# Patient Record
Sex: MALE | Race: WHITE | Employment: OTHER | URBAN - NONMETROPOLITAN AREA
[De-identification: names, ages, dates, MRNs, and addresses within clinical notes are randomized per-mention and may not be internally consistent; named-entity substitution may affect disease eponyms.]

---

## 2017-01-05 DIAGNOSIS — F90.0 ADHD (ATTENTION DEFICIT HYPERACTIVITY DISORDER), INATTENTIVE TYPE: Primary | ICD-10-CM

## 2017-01-05 RX ORDER — DEXTROAMPHETAMINE SACCHARATE, AMPHETAMINE ASPARTATE MONOHYDRATE, DEXTROAMPHETAMINE SULFATE AND AMPHETAMINE SULFATE 5; 5; 5; 5 MG/1; MG/1; MG/1; MG/1
20 CAPSULE, EXTENDED RELEASE ORAL
Qty: 60 CAPSULE | Refills: 0 | Status: SHIPPED | OUTPATIENT
Start: 2017-01-05 | End: 2017-02-02

## 2017-01-05 NOTE — TELEPHONE ENCOUNTER
Unable to leave message that the written RX is ready at the [facility]  registration to be picked up.

## 2017-02-02 DIAGNOSIS — F90.0 ADHD (ATTENTION DEFICIT HYPERACTIVITY DISORDER), INATTENTIVE TYPE: Primary | ICD-10-CM

## 2017-02-03 RX ORDER — DEXTROAMPHETAMINE SACCHARATE, AMPHETAMINE ASPARTATE MONOHYDRATE, DEXTROAMPHETAMINE SULFATE AND AMPHETAMINE SULFATE 5; 5; 5; 5 MG/1; MG/1; MG/1; MG/1
CAPSULE, EXTENDED RELEASE ORAL
Qty: 60 CAPSULE | Refills: 0 | Status: SHIPPED | OUTPATIENT
Start: 2017-02-03 | End: 2017-02-22

## 2017-02-22 ENCOUNTER — OFFICE VISIT (OUTPATIENT)
Dept: PEDIATRICS | Facility: OTHER | Age: 47
End: 2017-02-22
Attending: INTERNAL MEDICINE
Payer: MEDICARE

## 2017-02-22 VITALS
SYSTOLIC BLOOD PRESSURE: 122 MMHG | WEIGHT: 215 LBS | OXYGEN SATURATION: 99 % | BODY MASS INDEX: 29.16 KG/M2 | DIASTOLIC BLOOD PRESSURE: 80 MMHG | RESPIRATION RATE: 20 BRPM | HEART RATE: 78 BPM

## 2017-02-22 DIAGNOSIS — F31.32 BIPOLAR AFFECTIVE DISORDER, CURRENTLY DEPRESSED, MODERATE (H): Primary | ICD-10-CM

## 2017-02-22 DIAGNOSIS — F90.0 ADHD (ATTENTION DEFICIT HYPERACTIVITY DISORDER), INATTENTIVE TYPE: ICD-10-CM

## 2017-02-22 DIAGNOSIS — Z79.899 HISTORY OF ONGOING TREATMENT WITH HIGH-RISK MEDICATION: ICD-10-CM

## 2017-02-22 DIAGNOSIS — F41.1 GAD (GENERALIZED ANXIETY DISORDER): ICD-10-CM

## 2017-02-22 PROCEDURE — 99214 OFFICE O/P EST MOD 30 MIN: CPT | Performed by: INTERNAL MEDICINE

## 2017-02-22 PROCEDURE — 99212 OFFICE O/P EST SF 10 MIN: CPT

## 2017-02-22 RX ORDER — BUPROPION HYDROCHLORIDE 300 MG/1
300 TABLET ORAL EVERY MORNING
Qty: 30 TABLET | Refills: 3 | Status: SHIPPED | OUTPATIENT
Start: 2017-02-22 | End: 2017-06-02

## 2017-02-22 RX ORDER — DEXTROAMPHETAMINE SACCHARATE, AMPHETAMINE ASPARTATE MONOHYDRATE, DEXTROAMPHETAMINE SULFATE AND AMPHETAMINE SULFATE 7.5; 7.5; 7.5; 7.5 MG/1; MG/1; MG/1; MG/1
30 CAPSULE, EXTENDED RELEASE ORAL 2 TIMES DAILY
Qty: 60 CAPSULE | Refills: 0 | Status: SHIPPED | OUTPATIENT
Start: 2017-02-22 | End: 2017-03-21

## 2017-02-22 ASSESSMENT — ANXIETY QUESTIONNAIRES
3. WORRYING TOO MUCH ABOUT DIFFERENT THINGS: MORE THAN HALF THE DAYS
IF YOU CHECKED OFF ANY PROBLEMS ON THIS QUESTIONNAIRE, HOW DIFFICULT HAVE THESE PROBLEMS MADE IT FOR YOU TO DO YOUR WORK, TAKE CARE OF THINGS AT HOME, OR GET ALONG WITH OTHER PEOPLE: VERY DIFFICULT
5. BEING SO RESTLESS THAT IT IS HARD TO SIT STILL: MORE THAN HALF THE DAYS
1. FEELING NERVOUS, ANXIOUS, OR ON EDGE: MORE THAN HALF THE DAYS
GAD7 TOTAL SCORE: 17
6. BECOMING EASILY ANNOYED OR IRRITABLE: NEARLY EVERY DAY
7. FEELING AFRAID AS IF SOMETHING AWFUL MIGHT HAPPEN: NEARLY EVERY DAY
2. NOT BEING ABLE TO STOP OR CONTROL WORRYING: MORE THAN HALF THE DAYS

## 2017-02-22 ASSESSMENT — PATIENT HEALTH QUESTIONNAIRE - PHQ9: 5. POOR APPETITE OR OVEREATING: NEARLY EVERY DAY

## 2017-02-22 NOTE — MR AVS SNAPSHOT
After Visit Summary   2/22/2017    Andrei Whitman    MRN: [id number]           Patient Information     Date Of Birth          1970        Visit Information        Provider Department      2/22/2017 8:20 AM Tommy Lamb DO [place]        Today's Diagnoses     Bipolar affective disorder, currently depressed, moderate (H)    -  1    ADHD (attention deficit hyperactivity disorder), inattentive type        SONDRA (generalized anxiety disorder)        Bipolar disease, chronic (H)        Attention deficit hyperactivity disorder (ADHD), predominantly inattentive type           Follow-ups after your visit        Follow-up notes from your care team     Return in about 6 weeks (around 4/5/2017), or SONDRA and ADHD.      Your next 10 appointments already scheduled     Apr 05, 2017  9:20 AM CDT   (Arrive by 9:00 AM)   SHORT with Tommy Lamb DO   [facility] [place] ([facility] [place] [facility]    [address]  [place] [address]   [phone]              Who to contact     If you have questions or need follow up information about today's clinic visit or your schedule please contact [facility] directly at [phone].  Normal or non-critical lab and imaging results will be communicated to you by MyChart, letter or phone within 4 business days after the clinic has received the results. If you do not hear from us within 7 days, please contact the clinic through MyChart or phone. If you have a critical or abnormal lab result, we will notify you by phone as soon as possible.  Submit refill requests through Primeloopt or call your pharmacy and they will forward the refill request to us. Please allow 3 business days for your refill to be completed.          Additional Information About Your Visit        Care EveryWhere ID     This is your Care EveryWhere ID. This could be used by other organizations to access your [place] medical records  [id number]        Your  Vitals Were     Pulse Respirations Pulse Oximetry BMI (Body Mass Index)          78 20 99% 29.16 kg/m2         Blood Pressure from Last 3 Encounters:   02/22/17 122/80   12/20/16 124/80   11/15/16 124/78    Weight from Last 3 Encounters:   02/22/17 215 lb (97.5 kg)   12/20/16 215 lb (97.5 kg)   11/15/16 220 lb 6.4 oz (100 kg)              Today, you had the following     No orders found for display         Today's Medication Changes          These changes are accurate as of: 2/22/17  8:52 AM.  If you have any questions, ask your nurse or doctor.               Start taking these medicines.        Dose/Directions    amphetamine-dextroamphetamine 30 MG per 24 hr capsule   Commonly known as:  ADDERALL XR   Used for:  ADHD (attention deficit hyperactivity disorder), inattentive type, SONDRA (generalized anxiety disorder)   Replaces:  amphetamine-dextroamphetamine 20 MG per 24 hr capsule   Started by:  Tommy Lamb DO        Dose:  30 mg   Take 1 capsule (30 mg) by mouth 2 times daily   Quantity:  60 capsule   Refills:  0       buPROPion 300 MG 24 hr tablet   Commonly known as:  WELLBUTRIN XL   Used for:  Bipolar affective disorder, currently depressed, moderate (H)   Started by:  Tommy Lamb DO        Dose:  300 mg   Take 1 tablet (300 mg) by mouth every morning   Quantity:  30 tablet   Refills:  3         Stop taking these medicines if you haven't already. Please contact your care team if you have questions.     amphetamine-dextroamphetamine 20 MG per 24 hr capsule   Commonly known as:  ADDERALL XR   Replaced by:  amphetamine-dextroamphetamine 30 MG per 24 hr capsule   Stopped by:  Tommy Lamb DO           venlafaxine 75 MG tablet   Commonly known as:  EFFEXOR   Stopped by:  Tommy Lamb DO                Where to get your medicines      These medications were sent to [id number] - [place] - [address]  [address] [address]     Phone:   [phone]     buPROPion 300 MG 24 hr tablet         Some of these will need a paper prescription and others can be bought over the counter.  Ask your nurse if you have questions.     Bring a paper prescription for each of these medications     amphetamine-dextroamphetamine 30 MG per 24 hr capsule                Primary Care Provider Office Phone # Fax #    Tommy Lamb [phone]       [facility] [place] [address] [facility] [address]        Thank you!     Thank you for choosing [facility] [place]  for your care. Our goal is always to provide you with excellent care. Hearing back from our patients is one way we can continue to improve our services. Please take a few minutes to complete the written survey that you may receive in the mail after your visit with us. Thank you!             Your Updated Medication List - Protect others around you: Learn how to safely use, store and throw away your medicines at www.disposemymeds.org.          This list is accurate as of: 2/22/17  8:52 AM.  Always use your most recent med list.                   Brand Name Dispense Instructions for use    amitriptyline 25 MG tablet    ELAVIL    30 tablet    TAKE 1 TABLET BY MOUTH AT B EDTIME       amphetamine-dextroamphetamine 30 MG per 24 hr capsule    ADDERALL XR    60 capsule    Take 1 capsule (30 mg) by mouth 2 times daily       buPROPion 300 MG 24 hr tablet    WELLBUTRIN XL    30 tablet    Take 1 tablet (300 mg) by mouth every morning       gabapentin 300 MG capsule    NEURONTIN    180 capsule    TAKE 2 CAPSULES BY MOUTH TH REE TIMES DAILY       lithium 450 MG CR tablet    ESKALITH    90 tablet    Take two tablets by mouth at 8 am and take one tablet at 4 pm.       Suvorexant 10 MG tablet    BELSOMRA    30 tablet    Take 1 tablet (10 mg) by mouth nightly as needed

## 2017-02-22 NOTE — PROGRESS NOTES
SUBJECTIVE:                                                    Andrei Whitman is a 46 year old male who presents to clinic today for the following health issues:        Depression and Anxiety Follow-Up    Status since last visit: He reports overwhelming depression with a higher dose of venlafaxine and increased irritability.    Other associated symptoms:None    Complicating factors:     Significant life event: No     Current substance abuse: None    PHQ-9 SCORE 12/20/2016 2/22/2017 2/22/2017   Total Score - - -   Total Score 14 18 18     SONDRA-7 SCORE 11/15/2016 12/20/2016 2/22/2017   Total Score 16 14 17        PHQ-9  English      PHQ-9   Any Language     GAD7       Amount of exercise or physical activity: None    Problems taking medications regularly: No    Medication side effects: spacey feeling, doesn't feel good on the Effexor   Diet: regular (no restrictions)    Inattention:  He feels that his his concentration is poor and that he is constantly thinking about the next thing he has to do.      Problem list and histories reviewed & adjusted, as indicated.  Additional history: as documented    Patient Active Problem List   Diagnosis     Cervicalgia     Lower back pain     Piriformis syndrome     Segmental and somatic dysfunction of lower extremity     GERD (gastroesophageal reflux disease)     Constipation     Sorethroat     Mood disorder (H)     Elevated blood pressure     Abnormal weight gain     Attention deficit hyperactivity disorder (ADHD), predominantly inattentive type     ACP (advance care planning)     Flatulence, eructation, and gas pain     Bipolar disease, chronic (H)     Routine general medical examination at a health care facility     Past Surgical History   Procedure Laterality Date     Appendectomy       age 14     Ent surgery  age 18     tonsils     Orthopedic surgery  age 30      back and neck fusion, bone from hip removed to use on plates     Orthopedic surgery  age 30     L5 fusion,  laminectomy of lumbar discs       Social History   Substance Use Topics     Smoking status: Current Every Day Smoker     Packs/day: 1.00     Years: 20.00     Smokeless tobacco: Never Used     Alcohol use No     Family History   Problem Relation Age of Onset     Asthma Mother      Arthritis Mother      Prostate Cancer Father      HEART DISEASE Paternal Grandfather      Hypertension Paternal Grandfather      Alcohol/Drug Paternal Grandfather      Other - See Comments Brother      Nerve and degenerative disease mild     Alcohol/Drug Brother      Other - See Comments Sister 21     Hip replacements, nerve, degerative disease     Alcohol/Drug Maternal Grandfather      Unknown/Adopted Paternal Grandmother      Other Cancer Paternal Aunt      Cervical cancer           ROS:  C: NEGATIVE for fever, chills, change in weight  CONSTITUTIONAL:poor appetite  E/M: NEGATIVE for ear, mouth and throat problems  R: NEGATIVE for significant cough or SOB  CV: NEGATIVE for chest pain, palpitations or peripheral edema  GI: NEGATIVE for nausea, abdominal pain, heartburn, or change in bowel habits  : NEGATIVE for frequency, dysuria, or hematuria  MUSCULOSKELETAL:back pain and neck pain  N: NEGATIVE for weakness, dizziness or paresthesias  PSYCHIATRIC: See HPI    OBJECTIVE:                                                    /80 (BP Location: Left arm, Patient Position: Chair, Cuff Size: Adult Large)  Pulse 78  Resp 20  Wt 215 lb (97.5 kg)  SpO2 99%  BMI 29.16 kg/m2  Body mass index is 29.16 kg/(m^2).  GENERAL: healthy, alert and no distress  NECK: no adenopathy, no asymmetry, masses, or scars and thyroid normal to palpation  RESP: lungs clear to auscultation - no rales, rhonchi or wheezes  CV: regular rate and rhythm, normal S1 S2, no S3 or S4, no murmur, click or rub, no peripheral edema and peripheral pulses strong  MS: no gross musculoskeletal defects noted, no edema  PSYCH: mentation appears normal, affect normal/bright  PSYCH:  anxious    Diagnostic Test Results:  none      ASSESSMENT/PLAN:                                                    (F31.32) Bipolar affective disorder, currently depressed, moderate (H)  (primary encounter diagnosis)  Comment: Increased agitation which is most likely due to medication side effect and under treated ADHD.  Plan:   Restart buPROPion (WELLBUTRIN XL) 300 MG 24 hr tablet and discontinue venlafaxine.  He will continue his current dosing of Lithium.           (F90.0) ADHD (attention deficit hyperactivity disorder), inattentive type  Comment: Poor control as he reports poor focus.  Plan:  Increase  amphetamine-dextroamphetamine (ADDERALL XR) from 20 MG to 30         MG per 24 hr capsule BID      (F41.1) SONDRA (generalized anxiety disorder)  Comment: Most likely increased anxiety is due poor control of his inattention.  Plan:   amphetamine-dextroamphetamine (ADDERALL XR) 30 MG per 24 hr capsule BID.                    FUTURE APPOINTMENTS:       - Follow-up visit in 6 weeks for SONDRA and ADHD    Tommy Lamb DO, DO  [facility]

## 2017-02-23 ASSESSMENT — PATIENT HEALTH QUESTIONNAIRE - PHQ9: SUM OF ALL RESPONSES TO PHQ QUESTIONS 1-9: 18

## 2017-02-23 ASSESSMENT — ANXIETY QUESTIONNAIRES: GAD7 TOTAL SCORE: 17

## 2017-03-21 DIAGNOSIS — F41.1 GAD (GENERALIZED ANXIETY DISORDER): ICD-10-CM

## 2017-03-21 DIAGNOSIS — F90.0 ADHD (ATTENTION DEFICIT HYPERACTIVITY DISORDER), INATTENTIVE TYPE: ICD-10-CM

## 2017-03-21 RX ORDER — DEXTROAMPHETAMINE SACCHARATE, AMPHETAMINE ASPARTATE MONOHYDRATE, DEXTROAMPHETAMINE SULFATE AND AMPHETAMINE SULFATE 7.5; 7.5; 7.5; 7.5 MG/1; MG/1; MG/1; MG/1
30 CAPSULE, EXTENDED RELEASE ORAL 2 TIMES DAILY
Qty: 60 CAPSULE | Refills: 0 | Status: SHIPPED | OUTPATIENT
Start: 2017-03-21 | End: 2017-04-20

## 2017-04-04 DIAGNOSIS — F39 MOOD DISORDER (H): ICD-10-CM

## 2017-04-04 RX ORDER — LITHIUM CARBONATE 450 MG
TABLET, EXTENDED RELEASE ORAL
Qty: 90 TABLET | Refills: 1 | Status: SHIPPED | OUTPATIENT
Start: 2017-04-04 | End: 2017-04-05

## 2017-04-04 NOTE — TELEPHONE ENCOUNTER
lithium     Last Written Prescription Date: 3/6/17  Last Fill Quantity: 90, # refills: 0  [place] Office Visit with [facility] or [facility] prescribing provider: 2/22/17  Next 5 appointments (look out 90 days)     Apr 05, 2017  9:20 AM CDT   (Arrive by 9:00 AM)   SHORT with Tommy Lamb [facility] [place] ([facility] [place] [facility])    [address]  [place] [address]   [phone]                   Lab Results   Component Value Date    ALT 21 11/15/2016     No results found for: WBC  No results found for: RBC  No results found for: HGB  No results found for: HCT  No components found for: MCT  No results found for: MCV  No results found for: MCH  No results found for: MCHC  No results found for: RDW  No results found for: PLT  TSH   Date Value Ref Range Status   06/20/2016 1.49 0.40 - 4.00 mU/L Final     Creatinine   Date Value Ref Range Status   11/15/2016 0.94 0.66 - 1.25 mg/dL Final       Drug Levels  Depakote: No results found for this or any previous visit.  Dilantin: No results found for this or any previous visit.  Gabitril: No results found for this or any previous visit.  Tegretol: No results found for this or any previous visit.  Zonegran: No results found for this or any previous visit.

## 2017-04-05 ENCOUNTER — OFFICE VISIT (OUTPATIENT)
Dept: PEDIATRICS | Facility: OTHER | Age: 47
End: 2017-04-05
Attending: FAMILY MEDICINE
Payer: MEDICARE

## 2017-04-05 VITALS
WEIGHT: 223 LBS | TEMPERATURE: 97 F | HEIGHT: 72 IN | SYSTOLIC BLOOD PRESSURE: 130 MMHG | BODY MASS INDEX: 30.2 KG/M2 | HEART RATE: 85 BPM | OXYGEN SATURATION: 98 % | RESPIRATION RATE: 16 BRPM | DIASTOLIC BLOOD PRESSURE: 90 MMHG

## 2017-04-05 DIAGNOSIS — F31.32 BIPOLAR AFFECTIVE DISORDER, CURRENTLY DEPRESSED, MODERATE (H): ICD-10-CM

## 2017-04-05 DIAGNOSIS — Z79.899 HISTORY OF ONGOING TREATMENT WITH HIGH-RISK MEDICATION: ICD-10-CM

## 2017-04-05 DIAGNOSIS — F39 MOOD DISORDER (H): Primary | ICD-10-CM

## 2017-04-05 LAB
ALBUMIN SERPL-MCNC: 3.8 G/DL (ref 3.4–5)
ALBUMIN UR-MCNC: NEGATIVE MG/DL
ALP SERPL-CCNC: 94 U/L (ref 40–150)
ALT SERPL W P-5'-P-CCNC: 18 U/L (ref 0–70)
ANION GAP SERPL CALCULATED.3IONS-SCNC: 7 MMOL/L (ref 3–14)
APPEARANCE UR: CLEAR
AST SERPL W P-5'-P-CCNC: 18 U/L (ref 0–45)
BILIRUB SERPL-MCNC: 0.2 MG/DL (ref 0.2–1.3)
BILIRUB UR QL STRIP: NEGATIVE
BUN SERPL-MCNC: 4 MG/DL (ref 7–30)
CALCIUM SERPL-MCNC: 8.8 MG/DL (ref 8.5–10.1)
CHLORIDE SERPL-SCNC: 108 MMOL/L (ref 94–109)
CO2 SERPL-SCNC: 26 MMOL/L (ref 20–32)
COLOR UR AUTO: ABNORMAL
CREAT SERPL-MCNC: 0.8 MG/DL (ref 0.66–1.25)
GFR SERPL CREATININE-BSD FRML MDRD: ABNORMAL ML/MIN/1.7M2
GLUCOSE SERPL-MCNC: 103 MG/DL (ref 70–99)
GLUCOSE UR STRIP-MCNC: NEGATIVE MG/DL
HGB UR QL STRIP: NEGATIVE
KETONES UR STRIP-MCNC: NEGATIVE MG/DL
LEUKOCYTE ESTERASE UR QL STRIP: NEGATIVE
NITRATE UR QL: NEGATIVE
PH UR STRIP: 7 PH (ref 4.7–8)
POTASSIUM SERPL-SCNC: 4.2 MMOL/L (ref 3.4–5.3)
PROT SERPL-MCNC: 7.9 G/DL (ref 6.8–8.8)
SODIUM SERPL-SCNC: 141 MMOL/L (ref 133–144)
SP GR UR STRIP: 1 (ref 1–1.03)
T4 FREE SERPL-MCNC: 0.85 NG/DL (ref 0.76–1.46)
TSH SERPL DL<=0.05 MIU/L-ACNC: 1.93 MU/L (ref 0.4–4)
URN SPEC COLLECT METH UR: ABNORMAL
UROBILINOGEN UR STRIP-MCNC: NORMAL MG/DL (ref 0–2)

## 2017-04-05 PROCEDURE — 80053 COMPREHEN METABOLIC PANEL: CPT | Performed by: INTERNAL MEDICINE

## 2017-04-05 PROCEDURE — 84439 ASSAY OF FREE THYROXINE: CPT | Performed by: INTERNAL MEDICINE

## 2017-04-05 PROCEDURE — 36415 COLL VENOUS BLD VENIPUNCTURE: CPT | Performed by: INTERNAL MEDICINE

## 2017-04-05 PROCEDURE — 84443 ASSAY THYROID STIM HORMONE: CPT | Performed by: INTERNAL MEDICINE

## 2017-04-05 PROCEDURE — 81003 URINALYSIS AUTO W/O SCOPE: CPT | Performed by: INTERNAL MEDICINE

## 2017-04-05 PROCEDURE — 99207 ZZC NO CHARGE NURSE ONLY: CPT | Performed by: INTERNAL MEDICINE

## 2017-04-05 ASSESSMENT — PAIN SCALES - GENERAL: PAINLEVEL: MODERATE PAIN (5)

## 2017-04-05 NOTE — MR AVS SNAPSHOT
"              After Visit Summary   4/5/2017    Andrei Whitman    MRN: [id number]           Patient Information     Date Of Birth          1970        Visit Information        Provider Department      4/5/2017 9:20 AM Tommy Lamb DO [facility] [place]         Follow-ups after your visit        Your next 10 appointments already scheduled     Apr 05, 2017  9:20 AM CDT   (Arrive by 9:00 AM)   SHORT with Tommy Lamb DO   [facility] [place] ([facility] [place] [facility]    [address]  [place] [address]   [phone]            May 16, 2017  8:40 AM CDT   (Arrive by 8:20 AM)   SHORT with Tommy Lamb DO   [facility] [place] ([facility] [place] [facility])    [address]  [place] [address]   [phone]              Who to contact     If you have questions or need follow up information about today's clinic visit or your schedule please contact [facility] [place] directly at [phone].  Normal or non-critical lab and imaging results will be communicated to you by MyChart, letter or phone within 4 business days after the clinic has received the results. If you do not hear from us within 7 days, please contact the clinic through 3scalehart or phone. If you have a critical or abnormal lab result, we will notify you by phone as soon as possible.  Submit refill requests through Plixi or call your pharmacy and they will forward the refill request to us. Please allow 3 business days for your refill to be completed.          Additional Information About Your Visit        3scaleharPeriphaGen Information     Plixi lets you send messages to your doctor, view your test results, renew your prescriptions, schedule appointments and more. To sign up, go to [email or web address] . Click on \"Log in\" on the left side of the screen, which will take you to the Welcome page. Then click on \"Sign up Now\" on the right side of the page.     You will be asked to enter the access code listed " below, as well as some personal information. Please follow the directions to create your username and password.     Your access code is: [email or web address]  Expires: 2017  8:56 AM     Your access code will  in 90 days. If you need help or a new code, please call your [facility] or [phone].        Care EveryWhere ID     This is your Care EveryWhere ID. This could be used by other organizations to access your [place] medical records  [id number]        Your Vitals Were     Pulse Temperature Respirations Height Pulse Oximetry BMI (Body Mass Index)    85 97  F (36.1  C) 16 6' (1.829 m) 98% 30.24 kg/m2       Blood Pressure from Last 3 Encounters:   17 130/90   17 122/80   16 124/80    Weight from Last 3 Encounters:   17 223 lb (101.2 kg)   17 215 lb (97.5 kg)   16 215 lb (97.5 kg)              Today, you had the following     No orders found for display         Today's Medication Changes          These changes are accurate as of: 17  8:56 AM.  If you have any questions, ask your nurse or doctor.               These medicines have changed or have updated prescriptions.        Dose/Directions    lithium 450 MG CR tablet   Commonly known as:  ESKALITH   This may have changed:  Another medication with the same name was removed. Continue taking this medication, and follow the directions you see here.   Used for:  Mood disorder (H)   Changed by:  Tommy Lamb DO        Take two tablets by mouth at 8 am and take one tablet at 4 pm.   Quantity:  90 tablet   Refills:  3                Primary Care Provider Office Phone # Fax #    Tommy Lamb [phone]       [facility] [place] [address]  [place] [address]        Thank you!     Thank you for choosing [facility]  for your care. Our goal is always to provide you with excellent care. Hearing back from our patients is one way we can continue to improve our  services. Please take a few minutes to complete the written survey that you may receive in the mail after your visit with us. Thank you!             Your Updated Medication List - Protect others around you: Learn how to safely use, store and throw away your medicines at www.disposemymeds.org.          This list is accurate as of: 4/5/17  8:56 AM.  Always use your most recent med list.                   Brand Name Dispense Instructions for use    amitriptyline 25 MG tablet    ELAVIL    30 tablet    TAKE 1 TABLET BY MOUTH AT B EDTIME       amphetamine-dextroamphetamine 30 MG per 24 hr capsule    ADDERALL XR    60 capsule    Take 1 capsule (30 mg) by mouth 2 times daily       buPROPion 300 MG 24 hr tablet    WELLBUTRIN XL    30 tablet    Take 1 tablet (300 mg) by mouth every morning       gabapentin 300 MG capsule    NEURONTIN    180 capsule    TAKE 2 CAPSULES BY MOUTH TH REE TIMES DAILY       lithium 450 MG CR tablet    ESKALITH    90 tablet    Take two tablets by mouth at 8 am and take one tablet at 4 pm.       Suvorexant 10 MG tablet    BELSOMRA    30 tablet    Take 1 tablet (10 mg) by mouth nightly as needed

## 2017-04-05 NOTE — PROGRESS NOTES
HPI      ROS      Physical Exam    NO CHARGE. NURSE ONLY. MD did not get to see patient as he was called up for an emergency.

## 2017-04-05 NOTE — NURSING NOTE
Chief Complaint   Patient presents with     Recheck Medication       Initial /90  Pulse 85  Temp 97  F (36.1  C)  Resp 16  Ht 6' (1.829 m)  Wt 223 lb (101.2 kg)  SpO2 98%  BMI 30.24 kg/m2 Estimated body mass index is 30.24 kg/(m^2) as calculated from the following:    Height as of this encounter: 6' (1.829 m).    Weight as of this encounter: 223 lb (101.2 kg).  Medication Reconciliation: complete     Summer Siddiqui

## 2017-04-19 ENCOUNTER — TELEPHONE (OUTPATIENT)
Dept: FAMILY MEDICINE | Facility: OTHER | Age: 47
End: 2017-04-19

## 2017-04-19 ENCOUNTER — TELEPHONE (OUTPATIENT)
Dept: PEDIATRICS | Facility: OTHER | Age: 47
End: 2017-04-19

## 2017-04-19 DIAGNOSIS — R82.90 ABNORMAL URINALYSIS: Primary | ICD-10-CM

## 2017-04-19 DIAGNOSIS — Z53.9 ERRONEOUS ENCOUNTER--DISREGARD: ICD-10-CM

## 2017-04-19 LAB
ALBUMIN UR-MCNC: 10 MG/DL
APPEARANCE UR: CLEAR
BACTERIA #/AREA URNS HPF: ABNORMAL /HPF
BILIRUB UR QL STRIP: NEGATIVE
COLOR UR AUTO: ABNORMAL
GLUCOSE UR STRIP-MCNC: NEGATIVE MG/DL
HGB UR QL STRIP: NEGATIVE
KETONES UR STRIP-MCNC: NEGATIVE MG/DL
LEUKOCYTE ESTERASE UR QL STRIP: NEGATIVE
NITRATE UR QL: NEGATIVE
PH UR STRIP: 6.5 PH (ref 4.7–8)
RBC #/AREA URNS AUTO: 0 /HPF (ref 0–2)
SP GR UR STRIP: 1 (ref 1–1.03)
SQUAMOUS #/AREA URNS AUTO: 1 /HPF (ref 0–1)
URN SPEC COLLECT METH UR: ABNORMAL
UROBILINOGEN UR STRIP-MCNC: NORMAL MG/DL (ref 0–2)
WBC #/AREA URNS AUTO: <1 /HPF (ref 0–2)

## 2017-04-19 PROCEDURE — 81001 URINALYSIS AUTO W/SCOPE: CPT | Performed by: INTERNAL MEDICINE

## 2017-04-19 NOTE — TELEPHONE ENCOUNTER
Patient here for lab only visit for follow up UA as ordered by PCP Dr. Lamb. Order placed per written order that was found on last UA result note.  Kelly Quick LPN

## 2017-04-20 DIAGNOSIS — F90.0 ADHD (ATTENTION DEFICIT HYPERACTIVITY DISORDER), INATTENTIVE TYPE: ICD-10-CM

## 2017-04-20 DIAGNOSIS — F41.1 GAD (GENERALIZED ANXIETY DISORDER): ICD-10-CM

## 2017-04-20 RX ORDER — DEXTROAMPHETAMINE SACCHARATE, AMPHETAMINE ASPARTATE MONOHYDRATE, DEXTROAMPHETAMINE SULFATE AND AMPHETAMINE SULFATE 7.5; 7.5; 7.5; 7.5 MG/1; MG/1; MG/1; MG/1
30 CAPSULE, EXTENDED RELEASE ORAL 2 TIMES DAILY
Qty: 60 CAPSULE | Refills: 0 | Status: SHIPPED | OUTPATIENT
Start: 2017-04-20 | End: 2017-05-16

## 2017-04-20 NOTE — TELEPHONE ENCOUNTER
Pt notified that the written RX is ready at the [facility]  registration to be picked up.

## 2017-04-20 NOTE — TELEPHONE ENCOUNTER
adderall      Last Written Prescription Date: 3/21/17  Last Fill Quantity: 30,  # refills: 0   Last Office Visit with [facility], [facility] or [facility] prescribing provider: 4/5/17                                         Next 5 appointments (look out 90 days)     May 16, 2017  8:40 AM CDT   (Arrive by 8:20 AM)   SHORT with Tommy Lamb DO   [facility]    [address] [place] [address]   [phone]

## 2017-05-16 ENCOUNTER — OFFICE VISIT (OUTPATIENT)
Dept: PEDIATRICS | Facility: OTHER | Age: 47
End: 2017-05-16
Attending: INTERNAL MEDICINE
Payer: MEDICARE

## 2017-05-16 VITALS
HEART RATE: 88 BPM | TEMPERATURE: 97.9 F | SYSTOLIC BLOOD PRESSURE: 130 MMHG | OXYGEN SATURATION: 98 % | DIASTOLIC BLOOD PRESSURE: 90 MMHG

## 2017-05-16 DIAGNOSIS — L03.119 CELLULITIS AND ABSCESS OF LEG, EXCEPT FOOT: ICD-10-CM

## 2017-05-16 DIAGNOSIS — R80.9 PROTEIN IN URINE: ICD-10-CM

## 2017-05-16 DIAGNOSIS — L02.419 CELLULITIS AND ABSCESS OF LEG, EXCEPT FOOT: ICD-10-CM

## 2017-05-16 DIAGNOSIS — K21.9 GASTROESOPHAGEAL REFLUX DISEASE, ESOPHAGITIS PRESENCE NOT SPECIFIED: ICD-10-CM

## 2017-05-16 DIAGNOSIS — R10.13 EPIGASTRIC PAIN: Primary | ICD-10-CM

## 2017-05-16 LAB
ALBUMIN UR-MCNC: NEGATIVE MG/DL
APPEARANCE UR: CLEAR
BILIRUB UR QL STRIP: NEGATIVE
COLOR UR AUTO: ABNORMAL
ERYTHROCYTE [DISTWIDTH] IN BLOOD BY AUTOMATED COUNT: 14 % (ref 10–15)
GLUCOSE UR STRIP-MCNC: NEGATIVE MG/DL
HCT VFR BLD AUTO: 41.6 % (ref 40–53)
HGB BLD-MCNC: 13.8 G/DL (ref 13.3–17.7)
HGB UR QL STRIP: NEGATIVE
KETONES UR STRIP-MCNC: NEGATIVE MG/DL
LEUKOCYTE ESTERASE UR QL STRIP: NEGATIVE
MCH RBC QN AUTO: 29.2 PG (ref 26.5–33)
MCHC RBC AUTO-ENTMCNC: 33.2 G/DL (ref 31.5–36.5)
MCV RBC AUTO: 88 FL (ref 78–100)
NITRATE UR QL: NEGATIVE
PH UR STRIP: 7 PH (ref 4.7–8)
PLATELET # BLD AUTO: 249 10E9/L (ref 150–450)
RBC # BLD AUTO: 4.72 10E12/L (ref 4.4–5.9)
SP GR UR STRIP: 1 (ref 1–1.03)
URN SPEC COLLECT METH UR: ABNORMAL
UROBILINOGEN UR STRIP-MCNC: NORMAL MG/DL (ref 0–2)
WBC # BLD AUTO: 7.4 10E9/L (ref 4–11)

## 2017-05-16 PROCEDURE — 99212 OFFICE O/P EST SF 10 MIN: CPT

## 2017-05-16 PROCEDURE — 36415 COLL VENOUS BLD VENIPUNCTURE: CPT | Mod: ZL | Performed by: INTERNAL MEDICINE

## 2017-05-16 PROCEDURE — 81003 URINALYSIS AUTO W/O SCOPE: CPT | Mod: ZL | Performed by: INTERNAL MEDICINE

## 2017-05-16 PROCEDURE — 99214 OFFICE O/P EST MOD 30 MIN: CPT | Performed by: INTERNAL MEDICINE

## 2017-05-16 PROCEDURE — 85027 COMPLETE CBC AUTOMATED: CPT | Mod: ZL | Performed by: INTERNAL MEDICINE

## 2017-05-16 RX ORDER — DEXTROAMPHETAMINE SACCHARATE, AMPHETAMINE ASPARTATE MONOHYDRATE, DEXTROAMPHETAMINE SULFATE AND AMPHETAMINE SULFATE 7.5; 7.5; 7.5; 7.5 MG/1; MG/1; MG/1; MG/1
30 CAPSULE, EXTENDED RELEASE ORAL 2 TIMES DAILY
Qty: 60 CAPSULE | Refills: 0 | Status: SHIPPED | OUTPATIENT
Start: 2017-05-16 | End: 2017-06-15

## 2017-05-16 RX ORDER — SULFAMETHOXAZOLE/TRIMETHOPRIM 800-160 MG
1 TABLET ORAL 2 TIMES DAILY
Qty: 14 TABLET | Refills: 0 | Status: SHIPPED | OUTPATIENT
Start: 2017-05-16 | End: 2017-05-23

## 2017-05-16 ASSESSMENT — ENCOUNTER SYMPTOMS
ABDOMINAL PAIN: 1
CONSTIPATION: 0
HEADACHES: 1
DIARRHEA: 0
VOMITING: 0
SHORTNESS OF BREATH: 0
HEARTBURN: 0
DYSURIA: 0
CHILLS: 0
WHEEZING: 0
HEMATURIA: 0
COUGH: 1
PALPITATIONS: 0
SPUTUM PRODUCTION: 1
FEVER: 0
NAUSEA: 1
BLOOD IN STOOL: 0
DIZZINESS: 0
BRUISES/BLEEDS EASILY: 0

## 2017-05-16 ASSESSMENT — PAIN SCALES - GENERAL: PAINLEVEL: MODERATE PAIN (5)

## 2017-05-16 NOTE — NURSING NOTE
Chief Complaint   Patient presents with     RECHECK     abdominal pain, protein in urine, 3rd Rt finger pain, boil Rt lower abdomen       Initial /90  Pulse 88  Temp 97.9  F (36.6  C)  SpO2 98% Estimated body mass index is 30.24 kg/(m^2) as calculated from the following:    Height as of 4/5/17: 6' (1.829 m).    Weight as of 4/5/17: 223 lb (101.2 kg).  Medication Reconciliation: complete     Summer Siddiqui

## 2017-05-16 NOTE — PROGRESS NOTES
HPI  Patient is a 47 yo male with long standing on and off abdominal pain who presents for a follow up on this problem.  He continues to use tobacco.  He does not use NSAIDs.  He has been drinking excessive caffeine loaded soda.  He reports that he has been having pain over the epigastric region immediatly after eating or drinking.      He also is concerned about his protein in his urine and would like this rechecked as he is on lithium.        Past Medical History:   Diagnosis Date     Bipolar disorder (H)      Chronic cervical pain      DDD (degenerative disc disease), cervical      Depression, major        Past Surgical History:   Procedure Laterality Date     APPENDECTOMY      age 14     ENT SURGERY  age 18    tonsils     ORTHOPEDIC SURGERY  age 30     back and neck fusion, bone from hip removed to use on plates     ORTHOPEDIC SURGERY  age 30    L5 fusion, laminectomy of lumbar discs       Review of Systems   Constitutional: Negative for chills and fever.   Respiratory: Positive for cough and sputum production. Negative for shortness of breath and wheezing.    Cardiovascular: Negative for chest pain, palpitations and leg swelling.   Gastrointestinal: Positive for abdominal pain and nausea. Negative for blood in stool, constipation, diarrhea, heartburn, melena and vomiting.   Genitourinary: Negative for dysuria and hematuria.   Neurological: Positive for headaches. Negative for dizziness.   Endo/Heme/Allergies: Does not bruise/bleed easily.         Physical Exam   Constitutional: He is oriented to person, place, and time and well-developed, well-nourished, and in no distress. No distress.   HENT:   Head: Normocephalic.   Mouth/Throat: No oropharyngeal exudate.   Eyes: Conjunctivae are normal. No scleral icterus.   Cardiovascular: Normal rate, regular rhythm, normal heart sounds and intact distal pulses.    No murmur heard.  Pulses:       Radial pulses are 2+ on the right side, and 2+ on the left side.    Pulmonary/Chest: Effort normal and breath sounds normal. He has no wheezes. He has no rales.   Abdominal: Soft. Bowel sounds are normal. He exhibits no shifting dullness, no distension, no pulsatile midline mass and no mass. There is no hepatosplenomegaly. There is no tenderness.   Musculoskeletal: He exhibits no edema.   Neurological: He is alert and oriented to person, place, and time.   Skin:        Psychiatric: Mood, memory, affect and judgment normal.       Labs:  Results for orders placed or performed in visit on 05/16/17   CBC with platelets   Result Value Ref Range    WBC 7.4 4.0 - 11.0 10e9/L    RBC Count 4.72 4.4 - 5.9 10e12/L    Hemoglobin 13.8 13.3 - 17.7 g/dL    Hematocrit 41.6 40.0 - 53.0 %    MCV 88 78 - 100 fl    MCH 29.2 26.5 - 33.0 pg    MCHC 33.2 31.5 - 36.5 g/dL    RDW 14.0 10.0 - 15.0 %    Platelet Count 249 150 - 450 10e9/L   UA reflex to Microscopic   Result Value Ref Range    Color Urine Straw     Appearance Urine Clear     Glucose Urine Negative NEG mg/dL    Bilirubin Urine Negative NEG    Ketones Urine Negative NEG mg/dL    Specific Gravity Urine 1.001 (L) 1.003 - 1.035    Blood Urine Negative NEG    pH Urine 7.0 4.7 - 8.0 pH    Protein Albumin Urine Negative NEG mg/dL    Urobilinogen mg/dL Normal 0.0 - 2.0 mg/dL    Nitrite Urine Negative NEG    Leukocyte Esterase Urine Negative NEG    Source Midstream Urine            Imaging:  NA      ASSESSMENT /PLAN:  (R10.13) Epigastric pain  (primary encounter diagnosis)  Comment: Patient has a long standing history of epigastric pain which comes and goes with continuance of smoking tobacco despite being advised to quits as this is a gatsric irritant.  His hemoglobin is normal.  He needs an EGD.  Plan:   GENERAL SURG ADULT REFERRAL,     (K21.9) Gastroesophageal reflux disease, esophagitis presence not specified  Comment: He has been on PPI and Histamin blockers in the past for several months.  He should have an EGD before being placed back on the se  medications.  Plan:  GENERAL SURG ADULT REFERRAL,  He agrees to cut down on his soda's with caffeine.    (R80.9) Protein in urine  Comment: Resolved.  Transient.  Plan:   Resolved    (L02.419,  L03.119) Cellulitis and abscess of leg, except foot  Comment: Abdomen abscess  Plan:   sulfamethoxazole-trimethoprim (BACTRIM  DS/SEPTRA DS) 800-160 MG per tablet BID for 7 days.            Follow up with Provider - 3 months for anxiety and depression.        Tommymarquita Lamb, DO

## 2017-06-02 DIAGNOSIS — F31.32 BIPOLAR AFFECTIVE DISORDER, CURRENTLY DEPRESSED, MODERATE (H): ICD-10-CM

## 2017-06-03 DIAGNOSIS — F39 MOOD DISORDER (H): ICD-10-CM

## 2017-06-05 RX ORDER — BUPROPION HYDROCHLORIDE 300 MG/1
TABLET ORAL
Qty: 30 TABLET | Refills: 5 | Status: SHIPPED | OUTPATIENT
Start: 2017-06-05 | End: 2017-09-06 | Stop reason: DRUGHIGH

## 2017-06-05 NOTE — TELEPHONE ENCOUNTER
Wellbutrin XL       Last Written Prescription Date: 2/22/2017  Last Fill Quantity: 30; # refills: 3  Last Office Visit with [facility], [facility] or [facility] prescribing provider:  5/16/2017   Next 5 appointments (look out 90 days)     Aug 18, 2017  9:20 AM CDT   (Arrive by 9:00 AM)   SHORT with Tommy Lamb DO   [facility] ([facility])    [address] [place] [address]   [phone]                   Last PHQ-9 score on record=   PHQ-9 SCORE 2/22/2017   Total Score 18       Lab Results   Component Value Date    AST 18 04/05/2017     Lab Results   Component Value Date    ALT 18 04/05/2017

## 2017-06-06 RX ORDER — LITHIUM CARBONATE 450 MG
TABLET, EXTENDED RELEASE ORAL
Qty: 90 TABLET | Refills: 0 | Status: SHIPPED | OUTPATIENT
Start: 2017-06-06 | End: 2017-07-10

## 2017-06-08 ENCOUNTER — HOSPITAL ENCOUNTER (EMERGENCY)
Facility: HOSPITAL | Age: 47
Discharge: HOME | End: 2017-06-08
Attending: PHYSICIAN ASSISTANT | Admitting: PHYSICIAN ASSISTANT
Payer: MEDICARE

## 2017-06-08 VITALS
OXYGEN SATURATION: 96 % | SYSTOLIC BLOOD PRESSURE: 150 MMHG | RESPIRATION RATE: 16 BRPM | DIASTOLIC BLOOD PRESSURE: 92 MMHG | HEIGHT: 72 IN | TEMPERATURE: 98.1 F

## 2017-06-08 DIAGNOSIS — R10.84 CHRONIC GENERALIZED ABDOMINAL PAIN: ICD-10-CM

## 2017-06-08 DIAGNOSIS — G89.29 CHRONIC GENERALIZED ABDOMINAL PAIN: ICD-10-CM

## 2017-06-08 LAB
ALBUMIN SERPL-MCNC: 4 G/DL (ref 3.4–5)
ALBUMIN UR-MCNC: 10 MG/DL
ALP SERPL-CCNC: 91 U/L (ref 40–150)
ALT SERPL W P-5'-P-CCNC: 24 U/L (ref 0–70)
ANION GAP SERPL CALCULATED.3IONS-SCNC: 7 MMOL/L (ref 3–14)
APPEARANCE UR: CLEAR
AST SERPL W P-5'-P-CCNC: 34 U/L (ref 0–45)
BACTERIA #/AREA URNS HPF: ABNORMAL /HPF
BASOPHILS # BLD AUTO: 0 10E9/L (ref 0–0.2)
BASOPHILS NFR BLD AUTO: 0.4 %
BILIRUB SERPL-MCNC: 1 MG/DL (ref 0.2–1.3)
BILIRUB UR QL STRIP: NEGATIVE
BUN SERPL-MCNC: 9 MG/DL (ref 7–30)
CALCIUM SERPL-MCNC: 9.3 MG/DL (ref 8.5–10.1)
CHLORIDE SERPL-SCNC: 103 MMOL/L (ref 94–109)
CO2 SERPL-SCNC: 27 MMOL/L (ref 20–32)
COLOR UR AUTO: YELLOW
CREAT SERPL-MCNC: 1.03 MG/DL (ref 0.66–1.25)
CRP SERPL-MCNC: 18.1 MG/L (ref 0–8)
DIFFERENTIAL METHOD BLD: ABNORMAL
EOSINOPHIL # BLD AUTO: 0.3 10E9/L (ref 0–0.7)
EOSINOPHIL NFR BLD AUTO: 3.1 %
ERYTHROCYTE [DISTWIDTH] IN BLOOD BY AUTOMATED COUNT: 14.4 % (ref 10–15)
GFR SERPL CREATININE-BSD FRML MDRD: 78 ML/MIN/1.7M2
GLUCOSE SERPL-MCNC: 91 MG/DL (ref 70–99)
GLUCOSE UR STRIP-MCNC: NEGATIVE MG/DL
HCT VFR BLD AUTO: 38.5 % (ref 40–53)
HGB BLD-MCNC: 12.8 G/DL (ref 13.3–17.7)
HGB UR QL STRIP: NEGATIVE
IMM GRANULOCYTES # BLD: 0.1 10E9/L (ref 0–0.4)
IMM GRANULOCYTES NFR BLD: 0.6 %
KETONES UR STRIP-MCNC: NEGATIVE MG/DL
LEUKOCYTE ESTERASE UR QL STRIP: ABNORMAL
LITHIUM SERPL-SCNC: 1 MMOL/L (ref 0.6–1.2)
LYMPHOCYTES # BLD AUTO: 1.7 10E9/L (ref 0.8–5.3)
LYMPHOCYTES NFR BLD AUTO: 15.5 %
MCH RBC QN AUTO: 29.2 PG (ref 26.5–33)
MCHC RBC AUTO-ENTMCNC: 33.2 G/DL (ref 31.5–36.5)
MCV RBC AUTO: 88 FL (ref 78–100)
MONOCYTES # BLD AUTO: 0.9 10E9/L (ref 0–1.3)
MONOCYTES NFR BLD AUTO: 8.7 %
MUCOUS THREADS #/AREA URNS LPF: PRESENT /LPF
NEUTROPHILS # BLD AUTO: 7.7 10E9/L (ref 1.6–8.3)
NEUTROPHILS NFR BLD AUTO: 71.7 %
NITRATE UR QL: NEGATIVE
NRBC # BLD AUTO: 0 10*3/UL
NRBC BLD AUTO-RTO: 0 /100
PH UR STRIP: 6.5 PH (ref 4.7–8)
PLATELET # BLD AUTO: 208 10E9/L (ref 150–450)
POTASSIUM SERPL-SCNC: 3.5 MMOL/L (ref 3.4–5.3)
PROT SERPL-MCNC: 8.1 G/DL (ref 6.8–8.8)
RBC # BLD AUTO: 4.39 10E12/L (ref 4.4–5.9)
RBC #/AREA URNS AUTO: 1 /HPF (ref 0–2)
SODIUM SERPL-SCNC: 137 MMOL/L (ref 133–144)
SP GR UR STRIP: 1.01 (ref 1–1.03)
SQUAMOUS #/AREA URNS AUTO: 1 /HPF (ref 0–1)
URN SPEC COLLECT METH UR: ABNORMAL
UROBILINOGEN UR STRIP-MCNC: NORMAL MG/DL (ref 0–2)
WBC # BLD AUTO: 10.7 10E9/L (ref 4–11)
WBC #/AREA URNS AUTO: 3 /HPF (ref 0–2)

## 2017-06-08 PROCEDURE — 36415 COLL VENOUS BLD VENIPUNCTURE: CPT | Performed by: PHYSICIAN ASSISTANT

## 2017-06-08 PROCEDURE — 80178 ASSAY OF LITHIUM: CPT | Performed by: PHYSICIAN ASSISTANT

## 2017-06-08 PROCEDURE — 86140 C-REACTIVE PROTEIN: CPT | Performed by: PHYSICIAN ASSISTANT

## 2017-06-08 PROCEDURE — 85025 COMPLETE CBC W/AUTO DIFF WBC: CPT | Performed by: PHYSICIAN ASSISTANT

## 2017-06-08 PROCEDURE — 74020 ZZHC X-RAY ABDOMEN COMPLETE: CPT | Mod: TC

## 2017-06-08 PROCEDURE — 99285 EMERGENCY DEPT VISIT HI MDM: CPT | Performed by: PHYSICIAN ASSISTANT

## 2017-06-08 PROCEDURE — 81001 URINALYSIS AUTO W/SCOPE: CPT | Performed by: PHYSICIAN ASSISTANT

## 2017-06-08 PROCEDURE — 80053 COMPREHEN METABOLIC PANEL: CPT | Performed by: PHYSICIAN ASSISTANT

## 2017-06-08 PROCEDURE — 76705 ECHO EXAM OF ABDOMEN: CPT | Mod: TC

## 2017-06-08 PROCEDURE — 99284 EMERGENCY DEPT VISIT MOD MDM: CPT | Mod: 25

## 2017-06-08 NOTE — ED NOTES
Pt with abd for a year and one-half.  Has sore on RLQ which has been treated with abx, scabbed area still present.  States he gets violently ill with abd pain when trying to eat. No vomiting.

## 2017-06-08 NOTE — ED AVS SNAPSHOT
[facility]    [address]    [place] [address]    Phone:  [phone]                                       Andrei Whitman   MRN: [id number]    Department:  [facility]   Date of Visit:  6/8/2017           Patient Information     Date Of Birth          1970        Your diagnoses for this visit were:     Chronic generalized abdominal pain        You were seen by Rachel Judd PA-C.      Follow-up Information     Follow up with Tommy Lamb DO In 1 week.    Specialties:  Internal Medicine, Pediatrics    Contact information:    [facility] [place]  [address]  [place] [address] [phone]          Follow up with [facility].    Specialty:  EMERGENCY MEDICINE    Why:  If symptoms worsen    Contact information:    [address]  [place] [address] [phone]    Additional information:    From [facility]: Take [address]. Turn left at [address]. Turn left at the first stoplight on [address]. At the first stop sign, take a right onto [address]. Take a left into the parking lot and continue through until you reach the North enterance of the building.       From [place]: Take [address]. Take the [address] ramp towards [place]. Turn left onto [address]. Take a slight right onto [address]. Turn left at the first stoplight on [address]. At the first stop sign, take a right onto [address]. Take a left into the parking lot and continue through until you reach the North enterance of the building.       From [place]: Take [address]. Take a right at [address]. At the first stop sign, take a right onto [address]. Take a left into the parking lot and continue through until you reach the North enterance of the building.         Discharge Instructions       Follow the attached instructions. Follow through with your endoscopy as  scheduled. Return here with any new or worsening symptoms.     Discharge References/Attachments     EPIGASTRIC PAIN (UNCERTAIN CAUSE) (ENGLISH)      Future Appointments        Provider Department Dept Phone Center    6/22/2017 10:30 AM Francis Valverde DO [facility] [phone] [facility] [place]    8/18/2017 9:20 AM Tommy Lamb DO,  [facility] [phone] [facility] [place]         Review of your medicines      Our records show that you are taking the medicines listed below. If these are incorrect, please call your family doctor or clinic.        Dose / Directions Last dose taken    amitriptyline 25 MG tablet   Commonly known as:  ELAVIL   Quantity:  30 tablet        TAKE 1 TABLET BY MOUTH AT B EDTIME   Refills:  11        amphetamine-dextroamphetamine 30 MG per 24 hr capsule   Commonly known as:  ADDERALL XR   Dose:  30 mg   Quantity:  60 capsule        Take 1 capsule (30 mg) by mouth 2 times daily   Refills:  0        buPROPion 300 MG 24 hr tablet   Commonly known as:  WELLBUTRIN XL   Quantity:  30 tablet        TAKE 1 TABLET (300 MG) BY MOUTH EVERY MORNING   Refills:  5        gabapentin 300 MG capsule   Commonly known as:  NEURONTIN   Quantity:  180 capsule        TAKE 2 CAPSULES BY MOUTH TH REE TIMES DAILY   Refills:  11        * lithium 450 MG CR tablet   Commonly known as:  ESKALITH   Quantity:  90 tablet        Take two tablets by mouth at 8 am and take one tablet at 4 pm.   Refills:  3        * lithium 450 MG CR tablet   Commonly known as:  ESKALITH   Quantity:  90 tablet        TAKE ONE TABLET THREE TIMES A DAY BY MOUTH   Refills:  0        Suvorexant 10 MG tablet   Commonly known as:  BELSOMRA   Dose:  10 mg   Quantity:  30 tablet        Take 1 tablet (10 mg) by mouth nightly as needed   Refills:  3        * Notice:  This list has 2 medication(s) that are the same as other medications prescribed for you. Read the directions carefully, and ask your doctor or other care  "provider to review them with you.            Procedures and tests performed during your visit     Abdomen US, limited (RUQ only)    CBC with platelets differential    CRP inflammation    Comprehensive metabolic panel    Lithium level    UA reflex to Microscopic and Culture    XR Abdomen 2 Views      Orders Needing Specimen Collection     None      Pending Results     Date and Time Order Name Status Description    2017 1603 Abdomen US, limited (RUQ only) In process     2017 1447 XR Abdomen 2 Views In process             Pending Culture Results     No orders found from 2017 to 2017.            Thank you for choosing [facility]       Thank you for choosing [facility] for your care. Our goal is always to provide you with excellent care. Hearing back from our patients is one way we can continue to improve our services. Please take a few minutes to complete the written survey that you may receive in the mail after you visit with us. Thank you!        [employer] Information     [employer] lets you send messages to your doctor, view your test results, renew your prescriptions, schedule appointments and more. To sign up, go to [email or web address] . Click on \"Log in\" on the left side of the screen, which will take you to the Welcome page. Then click on \"Sign up Now\" on the right side of the page.     You will be asked to enter the access code listed below, as well as some personal information. Please follow the directions to create your username and password.     Your access code is: [email or web address]  Expires: 2017  8:56 AM     Your access code will  in 90 days. If you need help or a new code, please call your [facility] clinic or [phone].        Care EveryWhere ID     This is your Care EveryWhere ID. This could be used by other organizations to access your [facility] medical records  [id number]        After Visit Summary       This is your record. Keep this with you and show to your community pharmacist(s) " and doctor(s) at your next visit.

## 2017-06-08 NOTE — ED AVS SNAPSHOT
[facility]    [address] [address]    Phone:  [phone]                                       Andrei Whitman   MRN: [id number]    Department:  [facility]   Date of Visit:  6/8/2017           After Visit Summary Signature Page     I have received my discharge instructions, and my questions have been answered. I have discussed any challenges I see with this plan with the nurse or doctor.    ..........................................................................................................................................  Patient/Patient Representative Signature      ..........................................................................................................................................  Patient Representative Print Name and Relationship to Patient    ..................................................               ................................................  Date                                            Time    ..........................................................................................................................................  Reviewed by Signature/Title    ...................................................              ..............................................  Date                                                            Time

## 2017-06-08 NOTE — ED PROVIDER NOTES
"  History     Chief Complaint   Patient presents with     Abdominal Pain     Pt feels he has an infection i his abdomen. Worst the last week.     Nausea     \"I have a hard time keeping food or water down\", \"I don't vomit but I become very nauseated after eating/drinking\".     HPI  Andrei Whitman is a 46 year old male who presents with abdominal pain for 1 year and swelling and rash to bilateral ankles since last night. He states the belly pain is usually in his upper abdomen and worse after eating. He is constantly standing up and walking around. He was on Bactrim 2 weeks ago for an abdominal abscess with cellulitis. The abscess has since drained and is healing up. Has been out in the sun recently. Denies fevers/chills or weight loss. Becomes nauseous after eating but no vomiting. Daily BM's.   H/o Bipolar depression.    I have reviewed the Medications, Allergies, Past Medical and Surgical History, and Social History in the Epic system.    Review of Systems   All other systems reviewed and are negative.     Past Medical History:   Past Medical History:   Diagnosis Date     Bipolar disorder (H)      Chronic cervical pain      DDD (degenerative disc disease), cervical      Depression, major        Past Surgical History:   Procedure Laterality Date     APPENDECTOMY      age 14     ENT SURGERY  age 18    tonsils     ORTHOPEDIC SURGERY  age 30     back and neck fusion, bone from hip removed to use on plates     ORTHOPEDIC SURGERY  age 30    L5 fusion, laminectomy of lumbar discs       Social History     Social History     Marital status:      Spouse name: N/A     Number of children: N/A     Years of education: N/A     Occupational History     Not on file.     Social History Main Topics     Smoking status: Current Every Day Smoker     Packs/day: 1.00     Years: 20.00     Smokeless tobacco: Never Used     Alcohol use No     Drug use: No     Sexual activity: Yes     Partners: Female     Other Topics Concern     "  Service No     Blood Transfusions Yes     Permits if needed     Caffeine Concern Yes     coffee and pop, a pot a day     Occupational Exposure No     Hobby Hazards No     Sleep Concern Yes     Stress Concern Yes     Weight Concern Yes     Special Diet No     Back Care Yes     Exercise Yes     Stretching and walking     Bike Helmet No     Seat Belt Yes     Parent/Sibling W/ Cabg, Mi Or Angioplasty Before 65f 55m? No     Social History Narrative       Discharge Medication List as of 6/8/2017  5:20 PM      CONTINUE these medications which have NOT CHANGED    Details   !! lithium (ESKALITH) 450 MG CR tablet TAKE ONE TABLET THREE TIMES A DAY BY MOUTH, Disp-90 tablet, R-0, E-Prescribe      buPROPion (WELLBUTRIN XL) 300 MG 24 hr tablet TAKE 1 TABLET (300 MG) BY MOUTH EVERY MORNING, Disp-30 tablet, R-5, E-Prescribe      amphetamine-dextroamphetamine (ADDERALL XR) 30 MG per 24 hr capsule Take 1 capsule (30 mg) by mouth 2 times daily, Disp-60 capsule, R-0, Local Print      gabapentin (NEURONTIN) 300 MG capsule TAKE 2 CAPSULES BY MOUTH TH REE TIMES DAILY, Disp-180 capsule, R-11, E-Prescribe      amitriptyline (ELAVIL) 25 MG tablet TAKE 1 TABLET BY MOUTH AT B EDTIME, Disp-30 tablet, R-11, E-Prescribe      !! lithium (ESKALITH) 450 MG CR tablet Take two tablets by mouth at 8 am and take one tablet at 4 pm., Disp-90 tablet, R-3, Historical      Suvorexant (BELSOMRA) 10 MG tablet Take 1 tablet (10 mg) by mouth nightly as needed, Disp-30 tablet, R-3, Local Print       !! - Potential duplicate medications found. Please discuss with provider.          Allergies: Review of patient's allergies indicates no known allergies.      Physical Exam   BP: (!) 148/109  Heart Rate: 108  Temp: 98.8  F (37.1  C)  Resp: 18  Height: 182.9 cm (6')  SpO2: 97 %  Physical Exam   Constitutional: He is oriented to person, place, and time. He appears well-developed and well-nourished.  Non-toxic appearance. He does not have a sickly appearance. He  does not appear ill. No distress.   HENT:   Head: Normocephalic and atraumatic.   Nose: Nose normal.   Mouth/Throat: Oropharynx is clear and moist. No oropharyngeal exudate.   Eyes: Conjunctivae are normal. Pupils are equal, round, and reactive to light. Right eye exhibits no discharge. Left eye exhibits no discharge. No scleral icterus.   Neck: Normal range of motion. Neck supple.   Cardiovascular: Normal rate, regular rhythm, normal heart sounds and intact distal pulses.  Exam reveals no gallop and no friction rub.    No murmur heard.  Pulmonary/Chest: Effort normal and breath sounds normal. No respiratory distress. He has no wheezes. He has no rales.   Abdominal: Soft. Bowel sounds are normal. He exhibits no distension and no mass. There is generalized tenderness. There is no rebound and no guarding.   Musculoskeletal: Normal range of motion. He exhibits edema (1+ pitting edema to bilateral ankles. ).   Lymphadenopathy:     He has no cervical adenopathy.   Neurological: He is alert and oriented to person, place, and time. No cranial nerve deficit. Coordination normal.   Skin: Skin is warm and dry. Rash (Petechial rash to bilateral ankles. ) noted. He is not diaphoretic. No erythema. No pallor.   Psychiatric: His behavior is normal. Judgment and thought content normal. His mood appears anxious. His speech is tangential.   Nursing note and vitals reviewed.      ED Course     ED Course     Procedures          Labs Ordered and Resulted from Time of ED Arrival Up to the Time of Departure from the ED   UA MACROSCOPIC WITH REFLEX TO MICRO AND CULTURE - Abnormal; Notable for the following:        Result Value    Protein Albumin Urine 10 (*)     Leukocyte Esterase Urine Small (*)     WBC Urine 3 (*)     Bacteria Urine None (*)     Mucous Urine Present (*)     All other components within normal limits   CBC WITH PLATELETS DIFFERENTIAL - Abnormal; Notable for the following:     RBC Count 4.39 (*)     Hemoglobin 12.8 (*)      Hematocrit 38.5 (*)     All other components within normal limits   CRP INFLAMMATION - Abnormal; Notable for the following:     CRP Inflammation 18.1 (*)     All other components within normal limits   COMPREHENSIVE METABOLIC PANEL   LITHIUM LEVEL     Results for orders placed or performed in visit on 11/15/16   XR ABDOMEN 2 VW (Clinic Performed)    Narrative    FLAT AND UPRIGHT VIEWS OF ABDOMEN    REPORT:  There is a normal intestinal gas pattern. No extraluminal gas  or pathologic intra-abdominal calcifications are noted.  Degenerative  changes are present in the lumbar spine.    IMPRESSION:   NORMAL ABDOMINAL GAS PATTERN.  Exam Date: Nov 15, 2016 09:30:13 AM  Author: TANA GARZON  This report is final and null           Assessments & Plan (with Medical Decision Making)   Labs are unremarkable other than elevated CRP at 18.1. RUQ US reveals normal gallbladder. Abdominal XR is unremarkable as well. Petechial rash to bilateral LE's is likely related to sun exposure while being on Bactrim. I reassured pt that there does not appear to be anything acute or life threatening going on today. He is scheduled for an endoscopy in 2 weeks for further evaluation of this pain that has been ongoing x 1 year. He was discharged home in good condition.     Plan: Follow the attached instructions. Follow through with your endoscopy as scheduled. Return here with any new or worsening symptoms.     I have reviewed the nursing notes.    I have reviewed the findings, diagnosis, plan and need for follow up with the patient.    Discharge Medication List as of 6/8/2017  5:20 PM          Final diagnoses:   Chronic generalized abdominal pain       6/8/2017   [facility]     Rachel Judd PA-C  06/08/17 1956

## 2017-06-08 NOTE — DISCHARGE INSTRUCTIONS
Follow the attached instructions. Follow through with your endoscopy as scheduled. Return here with any new or worsening symptoms.

## 2017-06-15 DIAGNOSIS — F90.0 ATTENTION DEFICIT HYPERACTIVITY DISORDER (ADHD), PREDOMINANTLY INATTENTIVE TYPE: Primary | ICD-10-CM

## 2017-06-15 RX ORDER — DEXTROAMPHETAMINE SACCHARATE, AMPHETAMINE ASPARTATE MONOHYDRATE, DEXTROAMPHETAMINE SULFATE AND AMPHETAMINE SULFATE 7.5; 7.5; 7.5; 7.5 MG/1; MG/1; MG/1; MG/1
30 CAPSULE, EXTENDED RELEASE ORAL 2 TIMES DAILY
Qty: 60 CAPSULE | Refills: 0 | Status: SHIPPED | OUTPATIENT
Start: 2017-06-15 | End: 2017-07-14

## 2017-06-22 ENCOUNTER — OFFICE VISIT (OUTPATIENT)
Dept: SURGERY | Facility: OTHER | Age: 47
End: 2017-06-22
Attending: INTERNAL MEDICINE
Payer: MEDICARE

## 2017-06-22 VITALS
HEIGHT: 72 IN | HEART RATE: 93 BPM | TEMPERATURE: 98.3 F | DIASTOLIC BLOOD PRESSURE: 74 MMHG | OXYGEN SATURATION: 98 % | BODY MASS INDEX: 30.61 KG/M2 | WEIGHT: 226 LBS | SYSTOLIC BLOOD PRESSURE: 144 MMHG

## 2017-06-22 DIAGNOSIS — Z71.6 ENCOUNTER FOR SMOKING CESSATION COUNSELING: ICD-10-CM

## 2017-06-22 DIAGNOSIS — K92.1 BLOOD IN STOOL: ICD-10-CM

## 2017-06-22 DIAGNOSIS — R10.13 EPIGASTRIC PAIN: ICD-10-CM

## 2017-06-22 DIAGNOSIS — I10 BENIGN ESSENTIAL HYPERTENSION: ICD-10-CM

## 2017-06-22 DIAGNOSIS — Z01.818 ENCOUNTER FOR PREOPERATIVE EXAMINATION FOR GENERAL SURGICAL PROCEDURE: Primary | ICD-10-CM

## 2017-06-22 DIAGNOSIS — K21.9 GASTROESOPHAGEAL REFLUX DISEASE, ESOPHAGITIS PRESENCE NOT SPECIFIED: ICD-10-CM

## 2017-06-22 DIAGNOSIS — L08.9 LOCAL INFECTION OF SKIN AND SUBCUTANEOUS TISSUE: ICD-10-CM

## 2017-06-22 PROCEDURE — 93010 ELECTROCARDIOGRAM REPORT: CPT | Performed by: INTERNAL MEDICINE

## 2017-06-22 PROCEDURE — 93005 ELECTROCARDIOGRAM TRACING: CPT | Performed by: INTERNAL MEDICINE

## 2017-06-22 PROCEDURE — 99203 OFFICE O/P NEW LOW 30 MIN: CPT | Performed by: SURGERY

## 2017-06-22 PROCEDURE — 99213 OFFICE O/P EST LOW 20 MIN: CPT

## 2017-06-22 RX ORDER — SODIUM, POTASSIUM,MAG SULFATES 17.5-3.13G
2 SOLUTION, RECONSTITUTED, ORAL ORAL SEE ADMIN INSTRUCTIONS
Qty: 2 BOTTLE | Refills: 0 | Status: SHIPPED | OUTPATIENT
Start: 2017-06-22 | End: 2017-09-06

## 2017-06-22 RX ORDER — SODIUM, POTASSIUM,MAG SULFATES 17.5-3.13G
2 SOLUTION, RECONSTITUTED, ORAL ORAL SEE ADMIN INSTRUCTIONS
Qty: 2 BOTTLE | Refills: 0 | Status: CANCELLED | OUTPATIENT
Start: 2017-06-22

## 2017-06-22 ASSESSMENT — PAIN SCALES - GENERAL: PAINLEVEL: MODERATE PAIN (5)

## 2017-06-22 NOTE — PROGRESS NOTES
Surgery Consult Clinic Note      RE: Andrei Whitman  : 1970  PJ: 2017      Chief Complaint:  Epigastric pain    History of Present Illness:  I have reviewed the EMR.  Personally examined the patient.  Read Gail Holden's note and agree with the findings, assessment and plan. 47 yo male with long standing GERD and acid reflux with dysphagia.  Also complains of diarrhea and blood in stools that's been attributed to hemorrhoids.  He also points out an abscess on the RLQ abdominal pain that is spontaneously draining.      Medical history:  Past Medical History:   Diagnosis Date     Bipolar disorder (H)      Chronic cervical pain      DDD (degenerative disc disease), cervical      Depression, major        Surgical history:  Past Surgical History:   Procedure Laterality Date     APPENDECTOMY      age 14     ENT SURGERY  age 18    tonsils     ORTHOPEDIC SURGERY  age 30     back and neck fusion, bone from hip removed to use on plates     ORTHOPEDIC SURGERY  age 30    L5 fusion, laminectomy of lumbar discs       Family history:  Family History   Problem Relation Age of Onset     Asthma Mother      Arthritis Mother      Prostate Cancer Father      HEART DISEASE Paternal Grandfather      Hypertension Paternal Grandfather      Alcohol/Drug Paternal Grandfather      Other - See Comments Brother      Nerve and degenerative disease mild     Alcohol/Drug Brother      Other - See Comments Sister 21     Hip replacements, nerve, degerative disease     Alcohol/Drug Maternal Grandfather      Unknown/Adopted Paternal Grandmother      Other Cancer Paternal Aunt      Cervical cancer       Medications:  Current Outpatient Prescriptions   Medication Sig Dispense Refill     amphetamine-dextroamphetamine (ADDERALL XR) 30 MG per 24 hr capsule Take 1 capsule (30 mg) by mouth 2 times daily 60 capsule 0     lithium (ESKALITH) 450 MG CR tablet TAKE ONE TABLET THREE TIMES A DAY BY MOUTH 90 tablet 0     buPROPion (WELLBUTRIN XL) 300  MG 24 hr tablet TAKE 1 TABLET (300 MG) BY MOUTH EVERY MORNING 30 tablet 5     gabapentin (NEURONTIN) 300 MG capsule TAKE 2 CAPSULES BY MOUTH TH REE TIMES DAILY 180 capsule 11     Suvorexant (BELSOMRA) 10 MG tablet Take 1 tablet (10 mg) by mouth nightly as needed 30 tablet 3     amitriptyline (ELAVIL) 25 MG tablet TAKE 1 TABLET BY MOUTH AT B EDTIME 30 tablet 11     lithium (ESKALITH) 450 MG CR tablet Take two tablets by mouth at 8 am and take one tablet at 4 pm. 90 tablet 3     Allergies:  The patienthas No Known Allergies.  .  Social history:  Social History   Substance Use Topics     Smoking status: Current Every Day Smoker     Packs/day: 1.00     Years: 20.00     Smokeless tobacco: Never Used     Alcohol use No     Marital status: .    Review of Systems:    Constitutional: Negative for fever, chills and weight loss.   HENT: Negative for ear pain, nosebleeds, congestion, sore throat, tinnitus and ear discharge.    Eyes: Negative for blurred vision, double vision, photophobia and pain.   Respiratory: Negative for cough, hemoptysis, shortness of breath, wheezing and stridor.    Cardiovascular: Negative for chest pain, palpitations and orthopnea.   Gastrointestinal: Negative for heartburn, nausea, vomiting, abdominal pain and blood in stool.   Genitourinary: Negative for urgency, frequency and hematuria.   Musculoskeletal: Negative for myalgias, back pain and joint pain.   Neurological: Negative for tingling, speech change and headaches.   Endo/Heme/Allergies: Does not bruise/bleed easily.   Psychiatric/Behavioral: Negative for depression, suicidal ideas and hallucinations. The patient is not nervous/anxious.    Physical Examination:  /74 (BP Location: Right arm, Patient Position: Chair, Cuff Size: Adult Regular)  Pulse 93  Temp 98.3  F (36.8  C) (Tympanic)  Ht 6' (1.829 m)  Wt 226 lb (102.5 kg)  SpO2 98%  BMI 30.65 kg/m2  General: AAOx4, NAD, WN/WD, ambulating without assistance  HEENT:NCAT, EOMI,  PERRL Sclerae anicteric; Trachea mideline, no JVD  Chest:   Clear to auscultation bilaterally.  Cardiac: S1S2 , regular rate and rhythm without additional sounds  Abdomen: Obese, Soft, ND/NT no rebound, no guarding  Extremities: Cursory exam unremarkable.  Skin: Warm, dry, < 2 sec cap refill, 3cm erythematous draining abscess without purulence, induration.  Neuro: CN 2-12 grossly intact, no focal deficit, GCS 15  Psych: happy, calm, asks appropriate questions            Dr Valverde  [facility]  [address] [place] [address], [address]  [place] [address]    Referring Provider:  [place] [address]     Primary Care Provider:  Tommy Lamb

## 2017-06-22 NOTE — PROGRESS NOTES
Surgery Consult Clinic Note      RE: Andrei Whitman  : 1970  PJ: 2017      Chief Complaint:  Acid taste in mouth  Abdominal pain  Blood in stools  RLQ wound    History of Present Illness:  I am seeing Andrei Whitman at the request of Dr. Lamb for evaluation of fullness after meals, abdominal bloating, heartburn, bilious reflux, nocturnal burning, waterbrash, upper abdominal discomfort, symptoms primarily relate to meals and lying down after meals, symptoms occur at night and consideration for EGD.  He has been treated for reflux with omeprazole in the past, but has been off of it for the last several months and Dr. Lamb prefers to have him refrain from taking a PPI or H2 Blocker until after evaluation. Denies changes in voice.   He doesn't drink coffee or ETOH.  However, he drinks 3-10 liters of caffeinated soda per day and smokes one ppd of cigarettes.     Mr. Whitman also reports bowel changes with blood in his stools, therefore we will evaluate for screening malignant colon neoplasm and consideration for colonoscopy.  He denies family history of colon or rectal cancer, weight loss.  He specifically denies fevers, chills, nausea, vomiting, chest pain, shortness of breath, palpitations, sore throat, cough, or generalized feeling ill.      Medical history:  Past Medical History:   Diagnosis Date     Bipolar disorder (H)      Chronic cervical pain      DDD (degenerative disc disease), cervical      Depression, major        Surgical history:  Past Surgical History:   Procedure Laterality Date     APPENDECTOMY      age 14     ENT SURGERY  age 18    tonsils     ORTHOPEDIC SURGERY  age 30     back and neck fusion, bone from hip removed to use on plates     ORTHOPEDIC SURGERY  age 30    L5 fusion, laminectomy of lumbar discs       Family history:  Family History   Problem Relation Age of Onset     Asthma Mother      Arthritis Mother      Prostate Cancer Father      HEART DISEASE Paternal Grandfather       Hypertension Paternal Grandfather      Alcohol/Drug Paternal Grandfather      Other - See Comments Brother      Nerve and degenerative disease mild     Alcohol/Drug Brother      Other - See Comments Sister 21     Hip replacements, nerve, degerative disease     Alcohol/Drug Maternal Grandfather      Unknown/Adopted Paternal Grandmother      Other Cancer Paternal Aunt      Cervical cancer       Medications:  Current Outpatient Prescriptions   Medication Sig Dispense Refill     amphetamine-dextroamphetamine (ADDERALL XR) 30 MG per 24 hr capsule Take 1 capsule (30 mg) by mouth 2 times daily 60 capsule 0     lithium (ESKALITH) 450 MG CR tablet TAKE ONE TABLET THREE TIMES A DAY BY MOUTH 90 tablet 0     buPROPion (WELLBUTRIN XL) 300 MG 24 hr tablet TAKE 1 TABLET (300 MG) BY MOUTH EVERY MORNING 30 tablet 5     gabapentin (NEURONTIN) 300 MG capsule TAKE 2 CAPSULES BY MOUTH TH REE TIMES DAILY 180 capsule 11     Suvorexant (BELSOMRA) 10 MG tablet Take 1 tablet (10 mg) by mouth nightly as needed 30 tablet 3     amitriptyline (ELAVIL) 25 MG tablet TAKE 1 TABLET BY MOUTH AT B EDTIME 30 tablet 11     lithium (ESKALITH) 450 MG CR tablet Take two tablets by mouth at 8 am and take one tablet at 4 pm. 90 tablet 3     Allergies:  The patient has No Known Allergies.  .  Social history:  Social History   Substance Use Topics     Smoking status: Current Every Day Smoker     Packs/day: 1.00     Years: 20.00     Smokeless tobacco: Never Used     Alcohol use No     Marital status: .      Review of Systems:    Constitutional: Negative for fever, chills and weight loss.   HENT: Negative for ear pain, nosebleeds, congestion, sore throat, tinnitus and ear discharge.    Eyes: Negative for blurred vision, double vision, photophobia and pain.   Respiratory: Negative for cough, hemoptysis, shortness of breath, wheezing and stridor.    Cardiovascular: Negative for chest pain, palpitations and orthopnea.   Gastrointestinal: Negative for  heartburn, nausea, vomiting, abdominal pain and blood in stool.   Genitourinary: Negative for urgency, frequency and hematuria.   Musculoskeletal: Negative for myalgias, back pain and joint pain.   Neurological: Negative for tingling, speech change and headaches.   Endo/Heme/Allergies: Does not bruise/bleed easily.   Psychiatric/Behavioral: Negative for depression, suicidal ideas and hallucinations. The patient is not nervous/anxious.    Physical Examination:  /74 (BP Location: Right arm, Patient Position: Chair, Cuff Size: Adult Regular)  Pulse 93  Temp 98.3  F (36.8  C) (Tympanic)  Ht 6' (1.829 m)  Wt 226 lb (102.5 kg)  SpO2 98%  BMI 30.65 kg/m2  General: AAOx4, NAD, WN/WD, ambulating without assistance  HEENT:NCAT, EOMI, PERRL Sclerae anicteric; Trachea mideline, no JVD  Chest:   Clear to auscultation bilaterally.  Cardiac: S1S2 , regular rate and rhythm without additional sounds  Abdomen: Soft, non-tender, non-distended  Extremities: Cursory exam unremarkable.  No peripheral edema noted.  Skin: Warm, dry, < 2 sec cap refill  Neuro: CN 2-12 grossly intact, no focal deficit, GCS 15  Psych: Pleasant, calm, asks appropriate questions      Assessment/Plan:  #1 Esophagram  #2 Esophagogastroduodenoscopy  #3 Colonoscopy  #4 Draining abscess  #5 Obesity  #6 HTN    We discussed lifestyle changes including tobacco cessation, refraining from coffee, tomato-based foods, fatty foods (especially fast food), citrus, peppermint, alcohol, NSAIDS, and carbonated beverages.  We discussed elevating the head of his bed.  He verbalizes understanding and indicates that he IS NOT interested in smoking cessation or cutting back on soda pop specifically.    He has a skin abscess on his RLQ which is open and draining.  He has been treated with Bactrim in the past and he reports that it is improving.  I have instructed him to apply warm compresses several times a day and follow up with us back in clinic next week.      The  indications, risks, benefits and technical aspects of esophagogastroduodenoscopy were reviewed with Mr. Whitman, his questions asked and answered. Antral biopsy for histologic examination will be performed and the place of H. pylori in gastritis was discussed. Preoperative preparation, npo after midnight preceding the date was discussed and a request made to hold aspirin containing agents one week prior to ameliorate antiplatelet effect.        Andrei Whitman and I had a discussion about colonoscopies.  The indications, risks, benefits, althernatives and technical aspects of whole colon colonoscopy were outlined with risks including, but not limited to, perforation, bleeding and inability to visualize entire colon.  Management of each was reviewed including the risk for life saving surgery and possible admittance to the hospital.  His questions were asked and answered.    We will schedule an esophagogastroduodenoscopy and a colonoscopy with Dr. Valverde at the next available date.  Constanza Coles [facility]  [address] [place] [address]    Referring Provider:  [place] [address]     Primary Care Provider:  Tommy Lamb

## 2017-06-22 NOTE — MR AVS SNAPSHOT
After Visit Summary   6/22/2017    Andrei Whitman    MRN: [id number]           Patient Information     Date Of Birth          1970        Visit Information        Provider Department      6/22/2017 10:30 AM Francis Valverde, DO [facility]        Today's Diagnoses     Encounter for preoperative examination for general surgical procedure    -  1    Epigastric pain        Gastroesophageal reflux disease, esophagitis presence not specified          Care Instructions          Thank you for allowing Dr. Valverde and our surgical team to participate in your care.  If you have a scheduling or an appointment question please contact [facility] Unit Coordinator at her direct line [phone].   ALL nursing questions or concerns can be directed to Nataly at: [phone]     You are scheduled for a: colonoscopy and egd  Your procedure date is: 7/12/17    You need a friend or family member available to drive you home AND stay with you for 24 hours after you leave the hospital. You will not be allowed to drive yourself. IF you need to take a taxi or the bus you MUST have a responsible person to ride with you. YOUR PROCEDURE WILL BE CANCELLED IF YOU DO NOT HAVE A RESPONSIBLE ADULT TO DRIVE YOU HOME.       You CANNOT have anything to eat or drink after midnight the night before your surgery, ncluding water and coffee. Your stomach needs to be completely empty. Do NOT chew gum, suck on hard candy, or smoke. You can brush your teeth the morning of surgery.       You need to call our Surgery Education Nurses 1-2 weeks prior to your surgery date at  [phone] or toll free [phone]. Please have you medication and allergy lists ready.      Stop your aspirin or other NSAIDs(Ibuprofen, Motrin, Aleve, Celebrex, Naproxen, etc...) 7 days before your surgery.      Hospital admitting will call you the day before your surgery with your arrival time. If you are scheduled on a Monday admitting  "will call you the Friday before.      Please call your primary care physician if you should become ill within 24 hours of scheduled surgery. (ex.vomiting, diarrhea, fever)    Surgery Education will contact you the day before your procedure between the hours of noon and 5 pm with the time you need to register in admitting at the hospital. Call Nataly with any questions [phone]                Follow-ups after your visit        Your next 10 appointments already scheduled     Aug 18, 2017  9:20 AM CDT   (Arrive by 9:00 AM)   SHORT with Tommy Lamb, DO   [facility]    [address]  [facility] [address]   [phone]              Who to contact     If you have questions or need follow up information about today's clinic visit or your schedule please contact [facility]] directly at [phone].  Normal or non-critical lab and imaging results will be communicated to you by MyChart, letter or phone within 4 business days after the clinic has received the results. If you do not hear from us within 7 days, please contact the clinic through MyChart or phone. If you have a critical or abnormal lab result, we will notify you by phone as soon as possible.  Submit refill requests through [employer] or call your pharmacy and they will forward the refill request to us. Please allow 3 business days for your refill to be completed.          Additional Information About Your Visit        MyChart Information     [employer] lets you send messages to your doctor, view your test results, renew your prescriptions, schedule appointments and more. To sign up, go to [email or web address] . Click on \"Log in\" on the left side of the screen, which will take you to the Welcome page. Then click on \"Sign up Now\" on the right side of the page.     You will be asked to enter the access code listed below, as well as some personal information. Please follow the directions to " create your username and password.     Your access code is: [email or web address]  Expires: 2017  8:56 AM     Your access code will  in 90 days. If you need help or a new code, please call your [place] clinic or [phone].        Care EveryWhere ID     This is your Care EveryWhere ID. This could be used by other organizations to access your [place] medical records  [id number]        Your Vitals Were     Pulse Temperature Height Pulse Oximetry BMI (Body Mass Index)       93 98.3  F (36.8  C) (Tympanic) 6' (1.829 m) 98% 30.65 kg/m2        Blood Pressure from Last 3 Encounters:   17 144/74   17 150/92   17 130/90    Weight from Last 3 Encounters:   17 226 lb (102.5 kg)   17 223 lb (101.2 kg)   17 215 lb (97.5 kg)              We Performed the Following     EKG 12-lead complete w/read - Clinics        Primary Care Provider Office Phone # Fax #    Tommy Farhan Lamb [phone]       [facility] [place] [address]  [place] [address]        Equal Access to Services     SENG CEVALLOS AH: Hadii aad ku hadasho Soomaali, waaxda luqadaha, qaybta kaalmada adeegyada, waxay idiin haydarieln steven parker . So [facility] [phone].    ATENCIÓN: Si habla español, tiene a rodriguez disposición servicios gratuitos de asistencia lingüística. Llrakel al [phone].    We comply with applicable federal civil rights laws and [place] laws. We do not discriminate on the basis of race, color, national origin, age, disability sex, sexual orientation or gender identity.            Thank you!     Thank you for choosing [facility]  for your care. Our goal is always to provide you with excellent care. Hearing back from our patients is one way we can continue to improve our services. Please take a few minutes to complete the written survey that you may receive in the mail after your visit with us. Thank you!             Your Updated Medication List - Protect  others around you: Learn how to safely use, store and throw away your medicines at www.disposemymeds.org.          This list is accurate as of: 6/22/17 11:24 AM.  Always use your most recent med list.                   Brand Name Dispense Instructions for use Diagnosis    amitriptyline 25 MG tablet    ELAVIL    30 tablet    TAKE 1 TABLET BY MOUTH AT B EDTIME    Persistent insomnia       amphetamine-dextroamphetamine 30 MG per 24 hr capsule    ADDERALL XR    60 capsule    Take 1 capsule (30 mg) by mouth 2 times daily    Attention deficit hyperactivity disorder (ADHD), predominantly inattentive type       buPROPion 300 MG 24 hr tablet    WELLBUTRIN XL    30 tablet    TAKE 1 TABLET (300 MG) BY MOUTH EVERY MORNING    Bipolar affective disorder, currently depressed, moderate (H)       gabapentin 300 MG capsule    NEURONTIN    180 capsule    TAKE 2 CAPSULES BY MOUTH TH REE TIMES DAILY    Cervical radiculopathy       * lithium 450 MG CR tablet    ESKALITH    90 tablet    Take two tablets by mouth at 8 am and take one tablet at 4 pm.    Mood disorder (H)       * lithium 450 MG CR tablet    ESKALITH    90 tablet    TAKE ONE TABLET THREE TIMES A DAY BY MOUTH    Mood disorder (H)       Suvorexant 10 MG tablet    BELSOMRA    30 tablet    Take 1 tablet (10 mg) by mouth nightly as needed    Mixed insomnia       * Notice:  This list has 2 medication(s) that are the same as other medications prescribed for you. Read the directions carefully, and ask your doctor or other care provider to review them with you.

## 2017-06-22 NOTE — PATIENT INSTRUCTIONS
Thank you for allowing Dr. Valverde and our surgical team to participate in your care.  If you have a scheduling or an appointment question please contact [facility] Unit Coordinator at her direct line [phone].   ALL nursing questions or concerns can be directed to Nataly at: [phone]     You are scheduled for a: colonoscopy and egd  Your procedure date is: 7/12/17    You need a friend or family member available to drive you home AND stay with you for 24 hours after you leave the hospital. You will not be allowed to drive yourself. IF you need to take a taxi or the bus you MUST have a responsible person to ride with you. YOUR PROCEDURE WILL BE CANCELLED IF YOU DO NOT HAVE A RESPONSIBLE ADULT TO DRIVE YOU HOME.       You CANNOT have anything to eat or drink after midnight the night before your surgery, ncluding water and coffee. Your stomach needs to be completely empty. Do NOT chew gum, suck on hard candy, or smoke. You can brush your teeth the morning of surgery.       You need to call our Surgery Education Nurses 1-2 weeks prior to your surgery date at  [phone] or toll free [phone]. Please have you medication and allergy lists ready.      Stop your aspirin or other NSAIDs(Ibuprofen, Motrin, Aleve, Celebrex, Naproxen, etc...) 7 days before your surgery.      Hospital admitting will call you the day before your surgery with your arrival time. If you are scheduled on a Monday admitting will call you the Friday before.      Please call your primary care physician if you should become ill within 24 hours of scheduled surgery. (ex.vomiting, diarrhea, fever)    Surgery Education will contact you the day before your procedure between the hours of noon and 5 pm with the time you need to register in admitting at the hospital. Call Nataly with any questions [phone]

## 2017-06-22 NOTE — NURSING NOTE
Chief Complaint   Patient presents with     Consult     Consult for epigastric pain- Dr Lamb is referring.  Abdominal ultrasound and abdominal xray done 6/8/17.  Pain is surrounding umbillicus, also has a lesion on his right abdomen.         Initial /74 (BP Location: Right arm, Patient Position: Chair, Cuff Size: Adult Regular)  Pulse 93  Temp 98.3  F (36.8  C) (Tympanic)  Ht 6' (1.829 m)  Wt 226 lb (102.5 kg)  SpO2 98%  BMI 30.65 kg/m2 Estimated body mass index is 30.65 kg/(m^2) as calculated from the following:    Height as of this encounter: 6' (1.829 m).    Weight as of this encounter: 226 lb (102.5 kg).  Medication Reconciliation: complete   NILTON GROVER

## 2017-06-26 ENCOUNTER — HOSPITAL ENCOUNTER (OUTPATIENT)
Dept: GENERAL RADIOLOGY | Facility: HOSPITAL | Age: 47
Discharge: HOME | End: 2017-06-26
Attending: SURGERY | Admitting: SURGERY
Payer: MEDICARE

## 2017-06-26 PROCEDURE — 74220 X-RAY XM ESOPHAGUS 1CNTRST: CPT | Mod: TC | Performed by: RADIOLOGY

## 2017-06-26 PROCEDURE — 74220 X-RAY XM ESOPHAGUS 1CNTRST: CPT | Mod: TC

## 2017-06-26 NOTE — PROGRESS NOTES
Fluoro time for this case was 1:10 minutes:seconds, less than 5 min  Was patient held? YES, Hector  If yes, by whom? Technologist NAME Hector

## 2017-06-28 NOTE — H&P (VIEW-ONLY)
Surgery Consult Clinic Note      RE: Andrei Whitman  : 1970  PJ: 2017      Chief Complaint:  Acid taste in mouth  Abdominal pain  Blood in stools  RLQ wound    History of Present Illness:  I am seeing Andrei Whitman at the request of Dr. Lamb for evaluation of fullness after meals, abdominal bloating, heartburn, bilious reflux, nocturnal burning, waterbrash, upper abdominal discomfort, symptoms primarily relate to meals and lying down after meals, symptoms occur at night and consideration for EGD.  He has been treated for reflux with omeprazole in the past, but has been off of it for the last several months and Dr. Lamb prefers to have him refrain from taking a PPI or H2 Blocker until after evaluation. Denies changes in voice.   He doesn't drink coffee or ETOH.  However, he drinks 3-10 liters of caffeinated soda per day and smokes one ppd of cigarettes.     Mr. Whitman also reports bowel changes with blood in his stools, therefore we will evaluate for screening malignant colon neoplasm and consideration for colonoscopy.  He denies family history of colon or rectal cancer, weight loss.  He specifically denies fevers, chills, nausea, vomiting, chest pain, shortness of breath, palpitations, sore throat, cough, or generalized feeling ill.      Medical history:  Past Medical History:   Diagnosis Date     Bipolar disorder (H)      Chronic cervical pain      DDD (degenerative disc disease), cervical      Depression, major        Surgical history:  Past Surgical History:   Procedure Laterality Date     APPENDECTOMY      age 14     ENT SURGERY  age 18    tonsils     ORTHOPEDIC SURGERY  age 30     back and neck fusion, bone from hip removed to use on plates     ORTHOPEDIC SURGERY  age 30    L5 fusion, laminectomy of lumbar discs       Family history:  Family History   Problem Relation Age of Onset     Asthma Mother      Arthritis Mother      Prostate Cancer Father      HEART DISEASE Paternal Grandfather       Hypertension Paternal Grandfather      Alcohol/Drug Paternal Grandfather      Other - See Comments Brother      Nerve and degenerative disease mild     Alcohol/Drug Brother      Other - See Comments Sister 21     Hip replacements, nerve, degerative disease     Alcohol/Drug Maternal Grandfather      Unknown/Adopted Paternal Grandmother      Other Cancer Paternal Aunt      Cervical cancer       Medications:  Current Outpatient Prescriptions   Medication Sig Dispense Refill     amphetamine-dextroamphetamine (ADDERALL XR) 30 MG per 24 hr capsule Take 1 capsule (30 mg) by mouth 2 times daily 60 capsule 0     lithium (ESKALITH) 450 MG CR tablet TAKE ONE TABLET THREE TIMES A DAY BY MOUTH 90 tablet 0     buPROPion (WELLBUTRIN XL) 300 MG 24 hr tablet TAKE 1 TABLET (300 MG) BY MOUTH EVERY MORNING 30 tablet 5     gabapentin (NEURONTIN) 300 MG capsule TAKE 2 CAPSULES BY MOUTH TH REE TIMES DAILY 180 capsule 11     Suvorexant (BELSOMRA) 10 MG tablet Take 1 tablet (10 mg) by mouth nightly as needed 30 tablet 3     amitriptyline (ELAVIL) 25 MG tablet TAKE 1 TABLET BY MOUTH AT B EDTIME 30 tablet 11     lithium (ESKALITH) 450 MG CR tablet Take two tablets by mouth at 8 am and take one tablet at 4 pm. 90 tablet 3     Allergies:  The patient has No Known Allergies.  .  Social history:  Social History   Substance Use Topics     Smoking status: Current Every Day Smoker     Packs/day: 1.00     Years: 20.00     Smokeless tobacco: Never Used     Alcohol use No     Marital status: .      Review of Systems:    Constitutional: Negative for fever, chills and weight loss.   HENT: Negative for ear pain, nosebleeds, congestion, sore throat, tinnitus and ear discharge.    Eyes: Negative for blurred vision, double vision, photophobia and pain.   Respiratory: Negative for cough, hemoptysis, shortness of breath, wheezing and stridor.    Cardiovascular: Negative for chest pain, palpitations and orthopnea.   Gastrointestinal: Negative for  heartburn, nausea, vomiting, abdominal pain and blood in stool.   Genitourinary: Negative for urgency, frequency and hematuria.   Musculoskeletal: Negative for myalgias, back pain and joint pain.   Neurological: Negative for tingling, speech change and headaches.   Endo/Heme/Allergies: Does not bruise/bleed easily.   Psychiatric/Behavioral: Negative for depression, suicidal ideas and hallucinations. The patient is not nervous/anxious.    Physical Examination:  /74 (BP Location: Right arm, Patient Position: Chair, Cuff Size: Adult Regular)  Pulse 93  Temp 98.3  F (36.8  C) (Tympanic)  Ht 6' (1.829 m)  Wt 226 lb (102.5 kg)  SpO2 98%  BMI 30.65 kg/m2  General: AAOx4, NAD, WN/WD, ambulating without assistance  HEENT:NCAT, EOMI, PERRL Sclerae anicteric; Trachea mideline, no JVD  Chest:   Clear to auscultation bilaterally.  Cardiac: S1S2 , regular rate and rhythm without additional sounds  Abdomen: Soft, non-tender, non-distended  Extremities: Cursory exam unremarkable.  No peripheral edema noted.  Skin: Warm, dry, < 2 sec cap refill  Neuro: CN 2-12 grossly intact, no focal deficit, GCS 15  Psych: Pleasant, calm, asks appropriate questions      Assessment/Plan:  #1 Esophagram  #2 Esophagogastroduodenoscopy  #3 Colonoscopy  #4 Draining abscess  #5 Obesity  #6 HTN    We discussed lifestyle changes including tobacco cessation, refraining from coffee, tomato-based foods, fatty foods (especially fast food), citrus, peppermint, alcohol, NSAIDS, and carbonated beverages.  We discussed elevating the head of his bed.  He verbalizes understanding and indicates that he IS NOT interested in smoking cessation or cutting back on soda pop specifically.    He has a skin abscess on his RLQ which is open and draining.  He has been treated with Bactrim in the past and he reports that it is improving.  I have instructed him to apply warm compresses several times a day and follow up with us back in clinic next week.      The  indications, risks, benefits and technical aspects of esophagogastroduodenoscopy were reviewed with Mr. Whitman, his questions asked and answered. Antral biopsy for histologic examination will be performed and the place of H. pylori in gastritis was discussed. Preoperative preparation, npo after midnight preceding the date was discussed and a request made to hold aspirin containing agents one week prior to ameliorate antiplatelet effect.        Andrei Whitman and I had a discussion about colonoscopies.  The indications, risks, benefits, althernatives and technical aspects of whole colon colonoscopy were outlined with risks including, but not limited to, perforation, bleeding and inability to visualize entire colon.  Management of each was reviewed including the risk for life saving surgery and possible admittance to the hospital.  His questions were asked and answered.    We will schedule an esophagogastroduodenoscopy and a colonoscopy with Dr. Valverde at the next available date.  Constanza Coles [facility]  [address] [place] [address]    Referring Provider:  [place] [address]     Primary Care Provider:  Tommy Lmab

## 2017-07-11 ENCOUNTER — ANESTHESIA EVENT (OUTPATIENT)
Dept: SURGERY | Facility: HOSPITAL | Age: 47
End: 2017-07-11
Payer: MEDICARE

## 2017-07-11 NOTE — ANESTHESIA PREPROCEDURE EVALUATION
Anesthesia Evaluation     . Pt has had prior anesthetic. Type: General    No history of anesthetic complications          ROS/MED HX    ENT/Pulmonary:  - neg pulmonary ROS     Neurologic:  - neg neurologic ROS     Cardiovascular:     (+) Dyslipidemia, hypertension-range: 120-145 systolic, ---. : . . . :. .       METS/Exercise Tolerance:     Hematologic:  - neg hematologic  ROS       Musculoskeletal:   (+) arthritis, , , -       GI/Hepatic:     (+) GERD Asymptomatic on medication,       Renal/Genitourinary:  - ROS Renal section negative       Endo:  - neg endo ROS   (+) Obesity, .      Psychiatric:     (+) psychiatric history anxiety, depression and bipolar      Infectious Disease:  - neg infectious disease ROS       Malignancy:      - no malignancy   Other:    - neg other ROS                 Physical Exam  Normal systems: dental    Airway   Mallampati: II  TM distance: >3 FB  Neck ROM: full    Dental     Cardiovascular   Rhythm and rate: regular and normal      Pulmonary    breath sounds clear to auscultation                    Anesthesia Plan      History & Physical Review  History and physical reviewed and following examination; no interval change.    ASA Status:  3 .    NPO Status:  > 8 hours    Plan for MAC with Propofol induction. Reason for MAC:  Difficulty with conscious sedation (QS)  PONV prophylaxis:  Ondansetron (or other 5HT-3)       Postoperative Care      Consents  Anesthetic plan, risks, benefits and alternatives discussed with:  Patient..                          .

## 2017-07-12 ENCOUNTER — HOSPITAL ENCOUNTER (OUTPATIENT)
Facility: HOSPITAL | Age: 47
Discharge: HOME | End: 2017-07-12
Attending: SURGERY | Admitting: SURGERY
Payer: MEDICARE

## 2017-07-12 ENCOUNTER — ANESTHESIA (OUTPATIENT)
Dept: SURGERY | Facility: HOSPITAL | Age: 47
End: 2017-07-12
Payer: MEDICARE

## 2017-07-12 ENCOUNTER — SURGERY (OUTPATIENT)
Age: 47
End: 2017-07-12

## 2017-07-12 VITALS
HEIGHT: 72 IN | WEIGHT: 231 LBS | DIASTOLIC BLOOD PRESSURE: 90 MMHG | TEMPERATURE: 98.7 F | SYSTOLIC BLOOD PRESSURE: 122 MMHG | BODY MASS INDEX: 31.29 KG/M2 | OXYGEN SATURATION: 98 % | HEART RATE: 75 BPM | RESPIRATION RATE: 20 BRPM

## 2017-07-12 DIAGNOSIS — K29.00 ACUTE SUPERFICIAL GASTRITIS WITHOUT HEMORRHAGE: Primary | ICD-10-CM

## 2017-07-12 PROCEDURE — 40000305 ZZH STATISTIC PRE PROC ASSESS I: Performed by: SURGERY

## 2017-07-12 PROCEDURE — 37000008 ZZH ANESTHESIA TECHNICAL FEE, 1ST 30 MIN: Performed by: SURGERY

## 2017-07-12 PROCEDURE — 36000050 ZZH SURGERY LEVEL 2 1ST 30 MIN: Performed by: SURGERY

## 2017-07-12 PROCEDURE — 43239 EGD BIOPSY SINGLE/MULTIPLE: CPT | Performed by: SURGERY

## 2017-07-12 PROCEDURE — 27210794 ZZH OR GENERAL SUPPLY STERILE: Performed by: SURGERY

## 2017-07-12 PROCEDURE — 25000125 ZZHC RX 250: Performed by: NURSE ANESTHETIST, CERTIFIED REGISTERED

## 2017-07-12 PROCEDURE — 45385 COLONOSCOPY W/LESION REMOVAL: CPT | Performed by: ANESTHESIOLOGY

## 2017-07-12 PROCEDURE — 45385 COLONOSCOPY W/LESION REMOVAL: CPT | Performed by: NURSE ANESTHETIST, CERTIFIED REGISTERED

## 2017-07-12 PROCEDURE — 36000052 ZZH SURGERY LEVEL 2 EA 15 ADDTL MIN: Performed by: SURGERY

## 2017-07-12 PROCEDURE — 25000128 H RX IP 250 OP 636: Performed by: ANESTHESIOLOGY

## 2017-07-12 PROCEDURE — 88305 TISSUE EXAM BY PATHOLOGIST: CPT | Mod: TC | Performed by: SURGERY

## 2017-07-12 PROCEDURE — 37000009 ZZH ANESTHESIA TECHNICAL FEE, EACH ADDTL 15 MIN: Performed by: SURGERY

## 2017-07-12 PROCEDURE — 71000027 ZZH RECOVERY PHASE 2 EACH 15 MINS: Performed by: SURGERY

## 2017-07-12 PROCEDURE — 45380 COLONOSCOPY AND BIOPSY: CPT | Performed by: SURGERY

## 2017-07-12 RX ORDER — LIDOCAINE 40 MG/G
CREAM TOPICAL
Status: DISCONTINUED | OUTPATIENT
Start: 2017-07-12 | End: 2017-07-12 | Stop reason: HOSPADM

## 2017-07-12 RX ORDER — OMEPRAZOLE 40 MG/1
40 CAPSULE, DELAYED RELEASE ORAL DAILY
Qty: 90 CAPSULE | Refills: 3 | Status: SHIPPED | OUTPATIENT
Start: 2017-07-12 | End: 2018-07-19

## 2017-07-12 RX ORDER — ONDANSETRON 2 MG/ML
4 INJECTION INTRAMUSCULAR; INTRAVENOUS EVERY 30 MIN PRN
Status: DISCONTINUED | OUTPATIENT
Start: 2017-07-12 | End: 2017-07-12 | Stop reason: HOSPADM

## 2017-07-12 RX ORDER — ONDANSETRON 4 MG/1
4 TABLET, ORALLY DISINTEGRATING ORAL EVERY 30 MIN PRN
Status: DISCONTINUED | OUTPATIENT
Start: 2017-07-12 | End: 2017-07-12 | Stop reason: HOSPADM

## 2017-07-12 RX ORDER — SODIUM CHLORIDE, SODIUM LACTATE, POTASSIUM CHLORIDE, CALCIUM CHLORIDE 600; 310; 30; 20 MG/100ML; MG/100ML; MG/100ML; MG/100ML
INJECTION, SOLUTION INTRAVENOUS CONTINUOUS
Status: DISCONTINUED | OUTPATIENT
Start: 2017-07-12 | End: 2017-07-12 | Stop reason: HOSPADM

## 2017-07-12 RX ORDER — NALOXONE HYDROCHLORIDE 0.4 MG/ML
.1-.4 INJECTION, SOLUTION INTRAMUSCULAR; INTRAVENOUS; SUBCUTANEOUS
Status: DISCONTINUED | OUTPATIENT
Start: 2017-07-12 | End: 2017-07-12 | Stop reason: HOSPADM

## 2017-07-12 RX ORDER — LIDOCAINE HYDROCHLORIDE 20 MG/ML
INJECTION, SOLUTION INFILTRATION; PERINEURAL PRN
Status: DISCONTINUED | OUTPATIENT
Start: 2017-07-12 | End: 2017-07-12

## 2017-07-12 RX ORDER — MEPERIDINE HYDROCHLORIDE 25 MG/ML
12.5 INJECTION INTRAMUSCULAR; INTRAVENOUS; SUBCUTANEOUS
Status: DISCONTINUED | OUTPATIENT
Start: 2017-07-12 | End: 2017-07-12 | Stop reason: HOSPADM

## 2017-07-12 RX ORDER — PROPOFOL 10 MG/ML
INJECTION, EMULSION INTRAVENOUS PRN
Status: DISCONTINUED | OUTPATIENT
Start: 2017-07-12 | End: 2017-07-12

## 2017-07-12 RX ADMIN — PROPOFOL 50 MG: 10 INJECTION, EMULSION INTRAVENOUS at 11:30

## 2017-07-12 RX ADMIN — PROPOFOL 30 MG: 10 INJECTION, EMULSION INTRAVENOUS at 11:33

## 2017-07-12 RX ADMIN — PROPOFOL 30 MG: 10 INJECTION, EMULSION INTRAVENOUS at 11:48

## 2017-07-12 RX ADMIN — PROPOFOL 50 MG: 10 INJECTION, EMULSION INTRAVENOUS at 11:40

## 2017-07-12 RX ADMIN — PROPOFOL 70 MG: 10 INJECTION, EMULSION INTRAVENOUS at 11:59

## 2017-07-12 RX ADMIN — SODIUM CHLORIDE, POTASSIUM CHLORIDE, SODIUM LACTATE AND CALCIUM CHLORIDE 1000 ML: 600; 310; 30; 20 INJECTION, SOLUTION INTRAVENOUS at 10:38

## 2017-07-12 RX ADMIN — PROPOFOL 50 MG: 10 INJECTION, EMULSION INTRAVENOUS at 11:29

## 2017-07-12 RX ADMIN — PROPOFOL 70 MG: 10 INJECTION, EMULSION INTRAVENOUS at 11:35

## 2017-07-12 RX ADMIN — LIDOCAINE HYDROCHLORIDE 40 MG: 20 INJECTION, SOLUTION INFILTRATION; PERINEURAL at 11:28

## 2017-07-12 RX ADMIN — PROPOFOL 50 MG: 10 INJECTION, EMULSION INTRAVENOUS at 11:32

## 2017-07-12 RX ADMIN — PROPOFOL 30 MG: 10 INJECTION, EMULSION INTRAVENOUS at 11:58

## 2017-07-12 RX ADMIN — PROPOFOL 40 MG: 10 INJECTION, EMULSION INTRAVENOUS at 11:54

## 2017-07-12 RX ADMIN — PROPOFOL 50 MG: 10 INJECTION, EMULSION INTRAVENOUS at 11:28

## 2017-07-12 RX ADMIN — PROPOFOL 50 MG: 10 INJECTION, EMULSION INTRAVENOUS at 11:43

## 2017-07-12 RX ADMIN — PROPOFOL 30 MG: 10 INJECTION, EMULSION INTRAVENOUS at 11:50

## 2017-07-12 NOTE — IP AVS SNAPSHOT
[facility] Operating Room    [address] [place] [id number]    Phone:  [phone]                                       After Visit Summary   7/12/2017    Andrei Whitman    MRN: [id number]           After Visit Summary Signature Page     I have received my discharge instructions, and my questions have been answered. I have discussed any challenges I see with this plan with the nurse or doctor.    ..........................................................................................................................................  Patient/Patient Representative Signature      ..........................................................................................................................................  Patient Representative Print Name and Relationship to Patient    ..................................................               ................................................  Date                                            Time    ..........................................................................................................................................  Reviewed by Signature/Title    ...................................................              ..............................................  Date                                                            Time

## 2017-07-12 NOTE — OP NOTE
Procedure report    Preoperative diagnosis: GERD, blood in stools    Post operative diagnosis: gastritis, galvan's esophagus, external hemorrhoids, internal hemorrhoids, pan diverticulosis, descending colon polyp, sigmoid colon polyp, low rectal polyp    Procedure: Esophagogastroduodenoscopy, colonoscopy, polypectomy x 3    Indication: 46 year old male with long standing reflux symptoms and blood in stools for the past month    Surgeon: Francis Valverde    Note:    The patient is brought into the endoscopy suite and placed in the left lateral decubitus position.  After a pre procedural pause and attended monitored anesthesia was administered, the endoscopy was advanced through the oral bite block, through the oropharynx, past the cricopharyngeus and easily intubated the esophagus.  Under direct visualization, the endoscope was advanced down the esophagus, through the stomach, past the pylorus and on to the distal portions of the duodenum.  The mucosa of the duodenum was unremarkable.  The entire duodenum was thoroughly evaluated upon slow withdrawal of the endoscopy.  The duodenal sweep and bulb were unremarkable.  The ampulla was identified and bile flowing freely from it.  Random biopsies were obtained using cold biting forceps.  The endoscope was withdrawn to the antrum of the stomach.  There was a focal area of mild gastritis that was biopsied as well as random biopsies were obtained using cold biting forceps.  Retroflexion into the cardia and fundus were unremarkable.  The endoscope then was withdrawn to the GE junction at 40cm. This was very irregular with evidence of reflux changes and possibly galvan's esophagus. The extra air was removed from the stomach and the endoscope was slowly withdrawn evaluating the esophagus which was unremarkable.  The endoscope then was completely withdrawn.  The patient tolerated the procedure well and was taken to the post anesthesia care unit.     The patient tolerated  that portion of the procedure well.  The equipment was exchanged and the patient repositioned.  Next, my attention was turned towards the colonoscopy.  The external anus was inspected and was positive for non thrombosed external hemorrhoids.  Digital rectal exam was positive for hemorrhoids.  The colonoscopy was inserted and advanced to the level of the cecum which was identified by the appendiceal orifice and the ileocecal valve. The prep was good.  Upon slow withdrawal of the colonoscopy, approximately 90% of the colon mucosa was directly visualized.  The cecum, ascending , transverse were unremarkable although there were the very rare small mouth diverticula. In the distal descending colon at 50 cm, a small sessile polyp was removed using cold biting forceps and sent to pathology.  In the sigmoid colon, there were more prevalent diverticula as well as a small sessile polyp at 30cm that was removed by cold biting forceps and sent to pathology.  The rectum was unremarkable.  Retroflexion was positive for grade 1 internal hemorrhoids as well as a very small sessile polyp 1 cm distal to the dentate line that was removed using cold biting forceps.  The extra air was removed from the colon and the colonoscopy was withdrawn.  The patient tolerated the procedure well and was taken to the post anesthesia care unit. Timing for interval colonoscopy will depend on the histological evaluation of the polyps.

## 2017-07-12 NOTE — ANESTHESIA CARE TRANSFER NOTE
Patient: Andrei Whitman    Procedure(s):  UPPER ENDOSCOPY WITH BIOPSIES  AND COLONOSCOPY WITH POLYPECTOMY - Wound Class: II-Clean Contaminated    Diagnosis: REFLUX AND BLOOD IN STOOLS  Diagnosis Additional Information: No value filed.    Anesthesia Type:   MAC     Note:  Airway :Room Air  Patient transferred to:Phase II  Comments: VSS.  Report to PACU RN.      Vitals: (Last set prior to Anesthesia Care Transfer)    CRNA VITALS  7/12/2017 1137 - 7/12/2017 1207      7/12/2017             Resp Rate (observed): (!)  1    Resp Rate (set): 8                Electronically Signed By: JOEY Mathews CRNA  July 12, 2017  12:07 PM

## 2017-07-12 NOTE — ANESTHESIA POSTPROCEDURE EVALUATION
Patient: Andrei Whitman    Procedure(s):  UPPER ENDOSCOPY WITH BIOPSIES  AND COLONOSCOPY WITH POLYPECTOMY - Wound Class: II-Clean Contaminated    Diagnosis:REFLUX AND BLOOD IN STOOLS  Diagnosis Additional Information: No value filed.    Anesthesia Type:  MAC    Note:  Anesthesia Post Evaluation    Patient location during evaluation: PACU  Patient participation: Able to fully participate in evaluation  Level of consciousness: awake and alert  Pain management: adequate  Airway patency: patent  Cardiovascular status: acceptable  Respiratory status: acceptable  Hydration status: acceptable  PONV: none     Anesthetic complications: None          Last vitals:  Vitals:    07/12/17 1019 07/12/17 1210 07/12/17 1215   BP: 144/88 112/68 117/73   Pulse: 75     Resp: 20 16 16   Temp: 99.2  F (37.3  C)     SpO2: 98%  94%         Electronically Signed By: Constantine Dutton MD  July 12, 2017  12:51 PM

## 2017-07-12 NOTE — OR NURSING
Patient and responsible adult given discharge instructions with no questions regarding instructions. Bethanie score 20. Pain level 0/10.  Discharged from unit via ambulation with wife. Patient discharged to home.

## 2017-07-12 NOTE — DISCHARGE INSTRUCTIONS
UPPER ENDOSCOPY    PURPOSE:  An Upper Endoscopy is a procedure in which the doctor passes a flexible, lighted tube called a gastroscope through your mouth into your stomach in order to:    See the lining of the esophagus, stomach, and duodenum (first part of the small intestine).    Look for bleeding, inflammation (swelling), abnormal tumors or tissue, or ulcers.    Take biopsy specimens.  (Biopsies do not hurt.)    TELL YOUR DOCTOR IF:     You have a heart condition, heart murmur, or have had heart valve surgery.    You have had angioplasty with stents put in within the last year.    You are taking blood thinners - Aspirin, Anti-inflammatory pain pills (like Motrin, ibuprofen, Naproxen, Feldene, Advil, etc.), Coumadin, Plavix.    You have diabetes - contact your regular doctor for management of your insulin.    YOU NEED TO:    Have someone drive you home.  You are not allowed to drive until the next day.  (If you take a taxi, the person staying with you after surgery must ride with you in the taxi.  The  is not responsible for you).    Have someone stay with you for four (4) hours after you leave the hospital.    Know the time to be at admitting:  A nurse will call you with the time the afternoon before your procedure.  If your procedure is on Monday, you will be called on Friday.  If you have not been contacted with the time, call the Admitting Department at [phone] or [phone], ext. [phone] after 5 pm (Admitting is open 24 hours daily).    Talk with the Surgery Patient Education Nurses.  Please call them @ [phone] or toll free [phone] after 8:00 a.m. Monday through Friday.    TO PREPARE FOR THE PROCEDURE:      ONE WEEK BEFORE:    Stop Aspirin, anti-inflammatory pain pills (Motrin, ibuprofen, Naproxen, Feldene, Advil, etc.).  It is OK to take Tylenol (acetaminophen).    Your doctor will tell you what to do if you are on Coumadin or Plavix.     NIGHT BEFORE:    Do not eat or drink  anything after midnight.  Your stomach needs to be empty.     DAY OF YOUR PROCEDURE:    Take your pills you were told to take.  Do not take diabetic pills.  If you are on Insulin, take the dose your doctor told you to take.    Wear loose, comfortable clothing and shoes.    Remove ALL jewelry including wedding rings.  Leave valuables at home.    Come to the hospital Admitting Department located at the [facility] on the lower level.    In Same Day surgery, you will be asked to change into an exam gown.    You will be asked your name, birth date, and what you are having done by every person who is involved with your care.    Your health history, medications, and allergies will be reviewed and verified.    An IV (intravenous line) will be started in your hand.  DURING THE UPPER ENDOSCOPY:    Your doctor and a registered nurse will be with you throughout the procedure which takes about 30 minutes.    You will either lie on your left side or on your back.    Medications will be given to you to help you relax and reduce the gag reflex when swallowing the scope.    Your doctor may need to insert air into your stomach to see better.  This may cause fullness or a cramping sensation.  The air will be removed at the end of the exam.    AFTER THE PROCEDURE    You will return to Same Day Surgery to rest for about an hour before you go home.    The doctor will talk with you and your family.    A family member/friend may visit with you.    You may burp up any air remaining in your stomach.    You may feel dizzy or light-headed from the medicine.    Your nurse will go over the discharge instructions with you and your caregiver and answer any of your questions.      You will be contacted the next day to check on how you are doing.    If biopsies were taken, you will be contacted with the results usually within 3 days.  BACK AT HOME    Rest for an hour or two after you get home.    When your throat is no longer numb and you have a  gag reflex, take a few sips of cool water.  If you can swallow comfortably, you may start eating again.    You may have a mild sore throat for the rest of the day.  WHAT TO WATCH FOR:  Problems rarely occur after the exam, but it is important to be aware of the early signs of a complication.  Call your doctor immediately if you have:    Difficulty swallowing or breathing    Unusual pain in your stomach or chest    Vomiting blood or dark material that looks like coffee grounds    Black or tarry stools    Temperature over 100.6 degrees    MORE QUESTIONS?  Please ask your doctor or nurse before the exam begins  or call your doctor at the clinic.    IF YOU MUST CANCEL YOUR PROCEDURE THE EVENING/NIGHT BEFORE, PLEASE CALL HOSPITAL ADMITTING -[phone] OR TOLL FREE [phone], [phone]. [phone].    Phone Numbers:  Hospital - [phone] [phone]  [facility] - [phone]  Surgery Patient Education - [phone] or toll free [phone]    INSTRUCTIONS AFTER COLONOSCOPY    WHEN YOU ARE BACK HOME:    Plan to rest for an hour or two after you get home.    You may have some cramping or pressure until you pass gas.    You may resume your regular medications.    Eat a small, light meal at first, and then gradually return to normal meal sizes.  If you had a polyp removed:    Slight bleeding may occur.  You may have a slight blood stain on the toilet paper after a bowel movement.    To lessen the chance of bleeding, avoid heavy exercise for ONE WEEK.  This includes heavy lifting, vigorous sport activities, and heavy physical labor.  You may resume your normal sexual activity.      Avoid aspirin or aspirin products if instructed by your doctor.    WHAT TO WATCH FOR:  Problems rarely occur after the exam; however, it is important for you to watch for early signs of possible problems.  If you have     Unusual pain in your abdomen    Nausea and vomiting that persists    Excessive bleeding    Black or bloody bowel  movements    Fever or temperature above 100.6 F  Please call your doctor ([facility] [phone]) or go to the nearest hospital emergency room.    Post-Anesthesia Patient Instructions    IMMEDIATELY FOLLOWING SURGERY:  Do not drive or operate machinery for the first twenty four hours after surgery.  Do not make any important decisions for twenty four hours after surgery or while taking narcotic pain medications or sedatives.  If you develop intractable nausea and vomiting or a severe headache please notify your doctor immediately.    FOLLOW-UP:  Please make an appointment with your surgeon as instructed. You do not need to follow up with anesthesia unless specifically instructed to do so.    WOUND CARE INSTRUCTIONS (if applicable):  Keep a dry clean dressing on the anesthesia/puncture wound site if there is drainage.  Once the wound has quit draining you may leave it open to air.  Generally you should leave the bandage intact for twenty four hours unless there is drainage.  If the epidural site drains for more than 36-48 hours please call the anesthesia department.    QUESTIONS?:  Please feel free to call your physician or the hospital  if you have any questions, and they will be happy to assist you.

## 2017-07-12 NOTE — IP AVS SNAPSHOT
MRN:[id number]                      After Visit Summary   7/12/2017    Andrei Whitman    MRN: [id number]           Thank you!     Thank you for choosing [facility] for your care. Our goal is always to provide you with excellent care. Hearing back from our patients is one way we can continue to improve our services. Please take a few minutes to complete the written survey that you may receive in the mail after you visit with us. Thank you!        Patient Information     Date Of Birth          1970        About your hospital stay     You were admitted on:  July 12, 2017 You last received care in the:  [facility] Main Operating Room    You were discharged on:  July 12, 2017       Who to Call     For medical emergencies, please call 911.  For non-urgent questions about your medical care, please call your primary care provider or clinic, [phone]  For questions related to your surgery, please call your surgery clinic        Attending Provider     Provider Francis Burrell,  Surgery       Primary Care Provider Office Phone # Fax #    Tommy Lamb [phone]      Your next 10 appointments already scheduled     Aug 18, 2017  9:20 AM CDT   (Arrive by 9:00 AM)   SHORT with Tommy Lamb DO   [facility] [place] ([facility] [place] )    [address]  [place] [address]   [phone]              Further instructions from your care team           UPPER ENDOSCOPY    PURPOSE:  An Upper Endoscopy is a procedure in which the doctor passes a flexible, lighted tube called a gastroscope through your mouth into your stomach in order to:    See the lining of the esophagus, stomach, and duodenum (first part of the small intestine).    Look for bleeding, inflammation (swelling), abnormal tumors or tissue, or ulcers.    Take biopsy specimens.  (Biopsies do not hurt.)    TELL YOUR DOCTOR IF:     You have a heart condition, heart murmur, or  have had heart valve surgery.    You have had angioplasty with stents put in within the last year.    You are taking blood thinners - Aspirin, Anti-inflammatory pain pills (like Motrin, ibuprofen, Naproxen, Feldene, Advil, etc.), Coumadin, Plavix.    You have diabetes - contact your regular doctor for management of your insulin.    YOU NEED TO:    Have someone drive you home.  You are not allowed to drive until the next day.  (If you take a taxi, the person staying with you after surgery must ride with you in the taxi.  The  is not responsible for you).    Have someone stay with you for four (4) hours after you leave the hospital.    Know the time to be at admitting:  A nurse will call you with the time the afternoon before your procedure.  If your procedure is on Monday, you will be called on Friday.  If you have not been contacted with the time, call the Admitting Department at [phone] or [phone], ext. [phone] after 5 pm (Admitting is open 24 hours daily).    Talk with the Surgery Patient Education Nurses.  Please call them @ [phone] or toll free [phone] after 8:00 a.m. Monday through Friday.    TO PREPARE FOR THE PROCEDURE:      ONE WEEK BEFORE:    Stop Aspirin, anti-inflammatory pain pills (Motrin, ibuprofen, Naproxen, Feldene, Advil, etc.).  It is OK to take Tylenol (acetaminophen).    Your doctor will tell you what to do if you are on Coumadin or Plavix.     NIGHT BEFORE:    Do not eat or drink anything after midnight.  Your stomach needs to be empty.     DAY OF YOUR PROCEDURE:    Take your pills you were told to take.  Do not take diabetic pills.  If you are on Insulin, take the dose your doctor told you to take.    Wear loose, comfortable clothing and shoes.    Remove ALL jewelry including wedding rings.  Leave valuables at home.    Come to the hospital Admitting Department located at the [facility] on the lower level.    In Same Day surgery, you will be asked to change into  an exam gown.    You will be asked your name, birth date, and what you are having done by every person who is involved with your care.    Your health history, medications, and allergies will be reviewed and verified.    An IV (intravenous line) will be started in your hand.  DURING THE UPPER ENDOSCOPY:    Your doctor and a registered nurse will be with you throughout the procedure which takes about 30 minutes.    You will either lie on your left side or on your back.    Medications will be given to you to help you relax and reduce the gag reflex when swallowing the scope.    Your doctor may need to insert air into your stomach to see better.  This may cause fullness or a cramping sensation.  The air will be removed at the end of the exam.    AFTER THE PROCEDURE    You will return to Same Day Surgery to rest for about an hour before you go home.    The doctor will talk with you and your family.    A family member/friend may visit with you.    You may burp up any air remaining in your stomach.    You may feel dizzy or light-headed from the medicine.    Your nurse will go over the discharge instructions with you and your caregiver and answer any of your questions.      You will be contacted the next day to check on how you are doing.    If biopsies were taken, you will be contacted with the results usually within 3 days.  BACK AT HOME    Rest for an hour or two after you get home.    When your throat is no longer numb and you have a gag reflex, take a few sips of cool water.  If you can swallow comfortably, you may start eating again.    You may have a mild sore throat for the rest of the day.  WHAT TO WATCH FOR:  Problems rarely occur after the exam, but it is important to be aware of the early signs of a complication.  Call your doctor immediately if you have:    Difficulty swallowing or breathing    Unusual pain in your stomach or chest    Vomiting blood or dark material that looks like coffee grounds    Black or  tarry stools    Temperature over 100.6 degrees    MORE QUESTIONS?  Please ask your doctor or nurse before the exam begins  or call your doctor at the clinic.    IF YOU MUST CANCEL YOUR PROCEDURE THE EVENING/NIGHT BEFORE, PLEASE CALL HOSPITAL ADMITTING -[phone] OR TOLL FREE [phone], [phone]. [phone].    Phone Numbers:  [facility] - [phone] [phone]  [facility] - [phone]  Surgery Patient Education - [phone] or toll free [phone]    INSTRUCTIONS AFTER COLONOSCOPY    WHEN YOU ARE BACK HOME:    Plan to rest for an hour or two after you get home.    You may have some cramping or pressure until you pass gas.    You may resume your regular medications.    Eat a small, light meal at first, and then gradually return to normal meal sizes.  If you had a polyp removed:    Slight bleeding may occur.  You may have a slight blood stain on the toilet paper after a bowel movement.    To lessen the chance of bleeding, avoid heavy exercise for ONE WEEK.  This includes heavy lifting, vigorous sport activities, and heavy physical labor.  You may resume your normal sexual activity.      Avoid aspirin or aspirin products if instructed by your doctor.    WHAT TO WATCH FOR:  Problems rarely occur after the exam; however, it is important for you to watch for early signs of possible problems.  If you have     Unusual pain in your abdomen    Nausea and vomiting that persists    Excessive bleeding    Black or bloody bowel movements    Fever or temperature above 100.6 F  Please call your doctor ([facility] [phone]) or go to the nearest hospital emergency room.    Post-Anesthesia Patient Instructions    IMMEDIATELY FOLLOWING SURGERY:  Do not drive or operate machinery for the first twenty four hours after surgery.  Do not make any important decisions for twenty four hours after surgery or while taking narcotic pain medications or sedatives.  If you develop intractable nausea and vomiting or a severe  "headache please notify your doctor immediately.    FOLLOW-UP:  Please make an appointment with your surgeon as instructed. You do not need to follow up with anesthesia unless specifically instructed to do so.    WOUND CARE INSTRUCTIONS (if applicable):  Keep a dry clean dressing on the anesthesia/puncture wound site if there is drainage.  Once the wound has quit draining you may leave it open to air.  Generally you should leave the bandage intact for twenty four hours unless there is drainage.  If the epidural site drains for more than 36-48 hours please call the anesthesia department.    QUESTIONS?:  Please feel free to call your physician or the hospital  if you have any questions, and they will be happy to assist you.       Pending Results     No orders found from 7/10/2017 to 2017.            Admission Information     Date & Time Provider Department Dept. Phone    2017 Francis Valverde DO [facility] Main Operating Room [phone]      Your Vitals Were     Blood Pressure Pulse Temperature Respirations Height Weight    117/73 75 99.2  F (37.3  C) (Oral) 16 1.829 m (6') 104.8 kg (231 lb)    Pulse Oximetry BMI (Body Mass Index)                94% 31.33 kg/m2          MyChart Information     [employer] lets you send messages to your doctor, view your test results, renew your prescriptions, schedule appointments and more. To sign up, go to [email or web address] . Click on \"Log in\" on the left side of the screen, which will take you to the Welcome page. Then click on \"Sign up Now\" on the right side of the page.     You will be asked to enter the access code listed below, as well as some personal information. Please follow the directions to create your username and password.     Your access code is: [email or web address]  Expires: 10/10/2017 12:44 PM     Your access code will  in 90 days. If you need help or a new code, please call your [facility] clinic or [phone].        Care EveryWhere ID     This is " your Care EveryWhere ID. This could be used by other organizations to access your [facility] medical records  [id number]        Equal Access to Services     SENG CEVALLOS : Hadii misa Hill, delfino suh, marjorie harrisonmacali monetrroso, alka mendoza teresamukund dentonmari yakelinjulianamari smithBrianna Wylie [facility] [phone].    ATENCIÓN: Si habla español, tiene a rodriguez disposición servicios gratuitos de asistencia lingüística. Llame al [phone].    We comply with applicable federal civil rights laws and [place] laws. We do not discriminate on the basis of race, color, national origin, age, disability sex, sexual orientation or gender identity.               Review of your medicines      START taking        Dose / Directions    omeprazole 40 MG capsule   Commonly known as:  priLOSEC   Used for:  Acute superficial gastritis without hemorrhage        Dose:  40 mg   Take 1 capsule (40 mg) by mouth daily Take 30-60 minutes before a meal.   Quantity:  90 capsule   Refills:  3         CONTINUE these medicines which have NOT CHANGED        Dose / Directions    amitriptyline 25 MG tablet   Commonly known as:  ELAVIL   Used for:  Persistent insomnia        TAKE 1 TABLET BY MOUTH AT B EDTIME   Quantity:  30 tablet   Refills:  11       amphetamine-dextroamphetamine 30 MG per 24 hr capsule   Commonly known as:  ADDERALL XR   Used for:  Attention deficit hyperactivity disorder (ADHD), predominantly inattentive type        Dose:  30 mg   Take 1 capsule (30 mg) by mouth 2 times daily   Quantity:  60 capsule   Refills:  0       buPROPion 300 MG 24 hr tablet   Commonly known as:  WELLBUTRIN XL   Used for:  Bipolar affective disorder, currently depressed, moderate (H)        TAKE 1 TABLET (300 MG) BY MOUTH EVERY MORNING   Quantity:  30 tablet   Refills:  5       gabapentin 300 MG capsule   Commonly known as:  NEURONTIN   Used for:  Cervical radiculopathy        TAKE 2 CAPSULES BY MOUTH TH REE TIMES DAILY   Quantity:  180 capsule   Refills:  11        * lithium 450 MG CR tablet   Commonly known as:  ESKALITH   Used for:  Mood disorder (H)        Take two tablets by mouth at 8 am and take one tablet at 4 pm.   Quantity:  90 tablet   Refills:  3       * lithium 450 MG CR tablet   Commonly known as:  ESKALITH   Used for:  Mood disorder (H)        TAKE ONE TABLET THREE TIMES A DAY BY MOUTH   Quantity:  90 tablet   Refills:  0       Na Sulfate-K Sulfate-Mg Sulf solution   Commonly known as:  SUPREP BOWEL PREP   Used for:  Epigastric pain, Gastroesophageal reflux disease, esophagitis presence not specified        Dose:  2 Bottle   Take 354 mLs (2 Bottles) by mouth See Admin Instructions   Quantity:  2 Bottle   Refills:  0       * Notice:  This list has 2 medication(s) that are the same as other medications prescribed for you. Read the directions carefully, and ask your doctor or other care provider to review them with you.         Where to get your medicines      These medications were sent to [id number] - [place] - [address]  [address] [place] [address]     Phone:  [phone]     omeprazole 40 MG capsule                Protect others around you: Learn how to safely use, store and throw away your medicines at www.disposemymeds.org.             Medication List: This is a list of all your medications and when to take them. Check marks below indicate your daily home schedule. Keep this list as a reference.      Medications           Morning Afternoon Evening Bedtime As Needed    amitriptyline 25 MG tablet   Commonly known as:  ELAVIL   TAKE 1 TABLET BY MOUTH AT B EDTIME                                amphetamine-dextroamphetamine 30 MG per 24 hr capsule   Commonly known as:  ADDERALL XR   Take 1 capsule (30 mg) by mouth 2 times daily                                buPROPion 300 MG 24 hr tablet   Commonly known as:  WELLBUTRIN XL   TAKE 1 TABLET (300 MG) BY MOUTH EVERY MORNING                                gabapentin 300 MG capsule    Commonly known as:  NEURONTIN   TAKE 2 CAPSULES BY MOUTH TH REE TIMES DAILY                                * lithium 450 MG CR tablet   Commonly known as:  ESKALITH   Take two tablets by mouth at 8 am and take one tablet at 4 pm.                                * lithium 450 MG CR tablet   Commonly known as:  ESKALITH   TAKE ONE TABLET THREE TIMES A DAY BY MOUTH                                Na Sulfate-K Sulfate-Mg Sulf solution   Commonly known as:  SUPREP BOWEL PREP   Take 354 mLs (2 Bottles) by mouth See Admin Instructions                                omeprazole 40 MG capsule   Commonly known as:  priLOSEC   Take 1 capsule (40 mg) by mouth daily Take 30-60 minutes before a meal.                                * Notice:  This list has 2 medication(s) that are the same as other medications prescribed for you. Read the directions carefully, and ask your doctor or other care provider to review them with you.

## 2017-07-13 DIAGNOSIS — K22.719 BARRETT'S ESOPHAGUS WITH DYSPLASIA: Primary | ICD-10-CM

## 2017-07-13 LAB — COPATH REPORT: NORMAL

## 2017-07-14 DIAGNOSIS — F90.0 ATTENTION DEFICIT HYPERACTIVITY DISORDER (ADHD), PREDOMINANTLY INATTENTIVE TYPE: ICD-10-CM

## 2017-07-14 NOTE — TELEPHONE ENCOUNTER
adderall      Last Written Prescription Date: 6/15/17  Last Fill Quantity: 60,  # refills: 0   Last Office Visit with [facility], [facility] or [facility] prescribing provider: 5/16/17                                         Next 5 appointments (look out 90 days)     Aug 18, 2017  9:20 AM CDT   (Arrive by 9:00 AM)   SHORT with Tommy Lamb DO   [facility] [place] ([facility] [place] )    [address] [place] [address]   [phone]

## 2017-07-15 RX ORDER — DEXTROAMPHETAMINE SACCHARATE, AMPHETAMINE ASPARTATE MONOHYDRATE, DEXTROAMPHETAMINE SULFATE AND AMPHETAMINE SULFATE 7.5; 7.5; 7.5; 7.5 MG/1; MG/1; MG/1; MG/1
30 CAPSULE, EXTENDED RELEASE ORAL 2 TIMES DAILY
Qty: 60 CAPSULE | Refills: 0 | Status: SHIPPED | OUTPATIENT
Start: 2017-07-15 | End: 2017-08-17

## 2017-07-17 NOTE — TELEPHONE ENCOUNTER
Pt notified that the written RX is ready at the [facility]  registration to be picked up.

## 2017-07-18 ENCOUNTER — HOSPITAL PATHOLOGY (OUTPATIENT)
Dept: OTHER | Facility: CLINIC | Age: 47
End: 2017-07-18

## 2017-07-18 LAB — COPATH REPORT: NORMAL

## 2017-08-09 DIAGNOSIS — F39 MOOD DISORDER (H): ICD-10-CM

## 2017-08-11 RX ORDER — LITHIUM CARBONATE 450 MG
TABLET, EXTENDED RELEASE ORAL
Qty: 90 TABLET | Refills: 0 | Status: SHIPPED | OUTPATIENT
Start: 2017-08-11 | End: 2017-09-06

## 2017-08-11 NOTE — TELEPHONE ENCOUNTER
Lithium      Last Written Prescription Date:  7.14.17  Last Fill Quantity: #90,   # refills: 0  Last Office Visit with [facility], [facility] or [facility] prescribing provider: 5.16.17  Future Office visit:    Next 5 appointments (look out 90 days)     Aug 18, 2017  9:20 AM CDT   (Arrive by 9:00 AM)   SHORT with Tommy Lamb DO   [facility] [place] ([facility] [place] )    [address] [place] [address]   [phone]                   Routing refill request to provider for review/approval because:  Drug not on the [facility], [facility] or [facility] refill protocol or controlled substance

## 2017-08-17 DIAGNOSIS — F90.0 ATTENTION DEFICIT HYPERACTIVITY DISORDER (ADHD), PREDOMINANTLY INATTENTIVE TYPE: ICD-10-CM

## 2017-08-17 NOTE — TELEPHONE ENCOUNTER
Adderall XR 30 mg   Last office visit: 5/16/17  Last refill: 7/15/17 #60, 0 R.  Medication pended.  Thank you.

## 2017-08-18 RX ORDER — DEXTROAMPHETAMINE SACCHARATE, AMPHETAMINE ASPARTATE MONOHYDRATE, DEXTROAMPHETAMINE SULFATE AND AMPHETAMINE SULFATE 7.5; 7.5; 7.5; 7.5 MG/1; MG/1; MG/1; MG/1
30 CAPSULE, EXTENDED RELEASE ORAL 2 TIMES DAILY
Qty: 60 CAPSULE | Refills: 0 | Status: SHIPPED | OUTPATIENT
Start: 2017-08-18 | End: 2017-09-14

## 2017-08-18 NOTE — TELEPHONE ENCOUNTER
I have attempted to contact this patient by phone with the following results: talked to patient and let him know Rx ready to be picked up at [facility] .

## 2017-09-06 ENCOUNTER — OFFICE VISIT (OUTPATIENT)
Dept: PEDIATRICS | Facility: OTHER | Age: 47
End: 2017-09-06
Attending: INTERNAL MEDICINE
Payer: MEDICARE

## 2017-09-06 VITALS
WEIGHT: 238 LBS | TEMPERATURE: 98.7 F | HEIGHT: 72 IN | HEART RATE: 48 BPM | SYSTOLIC BLOOD PRESSURE: 136 MMHG | BODY MASS INDEX: 32.23 KG/M2 | RESPIRATION RATE: 14 BRPM | OXYGEN SATURATION: 98 % | DIASTOLIC BLOOD PRESSURE: 82 MMHG

## 2017-09-06 DIAGNOSIS — F33.41 RECURRENT MAJOR DEPRESSIVE DISORDER, IN PARTIAL REMISSION (H): Primary | ICD-10-CM

## 2017-09-06 DIAGNOSIS — F17.200 TOBACCO DEPENDENCE: ICD-10-CM

## 2017-09-06 PROCEDURE — 99212 OFFICE O/P EST SF 10 MIN: CPT

## 2017-09-06 PROCEDURE — 99214 OFFICE O/P EST MOD 30 MIN: CPT | Performed by: INTERNAL MEDICINE

## 2017-09-06 RX ORDER — BUPROPION HYDROCHLORIDE 100 MG/1
200 TABLET ORAL 2 TIMES DAILY
Qty: 120 TABLET | Refills: 5 | Status: SHIPPED | OUTPATIENT
Start: 2017-09-06 | End: 2018-02-04

## 2017-09-06 ASSESSMENT — ANXIETY QUESTIONNAIRES
4. TROUBLE RELAXING: MORE THAN HALF THE DAYS
IF YOU CHECKED OFF ANY PROBLEMS ON THIS QUESTIONNAIRE, HOW DIFFICULT HAVE THESE PROBLEMS MADE IT FOR YOU TO DO YOUR WORK, TAKE CARE OF THINGS AT HOME, OR GET ALONG WITH OTHER PEOPLE: VERY DIFFICULT
7. FEELING AFRAID AS IF SOMETHING AWFUL MIGHT HAPPEN: SEVERAL DAYS
6. BECOMING EASILY ANNOYED OR IRRITABLE: MORE THAN HALF THE DAYS
2. NOT BEING ABLE TO STOP OR CONTROL WORRYING: NEARLY EVERY DAY
3. WORRYING TOO MUCH ABOUT DIFFERENT THINGS: MORE THAN HALF THE DAYS
5. BEING SO RESTLESS THAT IT IS HARD TO SIT STILL: SEVERAL DAYS
GAD7 TOTAL SCORE: 13
1. FEELING NERVOUS, ANXIOUS, OR ON EDGE: MORE THAN HALF THE DAYS

## 2017-09-06 ASSESSMENT — PATIENT HEALTH QUESTIONNAIRE - PHQ9: SUM OF ALL RESPONSES TO PHQ QUESTIONS 1-9: 19

## 2017-09-06 NOTE — MR AVS SNAPSHOT
After Visit Summary   9/6/2017    Andrei Whitman    MRN: [id number]           Patient Information     Date Of Birth          1970        Visit Information        Provider Department      9/6/2017 9:40 AM Tommy Lamb, DO [facility]        Today's Diagnoses     Recurrent major depressive disorder, in partial remission (H)    -  1    Tobacco dependence          Care Instructions      HOW TO QUIT SMOKING  Smoking is one of the hardest habits to break. About half of all those who have ever smoked have been able to quit, and most of those (about 70%) who still smoke want to quit. Here are some of the best ways to stop smoking.     KEEP TRYING:  It takes most smokers about 8 tries before they are finally able to fully quit. So, the more often you try and fail, the better your chance of quitting the next time! So, don't give up!    GO COLD TURKEY:  Most ex-smokers quit cold turkey. Trying to cut back gradually doesn't seem to work as well, perhaps because it continues the smoking habit. Also, it is possible to fool yourself by inhaling more while smoking fewer cigarettes. This results in the same amount of nicotine in your body!    GET SUPPORT:  Support programs can make an important difference, especially for the heavy smoker. These groups offer lectures, methods to change your behavior and peer support. Call the free national Quitline for more information. 800-QUIT-NOW (187-238-8354). Low-cost or free programs are offered by many hospitals, local chapters of the American Lung Association (821-219-3996) and the American Cancer Society (109-542-4855). Support at home is important too. Non-smokers can help by offering praise and encouragement. If the smoker fails to quit, encourage them to try again!    OVER-THE-COUNTER MEDICINES:  For those who can't quit on their own, Nicotine Replacement Therapy (NRT) may make quitting much easier. Certain aids such as the nicotine patch,  gum and lozenge are available without a prescription. However, it is best to use these under the guidance of your doctor. The skin patch provides a steady supply of nicotine to the body. Nicotine gum and lozenge gives temporary bursts of low levels of nicotine. Both methods take the edge off the craving for cigarettes. WARNING: If you feel symptoms of nicotine overdose, such as nausea, vomiting, dizziness, weakness, or fast heartbeat, stop using these and see your doctor.    PRESCRIPTION MEDICINES:  After evaluating your smoking patterns and prior attempts at quitting, your doctor may offer a prescription medicine such as bupropion (Zyban, Wellbutrin), varenicline (Chantix, Champix), a niocotine inhaler or nasal spray. Each has its unique advantage and side effects which your doctor can review with you.    HEALTH BENEFITS OF QUITTING:  The benefits of quitting start right away and keep improving the longer you go without smokin minutes: blood pressure and pulse return to normal  8 hours: oxygen levels return to normal  2 days: ability to smell and taste begins to improve as damaged nerves start to regrow  2-3 weeks: circulation and lung function improves  1-9 months: decreased cough, congestion and shortness of breath; less tired  1 year: risk of heart attack decreases by half  5 years: risk of lung cancer decreases by half; risk of stroke becomes the same as a non-smoker  For information about how to quit smoking, visit the following links:  National [employer] [place] ,   Clearing the Air, Quit Smoking Today   - an online booklet. http://www.smokefree.gov/pubs/clearing_the_air.pdf  Smokefree.gov http://smokefree.gov/  QuitNet http://www.quitnet.com/    9167-8842 Ryan Nolasco, [address] [place] [address], [place] [address]. All rights reserved. This information is not intended as a substitute for professional medical care. Always follow your healthcare professional's instructions.    The Benefits of Living Smoke  Free  What do you want to gain from quitting? Check off some reasons to quit.  Health Benefits  ___ Reduce my risk of lung cancer, heart disease, chronic lung disease  ___ Have fewer wrinkles and softer skin  ___ Improve my sense of taste and smell  ___ For pregnant women--reduce the risk of having a miscarriage, stillbirth, premature birth, or low-birth-weight baby  Personal Benefits  ___ Feel more in control of my life  ___ Have better-smelling hair, breath, clothes, home, and car  ___ Save time by not having to take smoke breaks, buy cigarettes, or hunt for a light  ___ Have whiter teeth  Family Benefits  ___ Reduce my children s respiratory tract infections  ___ Set a good example for my children  ___ Reduce my family s cancer risk  Financial Benefits  ___ Save hundreds of dollars each year that would be spent on cigarettes  ___ Save money on medical bills  ___ Save on life, health, and car insurance premiums    Those Dollars Add Up!  Cigarettes are expensive, and getting more expensive all the time. Do you realize how much money you are spending on cigarettes per year? What is the average amount you spend on a pack of cigarettes? What is the average number of packs that you smoke per day? Using your answers to these questions, fill in this formula to help you find out:  ($ _____ per pack) ×  ( _____ number of packs per day) × (365 days) =  $ _____ yearly cost of smoking  Besides tobacco, there are other costs, including extra cleaning bills and replacement costs for clothing and furniture; medical expenses for smoking-related illnesses; and higher health, life, and car insurance premiums.    Cigars and Pipes Count Too!  Cigars and pipes are also dangerous. So are smokeless (chewing) tobacco and snuff. All of these products contain nicotine, a highly addictive substance that has harmful effects on your body. Quitting smoking means giving up all tobacco products.      5459-9369 Ryan Nolasco, [address] [place] [address]  Road, [place] [address]. All rights reserved. This information is not intended as a substitute for professional medical care. Always follow your healthcare professional's instructions.  HOW TO QUIT SMOKING  Smoking is one of the hardest habits to break. About half of all those who have ever smoked have been able to quit, and most of those (about 70%) who still smoke want to quit. Here are some of the best ways to stop smoking.     KEEP TRYING:  It takes most smokers about 8 tries before they are finally able to fully quit. So, the more often you try and fail, the better your chance of quitting the next time! So, don't give up!    GO COLD TURKEY:  Most ex-smokers quit cold turkey. Trying to cut back gradually doesn't seem to work as well, perhaps because it continues the smoking habit. Also, it is possible to fool yourself by inhaling more while smoking fewer cigarettes. This results in the same amount of nicotine in your body!    GET SUPPORT:  Support programs can make an important difference, especially for the heavy smoker. These groups offer lectures, methods to change your behavior and peer support. Call the free national Quitline for more information. 800-QUIT-NOW (818-105-2246). Low-cost or free programs are offered by many hospitals, local chapters of the American Lung Association (597-369-5079) and the American Cancer Society (905-999-4456). Support at home is important too. Non-smokers can help by offering praise and encouragement. If the smoker fails to quit, encourage them to try again!    OVER-THE-COUNTER MEDICINES:  For those who can't quit on their own, Nicotine Replacement Therapy (NRT) may make quitting much easier. Certain aids such as the nicotine patch, gum and lozenge are available without a prescription. However, it is best to use these under the guidance of your doctor. The skin patch provides a steady supply of nicotine to the body. Nicotine gum and lozenge gives temporary bursts of low levels  of nicotine. Both methods take the edge off the craving for cigarettes. WARNING: If you feel symptoms of nicotine overdose, such as nausea, vomiting, dizziness, weakness, or fast heartbeat, stop using these and see your doctor.    PRESCRIPTION MEDICINES:  After evaluating your smoking patterns and prior attempts at quitting, your doctor may offer a prescription medicine such as bupropion (Zyban, Wellbutrin), varenicline (Chantix, Champix), a niocotine inhaler or nasal spray. Each has its unique advantage and side effects which your doctor can review with you.    HEALTH BENEFITS OF QUITTING:  The benefits of quitting start right away and keep improving the longer you go without smokin minutes: blood pressure and pulse return to normal  8 hours: oxygen levels return to normal  2 days: ability to smell and taste begins to improve as damaged nerves start to regrow  2-3 weeks: circulation and lung function improves  1-9 months: decreased cough, congestion and shortness of breath; less tired  1 year: risk of heart attack decreases by half  5 years: risk of lung cancer decreases by half; risk of stroke becomes the same as a non-smoker  For information about how to quit smoking, visit the following links:  National Cancer [place] ,   Clearing the Air, Quit Smoking Today   - an online booklet. http://www.smokefree.gov/pubs/clearing_the_air.pdf  Smokefree.gov http://smokefree.gov/  QuitNet http://www.quitnet.com/    0834-1348 [facility], [address] [place] [address]. All rights reserved. This information is not intended as a substitute for professional medical care. Always follow your healthcare professional's instructions.    The Benefits of Living Smoke Free  What do you want to gain from quitting? Check off some reasons to quit.  Health Benefits  ___ Reduce my risk of lung cancer, heart disease, chronic lung disease  ___ Have fewer wrinkles and softer skin  ___ Improve my sense of taste and  smell  ___ For pregnant women--reduce the risk of having a miscarriage, stillbirth, premature birth, or low-birth-weight baby  Personal Benefits  ___ Feel more in control of my life  ___ Have better-smelling hair, breath, clothes, home, and car  ___ Save time by not having to take smoke breaks, buy cigarettes, or hunt for a light  ___ Have whiter teeth  Family Benefits  ___ Reduce my children s respiratory tract infections  ___ Set a good example for my children  ___ Reduce my family s cancer risk  Financial Benefits  ___ Save hundreds of dollars each year that would be spent on cigarettes  ___ Save money on medical bills  ___ Save on life, health, and car insurance premiums    Those Dollars Add Up!  Cigarettes are expensive, and getting more expensive all the time. Do you realize how much money you are spending on cigarettes per year? What is the average amount you spend on a pack of cigarettes? What is the average number of packs that you smoke per day? Using your answers to these questions, fill in this formula to help you find out:  ($ _____ per pack) ×  ( _____ number of packs per day) × (365 days) =  $ _____ yearly cost of smoking  Besides tobacco, there are other costs, including extra cleaning bills and replacement costs for clothing and furniture; medical expenses for smoking-related illnesses; and higher health, life, and car insurance premiums.    Cigars and Pipes Count Too!  Cigars and pipes are also dangerous. So are smokeless (chewing) tobacco and snuff. All of these products contain nicotine, a highly addictive substance that has harmful effects on your body. Quitting smoking means giving up all tobacco products.      [phone] Ryan \[facility]\"", [address] [place] [address], [place] [address]. All rights reserved. This information is not intended as a substitute for professional medical care. Always follow your healthcare professional's instructions.          Follow-ups after your visit        Additional  Services     QUITPLAN  Referral       [place] [employer] QUITLINES FAX FORM  Fax form to: 1 [phone]    The clinic will facilitate the referral to the quitline.    Provider Information:  ===============================================================  Tommy Lamb DO, DO  ID#: [id number] - [facility] [phone] Fax: [phone]   [email or web address]  Payor: MEDICARE / Plan: MEDICARE / Product Type: Medicare /   ===============================================================    The Public Health Service Guideline does not recommend providing over-the-counter nicotine replacement therapy products without physician authorization to patients with the following conditions: pregnancy, uncontrolled high blood pressure, or cardiovascular diseases.     I authorize the [place] Tobacco Quitlines to provide over-the-counter nicotine replacement products for the patient listed below if the patient's health plan benefits cover NRT or if the patient is eligible for QUITPLAN services.    Patient Consented to:  ===============================================================  - YES - I am ready to quit tobacco and request the above information be given to the quitline so they may contact me.  I understand that one of [place]'s Tobacco Quitlines will inform my provider about my participation.  ===============================================================  Please check the BEST 3-hour call window for them to reach you: 11am - 2pm, 2pm - 5pm or 5pm - 8pm  May we leave a message?  YES  Language Preference:  English  Phone Number: Home Phone      [phone]  Mobile          [phone]     E-mail Address: No e-mail address on record    ========================================================================  FOR QUITLINE USE ONLY:  THIS INFORMATION WILL BE PROVIDED BACK TO THE PROVIDER  Contact date: __/ __/__ or ____ Did not reach after three  attempts.    Outcome:  __ Enrolled in telephone counseling program  __ Declined  __ Not Reached    Stage of readiness: _______________________  Planned Quit Date: ___/ ___/ ___  Comments:      2011 [facility]   This message funded by [employer] and [employer] [facility], an independent licensee of the Blue Cross and Blue [employer] Association. Rev. 11/1/12                  Follow-up notes from your care team     Return in about 6 weeks (around 10/18/2017), or depression .      Your next 10 appointments already scheduled     Sep 06, 2017  9:40 AM CDT   (Arrive by 9:20 AM)   SHORT with Tommy Lamb DO   [facility] [place] )    [address]  [place] [address]   [phone]            Oct 19, 2017  9:20 AM CDT   (Arrive by 9:00 AM)   SHORT with Tommy Lamb DO   [facility] ([facility] [place] )    [address]  [place] [address]   [phone]              Future tests that were ordered for you today     Open Future Orders        Priority Expected Expires Ordered    QUITPLAN  Referral Routine  11/5/2017 9/6/2017            Who to contact     If you have questions or need follow up information about today's clinic visit or your schedule please contact [facility] directly at [phone].  Normal or non-critical lab and imaging results will be communicated to you by MyChart, letter or phone within 4 business days after the clinic has received the results. If you do not hear from us within 7 days, please contact the clinic through MyChart or phone. If you have a critical or abnormal lab result, we will notify you by phone as soon as possible.  Submit refill requests through [employer] or call your pharmacy and they will forward the refill request to us. Please allow 3 business days for your refill to be completed.          Additional Information About Your Visit        MyChart Information     [facility]  "lets you send messages to your doctor, view your test results, renew your prescriptions, schedule appointments and more. To sign up, go to [email or web address] . Click on \"Log in\" on the left side of the screen, which will take you to the Welcome page. Then click on \"Sign up Now\" on the right side of the page.     You will be asked to enter the access code listed below, as well as some personal information. Please follow the directions to create your username and password.     Your access code is: [email or web address]  Expires: 10/10/2017 12:44 PM     Your access code will  in 90 days. If you need help or a new code, please call your [place] clinic or [phone].        Care EveryWhere ID     This is your Care EveryWhere ID. This could be used by other organizations to access your [place] medical records  [id number]        Your Vitals Were     Pulse Temperature Respirations Height Pulse Oximetry BMI (Body Mass Index)    48 98.7  F (37.1  C) (Tympanic) 14 6' (1.829 m) 98% 32.28 kg/m2       Blood Pressure from Last 3 Encounters:   17 136/82   17 122/90   17 144/74    Weight from Last 3 Encounters:   17 238 lb (108 kg)   17 231 lb (104.8 kg)   17 226 lb (102.5 kg)              We Performed the Following     Tobacco Cessation - for Health Maintenance          Today's Medication Changes          These changes are accurate as of: 17  9:34 AM.  If you have any questions, ask your nurse or doctor.               Start taking these medicines.        Dose/Directions    buPROPion 100 MG tablet   Commonly known as:  WELLBUTRIN   Used for:  Recurrent major depressive disorder, in partial remission (H)   Replaces:  buPROPion 300 MG 24 hr tablet   Started by:  Tommy Lamb DO        Dose:  200 mg   Take 2 tablets (200 mg) by mouth 2 times daily   Quantity:  120 tablet   Refills:  5         These medicines have changed or have updated prescriptions.        Dose/Directions    " lithium 450 MG CR tablet   Commonly known as:  ESKALITH   This may have changed:  Another medication with the same name was removed. Continue taking this medication, and follow the directions you see here.   Used for:  Mood disorder (H)   Changed by:  Tommy Lamb DO        Take two tablets by mouth at 8 am and take one tablet at 4 pm.   Quantity:  90 tablet   Refills:  3         Stop taking these medicines if you haven't already. Please contact your care team if you have questions.     buPROPion 300 MG 24 hr tablet   Commonly known as:  WELLBUTRIN XL   Replaced by:  buPROPion 100 MG tablet   Stopped by:  Tommy Lamb DO           Na Sulfate-K Sulfate-Mg Sulf solution   Commonly known as:  SUPREP BOWEL PREP   Stopped by:  Tommy Lamb DO                Where to get your medicines      These medications were sent to [id number] - [place], [place] - [address]  [address] [facility], [place] [address]     Phone:  [phone]     buPROPion 100 MG tablet                Primary Care Provider Office Phone # Fax #    Tommy Lamb [phone]       [facility] [place] [address]  [place] [address]        Equal Access to Services     SENG CEVALLOS : Hadii misa castillo hadasho Soomaali, waaxda luqadaha, qaybta kaalmada adeegyada, alka smith. So [facility] [phone].    ATENCIÓN: Si habla español, tiene a rodriguez disposición servicios gratuitos de asistencia lingüística. Llame al [phone].    We comply with applicable federal civil rights laws and [place] laws. We do not discriminate on the basis of race, color, national origin, age, disability sex, sexual orientation or gender identity.            Thank you!     Thank you for choosing [facility]  for your care. Our goal is always to provide you with excellent care. Hearing back from our patients is one way we can continue to improve our services. Please  take a few minutes to complete the written survey that you may receive in the mail after your visit with us. Thank you!             Your Updated Medication List - Protect others around you: Learn how to safely use, store and throw away your medicines at www.disposemymeds.org.          This list is accurate as of: 9/6/17  9:34 AM.  Always use your most recent med list.                   Brand Name Dispense Instructions for use Diagnosis    amitriptyline 25 MG tablet    ELAVIL    30 tablet    TAKE 1 TABLET BY MOUTH AT B EDTIME    Persistent insomnia       amphetamine-dextroamphetamine 30 MG per 24 hr capsule    ADDERALL XR    60 capsule    Take 1 capsule (30 mg) by mouth 2 times daily    Attention deficit hyperactivity disorder (ADHD), predominantly inattentive type       aspirin 81 MG tablet     30 tablet    Take 1 tablet (81 mg) by mouth daily    Russell's esophagus with dysplasia       buPROPion 100 MG tablet    WELLBUTRIN    120 tablet    Take 2 tablets (200 mg) by mouth 2 times daily    Recurrent major depressive disorder, in partial remission (H)       gabapentin 300 MG capsule    NEURONTIN    180 capsule    TAKE 2 CAPSULES BY MOUTH TH REE TIMES DAILY    Cervical radiculopathy       lithium 450 MG CR tablet    ESKALITH    90 tablet    Take two tablets by mouth at 8 am and take one tablet at 4 pm.    Mood disorder (H)       omeprazole 40 MG capsule    priLOSEC    90 capsule    Take 1 capsule (40 mg) by mouth daily Take 30-60 minutes before a meal.    Acute superficial gastritis without hemorrhage

## 2017-09-06 NOTE — PATIENT INSTRUCTIONS
HOW TO QUIT SMOKING  Smoking is one of the hardest habits to break. About half of all those who have ever smoked have been able to quit, and most of those (about 70%) who still smoke want to quit. Here are some of the best ways to stop smoking.     KEEP TRYING:  It takes most smokers about 8 tries before they are finally able to fully quit. So, the more often you try and fail, the better your chance of quitting the next time! So, don't give up!    GO COLD TURKEY:  Most ex-smokers quit cold turkey. Trying to cut back gradually doesn't seem to work as well, perhaps because it continues the smoking habit. Also, it is possible to fool yourself by inhaling more while smoking fewer cigarettes. This results in the same amount of nicotine in your body!    GET SUPPORT:  Support programs can make an important difference, especially for the heavy smoker. These groups offer lectures, methods to change your behavior and peer support. Call the free national Quitline for more information. 800-QUIT-NOW (596-439-9970). Low-cost or free programs are offered by many hospitals, local chapters of the American Lung Association (669-981-0940) and the American Cancer Society (427-985-4486). Support at home is important too. Non-smokers can help by offering praise and encouragement. If the smoker fails to quit, encourage them to try again!    OVER-THE-COUNTER MEDICINES:  For those who can't quit on their own, Nicotine Replacement Therapy (NRT) may make quitting much easier. Certain aids such as the nicotine patch, gum and lozenge are available without a prescription. However, it is best to use these under the guidance of your doctor. The skin patch provides a steady supply of nicotine to the body. Nicotine gum and lozenge gives temporary bursts of low levels of nicotine. Both methods take the edge off the craving for cigarettes. WARNING: If you feel symptoms of nicotine overdose, such as nausea, vomiting, dizziness, weakness, or fast  heartbeat, stop using these and see your doctor.    PRESCRIPTION MEDICINES:  After evaluating your smoking patterns and prior attempts at quitting, your doctor may offer a prescription medicine such as bupropion (Zyban, Wellbutrin), varenicline (Chantix, Champix), a niocotine inhaler or nasal spray. Each has its unique advantage and side effects which your doctor can review with you.    HEALTH BENEFITS OF QUITTING:  The benefits of quitting start right away and keep improving the longer you go without smokin minutes: blood pressure and pulse return to normal  8 hours: oxygen levels return to normal  2 days: ability to smell and taste begins to improve as damaged nerves start to regrow  2-3 weeks: circulation and lung function improves  1-9 months: decreased cough, congestion and shortness of breath; less tired  1 year: risk of heart attack decreases by half  5 years: risk of lung cancer decreases by half; risk of stroke becomes the same as a non-smoker  For information about how to quit smoking, visit the following links:  National Cancer [place] ,   Clearing the Air, Quit Smoking Today   - an online booklet. http://www.smokefree.gov/pubs/clearing_the_air.pdf  Smokefree.gov http://smokefree.gov/  QuitNet http://www.quitnet.com/    2267-9854 Ryan Nolasco, [address] [place] [address], [place] [address]. All rights reserved. This information is not intended as a substitute for professional medical care. Always follow your healthcare professional's instructions.    The Benefits of Living Smoke Free  What do you want to gain from quitting? Check off some reasons to quit.  Health Benefits  ___ Reduce my risk of lung cancer, heart disease, chronic lung disease  ___ Have fewer wrinkles and softer skin  ___ Improve my sense of taste and smell  ___ For pregnant women reduce the risk of having a miscarriage, stillbirth, premature birth, or low-birth-weight baby  Personal Benefits  ___ Feel more in control of my life  ___  Have better-smelling hair, breath, clothes, home, and car  ___ Save time by not having to take smoke breaks, buy cigarettes, or hunt for a light  ___ Have whiter teeth  Family Benefits  ___ Reduce my children s respiratory tract infections  ___ Set a good example for my children  ___ Reduce my family s cancer risk  Financial Benefits  ___ Save hundreds of dollars each year that would be spent on cigarettes  ___ Save money on medical bills  ___ Save on life, health, and car insurance premiums    Those Dollars Add Up!  Cigarettes are expensive, and getting more expensive all the time. Do you realize how much money you are spending on cigarettes per year? What is the average amount you spend on a pack of cigarettes? What is the average number of packs that you smoke per day? Using your answers to these questions, fill in this formula to help you find out:  ($ _____ per pack) ×  ( _____ number of packs per day) × (365 days) =  $ _____ yearly cost of smoking  Besides tobacco, there are other costs, including extra cleaning bills and replacement costs for clothing and furniture; medical expenses for smoking-related illnesses; and higher health, life, and car insurance premiums.    Cigars and Pipes Count Too!  Cigars and pipes are also dangerous. So are smokeless (chewing) tobacco and snuff. All of these products contain nicotine, a highly addictive substance that has harmful effects on your body. Quitting smoking means giving up all tobacco products.      0885-0015 [address], [place] [address]. All rights reserved. This information is not intended as a substitute for professional medical care. Always follow your healthcare professional's instructions.  HOW TO QUIT SMOKING  Smoking is one of the hardest habits to break. About half of all those who have ever smoked have been able to quit, and most of those (about 70%) who still smoke want to quit. Here are some of the best ways to stop smoking.      KEEP TRYING:  It takes most smokers about 8 tries before they are finally able to fully quit. So, the more often you try and fail, the better your chance of quitting the next time! So, don't give up!    GO COLD TURKEY:  Most ex-smokers quit cold turkey. Trying to cut back gradually doesn't seem to work as well, perhaps because it continues the smoking habit. Also, it is possible to fool yourself by inhaling more while smoking fewer cigarettes. This results in the same amount of nicotine in your body!    GET SUPPORT:  Support programs can make an important difference, especially for the heavy smoker. These groups offer lectures, methods to change your behavior and peer support. Call the free national Quitline for more information. 800-QUIT-NOW (182-711-4808). Low-cost or free programs are offered by many hospitals, local chapters of the American Lung Association (767-655-4222) and the American Cancer Society (228-155-9594). Support at home is important too. Non-smokers can help by offering praise and encouragement. If the smoker fails to quit, encourage them to try again!    OVER-THE-COUNTER MEDICINES:  For those who can't quit on their own, Nicotine Replacement Therapy (NRT) may make quitting much easier. Certain aids such as the nicotine patch, gum and lozenge are available without a prescription. However, it is best to use these under the guidance of your doctor. The skin patch provides a steady supply of nicotine to the body. Nicotine gum and lozenge gives temporary bursts of low levels of nicotine. Both methods take the edge off the craving for cigarettes. WARNING: If you feel symptoms of nicotine overdose, such as nausea, vomiting, dizziness, weakness, or fast heartbeat, stop using these and see your doctor.    PRESCRIPTION MEDICINES:  After evaluating your smoking patterns and prior attempts at quitting, your doctor may offer a prescription medicine such as bupropion (Zyban, Wellbutrin), varenicline  (Chantix, Champix), a niocotine inhaler or nasal spray. Each has its unique advantage and side effects which your doctor can review with you.    HEALTH BENEFITS OF QUITTING:  The benefits of quitting start right away and keep improving the longer you go without smokin minutes: blood pressure and pulse return to normal  8 hours: oxygen levels return to normal  2 days: ability to smell and taste begins to improve as damaged nerves start to regrow  2-3 weeks: circulation and lung function improves  1-9 months: decreased cough, congestion and shortness of breath; less tired  1 year: risk of heart attack decreases by half  5 years: risk of lung cancer decreases by half; risk of stroke becomes the same as a non-smoker  For information about how to quit smoking, visit the following links:  National Cancer [place] ,   Clearing the Air, Quit Smoking Today   - an online booklet. http://www.smokefree.gov/pubs/clearing_the_air.pdf  Smokefree.gov http://smokefree.gov/  QuitNet http://www.quitnet.com/    0601-8436 [place] [address]. All rights reserved. This information is not intended as a substitute for professional medical care. Always follow your healthcare professional's instructions.    The Benefits of Living Smoke Free  What do you want to gain from quitting? Check off some reasons to quit.  Health Benefits  ___ Reduce my risk of lung cancer, heart disease, chronic lung disease  ___ Have fewer wrinkles and softer skin  ___ Improve my sense of taste and smell  ___ For pregnant women reduce the risk of having a miscarriage, stillbirth, premature birth, or low-birth-weight baby  Personal Benefits  ___ Feel more in control of my life  ___ Have better-smelling hair, breath, clothes, home, and car  ___ Save time by not having to take smoke breaks, buy cigarettes, or hunt for a light  ___ Have whiter teeth  Family Benefits  ___ Reduce my children s respiratory tract infections  ___  Set a good example for my children  ___ Reduce my family s cancer risk  Financial Benefits  ___ Save hundreds of dollars each year that would be spent on cigarettes  ___ Save money on medical bills  ___ Save on life, health, and car insurance premiums    Those Dollars Add Up!  Cigarettes are expensive, and getting more expensive all the time. Do you realize how much money you are spending on cigarettes per year? What is the average amount you spend on a pack of cigarettes? What is the average number of packs that you smoke per day? Using your answers to these questions, fill in this formula to help you find out:  ($ _____ per pack) ×  ( _____ number of packs per day) × (365 days) =  $ _____ yearly cost of smoking  Besides tobacco, there are other costs, including extra cleaning bills and replacement costs for clothing and furniture; medical expenses for smoking-related illnesses; and higher health, life, and car insurance premiums.    Cigars and Pipes Count Too!  Cigars and pipes are also dangerous. So are smokeless (chewing) tobacco and snuff. All of these products contain nicotine, a highly addictive substance that has harmful effects on your body. Quitting smoking means giving up all tobacco products.      2827-9460 [facility], [address] [place] [address], [place] [id number]. All rights reserved. This information is not intended as a substitute for professional medical care. Always follow your healthcare professional's instructions.

## 2017-09-06 NOTE — NURSING NOTE
Chief Complaint   Patient presents with     Depression     update phq     Anxiety     update fred       Initial /82 (BP Location: Right arm, Patient Position: Sitting, Cuff Size: Adult Regular)  Pulse (!) 48  Temp 98.7  F (37.1  C) (Tympanic)  Resp 14  Ht 6' (1.829 m)  Wt 238 lb (108 kg)  SpO2 98%  BMI 32.28 kg/m2 Estimated body mass index is 32.28 kg/(m^2) as calculated from the following:    Height as of this encounter: 6' (1.829 m).    Weight as of this encounter: 238 lb (108 kg).  Medication Reconciliation: complete     Ibeth Cabral

## 2017-09-06 NOTE — PROGRESS NOTES
SUBJECTIVE:                                                    Andrei Whitman is a 46 year old male who presents to clinic today for the following health issues:      Depression and Anxiety Follow-Up    Status since last visit: No change    Other associated symptoms:None    Complicating factors:     Significant life event: No     Current substance abuse: None    PHQ-9 SCORE 2/22/2017 2/22/2017 9/6/2017   Total Score - - -   Total Score 18 18 19     SONDRA-7 SCORE 12/20/2016 2/22/2017 9/6/2017   Total Score 14 17 13       PHQ-9  English  PHQ-9   Any Language  GAD7      Amount of exercise or physical activity: None    Problems taking medications regularly: No    Medication side effects:  Time release Wellbutrin causes stomach problems  Diet: regular (no restrictions)      Abdominal pain:  He reports pressure like pain over the upper abdomen.  His pain comes on mainly in the afternoon.  He has a nauseated feeling.  He has had an upper GI scope showing no significant ulcers or gastritis.  He has made mental note that his abdominal pains worsened with ER Wellbutrin.  He had minimal nausea soon after taking the IR formulation which resolved within a few hours.  He feels like this is contributing to his depression.            Problem list and histories reviewed & adjusted, as indicated.  Additional history: as documented    Patient Active Problem List   Diagnosis     Cervicalgia     Lower back pain     Piriformis syndrome     Segmental and somatic dysfunction of lower extremity     GERD (gastroesophageal reflux disease)     Constipation     Sorethroat     Mood disorder (H)     Elevated blood pressure     Abnormal weight gain     Attention deficit hyperactivity disorder (ADHD), predominantly inattentive type     ACP (advance care planning)     Flatulence, eructation, and gas pain     Bipolar disease, chronic (H)     Routine general medical examination at a health care facility     Past Surgical History:   Procedure  Laterality Date     APPENDECTOMY      age 14     COLONOSCOPY  07/12/2017    Left descending x1 tubular adenoma, sigmoid x1 tubular adenoma and rectal x1 hyperplastic polyp.  Pan diverticulosis     ENDOSCOPY UPPER, COLONOSCOPY, COMBINED N/A 7/12/2017    Procedure: COMBINED ENDOSCOPY UPPER, COLONOSCOPY;  UPPER ENDOSCOPY WITH BIOPSIES  AND COLONOSCOPY WITH POLYPECTOMY;  Surgeon: Francis Valverde DO;  Location: [facility] OR     ENT SURGERY  age 18    tonsils     ESOPHAGOGASTRODUODENOSCOPY  07/12/2017    chemical gatropathy, GE junction with pancreatiuc metaplasia      ORTHOPEDIC SURGERY  age 30     back and neck fusion, bone from hip removed to use on plates     ORTHOPEDIC SURGERY  age 30    L5 fusion, laminectomy of lumbar discs       Social History   Substance Use Topics     Smoking status: Current Every Day Smoker     Packs/day: 1.00     Years: 20.00     Smokeless tobacco: Never Used     Alcohol use No     Family History   Problem Relation Age of Onset     Asthma Mother      Arthritis Mother      Prostate Cancer Father      HEART DISEASE Paternal Grandfather      Hypertension Paternal Grandfather      Alcohol/Drug Paternal Grandfather      Other - See Comments Brother      Nerve and degenerative disease mild     Alcohol/Drug Brother      Other - See Comments Sister 21     Hip replacements, nerve, degerative disease     Alcohol/Drug Maternal Grandfather      Unknown/Adopted Paternal Grandmother      Other Cancer Paternal Aunt      Cervical cancer             ROS:  C: NEGATIVE for fever, chills  CONSTITUTIONAL:POSITIVE  for weight gain  E: NEGATIVE for vision changes or irritation  E/M: NEGATIVE for ear, mouth and throat problems  ENT/MOUTH: POSITIVE for tooth pain  R: NEGATIVE for significant cough or SOB  CV: NEGATIVE for chest pain, palpitations or peripheral edema  GI: POSITIVE for abdominal pain epigastric and RUQ and nausea and NEGATIVE for constipation, heartburn or reflux, melena and vomiting  : NEGATIVE for  frequency, dysuria, or hematuria  MUSCULOSKELETAL:POSITIVE  for arthralgias of the back and neck which are chroic  N: NEGATIVE for weakness, dizziness or paresthesias  E: NEGATIVE for temperature intolerance, skin/hair changes  P: NEGATIVE for changes in mood or affect    OBJECTIVE:     /82 (BP Location: Right arm, Patient Position: Sitting, Cuff Size: Adult Regular)  Pulse (!) 48  Temp 98.7  F (37.1  C) (Tympanic)  Resp 14  Ht 6' (1.829 m)  Wt 238 lb (108 kg)  SpO2 98%  BMI 32.28 kg/m2  Body mass index is 32.28 kg/(m^2).  GENERAL: healthy, alert and no distress  HENT: oropharynx clear and oral mucous membranes are dry  RESP: lungs clear to auscultation - no rales, rhonchi or wheezes  CV: regular rate and rhythm, normal S1 S2, no S3 or S4, no murmur, click or rub, no peripheral edema and peripheral pulses strong  ABDOMEN: soft, nontender, no hepatosplenomegaly, no masses and bowel sounds normal  ABDOMEN: no palpable or pulsatile masses and no bruits heard  MS: no gross musculoskeletal defects noted, no edema  PSYCH: mentation appears normal, affect normal/bright    Diagnostic Test Results:  none     ASSESSMENT/PLAN:   (F33.41) Recurrent major depressive disorder, in partial remission (H)  (primary encounter diagnosis)  Comment: Patient has been having abdominal pain which seems to correlate well with his extended release Wellbutrin   Plan:  buPROPion (WELLBUTRIN) 100 MG tablet, 200 mg BID.  Discontinue XR formulation.      (F17.200) Tobacco dependence  Comment:  Emerson is ready to accept help to quit his tobacco use.  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  Plan:   QUITPLAN  Referral                  FUTURE APPOINTMENTS:       - Follow-up visit in 6 weeks    Tommy Lamb DO, DO  [facility]

## 2017-09-07 ASSESSMENT — ANXIETY QUESTIONNAIRES: GAD7 TOTAL SCORE: 13

## 2017-09-08 DIAGNOSIS — F39 MOOD DISORDER (H): ICD-10-CM

## 2017-09-08 RX ORDER — LITHIUM CARBONATE 450 MG
TABLET, EXTENDED RELEASE ORAL
Qty: 90 TABLET | Refills: 0 | Status: SHIPPED | OUTPATIENT
Start: 2017-09-08 | End: 2017-10-08

## 2017-09-08 NOTE — TELEPHONE ENCOUNTER
Lithium last filled on 8.15.16 #90 with 3 refills. Last office visit on 9.6.17.Medication pended.Thank you

## 2017-09-14 DIAGNOSIS — F90.0 ATTENTION DEFICIT HYPERACTIVITY DISORDER (ADHD), PREDOMINANTLY INATTENTIVE TYPE: ICD-10-CM

## 2017-09-14 NOTE — TELEPHONE ENCOUNTER
adderall      Last Written Prescription Date: 8/18/17  Last Fill Quantity: 60,  # refills: 0   Last Office Visit with [facility], [facility] or [facility] prescribing provider: 9/6/17                                         Next 5 appointments (look out 90 days)     Oct 19, 2017  9:20 AM CDT   (Arrive by 9:00 AM)   SHORT with Tommy Lamb DO   [facility] ([facility] )    [address] [place] [address]   [phone]

## 2017-09-15 RX ORDER — DEXTROAMPHETAMINE SACCHARATE, AMPHETAMINE ASPARTATE MONOHYDRATE, DEXTROAMPHETAMINE SULFATE AND AMPHETAMINE SULFATE 7.5; 7.5; 7.5; 7.5 MG/1; MG/1; MG/1; MG/1
30 CAPSULE, EXTENDED RELEASE ORAL 2 TIMES DAILY
Qty: 60 CAPSULE | Refills: 0 | Status: SHIPPED | OUTPATIENT
Start: 2017-09-15 | End: 2017-10-17

## 2017-10-08 DIAGNOSIS — F39 MOOD DISORDER (H): ICD-10-CM

## 2017-10-10 RX ORDER — LITHIUM CARBONATE 450 MG
TABLET, EXTENDED RELEASE ORAL
Qty: 90 TABLET | Refills: 0 | Status: SHIPPED | OUTPATIENT
Start: 2017-10-10 | End: 2017-10-19

## 2017-10-10 NOTE — TELEPHONE ENCOUNTER
Lithium carbonate 450 mg ER      Last Written Prescription Date: 9-8-17  Last Fill Quantity: 90,  # refills: 0   Last Office Visit with [facility], [facility] or [facility] prescribing provider: 9-6-17                                         Next 5 appointments (look out 90 days)     Oct 19, 2017  9:20 AM CDT   (Arrive by 9:00 AM)   SHORT with Tommy Lamb DO   [facility] ([facility] )    [address] [place] [address]   [phone]

## 2017-10-17 DIAGNOSIS — F90.0 ATTENTION DEFICIT HYPERACTIVITY DISORDER (ADHD), PREDOMINANTLY INATTENTIVE TYPE: ICD-10-CM

## 2017-10-17 RX ORDER — DEXTROAMPHETAMINE SACCHARATE, AMPHETAMINE ASPARTATE MONOHYDRATE, DEXTROAMPHETAMINE SULFATE AND AMPHETAMINE SULFATE 7.5; 7.5; 7.5; 7.5 MG/1; MG/1; MG/1; MG/1
30 CAPSULE, EXTENDED RELEASE ORAL 2 TIMES DAILY
Qty: 60 CAPSULE | Refills: 0 | Status: SHIPPED | OUTPATIENT
Start: 2017-10-17 | End: 2017-11-14

## 2017-10-19 ENCOUNTER — OFFICE VISIT (OUTPATIENT)
Dept: PEDIATRICS | Facility: OTHER | Age: 47
End: 2017-10-19
Attending: INTERNAL MEDICINE
Payer: MEDICARE

## 2017-10-19 VITALS
SYSTOLIC BLOOD PRESSURE: 148 MMHG | BODY MASS INDEX: 32.23 KG/M2 | HEART RATE: 64 BPM | WEIGHT: 238 LBS | DIASTOLIC BLOOD PRESSURE: 84 MMHG | HEIGHT: 72 IN | TEMPERATURE: 99.4 F

## 2017-10-19 DIAGNOSIS — F31.9 BIPOLAR DISEASE, CHRONIC (H): ICD-10-CM

## 2017-10-19 DIAGNOSIS — F39 MOOD DISORDER (H): ICD-10-CM

## 2017-10-19 DIAGNOSIS — I49.9 IRREGULAR HEART RHYTHM: Primary | ICD-10-CM

## 2017-10-19 DIAGNOSIS — R68.2 ORAL DRYNESS: ICD-10-CM

## 2017-10-19 PROCEDURE — 93010 ELECTROCARDIOGRAM REPORT: CPT | Performed by: INTERNAL MEDICINE

## 2017-10-19 PROCEDURE — 99212 OFFICE O/P EST SF 10 MIN: CPT

## 2017-10-19 PROCEDURE — 99214 OFFICE O/P EST MOD 30 MIN: CPT | Mod: 25 | Performed by: INTERNAL MEDICINE

## 2017-10-19 PROCEDURE — 93005 ELECTROCARDIOGRAM TRACING: CPT

## 2017-10-19 RX ORDER — FLUORIDE TOOTHPASTE
10 TOOTHPASTE DENTAL 4 TIMES DAILY
Qty: 473 ML | Refills: 5 | Status: SHIPPED | OUTPATIENT
Start: 2017-10-19 | End: 2018-03-12

## 2017-10-19 ASSESSMENT — ANXIETY QUESTIONNAIRES
3. WORRYING TOO MUCH ABOUT DIFFERENT THINGS: MORE THAN HALF THE DAYS
2. NOT BEING ABLE TO STOP OR CONTROL WORRYING: MORE THAN HALF THE DAYS
7. FEELING AFRAID AS IF SOMETHING AWFUL MIGHT HAPPEN: MORE THAN HALF THE DAYS
6. BECOMING EASILY ANNOYED OR IRRITABLE: SEVERAL DAYS
5. BEING SO RESTLESS THAT IT IS HARD TO SIT STILL: MORE THAN HALF THE DAYS
1. FEELING NERVOUS, ANXIOUS, OR ON EDGE: SEVERAL DAYS
IF YOU CHECKED OFF ANY PROBLEMS ON THIS QUESTIONNAIRE, HOW DIFFICULT HAVE THESE PROBLEMS MADE IT FOR YOU TO DO YOUR WORK, TAKE CARE OF THINGS AT HOME, OR GET ALONG WITH OTHER PEOPLE: VERY DIFFICULT
4. TROUBLE RELAXING: SEVERAL DAYS
GAD7 TOTAL SCORE: 11

## 2017-10-19 ASSESSMENT — PATIENT HEALTH QUESTIONNAIRE - PHQ9: SUM OF ALL RESPONSES TO PHQ QUESTIONS 1-9: 14

## 2017-10-19 ASSESSMENT — PAIN SCALES - GENERAL: PAINLEVEL: SEVERE PAIN (6)

## 2017-10-19 NOTE — MR AVS SNAPSHOT
"              After Visit Summary   10/19/2017    Andrei Whitman    MRN: [id number]           Patient Information     Date Of Birth          1970        Visit Information        Provider Department      10/19/2017 9:20 AM Tommy Lamb DO [facility] [place]        Today's Diagnoses     Irregular heart rhythm    -  1    Mood disorder (H)        Bipolar disease, chronic (H)        Oral dryness          Care Instructions    Take 200 mg twice per day of Wellbutrin on Tuesday and Friday of each week.  Remain on 200 mg in the am and 100 mg in the PM  All other day.          Follow-ups after your visit        Your next 10 appointments already scheduled     Dec 04, 2017 10:20 AM CST   (Arrive by 10:00 AM)   SHORT with Tommy Lamb DO   [facility] ([facility] )    [address] [place] [address]   [phone]              Who to contact     If you have questions or need follow up information about today's clinic visit or your schedule please contact [facility] directly at [phone].  Normal or non-critical lab and imaging results will be communicated to you by Musiwavehart, letter or phone within 4 business days after the clinic has received the results. If you do not hear from us within 7 days, please contact the clinic through Musiwavehart or phone. If you have a critical or abnormal lab result, we will notify you by phone as soon as possible.  Submit refill requests through Featherlight or call your pharmacy and they will forward the refill request to us. Please allow 3 business days for your refill to be completed.          Additional Information About Your Visit        MyChart Information     Featherlight lets you send messages to your doctor, view your test results, renew your prescriptions, schedule appointments and more. To sign up, go to [email or web address] . Click on \"Log in\" on the left side of the screen, which will take you to the " "Welcome page. Then click on \"Sign up Now\" on the right side of the page.     You will be asked to enter the access code listed below, as well as some personal information. Please follow the directions to create your username and password.     Your access code is: [email or web address]  Expires: 2018 10:05 AM     Your access code will  in 90 days. If you need help or a new code, please call your [facility] clinic or [phone].        Care EveryWhere ID     This is your Care EveryWhere ID. This could be used by other organizations to access your [facility] medical records  [id number]        Your Vitals Were     Pulse Temperature Height BMI (Body Mass Index)          64 99.4  F (37.4  C) 6' (1.829 m) 32.28 kg/m2         Blood Pressure from Last 3 Encounters:   10/19/17 148/84   17 136/82   17 122/90    Weight from Last 3 Encounters:   10/19/17 238 lb (108 kg)   17 238 lb (108 kg)   17 231 lb (104.8 kg)              We Performed the Following     EKG 12-lead complete w/read - (Clinic Performed)          Today's Medication Changes          These changes are accurate as of: 10/19/17 10:31 AM.  If you have any questions, ask your nurse or doctor.               Start taking these medicines.        Dose/Directions    artificial saliva Liqd liquid   Used for:  Oral dryness   Started by:  Tommy Lamb DO        Dose:  10 mL   Swish and spit 10 mLs in mouth 4 times daily   Quantity:  473 mL   Refills:  5         These medicines have changed or have updated prescriptions.        Dose/Directions    buPROPion 100 MG tablet   Commonly known as:  WELLBUTRIN   This may have changed:  additional instructions   Used for:  Recurrent major depressive disorder, in partial remission (H)        Dose:  200 mg   Take 2 tablets (200 mg) by mouth 2 times daily   Quantity:  120 tablet   Refills:  5       lithium 450 MG CR tablet   Commonly known as:  ESKALITH   This may have changed:  Another medication with " the same name was removed. Continue taking this medication, and follow the directions you see here.   Used for:  Mood disorder (H)   Changed by:  Tommy Lamb, DO        Take two tablets by mouth at 8 am and take one tablet at 4 pm.   Quantity:  90 tablet   Refills:  3            Where to get your medicines      These medications were sent to [id number] - [place], [place] - [address]  [address], [place] [address]     Phone:  [phone]     artificial saliva Liqd liquid                Primary Care Provider Office Phone # Fax #    Tommy Lamb [phone]       [facility] [place] [address]  [place] [address]        Equal Access to Services     SENG CEVALLOS : Hadii misa ku hadasho Soomaali, waaxda luqadaha, qaybta kaalmada adeegyada, waxay idiin haybrooke parker . So [facility] [phone].    ATENCIÓN: Si habla español, tiene a rodriguez disposición servicios gratuitos de asistencia lingüística. [facility] [phone].    We comply with applicable federal civil rights laws and [place] laws. We do not discriminate on the basis of race, color, national origin, age, disability, sex, sexual orientation, or gender identity.            Thank you!     Thank you for choosing [facility]  for your care. Our goal is always to provide you with excellent care. Hearing back from our patients is one way we can continue to improve our services. Please take a few minutes to complete the written survey that you may receive in the mail after your visit with us. Thank you!             Your Updated Medication List - Protect others around you: Learn how to safely use, store and throw away your medicines at www.disposemymeds.org.          This list is accurate as of: 10/19/17 10:31 AM.  Always use your most recent med list.                   Brand Name Dispense Instructions for use Diagnosis    amitriptyline 25 MG tablet    ELAVIL    30 tablet     TAKE 1 TABLET BY MOUTH AT B EDTIME    Persistent insomnia       amphetamine-dextroamphetamine 30 MG per 24 hr capsule    ADDERALL XR    60 capsule    Take 1 capsule (30 mg) by mouth 2 times daily    Attention deficit hyperactivity disorder (ADHD), predominantly inattentive type       artificial saliva Liqd liquid     473 mL    Swish and spit 10 mLs in mouth 4 times daily    Oral dryness       aspirin 81 MG tablet     30 tablet    Take 1 tablet (81 mg) by mouth daily    Russell's esophagus with dysplasia       buPROPion 100 MG tablet    WELLBUTRIN    120 tablet    Take 2 tablets (200 mg) by mouth 2 times daily    Recurrent major depressive disorder, in partial remission (H)       gabapentin 300 MG capsule    NEURONTIN    180 capsule    TAKE 2 CAPSULES BY MOUTH TH REE TIMES DAILY    Cervical radiculopathy       lithium 450 MG CR tablet    ESKALITH    90 tablet    Take two tablets by mouth at 8 am and take one tablet at 4 pm.    Mood disorder (H)       omeprazole 40 MG capsule    priLOSEC    90 capsule    Take 1 capsule (40 mg) by mouth daily Take 30-60 minutes before a meal.    Acute superficial gastritis without hemorrhage

## 2017-10-19 NOTE — NURSING NOTE
Chief Complaint   Patient presents with     Depression       Initial /72  Pulse 64  Temp 99.4  F (37.4  C)  Ht 6' (1.829 m)  Wt 238 lb (108 kg)  BMI 32.28 kg/m2 Estimated body mass index is 32.28 kg/(m^2) as calculated from the following:    Height as of this encounter: 6' (1.829 m).    Weight as of this encounter: 238 lb (108 kg).  Medication Reconciliation: complete     Clemente Agudelo

## 2017-10-19 NOTE — PROGRESS NOTES
SUBJECTIVE:                                                    Andrei Whitman is a 46 year old male who presents to clinic today for the following health issues:      HPI    Depression Followup    Status since last visit: Improved with higher dosage of Wellbutrin.  He reports that he did have some overwhelming fear late in the day.  He reports that he took the higher dose of Wellbutrin for 5 weeks and he has reduced his dose to 300mg with improvements in his anxiety.  He denies any other symptoms besides the overwhelming fear.    See PHQ-9 for current symptoms.  Other associated symptoms: None    Complicating factors:   Significant life event:  No   Current substance abuse:  None  Anxiety or Panic symptoms:  Yes-  As above.    PHQ-9 Score and MyChart F/U Questions 2/22/2017 9/6/2017 10/19/2017   Total Score 18 19 14   Q9: Suicide Ideation More than half the days More than half the days Several days       PHQ-9  English  PHQ-9   Any Language  Suicide Assessment Five-step Evaluation and Treatment (SAFE-T)        Problem list and histories reviewed & adjusted, as indicated.  Additional history: as documented    Teeth pain:  He reports pain over the posterior upper and lower teeth.  The pain is present with eating.  He has npt seen a dentist in greater than 12 years.      Patient Active Problem List   Diagnosis     Cervicalgia     Lower back pain     Piriformis syndrome     Segmental and somatic dysfunction of lower extremity     GERD (gastroesophageal reflux disease)     Constipation     Sorethroat     Mood disorder (H)     Elevated blood pressure     Abnormal weight gain     Attention deficit hyperactivity disorder (ADHD), predominantly inattentive type     ACP (advance care planning)     Flatulence, eructation, and gas pain     Bipolar disease, chronic (H)     Routine general medical examination at a health care facility     Past Surgical History:   Procedure Laterality Date     APPENDECTOMY      age 14      COLONOSCOPY  07/12/2017    Left descending x1 tubular adenoma, sigmoid x1 tubular adenoma and rectal x1 hyperplastic polyp.  Pan diverticulosis     ENDOSCOPY UPPER, COLONOSCOPY, COMBINED N/A 7/12/2017    Procedure: COMBINED ENDOSCOPY UPPER, COLONOSCOPY;  UPPER ENDOSCOPY WITH BIOPSIES  AND COLONOSCOPY WITH POLYPECTOMY;  Surgeon: Francis Valverde DO;  Location: [facility]     ENT SURGERY  age 18    tonsils     ESOPHAGOGASTRODUODENOSCOPY  07/12/2017    chemical gatropathy, GE junction with pancreatiuc metaplasia      ORTHOPEDIC SURGERY  age 30     back and neck fusion, bone from hip removed to use on plates     ORTHOPEDIC SURGERY  age 30    L5 fusion, laminectomy of lumbar discs       Social History   Substance Use Topics     Smoking status: Current Every Day Smoker     Packs/day: 1.00     Years: 20.00     Smokeless tobacco: Never Used     Alcohol use No     Family History   Problem Relation Age of Onset     Asthma Mother      Arthritis Mother      Prostate Cancer Father      HEART DISEASE Paternal Grandfather      Hypertension Paternal Grandfather      Alcohol/Drug Paternal Grandfather      Other - See Comments Brother      Nerve and degenerative disease mild     Alcohol/Drug Brother      Other - See Comments Sister 21     Hip replacements, nerve, degerative disease     Alcohol/Drug Maternal Grandfather      Unknown/Adopted Paternal Grandmother      Other Cancer Paternal Aunt      Cervical cancer             ROS:  C: NEGATIVE for fever, chills, change in weight  E/M: NEGATIVE for ear, mouth and throat problems  ENT/MOUTH: See HPI.  He reports that he can taste infection in his mouth   R: NEGATIVE for significant cough or SOB  RESP:POSITIVE for cough-non productive  CV: NEGATIVE for chest pain, palpitations or peripheral edema  GI: NEGATIVE for nausea, abdominal pain, heartburn, or change in bowel habits  : NEGATIVE for frequency, dysuria, or hematuria  MUSCULOSKELETAL:POSITIVE  for back pain  and neck pain  N:  NEGATIVE for weakness, dizziness or paresthesias  P: NEGATIVE for changes in mood or affect    OBJECTIVE:     /72  Pulse 64  Temp 99.4  F (37.4  C)  Ht 6' (1.829 m)  Wt 238 lb (108 kg)  BMI 32.28 kg/m2  Body mass index is 32.28 kg/(m^2).  GENERAL: healthy, alert and no distress  EYES: Eyes grossly normal to inspection  HENT: oropharynx clear and lower left posterior molar broken. Some large cavitations noted.  The mouth is excessively dry.  NECK: no adenopathy, no asymmetry, masses, or scars and thyroid normal to palpation  RESP: lungs clear to auscultation - no rales, rhonchi or wheezes  CV: frequent extasystoles with compensatory pause, no murmur, click or rub, peripheral pulses strong and no peripheral edema  ABDOMEN: soft, nontender, no hepatosplenomegaly, no masses and bowel sounds normal  ABDOMEN: no palpable or pulsatile masses and no bruits heard  MS: no gross musculoskeletal defects noted, no edema  PSYCH: mentation appears normal, affect normal/bright           EKG Interpretation:      Interpreted by Tommy Lamb DO  Time reviewed:1100  Symptoms at time of EKG: None   Rhythm:  Sinus tachycardia with sinus arrhythmia   Rate: 100-110  Axis: Left Axis Deviation  Ectopy: None  Conduction: Normal  ST Segments/ T Waves: No ST-T wave changes and No acute ischemic changes  Q Waves: None  Comparison to prior: Changed from 06/22/2017    Clinical Impression: sinus tachycardia              ASSESSMENT/PLAN:   (F39) Mood disorder (H)  Comment: Stable with the exception that he feels as though he has some excess tremors noted with the recent increased in his Wellbutrin.  Plan:   He will continue Wellbutrin 200 in the am and 100 mg in the pm, but he will take an additional 100 mg in the evening on Tuesdays and Fridays.    (F31.9) Bipolar disease, chronic (H)  Comment: Stable.  He has improved depression.  Plan:   He will continue Lithium 450 mg TID    (R68.2) Oral dryness  Comment: He has some oral  dryness.  Plan:   artificial saliva (BIOTENE DRY MOUTHWASH) LIQD liquid    (I49.9) Irregular heart rhythm  (primary encounter diagnosis)  Comment:His ECG shows sinus tachycardia nd sinus arrhythmia  Plan:   Continue monitoring.      Patient Instructions   Take 200 mg twice per day of Wellbutrin on Tuesday and Friday of each week.  Remain on 200 mg in the am and 100 mg in the PM  All other day.        FUTURE APPOINTMENTS:       - Follow-up visit in 6 weeks     Tommy Lamb DO, DO  [facility] [place]

## 2017-10-19 NOTE — PATIENT INSTRUCTIONS
Take 200 mg twice per day of Wellbutrin on Tuesday and Friday of each week.  Remain on 200 mg in the am and 100 mg in the PM  All other day.

## 2017-10-20 ASSESSMENT — ANXIETY QUESTIONNAIRES: GAD7 TOTAL SCORE: 11

## 2017-11-07 DIAGNOSIS — F39 MOOD DISORDER (H): ICD-10-CM

## 2017-11-07 RX ORDER — LITHIUM CARBONATE 450 MG
TABLET, EXTENDED RELEASE ORAL
Qty: 90 TABLET | Refills: 1 | Status: SHIPPED | OUTPATIENT
Start: 2017-11-07 | End: 2018-01-04

## 2017-11-07 NOTE — TELEPHONE ENCOUNTER
Lithium      Last Written Prescription Date: 8/15/16  Last Fill Quantity: 90tab,  # refills: 3   Last Office Visit with [facility], [facility] or [facility] prescribing provider: 10/19/17                                         Next 5 appointments (look out 90 days)     Dec 04, 2017 10:20 AM CST   (Arrive by 10:00 AM)   SHORT with Tommy Lamb DO   [facility] ([facility] )    [address] [place] [address]   [phone]

## 2017-11-14 DIAGNOSIS — F90.0 ATTENTION DEFICIT HYPERACTIVITY DISORDER (ADHD), PREDOMINANTLY INATTENTIVE TYPE: ICD-10-CM

## 2017-11-14 NOTE — TELEPHONE ENCOUNTER
adderall      Last Written Prescription Date: 10/17/17  Last Fill Quantity: 60,  # refills: 0   Last Office Visit with [facility], [facility] or [facility] prescribing provider: 10/19/17                                         Next 5 appointments (look out 90 days)     Dec 04, 2017 10:20 AM CST   (Arrive by 10:00 AM)   SHORT with Tommy Lamb DO   [facility] ([facility] )    [address] [place] [address]   [phone]

## 2017-11-15 RX ORDER — DEXTROAMPHETAMINE SACCHARATE, AMPHETAMINE ASPARTATE MONOHYDRATE, DEXTROAMPHETAMINE SULFATE AND AMPHETAMINE SULFATE 7.5; 7.5; 7.5; 7.5 MG/1; MG/1; MG/1; MG/1
30 CAPSULE, EXTENDED RELEASE ORAL 2 TIMES DAILY
Qty: 60 CAPSULE | Refills: 0 | Status: SHIPPED | OUTPATIENT
Start: 2017-11-15 | End: 2017-12-12

## 2017-11-15 NOTE — TELEPHONE ENCOUNTER
Unable to leave message that the written RX is ready at the [facility]  registration to be picked up.

## 2017-12-03 DIAGNOSIS — G47.00 PERSISTENT INSOMNIA: ICD-10-CM

## 2017-12-04 NOTE — TELEPHONE ENCOUNTER
amitriptyline (ELAVIL) 25 MG tablet  Last Written Prescription Date: 12-5-2016  Last Fill Quantity: 30,  # refills: 11   Last Office Visit with [facility], [facility] or [facility] prescribing provider: 5-                                         Next 5 appointments (look out 90 days)     Cristian 10, 2018 11:40 AM CST   (Arrive by 11:20 AM)   SHORT with Tommy Lamb DO   [facility] [place] ([facility] )    [address] [place] [address]   [phone]

## 2017-12-12 DIAGNOSIS — F90.0 ATTENTION DEFICIT HYPERACTIVITY DISORDER (ADHD), PREDOMINANTLY INATTENTIVE TYPE: ICD-10-CM

## 2017-12-12 RX ORDER — DEXTROAMPHETAMINE SACCHARATE, AMPHETAMINE ASPARTATE MONOHYDRATE, DEXTROAMPHETAMINE SULFATE AND AMPHETAMINE SULFATE 7.5; 7.5; 7.5; 7.5 MG/1; MG/1; MG/1; MG/1
30 CAPSULE, EXTENDED RELEASE ORAL 2 TIMES DAILY
Qty: 60 CAPSULE | Refills: 0 | Status: SHIPPED | OUTPATIENT
Start: 2017-12-12 | End: 2018-01-10

## 2017-12-12 NOTE — TELEPHONE ENCOUNTER
Pt notified that the written RX is ready at the [facility]  registration to be picked up.

## 2017-12-12 NOTE — TELEPHONE ENCOUNTER
adderall      Last Written Prescription Date: 11/15/17  Last Fill Quantity: 60,  # refills: 0   Last Office Visit with [facility], [facility] or [facility] prescribing provider: 10/19/17                                         Next 5 appointments (look out 90 days)     Cristian 10, 2018 11:40 AM CST   (Arrive by 11:20 AM)   SHORT with Tommy Lamb DO   [facility] ([facility]    [address] [place] [address]   [phone]

## 2018-01-04 DIAGNOSIS — F39 MOOD DISORDER (H): ICD-10-CM

## 2018-01-05 NOTE — TELEPHONE ENCOUNTER
Lithium      Last Written Prescription Date:  11/7/17  Last Fill Quantity: 90,   # refills: 1  Last Office Visit: 10/19/17  Future Office visit:    Next 5 appointments (look out 90 days)     Cristian 10, 2018 11:40 AM CST   (Arrive by 11:20 AM)   SHORT with Tommy Lamb DO   [facility] ([facility]    [address] [place] [address]   [phone]

## 2018-01-08 RX ORDER — LITHIUM CARBONATE 450 MG
TABLET, EXTENDED RELEASE ORAL
Qty: 90 TABLET | Refills: 0 | Status: SHIPPED | OUTPATIENT
Start: 2018-01-08 | End: 2018-01-10

## 2018-01-10 ENCOUNTER — OFFICE VISIT (OUTPATIENT)
Dept: PEDIATRICS | Facility: OTHER | Age: 48
End: 2018-01-10
Attending: INTERNAL MEDICINE
Payer: MEDICARE

## 2018-01-10 VITALS
TEMPERATURE: 98 F | HEIGHT: 70 IN | RESPIRATION RATE: 20 BRPM | SYSTOLIC BLOOD PRESSURE: 140 MMHG | HEART RATE: 80 BPM | WEIGHT: 239 LBS | BODY MASS INDEX: 34.22 KG/M2 | DIASTOLIC BLOOD PRESSURE: 98 MMHG

## 2018-01-10 DIAGNOSIS — F39 MOOD DISORDER (H): ICD-10-CM

## 2018-01-10 DIAGNOSIS — I10 ESSENTIAL HYPERTENSION: Primary | ICD-10-CM

## 2018-01-10 DIAGNOSIS — F90.0 ATTENTION DEFICIT HYPERACTIVITY DISORDER (ADHD), PREDOMINANTLY INATTENTIVE TYPE: ICD-10-CM

## 2018-01-10 PROCEDURE — 99214 OFFICE O/P EST MOD 30 MIN: CPT | Performed by: INTERNAL MEDICINE

## 2018-01-10 PROCEDURE — G0463 HOSPITAL OUTPT CLINIC VISIT: HCPCS

## 2018-01-10 RX ORDER — DEXTROAMPHETAMINE SACCHARATE, AMPHETAMINE ASPARTATE MONOHYDRATE, DEXTROAMPHETAMINE SULFATE AND AMPHETAMINE SULFATE 7.5; 7.5; 7.5; 7.5 MG/1; MG/1; MG/1; MG/1
30 CAPSULE, EXTENDED RELEASE ORAL 2 TIMES DAILY
Qty: 60 CAPSULE | Refills: 0 | Status: CANCELLED | OUTPATIENT
Start: 2018-01-10

## 2018-01-10 RX ORDER — DEXTROAMPHETAMINE SACCHARATE, AMPHETAMINE ASPARTATE MONOHYDRATE, DEXTROAMPHETAMINE SULFATE AND AMPHETAMINE SULFATE 7.5; 7.5; 7.5; 7.5 MG/1; MG/1; MG/1; MG/1
30 CAPSULE, EXTENDED RELEASE ORAL 2 TIMES DAILY
Qty: 60 CAPSULE | Refills: 0 | Status: SHIPPED | OUTPATIENT
Start: 2018-01-10 | End: 2018-02-13

## 2018-01-10 RX ORDER — VERAPAMIL HYDROCHLORIDE 120 MG/1
120 CAPSULE, EXTENDED RELEASE ORAL AT BEDTIME
Qty: 30 CAPSULE | Refills: 3 | Status: SHIPPED | OUTPATIENT
Start: 2018-01-10 | End: 2018-01-26

## 2018-01-10 ASSESSMENT — PATIENT HEALTH QUESTIONNAIRE - PHQ9
SUM OF ALL RESPONSES TO PHQ QUESTIONS 1-9: 13
5. POOR APPETITE OR OVEREATING: SEVERAL DAYS

## 2018-01-10 ASSESSMENT — ANXIETY QUESTIONNAIRES
3. WORRYING TOO MUCH ABOUT DIFFERENT THINGS: SEVERAL DAYS
6. BECOMING EASILY ANNOYED OR IRRITABLE: MORE THAN HALF THE DAYS
GAD7 TOTAL SCORE: 10
7. FEELING AFRAID AS IF SOMETHING AWFUL MIGHT HAPPEN: SEVERAL DAYS
1. FEELING NERVOUS, ANXIOUS, OR ON EDGE: MORE THAN HALF THE DAYS
5. BEING SO RESTLESS THAT IT IS HARD TO SIT STILL: SEVERAL DAYS
2. NOT BEING ABLE TO STOP OR CONTROL WORRYING: MORE THAN HALF THE DAYS
IF YOU CHECKED OFF ANY PROBLEMS ON THIS QUESTIONNAIRE, HOW DIFFICULT HAVE THESE PROBLEMS MADE IT FOR YOU TO DO YOUR WORK, TAKE CARE OF THINGS AT HOME, OR GET ALONG WITH OTHER PEOPLE: VERY DIFFICULT

## 2018-01-10 ASSESSMENT — PAIN SCALES - GENERAL: PAINLEVEL: MILD PAIN (3)

## 2018-01-10 NOTE — PROGRESS NOTES
SUBJECTIVE:                                                    Andrei Whitmna is a 47 year old male who presents to clinic today for the following health issues:      HPI    Depression and Anxiety Follow-Up    Status since last visit: No change  He has stable bipolar moods    Other associated symptoms:None    Complicating factors:     Significant life event: No     Current substance abuse: None    PHQ-9 Score and MyChart F/U Questions 9/6/2017 10/19/2017 1/10/2018   Total Score 19 14 13   Q9: Suicide Ideation More than half the days Several days Several days     SONDRA-7 SCORE 9/6/2017 10/19/2017 1/10/2018   Total Score 13 11 10       PHQ-9  English  PHQ-9   Any Language  GAD7  Suicide Assessment Five-step Evaluation and Treatment (SAFE-T)        Problem list and histories reviewed & adjusted, as indicated.  Additional history: as documented    Cough:  He reports that he has a productive cough which is more like a thick gel vs mucus.  He has coughed up blood a few times.  The cough has been presents for 5-6 weeks.  He was having burning esophageal pain which has resolved.  He continues to smoke less than one pack per day.  He denies any fevers or chills.  He denies sinus, ear or throat pain.    Patient Active Problem List   Diagnosis     Cervicalgia     Lower back pain     Piriformis syndrome     Segmental and somatic dysfunction of lower extremity     GERD (gastroesophageal reflux disease)     Constipation     Sorethroat     Mood disorder (H)     Elevated blood pressure     Abnormal weight gain     Attention deficit hyperactivity disorder (ADHD), predominantly inattentive type     ACP (advance care planning)     Flatulence, eructation, and gas pain     Bipolar disease, chronic (H)     Routine general medical examination at a health care facility     Past Surgical History:   Procedure Laterality Date     APPENDECTOMY      age 14     COLONOSCOPY  07/12/2017    Left descending x1 tubular adenoma, sigmoid x1 tubular  adenoma and rectal x1 hyperplastic polyp.  Pan diverticulosis     ENDOSCOPY UPPER, COLONOSCOPY, COMBINED N/A 7/12/2017    Procedure: COMBINED ENDOSCOPY UPPER, COLONOSCOPY;  UPPER ENDOSCOPY WITH BIOPSIES  AND COLONOSCOPY WITH POLYPECTOMY;  Surgeon: Francis Valverde DO;  Location: [facility]     ENT SURGERY  age 18    tonsils     ESOPHAGOGASTRODUODENOSCOPY  07/12/2017    chemical gatropathy, GE junction with pancreatiuc metaplasia      ORTHOPEDIC SURGERY  age 30     back and neck fusion, bone from hip removed to use on plates     ORTHOPEDIC SURGERY  age 30    L5 fusion, laminectomy of lumbar discs       Social History   Substance Use Topics     Smoking status: Current Every Day Smoker     Packs/day: 1.00     Years: 20.00     Smokeless tobacco: Never Used     Alcohol use No     Family History   Problem Relation Age of Onset     Asthma Mother      Arthritis Mother      Prostate Cancer Father      HEART DISEASE Paternal Grandfather      Hypertension Paternal Grandfather      Alcohol/Drug Paternal Grandfather      Other - See Comments Brother      Nerve and degenerative disease mild     Alcohol/Drug Brother      Other - See Comments Sister 21     Hip replacements, nerve, degerative disease     Alcohol/Drug Maternal Grandfather      Unknown/Adopted Paternal Grandmother      Other Cancer Paternal Aunt      Cervical cancer         BP Readings from Last 6 Encounters:   01/10/18 (!) 140/98   10/19/17 148/84   09/06/17 136/82   07/12/17 122/90   06/22/17 144/74   06/08/17 150/92       ROS:  C: NEGATIVE for fever, chills, change in weight  E: NEGATIVE for vision changes or irritation  E/M: NEGATIVE for ear, mouth and throat problems  R: NEGATIVE for significant cough or SOB  RESP:see HPI  B: NEGATIVE for masses, tenderness or discharge  CV: NEGATIVE for chest pain, palpitations or peripheral edema  GI: NEGATIVE for nausea, abdominal pain, heartburn, or change in bowel habits  : NEGATIVE for frequency, dysuria, or  "hematuria  MUSCULOSKELETAL:POSITIVE  for back pain  and neck pain  N: NEGATIVE for weakness, dizziness or paresthesias  H: NEGATIVE for bleeding problems  PSYCHIATRIC: See HPI    OBJECTIVE:     BP (!) 140/98  Pulse 80  Temp 98  F (36.7  C) (Tympanic)  Resp 20  Ht 5' 10\" (1.778 m)  Wt 239 lb (108.4 kg)  BMI 34.29 kg/m2  Body mass index is 34.29 kg/(m^2).  GENERAL: healthy, alert and no distress  HENT: ear canals and TM's normal, nose and mouth without ulcers or lesions  HENT: sinuses: not tender  NECK: no adenopathy, no asymmetry, masses, or scars and thyroid normal to palpation  RESP: lungs clear to auscultation - no rales, rhonchi or wheezes  CV: regular rate and rhythm, normal S1 S2, no S3 or S4, no murmur, click or rub, no peripheral edema and peripheral pulses strong  ABDOMEN: soft, nontender, no hepatosplenomegaly, no masses and bowel sounds normal  ABDOMEN: no bruits heard  MS: no gross musculoskeletal defects noted, no edema  PSYCH: mentation appears normal, affect normal/bright    Diagnostic Test Results:  none     ASSESSMENT/PLAN:   (I10) Essential hypertension  (primary encounter diagnosis)  Comment: Patient's blood pressure has been on the rise for a few months without an increase in his pain level.  He is likely to have fatigue with a beat blocker and he has a cough so at this time I don't want to mix the picture by putting him on an ACE inhibitor.  Since he has diastolic BP elevation a calcium channel.blocket would be best  Plan:   Start verapamil (VERELAN) 120 MG 24 hr capsule daily.    (F39) Mood disorder (H)  Comment: Stable.  Plan:   He will continue Alavil 25 mg daily, Wellbutrin 200 mg in the AM and 100 mg in the PM.  He will continue Lithium 900 mg AM and 450 mg in PM.                    FUTURE APPOINTMENTS:       - Follow-up visit in  2 weeks for HTN    Tommy Lamb DO, DO  [facility] [place]  "

## 2018-01-10 NOTE — NURSING NOTE
"Chief Complaint   Patient presents with     Depression     states going okay, symptoms better then last visit. no new life events.      Anxiety     hx of panic attacks, under control     Cough     onging issue for 5 weeks, started with URI symptoms, now seems to be settled in lower right lung. Very sticky foul tasting sputum being coughing. has coughed up blood for a couple days, right lung pain       Initial BP (!) 156/98 (BP Location: Right arm, Patient Position: Sitting, Cuff Size: Adult Regular)  Pulse 80  Temp 98  F (36.7  C) (Tympanic)  Resp 20  Ht 5' 10\" (1.778 m)  Wt 239 lb (108.4 kg)  BMI 34.29 kg/m2 Estimated body mass index is 34.29 kg/(m^2) as calculated from the following:    Height as of this encounter: 5' 10\" (1.778 m).    Weight as of this encounter: 239 lb (108.4 kg).  Medication Reconciliation: complete   Kelly Quick LPN    "

## 2018-01-10 NOTE — MR AVS SNAPSHOT
"              After Visit Summary   1/10/2018    Andrei Whitman    MRN: [id number]           Patient Information     Date Of Birth          1970        Visit Information        Provider Department      1/10/2018 11:40 AM Tommy Lamb DO [facility]        Today's Diagnoses     Essential hypertension    -  1    Mood disorder (H)        Attention deficit hyperactivity disorder (ADHD), predominantly inattentive type           Follow-ups after your visit        Follow-up notes from your care team     Return in about 2 weeks (around 1/24/2018) for HTN.      Your next 10 appointments already scheduled     Jan 26, 2018 10:00 AM CST   (Arrive by 9:40 AM)   SHORT with Tommy Lamb DO   [facility] ([facility] )    [address]  [place] [address]   [phone]              Who to contact     If you have questions or need follow up information about today's clinic visit or your schedule please contact [facility] directly at [phone].  Normal or non-critical lab and imaging results will be communicated to you by MyChart, letter or phone within 4 business days after the clinic has received the results. If you do not hear from us within 7 days, please contact the clinic through MyChart or phone. If you have a critical or abnormal lab result, we will notify you by phone as soon as possible.  Submit refill requests through [employer] or call your pharmacy and they will forward the refill request to us. Please allow 3 business days for your refill to be completed.          Additional Information About Your Visit        Care EveryWhere ID     This is your Care EveryWhere ID. This could be used by other organizations to access your [place] medical records  [id number]        Your Vitals Were     Pulse Temperature Respirations Height BMI (Body Mass Index)       80 98  F (36.7  C) (Tympanic) 20 5' 10\" (1.778 m) 34.29 kg/m2        Blood " Pressure from Last 3 Encounters:   01/10/18 (!) 140/98   10/19/17 148/84   09/06/17 136/82    Weight from Last 3 Encounters:   01/10/18 239 lb (108.4 kg)   10/19/17 238 lb (108 kg)   09/06/17 238 lb (108 kg)              Today, you had the following     No orders found for display         Today's Medication Changes          These changes are accurate as of: 1/10/18 12:15 PM.  If you have any questions, ask your nurse or doctor.               Start taking these medicines.        Dose/Directions    verapamil 120 MG 24 hr capsule   Commonly known as:  VERELAN   Used for:  Essential hypertension   Started by:  Tommy Lamb DO        Dose:  120 mg   Take 1 capsule (120 mg) by mouth At Bedtime   Quantity:  30 capsule   Refills:  3         These medicines have changed or have updated prescriptions.        Dose/Directions    buPROPion 100 MG tablet   Commonly known as:  WELLBUTRIN   This may have changed:  additional instructions   Used for:  Recurrent major depressive disorder, in partial remission (H)        Dose:  200 mg   Take 2 tablets (200 mg) by mouth 2 times daily   Quantity:  120 tablet   Refills:  5            Where to get your medicines      These medications were sent to [id number] - [place], [place] - [address]  [address] [place] [place] [address]     Phone:  [phone]     verapamil 120 MG 24 hr capsule         Some of these will need a paper prescription and others can be bought over the counter.  Ask your nurse if you have questions.     Bring a paper prescription for each of these medications     amphetamine-dextroamphetamine 30 MG per 24 hr capsule                Primary Care Provider Office Phone # Fax #    Tommy Lamb [phone]       [facility] [place] [address]  [place] [address]        Equal Access to Services     BRADEN CEVALLOS AH: Gildardo Hill, delfino suh, marjorie monterroso, alka harris  ana cristina parker ah. So [facility] [phone].    ATENCIÓN: Si roberla eduardo, tiene a rodriguez disposición servicios gratuitos de asistencia lingüística. Lluvia mackey [phone].    We comply with applicable federal civil rights laws and [place] laws. We do not discriminate on the basis of race, color, national origin, age, disability, sex, sexual orientation, or gender identity.            Thank you!     [facility] you for choosing [facility]  for your care. Our goal is always to provide you with excellent care. Hearing back from our patients is one way we can continue to improve our services. Please take a few minutes to complete the written survey that you may receive in the mail after your visit with us. Thank you!             Your Updated Medication List - Protect others around you: Learn how to safely use, store and throw away your medicines at www.disposemymeds.org.          This list is accurate as of: 1/10/18 12:15 PM.  Always use your most recent med list.                   Brand Name Dispense Instructions for use Diagnosis    amitriptyline 25 MG tablet    ELAVIL    30 tablet    TAKE 1 TABLET BY MOUTH AT BEDTIME    Persistent insomnia       amphetamine-dextroamphetamine 30 MG per 24 hr capsule    ADDERALL XR    60 capsule    Take 1 capsule (30 mg) by mouth 2 times daily    Attention deficit hyperactivity disorder (ADHD), predominantly inattentive type       artificial saliva Liqd liquid     473 mL    Swish and spit 10 mLs in mouth 4 times daily    Oral dryness       aspirin 81 MG tablet     30 tablet    Take 1 tablet (81 mg) by mouth daily    Russell's esophagus with dysplasia       buPROPion 100 MG tablet    WELLBUTRIN    120 tablet    Take 2 tablets (200 mg) by mouth 2 times daily    Recurrent major depressive disorder, in partial remission (H)       gabapentin 300 MG capsule    NEURONTIN    180 capsule    TAKE 2 CAPSULES BY MOUTH TH REE TIMES DAILY    Cervical radiculopathy       lithium 450 MG CR tablet     ESKALITH    90 tablet    Take two tablets by mouth at 8 am and take one tablet at 4 pm.    Mood disorder (H)       omeprazole 40 MG capsule    priLOSEC    90 capsule    Take 1 capsule (40 mg) by mouth daily Take 30-60 minutes before a meal.    Acute superficial gastritis without hemorrhage       verapamil 120 MG 24 hr capsule    VERELAN    30 capsule    Take 1 capsule (120 mg) by mouth At Bedtime    Essential hypertension

## 2018-01-11 ASSESSMENT — ANXIETY QUESTIONNAIRES: GAD7 TOTAL SCORE: 10

## 2018-01-26 ENCOUNTER — MEDICAL CORRESPONDENCE (OUTPATIENT)
Dept: HEALTH INFORMATION MANAGEMENT | Facility: CLINIC | Age: 48
End: 2018-01-26

## 2018-01-26 ENCOUNTER — OFFICE VISIT (OUTPATIENT)
Dept: PEDIATRICS | Facility: OTHER | Age: 48
End: 2018-01-26
Attending: INTERNAL MEDICINE
Payer: MEDICARE

## 2018-01-26 VITALS
HEART RATE: 86 BPM | BODY MASS INDEX: 34.22 KG/M2 | SYSTOLIC BLOOD PRESSURE: 138 MMHG | WEIGHT: 239 LBS | HEIGHT: 70 IN | RESPIRATION RATE: 18 BRPM | DIASTOLIC BLOOD PRESSURE: 68 MMHG | OXYGEN SATURATION: 98 % | TEMPERATURE: 97.5 F

## 2018-01-26 DIAGNOSIS — K04.7 TOOTH ABSCESS: Primary | ICD-10-CM

## 2018-01-26 DIAGNOSIS — I10 ESSENTIAL HYPERTENSION: ICD-10-CM

## 2018-01-26 PROCEDURE — 99213 OFFICE O/P EST LOW 20 MIN: CPT | Performed by: INTERNAL MEDICINE

## 2018-01-26 PROCEDURE — G0463 HOSPITAL OUTPT CLINIC VISIT: HCPCS

## 2018-01-26 RX ORDER — VERAPAMIL HYDROCHLORIDE 180 MG/1
180 CAPSULE, EXTENDED RELEASE ORAL AT BEDTIME
Qty: 30 CAPSULE | Refills: 1 | Status: SHIPPED | OUTPATIENT
Start: 2018-01-26 | End: 2018-03-12

## 2018-01-26 RX ORDER — AMOXICILLIN AND CLAVULANATE POTASSIUM 500; 125 MG/1; MG/1
1 TABLET, FILM COATED ORAL 2 TIMES DAILY
Qty: 20 TABLET | Refills: 0 | Status: SHIPPED | OUTPATIENT
Start: 2018-01-26 | End: 2018-03-12

## 2018-01-26 ASSESSMENT — PAIN SCALES - GENERAL: PAINLEVEL: MODERATE PAIN (5)

## 2018-01-26 ASSESSMENT — ANXIETY QUESTIONNAIRES
7. FEELING AFRAID AS IF SOMETHING AWFUL MIGHT HAPPEN: SEVERAL DAYS
5. BEING SO RESTLESS THAT IT IS HARD TO SIT STILL: SEVERAL DAYS
1. FEELING NERVOUS, ANXIOUS, OR ON EDGE: SEVERAL DAYS
3. WORRYING TOO MUCH ABOUT DIFFERENT THINGS: MORE THAN HALF THE DAYS
GAD7 TOTAL SCORE: 8
4. TROUBLE RELAXING: SEVERAL DAYS
2. NOT BEING ABLE TO STOP OR CONTROL WORRYING: SEVERAL DAYS
IF YOU CHECKED OFF ANY PROBLEMS ON THIS QUESTIONNAIRE, HOW DIFFICULT HAVE THESE PROBLEMS MADE IT FOR YOU TO DO YOUR WORK, TAKE CARE OF THINGS AT HOME, OR GET ALONG WITH OTHER PEOPLE: VERY DIFFICULT
6. BECOMING EASILY ANNOYED OR IRRITABLE: SEVERAL DAYS

## 2018-01-26 NOTE — NURSING NOTE
"Chief Complaint   Patient presents with     Anxiety     Since the BP meds, feels anxiety is less.     Depression       Initial /68 (BP Location: Left arm, Patient Position: Sitting, Cuff Size: Adult Large)  Pulse 86  Temp 97.5  F (36.4  C) (Tympanic)  Resp 18  Ht 5' 10\" (1.778 m)  Wt 239 lb (108.4 kg)  SpO2 98%  BMI 34.29 kg/m2 Estimated body mass index is 34.29 kg/(m^2) as calculated from the following:    Height as of this encounter: 5' 10\" (1.778 m).    Weight as of this encounter: 239 lb (108.4 kg).  Medication Reconciliation: complete     Ofelia Rios      "

## 2018-01-26 NOTE — PROGRESS NOTES
SUBJECTIVE:                                                    Andrei Whitman is a 47 year old male who presents to clinic today for the following health issues:          Hypertension Follow-up      Outpatient blood pressures are not being checked.    Low Salt Diet: low salt          BP Readings from Last 6 Encounters:   01/26/18 138/68   01/10/18 (!) 140/98   10/19/17 148/84   09/06/17 136/82   07/12/17 122/90   06/22/17 144/74       Problem list and histories reviewed & adjusted, as indicated.  Additional history: as documented    Patient Active Problem List   Diagnosis     Cervicalgia     Lower back pain     Segmental and somatic dysfunction of lower extremity     GERD (gastroesophageal reflux disease)     Constipation     Mood disorder (H)     Abnormal weight gain     Attention deficit hyperactivity disorder (ADHD), predominantly inattentive type     ACP (advance care planning)     Flatulence, eructation, and gas pain     Bipolar disease, chronic (H)     Routine general medical examination at a health care facility     Past Surgical History:   Procedure Laterality Date     APPENDECTOMY      age 14     COLONOSCOPY  07/12/2017    Left descending x1 tubular adenoma, sigmoid x1 tubular adenoma and rectal x1 hyperplastic polyp.  Pan diverticulosis     ENDOSCOPY UPPER, COLONOSCOPY, COMBINED N/A 7/12/2017    Procedure: COMBINED ENDOSCOPY UPPER, COLONOSCOPY;  UPPER ENDOSCOPY WITH BIOPSIES  AND COLONOSCOPY WITH POLYPECTOMY;  Surgeon: Francis Valverde DO;  Location: [facility] OR     ENT SURGERY  age 18    tonsils     ESOPHAGOGASTRODUODENOSCOPY  07/12/2017    chemical gatropathy, GE junction with pancreatiuc metaplasia      ORTHOPEDIC SURGERY  age 30     back and neck fusion, bone from hip removed to use on plates     ORTHOPEDIC SURGERY  age 30    L5 fusion, laminectomy of lumbar discs       Social History   Substance Use Topics     Smoking status: Current Every Day Smoker     Packs/day: 1.00     Years: 20.00      "Smokeless tobacco: Never Used     Alcohol use No     Family History   Problem Relation Age of Onset     Asthma Mother      Arthritis Mother      Prostate Cancer Father      HEART DISEASE Paternal Grandfather      Hypertension Paternal Grandfather      Alcohol/Drug Paternal Grandfather      Other - See Comments Brother      Nerve and degenerative disease mild     Alcohol/Drug Brother      Other - See Comments Sister 21     Hip replacements, nerve, degerative disease     Alcohol/Drug Maternal Grandfather      Unknown/Adopted Paternal Grandmother      Other Cancer Paternal Aunt      Cervical cancer           ROS:  C: NEGATIVE for fever, chills, change in weight  E: NEGATIVE for vision changes or irritation  E/M: NEGATIVE for ear, mouth and throat problems  ENT/MOUTH: dry mouth   R: NEGATIVE for significant cough or SOB  CV: NEGATIVE for chest pain, palpitations or peripheral edema  GI: NEGATIVE for nausea, abdominal pain, heartburn, or change in bowel habits  : NEGATIVE for frequency, dysuria, or hematuria  MUSCULOSKELETAL:Back pain  N: NEGATIVE for weakness, dizziness or paresthesias  P: NEGATIVE for changes in mood or affect    OBJECTIVE:     /68 (BP Location: Left arm, Patient Position: Sitting, Cuff Size: Adult Large)  Pulse 86  Temp 97.5  F (36.4  C) (Tympanic)  Resp 18  Ht 5' 10\" (1.778 m)  Wt 239 lb (108.4 kg)  SpO2 98%  BMI 34.29 kg/m2  Body mass index is 34.29 kg/(m^2).  GENERAL: healthy, alert and no distress  HENT: oropharynx clear, oral mucous membranes moist and right lower molar abscess with broken tooth.  NECK: no adenopathy, no asymmetry, masses, or scars and thyroid normal to palpation  RESP: lungs clear to auscultation - no rales, rhonchi or wheezes  CV: regular rate and rhythm, normal S1 S2, no S3 or S4, no murmur, click or rub, no peripheral edema and peripheral pulses strong  ABDOMEN: soft, nontender, no hepatosplenomegaly, no masses and bowel sounds normal  ABDOMEN: no bruits " heard  MS: no gross musculoskeletal defects noted, no edema  LYMPH: normal ant/post cervical, supraclavicular nodes      Diagnostic Test Results:  none     ASSESSMENT/PLAN:   (I10) Essential hypertension  Comment: Improved, but not at goal.  Plan:   Increase verapamil (VERELAN) from 120 to 180 MG CP24 24 hr capsule    (K04.7) Tooth abscess  (primary encounter diagnosis)  Comment:   Plan:   amoxicillin-clavulanate (AUGMENTIN) 500-125 MG per tablet, BID for 14 days.                      FUTURE APPOINTMENTS:       - Follow-up visit in 3 weeks for HTN    Tommy Lamb DO, DO  [facility]

## 2018-01-26 NOTE — MR AVS SNAPSHOT
After Visit Summary   1/26/2018    Andrei Whitman    MRN: [id number]           Patient Information     Date Of Birth          1970        Visit Information        Provider Department      1/26/2018 10:00 AM Tommy Lamb DO [facility] [place]        Today's Diagnoses     Tooth abscess    -  1    Essential hypertension           Follow-ups after your visit        Follow-up notes from your care team     Return in about 3 weeks (around 2/16/2018) for HTN.      Your next 10 appointments already scheduled     Jan 26, 2018 10:00 AM CST   (Arrive by 9:40 AM)   SHORT with Tommy Lamb DO   [facility] [place] ([facility] [place] )    [address]  [place] [address]   [phone]            Feb 16, 2018 10:00 AM CST   (Arrive by 9:40 AM)   SHORT with Tommy Lamb DO   [facility] [place] ([facility] [place] )    [address]  [place] [address]   [phone]              Who to contact     If you have questions or need follow up information about today's clinic visit or your schedule please contact [facility] directly at [phone].  Normal or non-critical lab and imaging results will be communicated to you by MyChart, letter or phone within 4 business days after the clinic has received the results. If you do not hear from us within 7 days, please contact the clinic through MyChart or phone. If you have a critical or abnormal lab result, we will notify you by phone as soon as possible.  Submit refill requests through [email or web address] or call your pharmacy and they will forward the refill request to us. Please allow 3 business days for your refill to be completed.          Additional Information About Your Visit        Care EveryWhere ID     This is your Care EveryWhere ID. This could be used by other organizations to access your [place] medical records  [id number]        Your Vitals Were     Pulse Temperature  "Respirations Height Pulse Oximetry BMI (Body Mass Index)    86 97.5  F (36.4  C) (Tympanic) 18 5' 10\" (1.778 m) 98% 34.29 kg/m2       Blood Pressure from Last 3 Encounters:   01/26/18 138/68   01/10/18 (!) 140/98   10/19/17 148/84    Weight from Last 3 Encounters:   01/26/18 239 lb (108.4 kg)   01/10/18 239 lb (108.4 kg)   10/19/17 238 lb (108 kg)              Today, you had the following     No orders found for display         Today's Medication Changes          These changes are accurate as of 1/26/18  9:20 AM.  If you have any questions, ask your nurse or doctor.               Start taking these medicines.        Dose/Directions    amoxicillin-clavulanate 500-125 MG per tablet   Commonly known as:  AUGMENTIN   Used for:  Tooth abscess   Started by:  Tommy Lamb DO        Dose:  1 tablet   Take 1 tablet by mouth 2 times daily   Quantity:  20 tablet   Refills:  0         These medicines have changed or have updated prescriptions.        Dose/Directions    buPROPion 100 MG tablet   Commonly known as:  WELLBUTRIN   This may have changed:  additional instructions   Used for:  Recurrent major depressive disorder, in partial remission (H)        Dose:  200 mg   Take 2 tablets (200 mg) by mouth 2 times daily   Quantity:  120 tablet   Refills:  5       verapamil 180 MG Cp24 24 hr capsule   Commonly known as:  VERELAN   This may have changed:    - medication strength  - how much to take   Used for:  Essential hypertension   Changed by:  Tommy Lamb DO        Dose:  180 mg   Take 1 capsule (180 mg) by mouth At Bedtime   Quantity:  30 capsule   Refills:  1            Where to get your medicines      These medications were sent to [id number] - [place] - [address]  [address] [place] [address]     Phone:  [phone]     amoxicillin-clavulanate 500-125 MG per tablet    verapamil 180 MG Cp24 24 hr capsule                Primary Care Provider Office Phone # Fax #    " Tommy Lamb, [phone]       [facility] [place] [address]  [place] [address]        Equal Access to Services     SENG CEVALLOS : Hadii misa ku hadrojaso Soomaali, waaxda luqadaha, qaybta kaalmada adeegyada, alka mooren bertinmari de la paz keith smith. So [facility] [phone].    ATENCIÓN: Si habla español, tiene a rodriguez disposición servicios gratuitos de asistencia lingüística. Llame al [phone].    We comply with applicable federal civil rights laws and [place] laws. We do not discriminate on the basis of race, color, national origin, age, disability, sex, sexual orientation, or gender identity.            Thank you!     [facility] you [facility]  for your [facility]. Our goal is always to provide you with excellent care. Hearing back from our patients is one way we can continue to improve our services. Please take a few minutes to complete the written survey that you may receive in the mail after your visit with us. Thank you!             Your Updated Medication List - Protect others around you: Learn how to safely use, store and throw away your medicines at www.disposemymeds.org.          This list is accurate as of 1/26/18  9:20 AM.  Always use your most recent med list.                   Brand Name Dispense Instructions for use Diagnosis    amitriptyline 25 MG tablet    ELAVIL    30 tablet    TAKE 1 TABLET BY MOUTH AT BEDTIME    Persistent insomnia       amoxicillin-clavulanate 500-125 MG per tablet    AUGMENTIN    20 tablet    Take 1 tablet by mouth 2 times daily    Tooth abscess       amphetamine-dextroamphetamine 30 MG per 24 hr capsule    ADDERALL XR    60 capsule    Take 1 capsule (30 mg) by mouth 2 times daily        artificial saliva Liqd liquid     473 mL    Swish and spit 10 mLs in mouth 4 times daily    Oral dryness       aspirin 81 MG tablet     30 tablet    Take 1 tablet (81 mg) by mouth daily    Russell's esophagus with dysplasia        buPROPion 100 MG tablet    WELLBUTRIN    120 tablet    Take 2 tablets (200 mg) by mouth 2 times daily    Recurrent major depressive disorder, in partial remission (H)       gabapentin 300 MG capsule    NEURONTIN    180 capsule    TAKE 2 CAPSULES BY MOUTH TH REE TIMES DAILY    Cervical radiculopathy       lithium 450 MG CR tablet    ESKALITH    90 tablet    Take two tablets by mouth at 8 am and take one tablet at 4 pm.    Mood disorder (H)       omeprazole 40 MG capsule    priLOSEC    90 capsule    Take 1 capsule (40 mg) by mouth daily Take 30-60 minutes before a meal.    Acute superficial gastritis without hemorrhage       verapamil 180 MG Cp24 24 hr capsule    VERELAN    30 capsule    Take 1 capsule (180 mg) by mouth At Bedtime    Essential hypertension

## 2018-01-27 ASSESSMENT — ANXIETY QUESTIONNAIRES: GAD7 TOTAL SCORE: 8

## 2018-01-27 ASSESSMENT — PATIENT HEALTH QUESTIONNAIRE - PHQ9: SUM OF ALL RESPONSES TO PHQ QUESTIONS 1-9: 12

## 2018-02-04 DIAGNOSIS — F33.41 RECURRENT MAJOR DEPRESSIVE DISORDER, IN PARTIAL REMISSION (H): ICD-10-CM

## 2018-02-05 RX ORDER — BUPROPION HYDROCHLORIDE 100 MG/1
TABLET ORAL
Qty: 120 TABLET | Refills: 1 | Status: SHIPPED | OUTPATIENT
Start: 2018-02-05 | End: 2018-04-07

## 2018-02-13 DIAGNOSIS — F90.0 ADHD (ATTENTION DEFICIT HYPERACTIVITY DISORDER), INATTENTIVE TYPE: Primary | ICD-10-CM

## 2018-02-13 RX ORDER — DEXTROAMPHETAMINE SACCHARATE, AMPHETAMINE ASPARTATE MONOHYDRATE, DEXTROAMPHETAMINE SULFATE AND AMPHETAMINE SULFATE 7.5; 7.5; 7.5; 7.5 MG/1; MG/1; MG/1; MG/1
30 CAPSULE, EXTENDED RELEASE ORAL 2 TIMES DAILY
Qty: 60 CAPSULE | Refills: 0 | Status: SHIPPED | OUTPATIENT
Start: 2018-02-13 | End: 2018-03-12 | Stop reason: DRUGHIGH

## 2018-02-13 NOTE — TELEPHONE ENCOUNTER
Pt notified that the written RX is ready at the [facility]  registration to be picked up.

## 2018-03-12 ENCOUNTER — TELEPHONE (OUTPATIENT)
Dept: PEDIATRICS | Facility: OTHER | Age: 48
End: 2018-03-12

## 2018-03-12 ENCOUNTER — OFFICE VISIT (OUTPATIENT)
Dept: PEDIATRICS | Facility: OTHER | Age: 48
End: 2018-03-12
Attending: INTERNAL MEDICINE
Payer: MEDICARE

## 2018-03-12 VITALS
WEIGHT: 238 LBS | HEART RATE: 91 BPM | OXYGEN SATURATION: 98 % | BODY MASS INDEX: 34.07 KG/M2 | TEMPERATURE: 99.6 F | SYSTOLIC BLOOD PRESSURE: 140 MMHG | DIASTOLIC BLOOD PRESSURE: 78 MMHG | HEIGHT: 70 IN

## 2018-03-12 DIAGNOSIS — F90.0 ADHD (ATTENTION DEFICIT HYPERACTIVITY DISORDER), INATTENTIVE TYPE: Primary | ICD-10-CM

## 2018-03-12 DIAGNOSIS — Z79.899 LITHIUM USE: ICD-10-CM

## 2018-03-12 DIAGNOSIS — K04.7 TOOTH ABSCESS: Primary | ICD-10-CM

## 2018-03-12 DIAGNOSIS — F90.1 ATTENTION DEFICIT HYPERACTIVITY DISORDER (ADHD), PREDOMINANTLY HYPERACTIVE TYPE: ICD-10-CM

## 2018-03-12 DIAGNOSIS — I10 ESSENTIAL HYPERTENSION: ICD-10-CM

## 2018-03-12 PROCEDURE — G0463 HOSPITAL OUTPT CLINIC VISIT: HCPCS

## 2018-03-12 PROCEDURE — 99213 OFFICE O/P EST LOW 20 MIN: CPT | Performed by: INTERNAL MEDICINE

## 2018-03-12 RX ORDER — DEXTROAMPHETAMINE SACCHARATE, AMPHETAMINE ASPARTATE MONOHYDRATE, DEXTROAMPHETAMINE SULFATE AND AMPHETAMINE SULFATE 7.5; 7.5; 7.5; 7.5 MG/1; MG/1; MG/1; MG/1
30 CAPSULE, EXTENDED RELEASE ORAL DAILY
Qty: 60 CAPSULE | Refills: 0 | Status: SHIPPED | OUTPATIENT
Start: 2018-04-12 | End: 2018-03-12

## 2018-03-12 RX ORDER — VERAPAMIL HYDROCHLORIDE 240 MG/1
240 CAPSULE, EXTENDED RELEASE ORAL AT BEDTIME
Qty: 90 CAPSULE | Refills: 1 | Status: SHIPPED | OUTPATIENT
Start: 2018-03-12 | End: 2018-09-17

## 2018-03-12 RX ORDER — DEXTROAMPHETAMINE SACCHARATE, AMPHETAMINE ASPARTATE MONOHYDRATE, DEXTROAMPHETAMINE SULFATE AND AMPHETAMINE SULFATE 7.5; 7.5; 7.5; 7.5 MG/1; MG/1; MG/1; MG/1
30 CAPSULE, EXTENDED RELEASE ORAL DAILY
Qty: 60 CAPSULE | Refills: 0 | Status: SHIPPED | OUTPATIENT
Start: 2018-03-12 | End: 2018-03-12

## 2018-03-12 RX ORDER — DEXTROAMPHETAMINE SACCHARATE, AMPHETAMINE ASPARTATE MONOHYDRATE, DEXTROAMPHETAMINE SULFATE AND AMPHETAMINE SULFATE 7.5; 7.5; 7.5; 7.5 MG/1; MG/1; MG/1; MG/1
CAPSULE, EXTENDED RELEASE ORAL
Qty: 60 CAPSULE | Refills: 0 | Status: SHIPPED | OUTPATIENT
Start: 2018-04-12 | End: 2018-05-23

## 2018-03-12 RX ORDER — AMOXICILLIN AND CLAVULANATE POTASSIUM 500; 125 MG/1; MG/1
1 TABLET, FILM COATED ORAL 2 TIMES DAILY
Qty: 20 TABLET | Refills: 0 | Status: SHIPPED | OUTPATIENT
Start: 2018-03-12 | End: 2018-04-24

## 2018-03-12 RX ORDER — DEXTROAMPHETAMINE SACCHARATE, AMPHETAMINE ASPARTATE MONOHYDRATE, DEXTROAMPHETAMINE SULFATE AND AMPHETAMINE SULFATE 7.5; 7.5; 7.5; 7.5 MG/1; MG/1; MG/1; MG/1
CAPSULE, EXTENDED RELEASE ORAL
Qty: 60 CAPSULE | Refills: 0 | Status: SHIPPED | OUTPATIENT
Start: 2018-03-12 | End: 2018-05-08

## 2018-03-12 ASSESSMENT — ANXIETY QUESTIONNAIRES
IF YOU CHECKED OFF ANY PROBLEMS ON THIS QUESTIONNAIRE, HOW DIFFICULT HAVE THESE PROBLEMS MADE IT FOR YOU TO DO YOUR WORK, TAKE CARE OF THINGS AT HOME, OR GET ALONG WITH OTHER PEOPLE: VERY DIFFICULT
1. FEELING NERVOUS, ANXIOUS, OR ON EDGE: MORE THAN HALF THE DAYS
6. BECOMING EASILY ANNOYED OR IRRITABLE: MORE THAN HALF THE DAYS
3. WORRYING TOO MUCH ABOUT DIFFERENT THINGS: MORE THAN HALF THE DAYS
2. NOT BEING ABLE TO STOP OR CONTROL WORRYING: MORE THAN HALF THE DAYS
7. FEELING AFRAID AS IF SOMETHING AWFUL MIGHT HAPPEN: SEVERAL DAYS
5. BEING SO RESTLESS THAT IT IS HARD TO SIT STILL: MORE THAN HALF THE DAYS
GAD7 TOTAL SCORE: 12

## 2018-03-12 ASSESSMENT — PAIN SCALES - GENERAL: PAINLEVEL: SEVERE PAIN (7)

## 2018-03-12 ASSESSMENT — PATIENT HEALTH QUESTIONNAIRE - PHQ9: 5. POOR APPETITE OR OVEREATING: SEVERAL DAYS

## 2018-03-12 NOTE — TELEPHONE ENCOUNTER
Patient stopped in with scripts for start date of March and April for Adderall XR 30 mg stating they take 1 capsule twice daily.    I have pended the new scripts.  Thank you.

## 2018-03-12 NOTE — TELEPHONE ENCOUNTER
Scripts for Adderall XR 30 mg take 1 capsule daily March and April were shredded.  Scripts for Adderall XR 30 mg take 1 capsule twice daily for March and April were given to patient at .

## 2018-03-12 NOTE — MR AVS SNAPSHOT
"              After Visit Summary   3/12/2018    Andrei Whitman    MRN: [id number]           Patient Information     Date Of Birth          1970        Visit Information        Provider Department      3/12/2018 2:40 PM [facility], DO [facility]        Today's Diagnoses     Tooth abscess    -  1    Essential hypertension           Follow-ups after your visit        Follow-up notes from your care team     Return in about 6 weeks (around 4/23/2018) for HTN and labs for lithium.      Who to contact     If you have questions or need follow up information about today's clinic visit or your schedule please contact [facility] directly at [phone].  Normal or non-critical lab and imaging results will be communicated to you by MyChart, letter or phone within 4 business days after the clinic has received the results. If you do not hear from us within 7 days, please contact the clinic through MyChart or phone. If you have a critical or abnormal lab result, we will notify you by phone as soon as possible.  Submit refill requests through [employer] or call your pharmacy and they will forward the refill request to us. Please allow 3 business days for your refill to be completed.          Additional Information About Your Visit        Care EveryWhere ID     This is your Care EveryWhere ID. This could be used by other organizations to access your [place] medical records  [id number]        Your Vitals Were     Pulse Temperature Height Pulse Oximetry BMI (Body Mass Index)       91 99.6  F (37.6  C) (Tympanic) 5' 10\" (1.778 m) 98% 34.15 kg/m2        Blood Pressure from Last 3 Encounters:   03/12/18 140/78   01/26/18 138/68   01/10/18 (!) 140/98    Weight from Last 3 Encounters:   03/12/18 238 lb (108 kg)   01/26/18 239 lb (108.4 kg)   01/10/18 239 lb (108.4 kg)              Today, you had the following     No orders found for display         Today's Medication Changes          These " changes are accurate as of 3/12/18  3:19 PM.  If you have any questions, ask your nurse or doctor.               These medicines have changed or have updated prescriptions.        Dose/Directions    lithium 450 MG CR tablet   Commonly known as:  ESKALITH   This may have changed:  Another medication with the same name was removed. Continue taking this medication, and follow the directions you see here.   Used for:  Mood disorder (H)   Changed by:  Tommy Lamb DO        Take two tablets by mouth at 8 am and take one tablet at 4 pm.   Quantity:  90 tablet   Refills:  3       verapamil 240 MG Cp24 24 hr capsule   Commonly known as:  VERELAN   This may have changed:    - medication strength  - how much to take   Used for:  Essential hypertension   Changed by:  Tommy Lamb DO        Dose:  240 mg   Take 1 capsule (240 mg) by mouth At Bedtime   Quantity:  90 capsule   Refills:  1         Stop taking these medicines if you haven't already. Please contact your care team if you have questions.     artificial saliva Liqd liquid   Stopped by:  Tommy Lamb DO                Where to get your medicines      These medications were sent to [id number] - [facility] [address] [address]     Phone:  [phone]     amoxicillin-clavulanate 500-125 MG per tablet    verapamil 240 MG Cp24 24 hr capsule                Primary Care Provider Office Phone # Fax #    Tommy Farhan Lamb [phone]       [facility] [address]        Equal Access to Services     [facility] : Hadii aad ku hadasho Soomaali, waaxda luqadaha, qaybta kaalmada adeegyada, alka parker . So [facility] [phone].    ATENCIÓN: Si habla español, tiene a rodriguez disposición servicios gratuitos de asistencia lingüística. Llame al [phone].    We comply with applicable federal civil rights laws and [place] laws. We do not  discriminate on the basis of race, color, national origin, age, disability, sex, sexual orientation, or gender identity.            Thank you!     [facility] you for [facility]  [facility] your [facility]. Our goal is always to provide you with excellent care. Hearing back from our patients is one way we can continue to improve our services. Please take a few minutes to complete the written survey that you may receive in the mail after your visit with us. Thank you!             Your Updated Medication List - Protect others around you: Learn how to safely use, store and throw away your medicines at www.disposemymeds.org.          This list is accurate as of 3/12/18  3:19 PM.  Always use your most recent med list.                   Brand Name Dispense Instructions for use Diagnosis    amitriptyline 25 MG tablet    ELAVIL    30 tablet    TAKE 1 TABLET BY MOUTH AT BEDTIME    Persistent insomnia       amoxicillin-clavulanate 500-125 MG per tablet    AUGMENTIN    20 tablet    Take 1 tablet by mouth 2 times daily    Tooth abscess       amphetamine-dextroamphetamine 30 MG per 24 hr capsule    ADDERALL XR    60 capsule    Take 1 capsule (30 mg) by mouth 2 times daily    ADHD (attention deficit hyperactivity disorder), inattentive type       aspirin 81 MG tablet     30 tablet    Take 1 tablet (81 mg) by mouth daily    Russell's esophagus with dysplasia       buPROPion 100 MG tablet    WELLBUTRIN    120 tablet    TAKE 2 TABLETS (200 MG) BY MOUTH 2 TIMES DAILY    Recurrent major depressive disorder, in partial remission (H)       gabapentin 300 MG capsule    NEURONTIN    180 capsule    TAKE 2 CAPSULES BY MOUTH TH REE TIMES DAILY    Cervical radiculopathy       lithium 450 MG CR tablet    ESKALITH    90 tablet    Take two tablets by mouth at 8 am and take one tablet at 4 pm.    Mood disorder (H)       omeprazole 40 MG capsule    priLOSEC    90 capsule    Take 1 capsule (40 mg) by mouth daily Take 30-60 minutes before a  meal.    Acute superficial gastritis without hemorrhage       verapamil 240 MG Cp24 24 hr capsule    VERELAN    90 capsule    Take 1 capsule (240 mg) by mouth At Bedtime    Essential hypertension

## 2018-03-12 NOTE — PROGRESS NOTES
SUBJECTIVE:   Andrei Whitman is a 47 year old male who presents to clinic today for the following health issues:      Hypertension Follow-up      Outpatient blood pressures are not being checked.    Low Salt Diet: no added salt      BP Readings from Last 6 Encounters:   03/12/18 144/84   01/26/18 138/68   01/10/18 (!) 140/98   10/19/17 148/84   09/06/17 136/82   07/12/17 122/90       Depression and Anxiety Follow-Up    Status since last visit: Worsened worried a little more- a little more anxiety    Other associated symptoms:None    Complicating factors:     Significant life event: No     Current substance abuse: None    PHQ-9 10/19/2017 1/10/2018 1/26/2018   Total Score 14 13 12   Q9: Suicide Ideation Several days Several days Several days   F/U: Thoughts of suicide or self-harm - - No   F/U: Safety concerns - - No     SONDRA-7 SCORE 10/19/2017 1/10/2018 1/26/2018   Total Score 11 10 8       PHQ-9  English  PHQ-9   Any Language  SONDRA-7  Suicide Assessment Five-step Evaluation and Treatment (SAFE-T)    Amount of exercise or physical activity: None    Problems taking medications regularly: No    Medication side effects: none    Diet: regular (no restrictions)        -------------------------------------    Problem list and histories reviewed & adjusted, as indicated.  Additional history: as documented    Patient Active Problem List   Diagnosis     Cervicalgia     Lower back pain     Segmental and somatic dysfunction of lower extremity     GERD (gastroesophageal reflux disease)     Constipation     Mood disorder (H)     Abnormal weight gain     Attention deficit hyperactivity disorder (ADHD), predominantly inattentive type     ACP (advance care planning)     Flatulence, eructation, and gas pain     Bipolar disease, chronic (H)     Routine general medical examination at a health care facility     Past Surgical History:   Procedure Laterality Date     APPENDECTOMY      age 14     COLONOSCOPY  07/12/2017    Left  descending x1 tubular adenoma, sigmoid x1 tubular adenoma and rectal x1 hyperplastic polyp.  Pan diverticulosis     ENDOSCOPY UPPER, COLONOSCOPY, COMBINED N/A 7/12/2017    Procedure: COMBINED ENDOSCOPY UPPER, COLONOSCOPY;  UPPER ENDOSCOPY WITH BIOPSIES  AND COLONOSCOPY WITH POLYPECTOMY;  Surgeon: Frnacis Valverde DO;  Location: [facility] OR     ENT SURGERY  age 18    tonsils     ESOPHAGOGASTRODUODENOSCOPY  07/12/2017    chemical gatropathy, GE junction with pancreatiuc metaplasia      ORTHOPEDIC SURGERY  age 30     back and neck fusion, bone from hip removed to use on plates     ORTHOPEDIC SURGERY  age 30    L5 fusion, laminectomy of lumbar discs       Social History   Substance Use Topics     Smoking status: Current Every Day Smoker     Packs/day: 1.00     Years: 20.00     Smokeless tobacco: Never Used     Alcohol use No     Family History   Problem Relation Age of Onset     Asthma Mother      Arthritis Mother      Prostate Cancer Father      HEART DISEASE Paternal Grandfather      Hypertension Paternal Grandfather      Alcohol/Drug Paternal Grandfather      Other - See Comments Brother      Nerve and degenerative disease mild     Alcohol/Drug Brother      Other - See Comments Sister 21     Hip replacements, nerve, degerative disease     Alcohol/Drug Maternal Grandfather      Unknown/Adopted Paternal Grandmother      Other Cancer Paternal Aunt      Cervical cancer           Reviewed and updated as needed this visit by clinical staff       Reviewed and updated as needed this visit by Provider         ROS:  CONSTITUTIONAL: NEGATIVE for fever, chills, change in weight  EYES: NEGATIVE for vision changes or irritation  ENT/MOUTH: NEGATIVE for ear, mouth and throat problems  RESP: NEGATIVE for significant cough or SOB  CV: NEGATIVE for chest pain, palpitations or peripheral edema  GI: NEGATIVE for nausea, abdominal pain, heartburn, or change in bowel habits  MUSCULOSKELETAL:POSITIVE  for arthralgias of the lower back and  "neck pain  NEURO: NEGATIVE for weakness, dizziness or paresthesias  PSYCHIATRIC: NEGATIVE for changes in mood or affect    OBJECTIVE:     /84 (BP Location: Left arm, Patient Position: Supine, Cuff Size: Adult Large)  Pulse 91  Temp 99.6  F (37.6  C) (Tympanic)  Ht 5' 10\" (1.778 m)  Wt 238 lb (108 kg)  SpO2 98%  BMI 34.15 kg/m2  Body mass index is 34.15 kg/(m^2).  GENERAL: healthy, alert and no distress  HENT: lower right molar broken, left lower and left upper molars in ill repair with likely abscess of the upper left molars.  NECK: no adenopathy, no asymmetry, masses, or scars and thyroid normal to palpation  RESP: lungs clear to auscultation - no rales, rhonchi or wheezes  CV: regular rate and rhythm, normal S1 S2, no S3 or S4, no murmur, click or rub, no peripheral edema and peripheral pulses strong  MS: no gross musculoskeletal defects noted, no edema  PSYCH: mentation appears normal, affect normal/bright  LYMPH: no cervical adenopathy    Diagnostic Test Results:  none     ASSESSMENT/PLAN:   (I10) Essential hypertension  Comment: Not at goal.  Plan:   Increase verapamil (VERELAN) from 180 MG to 240 MG CP24 24 hr capsule.     (K04.7) Tooth abscess  (primary encounter diagnosis)  Comment:   Plan:   amoxicillin-clavulanate (AUGMENTIN) 500-125 MG per tablet BID                    FUTURE APPOINTMENTS:       - Follow-up visit in 6 weeks for HTN    Tommy Lamb DO, DO  [facility] [place]    "

## 2018-03-13 ASSESSMENT — PATIENT HEALTH QUESTIONNAIRE - PHQ9: SUM OF ALL RESPONSES TO PHQ QUESTIONS 1-9: 14

## 2018-03-13 ASSESSMENT — ANXIETY QUESTIONNAIRES: GAD7 TOTAL SCORE: 12

## 2018-04-12 DIAGNOSIS — M54.12 CERVICAL RADICULOPATHY: ICD-10-CM

## 2018-04-12 RX ORDER — GABAPENTIN 300 MG/1
CAPSULE ORAL
Qty: 180 CAPSULE | Refills: 0 | Status: SHIPPED | OUTPATIENT
Start: 2018-04-12 | End: 2018-06-13

## 2018-04-12 NOTE — TELEPHONE ENCOUNTER
gabapentin (NEURONTIN) 300 MG capsule    Last Written Prescription Date:  4/9/18  Last Fill Quantity: 180,   # refills: 0  Last Office Visit: 3/12/18  Future Office visit:    Next 5 appointments (look out 90 days)     Apr 24, 2018  9:40 AM CDT   (Arrive by 9:20 AM)   SHORT with Tommy Lamb DO   [facility] [place] ([facility] )    [address] [place] [address]   [phone]                   }

## 2018-04-24 ENCOUNTER — OFFICE VISIT (OUTPATIENT)
Dept: PEDIATRICS | Facility: OTHER | Age: 48
End: 2018-04-24
Attending: INTERNAL MEDICINE
Payer: MEDICARE

## 2018-04-24 VITALS
TEMPERATURE: 98.2 F | SYSTOLIC BLOOD PRESSURE: 130 MMHG | WEIGHT: 234 LBS | DIASTOLIC BLOOD PRESSURE: 80 MMHG | HEART RATE: 73 BPM | OXYGEN SATURATION: 98 % | BODY MASS INDEX: 33.58 KG/M2 | RESPIRATION RATE: 20 BRPM

## 2018-04-24 DIAGNOSIS — J41.0 SIMPLE CHRONIC BRONCHITIS (H): ICD-10-CM

## 2018-04-24 DIAGNOSIS — F39 MOOD DISORDER (H): Primary | ICD-10-CM

## 2018-04-24 DIAGNOSIS — Z79.899 LITHIUM USE: ICD-10-CM

## 2018-04-24 DIAGNOSIS — I10 BENIGN ESSENTIAL HYPERTENSION: ICD-10-CM

## 2018-04-24 LAB
ALBUMIN SERPL-MCNC: 4 G/DL (ref 3.4–5)
ALP SERPL-CCNC: 104 U/L (ref 40–150)
ALT SERPL W P-5'-P-CCNC: 23 U/L (ref 0–70)
ANION GAP SERPL CALCULATED.3IONS-SCNC: 8 MMOL/L (ref 3–14)
AST SERPL W P-5'-P-CCNC: 17 U/L (ref 0–45)
BILIRUB SERPL-MCNC: 0.3 MG/DL (ref 0.2–1.3)
BUN SERPL-MCNC: 4 MG/DL (ref 7–30)
CALCIUM SERPL-MCNC: 8.9 MG/DL (ref 8.5–10.1)
CHLORIDE SERPL-SCNC: 106 MMOL/L (ref 94–109)
CO2 SERPL-SCNC: 25 MMOL/L (ref 20–32)
CREAT SERPL-MCNC: 0.92 MG/DL (ref 0.66–1.25)
GFR SERPL CREATININE-BSD FRML MDRD: 89 ML/MIN/1.7M2
GLUCOSE SERPL-MCNC: 104 MG/DL (ref 70–99)
LITHIUM SERPL-SCNC: 0.56 MMOL/L (ref 0.6–1.2)
POTASSIUM SERPL-SCNC: 3.6 MMOL/L (ref 3.4–5.3)
PROT SERPL-MCNC: 8.3 G/DL (ref 6.8–8.8)
SODIUM SERPL-SCNC: 139 MMOL/L (ref 133–144)
TSH SERPL DL<=0.005 MIU/L-ACNC: 1.09 MU/L (ref 0.4–4)

## 2018-04-24 PROCEDURE — 80053 COMPREHEN METABOLIC PANEL: CPT | Mod: ZL | Performed by: INTERNAL MEDICINE

## 2018-04-24 PROCEDURE — 84443 ASSAY THYROID STIM HORMONE: CPT | Mod: ZL | Performed by: INTERNAL MEDICINE

## 2018-04-24 PROCEDURE — 36415 COLL VENOUS BLD VENIPUNCTURE: CPT | Mod: ZL | Performed by: INTERNAL MEDICINE

## 2018-04-24 PROCEDURE — 80178 ASSAY OF LITHIUM: CPT | Mod: ZL | Performed by: INTERNAL MEDICINE

## 2018-04-24 PROCEDURE — 99214 OFFICE O/P EST MOD 30 MIN: CPT | Performed by: INTERNAL MEDICINE

## 2018-04-24 PROCEDURE — G0463 HOSPITAL OUTPT CLINIC VISIT: HCPCS

## 2018-04-24 RX ORDER — BUDESONIDE AND FORMOTEROL FUMARATE DIHYDRATE 80; 4.5 UG/1; UG/1
2 AEROSOL RESPIRATORY (INHALATION) 2 TIMES DAILY
Qty: 3 INHALER | Refills: 3 | Status: SHIPPED | OUTPATIENT
Start: 2018-04-24 | End: 2018-08-06 | Stop reason: DRUGHIGH

## 2018-04-24 RX ORDER — LITHIUM CARBONATE 450 MG
TABLET, EXTENDED RELEASE ORAL
Qty: 90 TABLET | Refills: 3 | Status: SHIPPED | OUTPATIENT
Start: 2018-04-24 | End: 2018-10-13

## 2018-04-24 ASSESSMENT — ANXIETY QUESTIONNAIRES
IF YOU CHECKED OFF ANY PROBLEMS ON THIS QUESTIONNAIRE, HOW DIFFICULT HAVE THESE PROBLEMS MADE IT FOR YOU TO DO YOUR WORK, TAKE CARE OF THINGS AT HOME, OR GET ALONG WITH OTHER PEOPLE: SOMEWHAT DIFFICULT
3. WORRYING TOO MUCH ABOUT DIFFERENT THINGS: MORE THAN HALF THE DAYS
5. BEING SO RESTLESS THAT IT IS HARD TO SIT STILL: SEVERAL DAYS
6. BECOMING EASILY ANNOYED OR IRRITABLE: SEVERAL DAYS
2. NOT BEING ABLE TO STOP OR CONTROL WORRYING: MORE THAN HALF THE DAYS
7. FEELING AFRAID AS IF SOMETHING AWFUL MIGHT HAPPEN: SEVERAL DAYS
GAD7 TOTAL SCORE: 9
1. FEELING NERVOUS, ANXIOUS, OR ON EDGE: SEVERAL DAYS

## 2018-04-24 ASSESSMENT — PAIN SCALES - GENERAL: PAINLEVEL: SEVERE PAIN (6)

## 2018-04-24 ASSESSMENT — PATIENT HEALTH QUESTIONNAIRE - PHQ9: 5. POOR APPETITE OR OVEREATING: SEVERAL DAYS

## 2018-04-24 NOTE — NURSING NOTE
"Chief Complaint   Patient presents with     Anxiety     Depression       Initial /78 (BP Location: Right arm, Patient Position: Chair, Cuff Size: Adult Large)  Pulse 73  Temp 98.2  F (36.8  C) (Tympanic)  Resp 20  Wt 234 lb (106.1 kg)  SpO2 98%  BMI 33.58 kg/m2 Estimated body mass index is 33.58 kg/(m^2) as calculated from the following:    Height as of 3/12/18: 5' 10\" (1.778 m).    Weight as of this encounter: 234 lb (106.1 kg).  Medication Reconciliation: complete   Monica Valerio LPN      "

## 2018-04-24 NOTE — PROGRESS NOTES
SUBJECTIVE:   Andrei Whitman is a 47 year old male who presents to clinic today for the following health issues:      Depression and Anxiety Follow-Up    Status since last visit: Improved     Other associated symptoms:None    Complicating factors:     Significant life event: No     Current substance abuse: None    PHQ-9 1/26/2018 3/12/2018 4/24/2018   Total Score 12 14 11   Q9: Suicide Ideation Several days Several days Several days   F/U: Thoughts of suicide or self-harm No - No   F/U: Safety concerns No - No     SONDRA-7 SCORE 1/26/2018 3/12/2018 4/24/2018   Total Score 8 12 9       PHQ-9  English  PHQ-9   Any Language  SONDRA-7  Suicide Assessment Five-step Evaluation and Treatment (SAFE-T)    Amount of exercise or physical activity: None    Problems taking medications regularly: No    Medication side effects: none    Diet: regular (no restrictions)          HTN:  He is not measuring his blood pressures at home.      BP Readings from Last 6 Encounters:   04/24/18 130/80   03/12/18 140/78   01/26/18 138/68   01/10/18 (!) 140/98   10/19/17 148/84   09/06/17 136/82       Wt Readings from Last 5 Encounters:   04/24/18 234 lb (106.1 kg)   03/12/18 238 lb (108 kg)   01/26/18 239 lb (108.4 kg)   01/10/18 239 lb (108.4 kg)   10/19/17 238 lb (108 kg)               -------------------------------------    Problem list and histories reviewed & adjusted, as indicated.  Additional history: as documented    Patient Active Problem List   Diagnosis     Cervicalgia     Lower back pain     Segmental and somatic dysfunction of lower extremity     GERD (gastroesophageal reflux disease)     Constipation     Mood disorder (H)     Abnormal weight gain     Attention deficit hyperactivity disorder (ADHD), predominantly inattentive type     ACP (advance care planning)     Flatulence, eructation, and gas pain     Bipolar disease, chronic (H)     Routine general medical examination at a health care facility     Past Surgical History:    Procedure Laterality Date     APPENDECTOMY      age 14     COLONOSCOPY  07/12/2017    Left descending x1 tubular adenoma, sigmoid x1 tubular adenoma and rectal x1 hyperplastic polyp.  Pan diverticulosis     ENDOSCOPY UPPER, COLONOSCOPY, COMBINED N/A 7/12/2017    Procedure: COMBINED ENDOSCOPY UPPER, COLONOSCOPY;  UPPER ENDOSCOPY WITH BIOPSIES  AND COLONOSCOPY WITH POLYPECTOMY;  Surgeon: Francis Valverde DO;  Location: [facility]     ENT SURGERY  age 18    tonsils     ESOPHAGOGASTRODUODENOSCOPY  07/12/2017    chemical gatropathy, GE junction with pancreatiuc metaplasia      ORTHOPEDIC SURGERY  age 30     back and neck fusion, bone from hip removed to use on plates     ORTHOPEDIC SURGERY  age 30    L5 fusion, laminectomy of lumbar discs       Social History   Substance Use Topics     Smoking status: Current Every Day Smoker     Packs/day: 1.00     Years: 20.00     Smokeless tobacco: Never Used     Alcohol use No     Family History   Problem Relation Age of Onset     Asthma Mother      Arthritis Mother      Prostate Cancer Father      HEART DISEASE Paternal Grandfather      Hypertension Paternal Grandfather      Alcohol/Drug Paternal Grandfather      Other - See Comments Brother      Nerve and degenerative disease mild     Alcohol/Drug Brother      Other - See Comments Sister 21     Hip replacements, nerve, degerative disease     Alcohol/Drug Maternal Grandfather      Unknown/Adopted Paternal Grandmother      Other Cancer Paternal Aunt      Cervical cancer           Reviewed and updated as needed this visit by clinical staff  Tobacco  Allergies  Meds  Med Hx  Surg Hx  Fam Hx  Soc Hx      Reviewed and updated as needed this visit by Provider         ROS:  CONSTITUTIONAL: NEGATIVE for fever, chills, change in weight  EYES: NEGATIVE for vision changes or irritation  ENT/MOUTH: NEGATIVE for ear, mouth and throat problems  RESP: NEGATIVE for significant cough or SOB  RESP:POSITIVE for cough-productive and wheezing in  the morning and with exertion  CV: NEGATIVE for chest pain, palpitations or peripheral edema  GI: NEGATIVE for nausea, abdominal pain, heartburn, or change in bowel habits  : NEGATIVE for frequency, dysuria, or hematuria  MUSCULOSKELETAL:POSITIVE  for back pain   NEURO: Headaches with lifting items greater 8 lbs and NEGATIVE for dizziness and paresthesias  ENDOCRINE: NEGATIVE for temperature intolerance, skin/hair changes  HEME: NEGATIVE for bleeding problems  PSYCHIATRIC: NEGATIVE for changes in mood or affect    OBJECTIVE:     /80  Pulse 73  Temp 98.2  F (36.8  C) (Tympanic)  Resp 20  Wt 234 lb (106.1 kg)  SpO2 98%  BMI 33.58 kg/m2  Body mass index is 33.58 kg/(m^2).  GENERAL: healthy, alert and no distress  NECK: no adenopathy, no asymmetry, masses, or scars and thyroid normal to palpation  RESP: lungs clear to auscultation - no rales, rhonchi or wheezes  CV: irregularly irregular rhythm, normal S1 S2, no S3 or S4, no murmur, click or rub, peripheral pulses strong and no peripheral edema  ABDOMEN: soft, nontender, no hepatosplenomegaly, no masses and bowel sounds normal  ABDOMEN: no palpable or pulsatile masses  MS: no gross musculoskeletal defects noted, no edema  PSYCH: mentation appears normal, affect normal/bright    Diagnostic Test Results:  TSH   Date Value Ref Range Status   04/24/2018 1.09 0.40 - 4.00 mU/L Final       Lithium level:  0.56 mmol/L    Recent Labs   Lab Test  04/24/18   0952  06/08/17   1458   NA  139  137   POTASSIUM  3.6  3.5   CHLORIDE  106  103   CO2  25  27   ANIONGAP  8  7   GLC  104*  91   BUN  4*  9   CR  0.92  1.03   CLARIBEL  8.9  9.3     Lab Results   Component Value Date    AST 17 04/24/2018     Lab Results   Component Value Date    ALT 23 04/24/2018        Lab Results   Component Value Date    ALBUMIN 4.0 04/24/2018     Lab Results   Component Value Date    PROTTOTAL 8.3 04/24/2018     Lab Results   Component Value Date    BILITOTAL 0.3 04/24/2018    BILITOTAL 1.0  06/08/2017    BILITOTAL 0.2 04/05/2017    BILITOTAL 0.3 11/15/2016    BILITOTAL 0.3 06/20/2016           ASSESSMENT/PLAN:   (F39) Mood disorder (H)  (primary encounter diagnosis)  Comment: Stable and well controlled.  His lithium level is low.  Plan:   Increase lithium in the evening from 450 mg to 600 mg and continue 900 mg in the am.  He will continue Wellbutrin 400 mg BID    (I10) Benign essential hypertension  Comment: Stable and improved.  Plan:   He will continue Verapamil 240 mg daily.    (J41.0) Simple chronic bronchitis (H)  Comment: New diagnosis  Plan:   Start budesonide-formoterol (SYMBICORT) 80-4.5 MCG/ACT Inhaler, 2 puffs BID    ]              FUTURE APPOINTMENTS:       - Follow-up visit in 1 month for COPD    Tommy Lamb DO, DO  [facility] [place]

## 2018-04-24 NOTE — MR AVS SNAPSHOT
"              After Visit Summary   4/24/2018    Andrei Whitman    MRN: [id number]           Patient Information     Date Of Birth          1970        Visit Information        Provider Department      4/24/2018 9:40 AM Tommy Lamb DO [facility] [place]        Today's Diagnoses     Mood disorder (H)    -  1    Benign essential hypertension        Simple chronic bronchitis (H)           Follow-ups after your visit        Follow-up notes from your care team     Return in about 1 month (around 5/24/2018) for COPD and HTN.      Your next 10 appointments already scheduled     May 24, 2018  9:20 AM CDT   (Arrive by 9:00 AM)   SHORT with Tommy Lamb DO   [facility] [place] ([facility] [place] )    [address]  [place] [address]   [phone]              Who to contact     If you have questions or need follow up information about today's clinic visit or your schedule please contact [facility] directly at [phone].  Normal or non-critical lab and imaging results will be communicated to you by [employer], letter or phone within 4 business days after the clinic has received the results. If you do not hear from us within 7 days, please contact the clinic through [employer] or phone. If you have a critical or abnormal lab result, we will notify you by phone as soon as possible.  Submit refill requests through [employer] or call your pharmacy and they will forward the refill request to us. Please allow 3 business days for your refill to be completed.          Additional Information About Your Visit        [employer] Information     [employer] lets you send messages to your doctor, view your test results, renew your prescriptions, schedule appointments and more. To sign up, go to [email or web address] . Click on \"Log in\" on the left side of the screen, which will take you to the Welcome page. Then click on \"Sign up Now\" on the right side of the page.     You " will be asked to enter the access code listed below, as well as some personal information. Please follow the directions to create your username and password.     Your access code is: [email or web address]  Expires: 2018 10:33 AM     Your access code will  in 90 days. If you need help or a new code, please call your [place] clinic or [phone].        Care EveryWhere ID     This is your Care EveryWhere ID. This could be used by other organizations to access your [place] medical records  [id number]        Your Vitals Were     Pulse Temperature Respirations Pulse Oximetry BMI (Body Mass Index)       73 98.2  F (36.8  C) (Tympanic) 20 98% 33.58 kg/m2        Blood Pressure from Last 3 Encounters:   18 130/80   18 140/78   18 138/68    Weight from Last 3 Encounters:   18 234 lb (106.1 kg)   18 238 lb (108 kg)   18 239 lb (108.4 kg)              Today, you had the following     No orders found for display         Today's Medication Changes          These changes are accurate as of 18 10:35 AM.  If you have any questions, ask your nurse or doctor.               Start taking these medicines.        Dose/Directions    budesonide-formoterol 80-4.5 MCG/ACT Inhaler   Commonly known as:  SYMBICORT   Used for:  Simple chronic bronchitis (H)   Started by:  Tommy Lamb DO        Dose:  2 puff   Inhale 2 puffs into the lungs 2 times daily   Quantity:  3 Inhaler   Refills:  3         Stop taking these medicines if you haven't already. Please contact your care team if you have questions.     amoxicillin-clavulanate 500-125 MG per tablet   Commonly known as:  AUGMENTIN   Stopped by:  Tommy Lamb DO                Where to get your medicines      These medications were sent to [id number] - [place] - [address]  [address] [place] [address] [address]     Phone:  [phone]     budesonide-formoterol 80-4.5 MCG/ACT Inhaler                 Primary Care Provider Office Phone # Fax #    Tommy Lamb [phone] [address] [id number]        Equal Access to Services     SENG CEVALLOS : Hadii aad ku hadrojaso Soamy, waaxda luqadaha, qaybta kaalmada adecarlosda, alka gross bertinmari de la paz keith smith. So [facility] [phone].    ATENCIÓN: Si habla español, tiene a rodriguez disposición servicios gratuitos de asistencia lingüística. Llame al [phone].    We comply with applicable federal civil rights laws and [place] laws. We do not discriminate on the basis of race, color, national origin, age, disability, sex, sexual orientation, or gender identity.            Thank you!     Thank you for choosing [facility]  for your care. Our goal is always to provide you with excellent care. Hearing back from our patients is one way we can continue to improve our services. Please take a few minutes to complete the written survey that you may receive in the mail after your visit with us. Thank you!             Your Updated Medication List - Protect others around you: Learn how to safely use, store and throw away your medicines at www.disposemymeds.org.          This list is accurate as of 4/24/18 10:35 AM.  Always use your most recent med list.                   Brand Name Dispense Instructions for use Diagnosis    amitriptyline 25 MG tablet    ELAVIL    30 tablet    TAKE 1 TABLET BY MOUTH AT BEDTIME    Persistent insomnia       * amphetamine-dextroamphetamine 30 MG per 24 hr capsule    ADDERALL XR    60 capsule    Take 1 capsule (30 mg) by mouth 2 times daily    ADHD (attention deficit hyperactivity disorder), inattentive type       * amphetamine-dextroamphetamine 30 MG per 24 hr capsule    ADDERALL XR    60 capsule    Take 1 capsule (30 mg) by mouth 2 times daily    Attention deficit hyperactivity disorder (ADHD), predominantly hyperactive type       aspirin 81 MG tablet     30 tablet    Take 1 tablet (81  mg) by mouth daily    Russell's esophagus with dysplasia       budesonide-formoterol 80-4.5 MCG/ACT Inhaler    SYMBICORT    3 Inhaler    Inhale 2 puffs into the lungs 2 times daily    Simple chronic bronchitis (H)       buPROPion 100 MG tablet    WELLBUTRIN    120 tablet    TAKE 2 TABLETS (200 MG) BY MOUTH 2 TIMES DAILY    Recurrent major depressive disorder, in partial remission (H)       gabapentin 300 MG capsule    NEURONTIN    180 capsule    TAKE 2 CAPSULES BY MOUTH THREE TIMES DAILY    Cervical radiculopathy       lithium 450 MG CR tablet    ESKALITH    90 tablet    Take two tablets by mouth at 8 am and take one tablet at 4 pm.    Mood disorder (H)       omeprazole 40 MG capsule    priLOSEC    90 capsule    Take 1 capsule (40 mg) by mouth daily Take 30-60 minutes before a meal.    Acute superficial gastritis without hemorrhage       verapamil 240 MG Cp24 24 hr capsule    VERELAN    90 capsule    Take 1 capsule (240 mg) by mouth At Bedtime    Essential hypertension       * Notice:  This list has 2 medication(s) that are the same as other medications prescribed for you. Read the directions carefully, and ask your doctor or other care provider to review them with you.

## 2018-04-25 ASSESSMENT — ANXIETY QUESTIONNAIRES: GAD7 TOTAL SCORE: 9

## 2018-04-25 ASSESSMENT — PATIENT HEALTH QUESTIONNAIRE - PHQ9: SUM OF ALL RESPONSES TO PHQ QUESTIONS 1-9: 11

## 2018-04-30 ENCOUNTER — TELEPHONE (OUTPATIENT)
Dept: INTERNAL MEDICINE | Facility: OTHER | Age: 48
End: 2018-04-30

## 2018-04-30 DIAGNOSIS — F39 MOOD DISORDER (H): Primary | ICD-10-CM

## 2018-04-30 RX ORDER — LITHIUM CARBONATE 600 MG/1
600 CAPSULE ORAL EVERY EVENING
Qty: 30 CAPSULE | Refills: 1 | Status: SHIPPED | OUTPATIENT
Start: 2018-04-30 | End: 2018-06-10

## 2018-04-30 NOTE — TELEPHONE ENCOUNTER
11:13 AM    Reason for Call: Phone Call    Description: Pt calling stating that his Lithium medication was changed after he was seen by DR and the pharmacy doesn't have the changes as of yet. Please call him to advise.    Was an appointment offered for this call? No  If yes : Appointment type              Date    Preferred method for responding to this message: Telephone Call  What is your phone number ?    If we cannot reach you directly, may we leave a detailed response at the number you provided? Yes    Can this message wait until your PCP/provider returns, if available today?     Jenny Dowd

## 2018-04-30 NOTE — TELEPHONE ENCOUNTER
Was told to go up on lithium dose after lab draw, Dr. Owens did fill for 600mg tablet 1 daily but was on previous 450 mg 2 tablets daily- please advise

## 2018-05-01 NOTE — TELEPHONE ENCOUNTER
This is done. Patient is to be on 450mg 2 capsules in the Am and 600mg cap in evening. Dr. Alonzo filled this yesterday and I have updated the patient.

## 2018-05-08 DIAGNOSIS — F90.0 ADHD (ATTENTION DEFICIT HYPERACTIVITY DISORDER), INATTENTIVE TYPE: ICD-10-CM

## 2018-05-08 RX ORDER — DEXTROAMPHETAMINE SACCHARATE, AMPHETAMINE ASPARTATE MONOHYDRATE, DEXTROAMPHETAMINE SULFATE AND AMPHETAMINE SULFATE 7.5; 7.5; 7.5; 7.5 MG/1; MG/1; MG/1; MG/1
CAPSULE, EXTENDED RELEASE ORAL
Qty: 60 CAPSULE | Refills: 0 | Status: SHIPPED | OUTPATIENT
Start: 2018-05-10 | End: 2018-05-17

## 2018-05-08 NOTE — TELEPHONE ENCOUNTER
adderall      Last Written Prescription Date:  4/12/18  Last Fill Quantity: 60,   # refills: 0  Last Office Visit: 4/24/18  Future Office visit:    Next 5 appointments (look out 90 days)     May 24, 2018  9:20 AM CDT   (Arrive by 9:00 AM)   SHORT with Tommy Lamb DO   [facility] [place] ([facility] [place] )    [address]  [place] [address]   [phone]                   Routing refill request to provider for review/approval because:  Drug not on the [facility], [facility] or  Health refill protocol or controlled substance

## 2018-05-14 NOTE — TELEPHONE ENCOUNTER
Pt notified that the written RX is ready at the [facility]  registration to be picked up.

## 2018-05-17 DIAGNOSIS — F90.0 ADHD (ATTENTION DEFICIT HYPERACTIVITY DISORDER), INATTENTIVE TYPE: ICD-10-CM

## 2018-05-17 NOTE — TELEPHONE ENCOUNTER
adderall      Last Written Prescription Date:  4/12/18  Last Fill Quantity: 60,   # refills: 0  Last Office Visit: 4/24/18  Future Office visit:    Next 5 appointments (look out 90 days)     May 24, 2018  9:20 AM CDT   (Arrive by 9:00 AM)   SHORT with Tommy Lamb DO   [facility] [place] ([facility] [place] )    [address]  [place] [address]   [phone]                   Routing refill request to provider for review/approval because:  Drug not on the [facility], [facility]MP or  Health refill protocol or controlled substance

## 2018-05-18 RX ORDER — DEXTROAMPHETAMINE SACCHARATE, AMPHETAMINE ASPARTATE MONOHYDRATE, DEXTROAMPHETAMINE SULFATE AND AMPHETAMINE SULFATE 7.5; 7.5; 7.5; 7.5 MG/1; MG/1; MG/1; MG/1
CAPSULE, EXTENDED RELEASE ORAL
Qty: 60 CAPSULE | Refills: 0 | Status: SHIPPED | OUTPATIENT
Start: 2018-05-18 | End: 2018-07-11

## 2018-05-23 DIAGNOSIS — F90.1 ATTENTION DEFICIT HYPERACTIVITY DISORDER (ADHD), PREDOMINANTLY HYPERACTIVE TYPE: ICD-10-CM

## 2018-05-23 RX ORDER — DEXTROAMPHETAMINE SACCHARATE, AMPHETAMINE ASPARTATE MONOHYDRATE, DEXTROAMPHETAMINE SULFATE AND AMPHETAMINE SULFATE 7.5; 7.5; 7.5; 7.5 MG/1; MG/1; MG/1; MG/1
CAPSULE, EXTENDED RELEASE ORAL
Qty: 60 CAPSULE | Refills: 0 | Status: SHIPPED | OUTPATIENT
Start: 2018-05-23 | End: 2018-06-18

## 2018-05-23 NOTE — TELEPHONE ENCOUNTER
adderall      Last Written Prescription Date:  4/12/18  Last Fill Quantity: 60,   # refills: 0  Last Office Visit: 4/24/18  Future Office visit:    Next 5 appointments (look out 90 days)     May 24, 2018  9:20 AM CDT   (Arrive by 9:00 AM)   SHORT with Tommy Lamb DO   [facility] [place] ([facility] [place] [facility]    [address]  [place] [address]   [phone]                   Routing refill request to provider for review/approval because:  Drug not on the [facility], [facility]MP or  Health refill protocol or controlled substance

## 2018-05-24 ENCOUNTER — OFFICE VISIT (OUTPATIENT)
Dept: PEDIATRICS | Facility: OTHER | Age: 48
End: 2018-05-24
Attending: INTERNAL MEDICINE
Payer: MEDICARE

## 2018-05-24 VITALS
WEIGHT: 233 LBS | SYSTOLIC BLOOD PRESSURE: 128 MMHG | OXYGEN SATURATION: 98 % | HEART RATE: 60 BPM | BODY MASS INDEX: 33.36 KG/M2 | RESPIRATION RATE: 20 BRPM | DIASTOLIC BLOOD PRESSURE: 92 MMHG | TEMPERATURE: 97 F | HEIGHT: 70 IN

## 2018-05-24 DIAGNOSIS — J41.0 SIMPLE CHRONIC BRONCHITIS (H): ICD-10-CM

## 2018-05-24 DIAGNOSIS — G89.29 CHRONIC MIDLINE LOW BACK PAIN WITHOUT SCIATICA: Primary | ICD-10-CM

## 2018-05-24 DIAGNOSIS — M54.50 CHRONIC MIDLINE LOW BACK PAIN WITHOUT SCIATICA: Primary | ICD-10-CM

## 2018-05-24 DIAGNOSIS — I10 BENIGN ESSENTIAL HYPERTENSION: ICD-10-CM

## 2018-05-24 DIAGNOSIS — M50.30 DDD (DEGENERATIVE DISC DISEASE), CERVICAL: ICD-10-CM

## 2018-05-24 PROCEDURE — 99214 OFFICE O/P EST MOD 30 MIN: CPT | Performed by: INTERNAL MEDICINE

## 2018-05-24 PROCEDURE — G0463 HOSPITAL OUTPT CLINIC VISIT: HCPCS

## 2018-05-24 ASSESSMENT — ANXIETY QUESTIONNAIRES
GAD7 TOTAL SCORE: 11
2. NOT BEING ABLE TO STOP OR CONTROL WORRYING: MORE THAN HALF THE DAYS
3. WORRYING TOO MUCH ABOUT DIFFERENT THINGS: MORE THAN HALF THE DAYS
IF YOU CHECKED OFF ANY PROBLEMS ON THIS QUESTIONNAIRE, HOW DIFFICULT HAVE THESE PROBLEMS MADE IT FOR YOU TO DO YOUR WORK, TAKE CARE OF THINGS AT HOME, OR GET ALONG WITH OTHER PEOPLE: VERY DIFFICULT
7. FEELING AFRAID AS IF SOMETHING AWFUL MIGHT HAPPEN: SEVERAL DAYS
4. TROUBLE RELAXING: SEVERAL DAYS
5. BEING SO RESTLESS THAT IT IS HARD TO SIT STILL: SEVERAL DAYS
1. FEELING NERVOUS, ANXIOUS, OR ON EDGE: MORE THAN HALF THE DAYS
6. BECOMING EASILY ANNOYED OR IRRITABLE: MORE THAN HALF THE DAYS

## 2018-05-24 ASSESSMENT — PAIN SCALES - GENERAL: PAINLEVEL: SEVERE PAIN (6)

## 2018-05-24 NOTE — TELEPHONE ENCOUNTER
Patient already has a script that he filled on 5/14/18.  Per Dr. Lamb, I just changed the start date on the Adderall signed 5/23/18 to start on 6/14/18.  Patient called and notified that they can just pick it up at the  in June and can't fill until 6/14/18.  Thank you.

## 2018-05-24 NOTE — PROGRESS NOTES
SUBJECTIVE:   Andrei Whitman is a 47 year old male who presents to clinic today for the following health issues:      Hypertension Follow-up      Outpatient blood pressures are not being checked.    Low Salt Diet: low salt          BP Readings from Last 6 Encounters:   05/24/18 140/70   04/24/18 130/80   03/12/18 140/78   01/26/18 138/68   01/10/18 (!) 140/98   10/19/17 148/84     COPD Follow-Up    Symptoms are currently: stable    Current fatigue or dyspnea with ambulation: none    Shortness of breath: stable    Increased or change in Cough/Sputum: No    Fever(s): No    Baseline ambulation without stopping to rest:  1 blocks. Able to walk up 1 flights of stairs without stopping to rest.    Any ER/UC or hospital admissions since your last visit? No     History   Smoking Status     Current Every Day Smoker     Packs/day: 1.00     Years: 20.00   Smokeless Tobacco     Never Used     No results found for: FEV1, IFV0LPE    Amount of exercise or physical activity: None    Problems taking medications regularly: No    Medication side effects: none    Diet: low salt    He reports two days after he started Symbicort he started having several GI symptoms at which time he stopped his symbicort as he was not feeling well.      Vomiting and Nausea:  He started having vomiting for two days duration starting  on 05/02/18.  He continued to have dry heaves for several days after this which started with exertion such as carrying groceries which induced a cough followed by dry heaves.  He reports that his chronic back pain was exacerbated by theses events.  He was unable to eat regularly for 10 days.  Since that time he has been having bowel movement which he feels that he has lost sensation as he sat down to move his bowels and does not feel the movement.  He denies fecal incontinence.  The reports soft stool without blood.  He reports a strong urine stream and feels like he completely empties his  bladder.        -------------------------------------    Problem list and histories reviewed & adjusted, as indicated.  Additional history: as documented    Patient Active Problem List   Diagnosis     Cervicalgia     Lower back pain     Segmental and somatic dysfunction of lower extremity     GERD (gastroesophageal reflux disease)     Constipation     Mood disorder (H)     Abnormal weight gain     Attention deficit hyperactivity disorder (ADHD), predominantly inattentive type     ACP (advance care planning)     Flatulence, eructation, and gas pain     Bipolar disease, chronic (H)     Routine general medical examination at a health care facility     Benign essential hypertension     Simple chronic bronchitis (H)     Past Surgical History:   Procedure Laterality Date     APPENDECTOMY      age 14     COLONOSCOPY  07/12/2017    Left descending x1 tubular adenoma, sigmoid x1 tubular adenoma and rectal x1 hyperplastic polyp.  Pan diverticulosis     ENDOSCOPY UPPER, COLONOSCOPY, COMBINED N/A 7/12/2017    Procedure: COMBINED ENDOSCOPY UPPER, COLONOSCOPY;  UPPER ENDOSCOPY WITH BIOPSIES  AND COLONOSCOPY WITH POLYPECTOMY;  Surgeon: Francis Valverde DO;  Location: [facility] OR     ENT SURGERY  age 18    tonsils     ESOPHAGOGASTRODUODENOSCOPY  07/12/2017    chemical gatropathy, GE junction with pancreatiuc metaplasia      ORTHOPEDIC SURGERY  age 30     back and neck fusion, bone from hip removed to use on plates     ORTHOPEDIC SURGERY  age 30    L5 fusion, laminectomy of lumbar discs       Social History   Substance Use Topics     Smoking status: Current Every Day Smoker     Packs/day: 1.00     Years: 20.00     Smokeless tobacco: Never Used     Alcohol use No     Family History   Problem Relation Age of Onset     Asthma Mother      Arthritis Mother      Prostate Cancer Father      HEART DISEASE Paternal Grandfather      Hypertension Paternal Grandfather      Alcohol/Drug Paternal Grandfather      Other - See Comments Brother       "Nerve and degenerative disease mild     Alcohol/Drug Brother      Other - See Comments Sister 21     Hip replacements, nerve, degerative disease     Alcohol/Drug Maternal Grandfather      Unknown/Adopted Paternal Grandmother      Other Cancer Paternal Aunt      Cervical cancer           Reviewed and updated as needed this visit by clinical staff  Tobacco  Allergies  Meds  Problems       Reviewed and updated as needed this visit by Provider         ROS:  CONSTITUTIONAL: NEGATIVE for fever, chills, change in weight  INTEGUMENTARY/SKIN: NEGATIVE for worrisome rashes, moles or lesions  EYES: NEGATIVE for vision changes or irritation  ENT/MOUTH: NEGATIVE for ear, mouth and throat problems  RESP:POSITIVE for cough-productive, dyspnea on exertion and  and NEGATIVE for wheezing  BREAST: NEGATIVE for masses, tenderness or discharge  CV: NEGATIVE for chest pain, palpitations or peripheral edema  GI: See HPI  : NEGATIVE for frequency, dysuria, or hematuria  MUSCULOSKELETAL:back pain has increased and neck pain has not changed  NEURO: POSITIVE for dizziness/lightheadedness and NEGATIVE for numbness or tingling   PSYCHIATRIC: NEGATIVE for changes in mood or affect    OBJECTIVE:     BP (!) 128/92  Pulse 60  Temp 97  F (36.1  C) (Tympanic)  Resp 20  Ht 5' 10\" (1.778 m)  Wt 233 lb (105.7 kg)  SpO2 98%  BMI 33.43 kg/m2  Body mass index is 33.43 kg/(m^2).  GENERAL: healthy, alert and no distress, voice is raspy  EYES: Eyes grossly normal to inspection and conjunctivae and sclerae normal  NECK: no adenopathy, no asymmetry, masses, or scars and thyroid normal to palpation  RESP: lungs clear to auscultation - no rales, rhonchi or wheezes  CV: regular rate and rhythm, normal S1 S2, no S3 or S4, no murmur, click or rub, no peripheral edema and peripheral pulses strong  ABDOMEN: soft, nontender, no hepatosplenomegaly, no masses and bowel sounds normal  ABDOMEN: no palpable or pulsatile masses and no bruits heard  MS: no gross " musculoskeletal defects noted, no edema  NEURO: Normal strength and tone, mentation intact and speech normal  NEURO: Normal strength and tone, sensory exam grossly normal, mentation intact and DTR's normal over the brachioradialis and asymmetric over the patella with 2/4 on the left and 3/4 brisk DTR on the right   PSYCH: mentation appears normal, anxious and speech pressured    Diagnostic Test Results:  none     ASSESSMENT/PLAN:   (M54.5,  G89.29) Chronic midline low back pain without sciatica  (primary encounter diagnosis)  Comment: Patient is having increased pain and feels that his recent episode of vomiting and dry heaves was prolonged due to worsening spinal disease.  He is considering going through surgery again.  Plan:   MR Lumbar Spine w/o Contrast    (M50.30) DDD (degenerative disc disease), cervical  Comment: Stable pain with chronic headaches  Plan:   MR Cervical Spine w/o Contrast    (I10) Benign essential hypertension  Comment: Improved.  Plan:   He will continue Verapamil 240 mg daily     (J41.0) Simple chronic bronchitis (H)  Comment: No changes, howeever he has not been taken the medication as above.  Plan:   He will restart his Symbicort BID                  FUTURE APPOINTMENTS:       - Follow-up visit in 2 week for COPD and HTN    Tommy Lamb DO,   [facility]

## 2018-05-24 NOTE — TELEPHONE ENCOUNTER
Patient gave this script to Dr. Lamb.  Dr. Lamb showed me the voided script that patient gave him during office visit.  New script was written in next encounter.

## 2018-05-24 NOTE — NURSING NOTE
"Chief Complaint   Patient presents with     COPD     Hypertension       Initial /70 (BP Location: Right arm, Patient Position: Chair, Cuff Size: Adult Regular)  Pulse 60  Temp 97  F (36.1  C) (Tympanic)  Resp 20  Ht 5' 10\" (1.778 m)  Wt 233 lb (105.7 kg)  SpO2 98%  BMI 33.43 kg/m2 Estimated body mass index is 33.43 kg/(m^2) as calculated from the following:    Height as of this encounter: 5' 10\" (1.778 m).    Weight as of this encounter: 233 lb (105.7 kg).  Medication Reconciliation: complete    Moraima Lechuga LPN    "

## 2018-05-24 NOTE — MR AVS SNAPSHOT
After Visit Summary   5/24/2018    Andrei Whitman    MRN: [id number]           Patient Information     Date Of Birth          1970        Visit Information        Provider Department      5/24/2018 9:20 AM Tommy Lamb DO [facility] [place]        Today's Diagnoses     Chronic midline low back pain without sciatica    -  1    DDD (degenerative disc disease), cervical        Benign essential hypertension        Simple chronic bronchitis (H)           Follow-ups after your visit        Follow-up notes from your care team     Return in about 2 weeks (around 6/7/2018) for COPD and HTN.      Your next 10 appointments already scheduled     May 24, 2018  9:20 AM CDT   (Arrive by 9:00 AM)   SHORT with Tommy Lamb DO   [facility] [place] ([facility] [place] )    [address]  [place] [address]   [phone]            Jun 07, 2018  9:40 AM CDT   (Arrive by 9:20 AM)   SHORT with Tommy Lamb DO   [facility] [place] ([facility] [place] )    [address]  [place] [address]   [phone]              Future tests that were ordered for you today     Open Future Orders        Priority Expected Expires Ordered    MR Lumbar Spine w/o Contrast Routine  5/24/2019 5/24/2018    MR Cervical Spine w/o Contrast Routine  5/24/2019 5/24/2018            Who to contact     If you have questions or need follow up information about today's clinic visit or your schedule please contact [facility] directly at [phone].  Normal or non-critical lab and imaging results will be communicated to you by MyChart, letter or phone within 4 business days after the clinic has received the results. If you do not hear from us within 7 days, please contact the clinic through [email or web address] or phone. If you have a critical or abnormal lab result, we will notify you by phone as soon as possible.  Submit refill requests through Accellos or call  "your pharmacy and they will forward the refill request to us. Please allow 3 business days for your refill to be completed.          Additional Information About Your Visit        Care EveryWhere ID     This is your Care EveryWhere ID. This could be used by other organizations to access your [place] medical records  [id number]        Your Vitals Were     Pulse Temperature Respirations Height Pulse Oximetry BMI (Body Mass Index)    60 97  F (36.1  C) (Tympanic) 20 5' 10\" (1.778 m) 98% 33.43 kg/m2       Blood Pressure from Last 3 Encounters:   05/24/18 (!) 128/92   04/24/18 130/80   03/12/18 140/78    Weight from Last 3 Encounters:   05/24/18 233 lb (105.7 kg)   04/24/18 234 lb (106.1 kg)   03/12/18 238 lb (108 kg)               Primary Care Provider Office Phone # Fax #    Tommy Farhan Zainab,  [phone]       [facility] [address] [address]        Equal Access to Services     BRADEN CEVALLOS : Hadii misa ku hadasho Soomaali, waaxda luqadaha, qaybta kaalmada adeegyada, alka parker . So [facility] [phone].    ATENCIÓN: Si habla español, tiene a rodriguez disposición servicios gratuitos de asistencia lingüística. [facility] [phone].    We comply with applicable federal civil rights laws and [place] laws. We do not discriminate on the basis of race, color, national origin, age, disability, sex, sexual orientation, or gender identity.            Thank you!     Thank you for choosing [facility]  for your care. Our goal is always to provide you with excellent care. Hearing back from our patients is one way we can continue to improve our services. Please take a few minutes to complete the written survey that you may receive in the mail after your visit with us. Thank you!             Your Updated Medication List - Protect others around you: Learn how to safely use, store and throw away your medicines at www.disposemymeds.org.          This list is accurate " as of 5/24/18  9:18 AM.  Always use your most recent med list.                   Brand Name Dispense Instructions for use Diagnosis    amitriptyline 25 MG tablet    ELAVIL    30 tablet    TAKE 1 TABLET BY MOUTH AT BEDTIME    Persistent insomnia       * amphetamine-dextroamphetamine 30 MG per 24 hr capsule    ADDERALL XR    60 capsule    Take 1 capsule (30 mg) by mouth 2 times daily    ADHD (attention deficit hyperactivity disorder), inattentive type       * amphetamine-dextroamphetamine 30 MG per 24 hr capsule    ADDERALL XR    60 capsule    Take 1 capsule (30 mg) by mouth 2 times daily    Attention deficit hyperactivity disorder (ADHD), predominantly hyperactive type       aspirin 81 MG tablet     30 tablet    Take 1 tablet (81 mg) by mouth daily    Russell's esophagus with dysplasia       budesonide-formoterol 80-4.5 MCG/ACT Inhaler    SYMBICORT    3 Inhaler    Inhale 2 puffs into the lungs 2 times daily    Simple chronic bronchitis (H)       buPROPion 100 MG tablet    WELLBUTRIN    120 tablet    Take 2 tablets by mouth twice a day.    Recurrent major depressive disorder, in partial remission (H)       gabapentin 300 MG capsule    NEURONTIN    180 capsule    TAKE 2 CAPSULES BY MOUTH THREE TIMES DAILY    Cervical radiculopathy       * lithium 450 MG CR tablet    ESKALITH    90 tablet    Take two tablets by mouth at 4 pm    Mood disorder (H)       * lithium 600 MG capsule     30 capsule    Take 1 capsule (600 mg) by mouth every evening    Mood disorder (H)       omeprazole 40 MG capsule    priLOSEC    90 capsule    Take 1 capsule (40 mg) by mouth daily Take 30-60 minutes before a meal.    Acute superficial gastritis without hemorrhage       verapamil 240 MG Cp24 24 hr capsule    VERELAN    90 capsule    Take 1 capsule (240 mg) by mouth At Bedtime    Essential hypertension       * Notice:  This list has 4 medication(s) that are the same as other medications prescribed for you. Read the directions carefully, and ask  your doctor or other care provider to review them with you.

## 2018-05-25 ASSESSMENT — ANXIETY QUESTIONNAIRES: GAD7 TOTAL SCORE: 11

## 2018-05-25 ASSESSMENT — PATIENT HEALTH QUESTIONNAIRE - PHQ9: SUM OF ALL RESPONSES TO PHQ QUESTIONS 1-9: 11

## 2018-05-29 ENCOUNTER — HOSPITAL ENCOUNTER (OUTPATIENT)
Dept: MRI IMAGING | Facility: HOSPITAL | Age: 48
End: 2018-05-29
Attending: INTERNAL MEDICINE
Payer: MEDICARE

## 2018-05-29 ENCOUNTER — HOSPITAL ENCOUNTER (OUTPATIENT)
Dept: MRI IMAGING | Facility: HOSPITAL | Age: 48
Discharge: HOME | End: 2018-05-29
Attending: INTERNAL MEDICINE | Admitting: INTERNAL MEDICINE
Payer: MEDICARE

## 2018-05-29 DIAGNOSIS — M50.30 DDD (DEGENERATIVE DISC DISEASE), CERVICAL: ICD-10-CM

## 2018-05-29 DIAGNOSIS — G89.29 CHRONIC MIDLINE LOW BACK PAIN WITHOUT SCIATICA: ICD-10-CM

## 2018-05-29 DIAGNOSIS — M54.50 CHRONIC MIDLINE LOW BACK PAIN WITHOUT SCIATICA: ICD-10-CM

## 2018-05-29 PROCEDURE — 72148 MRI LUMBAR SPINE W/O DYE: CPT | Mod: TC

## 2018-05-29 PROCEDURE — 72141 MRI NECK SPINE W/O DYE: CPT | Mod: TC

## 2018-05-30 DIAGNOSIS — M50.30 DDD (DEGENERATIVE DISC DISEASE), CERVICAL: Primary | ICD-10-CM

## 2018-05-30 DIAGNOSIS — M48.03 SPINAL STENOSIS, CERVICOTHORACIC REGION: ICD-10-CM

## 2018-06-06 DIAGNOSIS — G47.00 PERSISTENT INSOMNIA: ICD-10-CM

## 2018-06-07 ENCOUNTER — OFFICE VISIT (OUTPATIENT)
Dept: PEDIATRICS | Facility: OTHER | Age: 48
End: 2018-06-07
Attending: INTERNAL MEDICINE
Payer: MEDICARE

## 2018-06-07 VITALS
DIASTOLIC BLOOD PRESSURE: 78 MMHG | TEMPERATURE: 99.2 F | OXYGEN SATURATION: 96 % | RESPIRATION RATE: 22 BRPM | SYSTOLIC BLOOD PRESSURE: 144 MMHG | HEART RATE: 104 BPM | WEIGHT: 234 LBS | BODY MASS INDEX: 33.58 KG/M2

## 2018-06-07 DIAGNOSIS — K22.70 BARRETT'S ESOPHAGUS WITHOUT DYSPLASIA: ICD-10-CM

## 2018-06-07 DIAGNOSIS — I10 BENIGN ESSENTIAL HYPERTENSION: Primary | ICD-10-CM

## 2018-06-07 DIAGNOSIS — J41.0 SIMPLE CHRONIC BRONCHITIS (H): ICD-10-CM

## 2018-06-07 DIAGNOSIS — F17.200 TOBACCO DEPENDENCY: ICD-10-CM

## 2018-06-07 LAB
ALBUMIN SERPL-MCNC: 3.5 G/DL (ref 3.4–5)
ALP SERPL-CCNC: 119 U/L (ref 40–150)
ALT SERPL W P-5'-P-CCNC: 22 U/L (ref 0–70)
ANION GAP SERPL CALCULATED.3IONS-SCNC: 6 MMOL/L (ref 3–14)
AST SERPL W P-5'-P-CCNC: 20 U/L (ref 0–45)
BILIRUB SERPL-MCNC: 0.4 MG/DL (ref 0.2–1.3)
BUN SERPL-MCNC: 9 MG/DL (ref 7–30)
CALCIUM SERPL-MCNC: 9.2 MG/DL (ref 8.5–10.1)
CHLORIDE SERPL-SCNC: 106 MMOL/L (ref 94–109)
CO2 SERPL-SCNC: 26 MMOL/L (ref 20–32)
CREAT SERPL-MCNC: 0.84 MG/DL (ref 0.66–1.25)
GFR SERPL CREATININE-BSD FRML MDRD: >90 ML/MIN/1.7M2
GLUCOSE SERPL-MCNC: 98 MG/DL (ref 70–99)
POTASSIUM SERPL-SCNC: 4 MMOL/L (ref 3.4–5.3)
PROT SERPL-MCNC: 8.4 G/DL (ref 6.8–8.8)
SODIUM SERPL-SCNC: 138 MMOL/L (ref 133–144)

## 2018-06-07 PROCEDURE — 36415 COLL VENOUS BLD VENIPUNCTURE: CPT | Mod: ZL | Performed by: INTERNAL MEDICINE

## 2018-06-07 PROCEDURE — 80053 COMPREHEN METABOLIC PANEL: CPT | Mod: ZL | Performed by: INTERNAL MEDICINE

## 2018-06-07 PROCEDURE — G0463 HOSPITAL OUTPT CLINIC VISIT: HCPCS

## 2018-06-07 PROCEDURE — 99214 OFFICE O/P EST MOD 30 MIN: CPT | Performed by: INTERNAL MEDICINE

## 2018-06-07 RX ORDER — HYDROCHLOROTHIAZIDE 12.5 MG/1
12.5 CAPSULE ORAL EVERY MORNING
Qty: 90 CAPSULE | Refills: 1 | Status: SHIPPED | OUTPATIENT
Start: 2018-06-07 | End: 2018-12-03

## 2018-06-07 ASSESSMENT — ANXIETY QUESTIONNAIRES
IF YOU CHECKED OFF ANY PROBLEMS ON THIS QUESTIONNAIRE, HOW DIFFICULT HAVE THESE PROBLEMS MADE IT FOR YOU TO DO YOUR WORK, TAKE CARE OF THINGS AT HOME, OR GET ALONG WITH OTHER PEOPLE: VERY DIFFICULT
2. NOT BEING ABLE TO STOP OR CONTROL WORRYING: MORE THAN HALF THE DAYS
5. BEING SO RESTLESS THAT IT IS HARD TO SIT STILL: SEVERAL DAYS
4. TROUBLE RELAXING: MORE THAN HALF THE DAYS
1. FEELING NERVOUS, ANXIOUS, OR ON EDGE: SEVERAL DAYS
6. BECOMING EASILY ANNOYED OR IRRITABLE: MORE THAN HALF THE DAYS
GAD7 TOTAL SCORE: 11
3. WORRYING TOO MUCH ABOUT DIFFERENT THINGS: MORE THAN HALF THE DAYS
7. FEELING AFRAID AS IF SOMETHING AWFUL MIGHT HAPPEN: SEVERAL DAYS

## 2018-06-07 ASSESSMENT — PAIN SCALES - GENERAL: PAINLEVEL: SEVERE PAIN (7)

## 2018-06-07 NOTE — MR AVS SNAPSHOT
After Visit Summary   6/7/2018    Andrei Whitman    MRN: [id number]           Patient Information     Date Of Birth          1970        Visit Information        Provider Department      6/7/2018 9:40 AM Tommy Lamb DO [facility] [place]        Today's Diagnoses     Benign essential hypertension    -  1    Simple chronic bronchitis (H)        Russell's esophagus without dysplasia           Follow-ups after your visit        Additional Services     GENERAL SURG ADULT REFERRAL       Your provider has referred you to: PREFERRED PROVIDERS:  Dr. Valverde    Please be aware that coverage of these services is subject to the terms and limitations of your health insurance plan.  Call member services at your health plan with any benefit or coverage questions.      Please bring the following with you to your appointment:    (1) Any X-Rays, CTs or MRIs which have been performed.  Contact the facility where they were done to arrange for  prior to your scheduled appointment.   (2) List of current medications   (3) This referral request   (4) Any documents/labs given to you for this referral                  Follow-up notes from your care team     Return in about 2 months (around 8/7/2018) for BP Recheck.      Your next 10 appointments already scheduled     Aug 06, 2018 10:40 AM CDT   (Arrive by 10:20 AM)   SHORT with DO [facility] [place] ([facility] )    [facility] [place] [address]   [phone]              Who to contact     If you have questions or need follow up information about today's clinic visit or your schedule please contact [facility] directly at [phone].  Normal or non-critical lab and imaging results will be communicated to you by MyChart, letter or phone within 4 business days after the clinic has received the results. If you do not hear from us within 7 days, please contact the clinic  through Talyst or phone. If you have a critical or abnormal lab result, we will notify you by phone as soon as possible.  Submit refill requests through Talyst or call your pharmacy and they will forward the refill request to us. Please allow 3 business days for your refill to be completed.          Additional Information About Your Visit        Care EveryWhere ID     This is your Care EveryWhere ID. This could be used by other organizations to access your [place] medical records  [id number]        Your Vitals Were     Pulse Temperature Respirations Pulse Oximetry BMI (Body Mass Index)       104 99.2  F (37.3  C) (Tympanic) 22 96% 33.58 kg/m2        Blood Pressure from Last 3 Encounters:   06/07/18 144/78   05/24/18 (!) 128/92   04/24/18 130/80    Weight from Last 3 Encounters:   06/07/18 234 lb (106.1 kg)   05/24/18 233 lb (105.7 kg)   04/24/18 234 lb (106.1 kg)              We Performed the Following     Comprehensive metabolic panel     GENERAL SURG ADULT REFERRAL          Today's Medication Changes          These changes are accurate as of 6/7/18 10:01 AM.  If you have any questions, ask your nurse or doctor.               Start taking these medicines.        Dose/Directions    hydrochlorothiazide 12.5 MG capsule   Commonly known as:  MICROZIDE   Used for:  Benign essential hypertension   Started by:  Tommy Lamb DO        Dose:  12.5 mg   Take 1 capsule (12.5 mg) by mouth every morning   Quantity:  90 capsule   Refills:  1            Where to get your medicines      These medications were sent to [id number] - [place] - [address] [address] [facility] [place] [address]     Phone:  [phone]     hydrochlorothiazide 12.5 MG capsule                Primary Care Provider Office Phone # Fax #    Tommy Lamb [phone]       [address] [place] [address]  [place] [address]        Equal Access to Services     SENG CEVALLOS AH: Gildardo Hill  wabravocali suh, qaybta kachase monterroso, alka harris yakelinpamela laNicoleaamukund ah. So [facility] [phone].    ATENCIÓN: Si courtney clark, tiene a rodriguez disposición servicios gratuitos de asistencia lingüística. Lluvia al [phone].    We comply with applicable federal civil rights laws and [place] laws. We do not discriminate on the basis of race, color, national origin, age, disability, sex, sexual orientation, or gender identity.            Thank you!     [facility]  [facility] your [facility]. Our goal is always to provide you with excellent care. Hearing back from our patients is one way we can continue to improve our services. Please take a few minutes to complete the written survey that you may receive in the mail after your visit with us. Thank you!             Your Updated Medication List - Protect others around you: Learn how to safely use, store and throw away your medicines at www.disposemymeds.org.          This list is accurate as of 6/7/18 10:01 AM.  Always use your most recent med list.                   Brand Name Dispense Instructions for use Diagnosis    amitriptyline 25 MG tablet    ELAVIL    30 tablet    TAKE 1 TABLET BY MOUTH AT BEDTIME    Persistent insomnia       * amphetamine-dextroamphetamine 30 MG per 24 hr capsule    ADDERALL XR    60 capsule    Take 1 capsule (30 mg) by mouth 2 times daily    ADHD (attention deficit hyperactivity disorder), inattentive type       * amphetamine-dextroamphetamine 30 MG per 24 hr capsule    ADDERALL XR    60 capsule    Take 1 capsule (30 mg) by mouth 2 times daily    Attention deficit hyperactivity disorder (ADHD), predominantly hyperactive type       aspirin 81 MG tablet     30 tablet    Take 1 tablet (81 mg) by mouth daily    Russell's esophagus with dysplasia       budesonide-formoterol 80-4.5 MCG/ACT Inhaler    SYMBICORT    3 Inhaler    Inhale 2 puffs into the lungs 2 times daily    Simple chronic bronchitis (H)       buPROPion  100 MG tablet    WELLBUTRIN    120 tablet    Take 2 tablets by mouth twice a day.    Recurrent major depressive disorder, in partial remission (H)       gabapentin 300 MG capsule    NEURONTIN    180 capsule    TAKE 2 CAPSULES BY MOUTH THREE TIMES DAILY    Cervical radiculopathy       hydrochlorothiazide 12.5 MG capsule    MICROZIDE    90 capsule    Take 1 capsule (12.5 mg) by mouth every morning    Benign essential hypertension       * lithium 450 MG CR tablet    ESKALITH    90 tablet    Take two tablets by mouth at 4 pm    Mood disorder (H)       * lithium 600 MG capsule     30 capsule    Take 1 capsule (600 mg) by mouth every evening    Mood disorder (H)       omeprazole 40 MG capsule    priLOSEC    90 capsule    Take 1 capsule (40 mg) by mouth daily Take 30-60 minutes before a meal.    Acute superficial gastritis without hemorrhage       verapamil 240 MG Cp24 24 hr capsule    VERELAN    90 capsule    Take 1 capsule (240 mg) by mouth At Bedtime    Essential hypertension       * Notice:  This list has 4 medication(s) that are the same as other medications prescribed for you. Read the directions carefully, and ask your doctor or other care provider to review them with you.

## 2018-06-07 NOTE — PROGRESS NOTES
SUBJECTIVE:   Andrei Whitman is a 47 year old male who presents to clinic today for the following health issues:      Hypertension Follow-up      Outpatient blood pressures are not being checked.    Low Salt Diet: low salt      BP Readings from Last 6 Encounters:   06/07/18 142/90   05/24/18 (!) 128/92   04/24/18 130/80   03/12/18 140/78   01/26/18 138/68   01/10/18 (!) 140/98           COPD Follow-Up    Symptoms are currently: stable    Current fatigue or dyspnea with ambulation: none    Shortness of breath: stable    Increased or change in Cough/Sputum: Yes-  both  White Sputum throughout the day.      Fever(s): No    Baseline ambulation without stopping to rest:  1 blocks. Able to walk up 1 flights of stairs without stopping to rest.    Any ER/UC or hospital admissions since your last visit? No     History   Smoking Status     Current Every Day Smoker     Packs/day: 1.00     Years: 20.00   Smokeless Tobacco     Never Used     No results found for: FEV1, [id number]    Amount of exercise or physical activity: 6-7 days/week for an average of less than 15 minutes    Problems taking medications regularly: No    Medication side effects: none    Diet: low salt        -------------------------------------    Problem list and histories reviewed & adjusted, as indicated.  Additional history: as documented    Patient Active Problem List   Diagnosis     Cervicalgia     Lower back pain     Segmental and somatic dysfunction of lower extremity     GERD (gastroesophageal reflux disease)     Mood disorder (H)     Abnormal weight gain     Attention deficit hyperactivity disorder (ADHD), predominantly inattentive type     ACP (advance care planning)     Flatulence, eructation, and gas pain     Bipolar disease, chronic (H)     Routine general medical examination at a health care facility     Benign essential hypertension     Simple chronic bronchitis (H)     Past Surgical History:   Procedure Laterality Date     APPENDECTOMY       age 14     COLONOSCOPY  07/12/2017    Left descending x1 tubular adenoma, sigmoid x1 tubular adenoma and rectal x1 hyperplastic polyp.  Pan diverticulosis     ENDOSCOPY UPPER, COLONOSCOPY, COMBINED N/A 7/12/2017    Procedure: COMBINED ENDOSCOPY UPPER, COLONOSCOPY;  UPPER ENDOSCOPY WITH BIOPSIES  AND COLONOSCOPY WITH POLYPECTOMY;  Surgeon: Francis Valverde DO;  Location: [facility] OR     ENT SURGERY  age 18    tonsils     ESOPHAGOGASTRODUODENOSCOPY  07/12/2017    chemical gatropathy, GE junction with pancreatiuc metaplasia      ORTHOPEDIC SURGERY  age 30     back and neck fusion, bone from hip removed to use on plates     ORTHOPEDIC SURGERY  age 30    L5 fusion, laminectomy of lumbar discs       Social History   Substance Use Topics     Smoking status: Current Every Day Smoker     Packs/day: 1.00     Years: 30.00     Smokeless tobacco: Never Used     Alcohol use No     Family History   Problem Relation Age of Onset     Asthma Mother      Arthritis Mother      Prostate Cancer Father      HEART DISEASE Paternal Grandfather      Hypertension Paternal Grandfather      Alcohol/Drug Paternal Grandfather      Other - See Comments Brother      Nerve and degenerative disease mild     Alcohol/Drug Brother      Other - See Comments Sister 21     Hip replacements, nerve, degerative disease     Alcohol/Drug Maternal Grandfather      Unknown/Adopted Paternal Grandmother      Other Cancer Paternal Aunt      Cervical cancer           Reviewed and updated as needed this visit by clinical staff       Reviewed and updated as needed this visit by Provider         ROS:  CONSTITUTIONAL: NEGATIVE for fever, chills, change in weight  EYES: NEGATIVE for vision changes or irritation  ENT/MOUTH: POSITIVE for nasal congestion and sore throat and NEGATIVE for ear pain   RESP: NEGATIVE for significant cough or SOB  RESP  See HPI  CV: POSITIVE for lower extremity edema and NEGATIVE for chest pain/chest pressure and palpitations  GI: NEGATIVE for  nausea, abdominal pain, heartburn, or change in bowel habits  : NEGATIVE for frequency, dysuria, or hematuria  MUSCULOSKELETAL:POSITIVE  for back pain  and neck pain and NEGATIVE for muscle spasm and muscle weakness   NEURO: NEGATIVE for weakness, dizziness or paresthesias  ENDOCRINE: NEGATIVE for temperature intolerance, skin/hair changes  PSYCHIATRIC: NEGATIVE for changes in mood or affect    OBJECTIVE:     /90 (BP Location: Left arm, Patient Position: Sitting, Cuff Size: Adult Regular)  Pulse 104  Temp 99.2  F (37.3  C) (Tympanic)  Resp 22  Wt 234 lb (106.1 kg)  SpO2 96%  BMI 33.58 kg/m2  Body mass index is 33.58 kg/(m^2).  GENERAL: healthy, alert and no distress  NECK: no adenopathy, no asymmetry, masses, or scars and thyroid normal to palpation  RESP: lungs clear to auscultation - no rales, rhonchi or wheezes  CV: regular rate and rhythm, normal S1 S2, no S3 or S4, no murmur, click or rub, no peripheral edema and peripheral pulses strong  ABDOMEN: soft, nontender, no hepatosplenomegaly, no masses and bowel sounds normal  ABDOMEN: no bruits heard  MS: no gross musculoskeletal defects noted, no edema  PSYCH: mentation appears normal, affect normal/bright    Diagnostic Test Results:  Results for orders placed or performed in visit on 06/07/18 (from the past 24 hour(s))   Comprehensive metabolic panel   Result Value Ref Range    Sodium 138 133 - 144 mmol/L    Potassium 4.0 3.4 - 5.3 mmol/L    Chloride 106 94 - 109 mmol/L    Carbon Dioxide 26 20 - 32 mmol/L    Anion Gap 6 3 - 14 mmol/L    Glucose 98 70 - 99 mg/dL    Urea Nitrogen 9 7 - 30 mg/dL    Creatinine 0.84 0.66 - 1.25 mg/dL    GFR Estimate >90 >60 mL/min/1.7m2    GFR Estimate If Black >90 >60 mL/min/1.7m2    Calcium 9.2 8.5 - 10.1 mg/dL    Bilirubin Total 0.4 0.2 - 1.3 mg/dL    Albumin 3.5 3.4 - 5.0 g/dL    Protein Total 8.4 6.8 - 8.8 g/dL    Alkaline Phosphatase 119 40 - 150 U/L    ALT 22 0 - 70 U/L    AST 20 0 - 45 U/L       ASSESSMENT/PLAN:  "  (I10) Benign essential hypertension  (primary encounter diagnosis)  Comment: Improved, but not at goal.  His renal function is normal.  Plan:  Add hydrochlorothiazide (MICROZIDE) 12.5 MG capsule daily and continue Verapamil 240 mg daily.    (J41.0) Simple chronic bronchitis (H)  Comment: Stable.  Plan:   He will continue Symbicort 2 puffs BID.  He may need an increase in dosing.    (K22.70) Russell's esophagus without dysplasia  Comment: Patient requires a annual follow up EGD.  Plan:  GENERAL SURG ADULT REFERRAL.  He will continue omeprazole 40 mg daily.    (F17.200) Tobacco dependency  Comment: He continues to use tobacco.  He does understand that if he has spine surgery he will need to quit smoking to allow healing to take place.  Plan:   Andrei will try to quit \" cold turkey\".                  FUTURE APPOINTMENTS:       - Follow-up visit in 2 months for HTN    Tommy Lamb DO, DO  [facility] [place]    "

## 2018-06-07 NOTE — NURSING NOTE
"Chief Complaint   Patient presents with     COPD     Hypertension       Initial /90 (BP Location: Left arm, Patient Position: Sitting, Cuff Size: Adult Regular)  Pulse 104  Temp 99.2  F (37.3  C) (Tympanic)  Resp 22  Wt 234 lb (106.1 kg)  SpO2 96%  BMI 33.58 kg/m2 Estimated body mass index is 33.58 kg/(m^2) as calculated from the following:    Height as of 5/24/18: 5' 10\" (1.778 m).    Weight as of this encounter: 234 lb (106.1 kg).  Medication Reconciliation: complete    Moraima Lechuga LPN    "

## 2018-06-08 ASSESSMENT — PATIENT HEALTH QUESTIONNAIRE - PHQ9: SUM OF ALL RESPONSES TO PHQ QUESTIONS 1-9: 13

## 2018-06-08 ASSESSMENT — ANXIETY QUESTIONNAIRES: GAD7 TOTAL SCORE: 11

## 2018-06-09 PROBLEM — F17.200 TOBACCO DEPENDENCY: Status: ACTIVE | Noted: 2018-06-09

## 2018-06-10 DIAGNOSIS — F39 MOOD DISORDER (H): ICD-10-CM

## 2018-06-11 RX ORDER — LITHIUM CARBONATE 600 MG/1
CAPSULE ORAL
Qty: 30 CAPSULE | Refills: 0 | Status: SHIPPED | OUTPATIENT
Start: 2018-06-11 | End: 2018-06-30

## 2018-06-14 DIAGNOSIS — F90.0 ADHD (ATTENTION DEFICIT HYPERACTIVITY DISORDER), INATTENTIVE TYPE: ICD-10-CM

## 2018-06-14 RX ORDER — DEXTROAMPHETAMINE SACCHARATE, AMPHETAMINE ASPARTATE MONOHYDRATE, DEXTROAMPHETAMINE SULFATE AND AMPHETAMINE SULFATE 7.5; 7.5; 7.5; 7.5 MG/1; MG/1; MG/1; MG/1
CAPSULE, EXTENDED RELEASE ORAL
Qty: 60 CAPSULE | Refills: 0 | OUTPATIENT
Start: 2018-06-14

## 2018-06-14 NOTE — TELEPHONE ENCOUNTER
adderall      Last Written Prescription Date:  5/23/18  Last Fill Quantity: 60,   # refills: 0  Last Office Visit: 6/7/18  Future Office visit:    Next 5 appointments (look out 90 days)     Aug 06, 2018 10:40 AM CDT   (Arrive by 10:20 AM)   SHORT with Tommy Lamb DO   [facility] [place] ([facility] [place] )    [address] [place] [address]   [phone]                   Routing refill request to provider for review/approval because:  Drug not on the [facility], [facility] or  Health refill protocol or controlled substance

## 2018-06-18 ENCOUNTER — OFFICE VISIT (OUTPATIENT)
Dept: SURGERY | Facility: OTHER | Age: 48
End: 2018-06-18
Attending: INTERNAL MEDICINE
Payer: MEDICARE

## 2018-06-18 VITALS
DIASTOLIC BLOOD PRESSURE: 80 MMHG | TEMPERATURE: 98.6 F | HEIGHT: 70 IN | BODY MASS INDEX: 32.5 KG/M2 | HEART RATE: 90 BPM | OXYGEN SATURATION: 99 % | WEIGHT: 227 LBS | SYSTOLIC BLOOD PRESSURE: 124 MMHG

## 2018-06-18 DIAGNOSIS — F17.200 TOBACCO DEPENDENCE: ICD-10-CM

## 2018-06-18 DIAGNOSIS — K22.70 BARRETT'S ESOPHAGUS WITHOUT DYSPLASIA: ICD-10-CM

## 2018-06-18 DIAGNOSIS — E66.09 CLASS 1 OBESITY DUE TO EXCESS CALORIES WITHOUT SERIOUS COMORBIDITY WITH BODY MASS INDEX (BMI) OF 32.0 TO 32.9 IN ADULT: ICD-10-CM

## 2018-06-18 DIAGNOSIS — Z01.818 ENCOUNTER FOR PREOPERATIVE EXAMINATION FOR GENERAL SURGICAL PROCEDURE: ICD-10-CM

## 2018-06-18 DIAGNOSIS — Z71.6 ENCOUNTER FOR TOBACCO USE CESSATION COUNSELING: ICD-10-CM

## 2018-06-18 DIAGNOSIS — K21.9 GASTROESOPHAGEAL REFLUX DISEASE WITHOUT ESOPHAGITIS: Primary | ICD-10-CM

## 2018-06-18 DIAGNOSIS — E66.811 CLASS 1 OBESITY DUE TO EXCESS CALORIES WITHOUT SERIOUS COMORBIDITY WITH BODY MASS INDEX (BMI) OF 32.0 TO 32.9 IN ADULT: ICD-10-CM

## 2018-06-18 PROCEDURE — G0463 HOSPITAL OUTPT CLINIC VISIT: HCPCS

## 2018-06-18 PROCEDURE — 99214 OFFICE O/P EST MOD 30 MIN: CPT | Performed by: SURGERY

## 2018-06-18 PROCEDURE — 93010 ELECTROCARDIOGRAM REPORT: CPT | Performed by: INTERNAL MEDICINE

## 2018-06-18 PROCEDURE — 93005 ELECTROCARDIOGRAM TRACING: CPT | Performed by: INTERNAL MEDICINE

## 2018-06-18 ASSESSMENT — PAIN SCALES - GENERAL
PAINLEVEL: EXTREME PAIN (8)
PAINLEVEL: EXTREME PAIN (8)

## 2018-06-18 NOTE — MR AVS SNAPSHOT
After Visit Summary   6/18/2018    Andrei Whitman    MRN: [id number]           Patient Information     Date Of Birth          1970        Visit Information        Provider Department      6/18/2018 11:30 AM Farncis Valverde, DO [facility]        Today's Diagnoses     Gastroesophageal reflux disease without esophagitis    -  1    Russell's esophagus without dysplasia        Class 1 obesity due to excess calories without serious comorbidity with body mass index (BMI) of 32.0 to 32.9 in adult        Tobacco dependence        Encounter for tobacco use cessation counseling          Care Instructions    Thank you for allowing Dr. Valverde and our surgical team to participate in your care.  If you have a scheduling or an appointment question please contact [facility] Coordinator at her direct line [phone].   ALL nursing questions or concerns can be directed to Nataly at: [phone]       You are scheduled for a: egd  Your procedure date is: 6/20/18      You need a friend or family member available to drive you home AND stay with you for 24 hours after you leave the hospital. You will not be allowed to drive yourself. IF you need to take a taxi or the bus you MUST have a responsible person to ride with you. YOUR PROCEDURE WILL BE CANCELLED IF YOU DO NOT HAVE A RESPONSIBLE ADULT TO DRIVE YOU HOME.       You CANNOT have anything to eat or drink after midnight the night before your surgery, ncluding water and coffee. Your stomach needs to be completely empty. Do NOT chew gum, suck on hard candy, or smoke. You can brush your teeth the morning of surgery.       You need to call our Surgery Education Nurses 1-2 weeks prior to your surgery date at  [phone] or toll free [phone]. Please have you medication and allergy lists ready.      Stop your aspirin or other NSAIDs(Ibuprofen, Motrin, Aleve, Celebrex, Naproxen, etc...) 7 days before your surgery.      Hospital  admitting will call you the day before your surgery with your arrival time. If you are scheduled on a Monday admitting will call you the Friday before.      Please call your primary care physician if you should become ill within 24 hours of scheduled surgery. (ex.vomiting, diarrhea, fever)  Take your elavil morning of surgery, hold all other medications until you arrive home.           Follow-ups after your visit        Your next 10 appointments already scheduled     Jun 18, 2018 11:30 AM CDT   (Arrive by 11:15 AM)   New Visit with Francis Valverde, DO   [facility] [place] ([facility] [place] )    [address]  [place] [address]   [phone]            Aug 06, 2018 10:40 AM CDT   (Arrive by 10:20 AM)   SHORT with Tommy Lamb, DO   [facility] [place] ([facility] [place] )    [address]  [place] [address]   [phone]              Who to contact     If you have questions or need follow up information about today's clinic visit or your schedule please contact [facility] directly at [phone].  Normal or non-critical lab and imaging results will be communicated to you by MyChart, letter or phone within 4 business days after the clinic has received the results. If you do not hear from us within 7 days, please contact the clinic through MyChart or phone. If you have a critical or abnormal lab result, we will notify you by phone as soon as possible.  Submit refill requests through [employer] or call your pharmacy and they will forward the refill request to us. Please allow 3 business days for your refill to be completed.          Additional Information About Your Visit        Care EveryWhere ID     This is your Care EveryWhere ID. This could be used by other organizations to access your [facility] medical records  [id number]        Your Vitals Were     Pulse Temperature Height Pulse Oximetry BMI (Body Mass Index)       90 98.6  F (37  C) 5'  "10\" (1.778 m) 99% 32.57 kg/m2        Blood Pressure from Last 3 Encounters:   06/18/18 124/80   06/07/18 144/78   05/24/18 (!) 128/92    Weight from Last 3 Encounters:   06/18/18 227 lb (103 kg)   06/07/18 234 lb (106.1 kg)   05/24/18 233 lb (105.7 kg)              Today, you had the following     No orders found for display       Primary Care Provider Office Phone # Fax #    Tommydarby Lamb,  [phone] [address] [address]        Equal Access to Services     SENG CEVALLOS : Hadii misa Hill, waaxda luqadaha, qaybta kaalmada loc, alka smith. [facility] [phone].    ATENCIÓN: Si habla español, tiene a rodriguez disposición servicios gratuitos de asistencia lingüística. Llame al [phone].    We comply with applicable federal civil rights laws and [place] laws. We do not discriminate on the basis of race, color, national origin, age, disability, sex, sexual orientation, or gender identity.            Thank you!     Thank you for choosing [facility]  for your care. Our goal is always to provide you with excellent care. Hearing back from our patients is one way we can continue to improve our services. Please take a few minutes to complete the written survey that you may receive in the mail after your visit with us. Thank you!             Your Updated Medication List - Protect others around you: Learn how to safely use, store and throw away your medicines at www.disposemymeds.org.          This list is accurate as of 6/18/18 11:26 AM.  Always use your most recent med list.                   Brand Name Dispense Instructions for use Diagnosis    amitriptyline 25 MG tablet    ELAVIL    30 tablet    TAKE 1 TABLET BY MOUTH AT BEDTIME    Persistent insomnia       amphetamine-dextroamphetamine 30 MG per 24 hr capsule    ADDERALL XR    60 capsule    Take 1 capsule (30 mg) by mouth 2 times daily    ADHD (attention " deficit hyperactivity disorder), inattentive type       aspirin 81 MG tablet     30 tablet    Take 1 tablet (81 mg) by mouth daily    Russell's esophagus with dysplasia       budesonide-formoterol 80-4.5 MCG/ACT Inhaler    SYMBICORT    3 Inhaler    Inhale 2 puffs into the lungs 2 times daily    Simple chronic bronchitis (H)       buPROPion 100 MG tablet    WELLBUTRIN    120 tablet    Take 2 tablets by mouth twice a day.    Recurrent major depressive disorder, in partial remission (H)       gabapentin 300 MG capsule    NEURONTIN    180 capsule    TAKE 2 CAPSULES BY MOUTH THREE TIMES DAILY    Cervical radiculopathy       hydrochlorothiazide 12.5 MG capsule    MICROZIDE    90 capsule    Take 1 capsule (12.5 mg) by mouth every morning    Benign essential hypertension       * lithium 450 MG CR tablet    ESKALITH    90 tablet    Take two tablets by mouth at 4 pm    Mood disorder (H)       * lithium 600 MG capsule     30 capsule    TAKE 1 CAPSULE BY MOUTH EVERY EVENING    Mood disorder (H)       omeprazole 40 MG capsule    priLOSEC    90 capsule    Take 1 capsule (40 mg) by mouth daily Take 30-60 minutes before a meal.    Acute superficial gastritis without hemorrhage       verapamil 240 MG Cp24 24 hr capsule    VERELAN    90 capsule    Take 1 capsule (240 mg) by mouth At Bedtime    Essential hypertension       * Notice:  This list has 2 medication(s) that are the same as other medications prescribed for you. Read the directions carefully, and ask your doctor or other care provider to review them with you.

## 2018-06-18 NOTE — PATIENT INSTRUCTIONS
Thank you for allowing Dr. Valverde and our surgical team to participate in your care.  If you have a scheduling or an appointment question please contact [facility] Coordinator at her direct line [phone].   ALL nursing questions or concerns can be directed to Nataly at: [phone]       You are scheduled for a: egd  Your procedure date is: 6/20/18      You need a friend or family member available to drive you home AND stay with you for 24 hours after you leave the hospital. You will not be allowed to drive yourself. IF you need to take a taxi or the bus you MUST have a responsible person to ride with you. YOUR PROCEDURE WILL BE CANCELLED IF YOU DO NOT HAVE A RESPONSIBLE ADULT TO DRIVE YOU HOME.       You CANNOT have anything to eat or drink after midnight the night before your surgery, ncluding water and coffee. Your stomach needs to be completely empty. Do NOT chew gum, suck on hard candy, or smoke. You can brush your teeth the morning of surgery.       You need to call our Surgery Education Nurses 1-2 weeks prior to your surgery date at  [phone] or toll free [phone]. Please have you medication and allergy lists ready.      Stop your aspirin or other NSAIDs(Ibuprofen, Motrin, Aleve, Celebrex, Naproxen, etc...) 7 days before your surgery.      Hospital admitting will call you the day before your surgery with your arrival time. If you are scheduled on a Monday admitting will call you the Friday before.      Please call your primary care physician if you should become ill within 24 hours of scheduled surgery. (ex.vomiting, diarrhea, fever)  Take your elavil morning of surgery, hold all other medications until you arrive home.

## 2018-06-18 NOTE — PROGRESS NOTES
Surgery Consult Clinic Note      RE: Andrei Whitman  : 1970    Chief Complaint:  surveillance EGD    History of Present Illness:  Mr. Whitman is known to me for having done an EGD/colon last year for diarrhea and GERD symptoms.  The EGD shoed gastritis, pancreatic metaplasia and changes associated with reflux. The colonoscopy was positive for 2 small tubular adenomas and diverticulosis.  He takes 40mg of omeprazole in the morning and eats 1 hour later.  GERD symptoms have greatly improved. Only gets occasional heart burn when laying down.  He specifically denies fever, chills, nausea, vomiting, chest pain, shortness of breath or palpitations.      Medical history:  Past Medical History:   Diagnosis Date     Russell's esophagus 2017    repeat EGD in one year.     Bipolar disorder (H)      Chronic cervical pain      Colon polyp, hyperplastic 2017    rectal     Colon polyps 2017    Descending colon x1 and sigmoid colon x1     DDD (degenerative disc disease), cervical      Depression, major      Pancolonic diverticulosis 2017       Surgical history:  Past Surgical History:   Procedure Laterality Date     APPENDECTOMY      age 14     COLONOSCOPY  2017    Left descending x1 tubular adenoma, sigmoid x1 tubular adenoma and rectal x1 hyperplastic polyp.  Pan diverticulosis     ENDOSCOPY UPPER, COLONOSCOPY, COMBINED N/A 2017    Procedure: COMBINED ENDOSCOPY UPPER, COLONOSCOPY;  UPPER ENDOSCOPY WITH BIOPSIES  AND COLONOSCOPY WITH POLYPECTOMY;  Surgeon: Francis Valverde DO;  Location: [facility]     ENT SURGERY  age 18    tonsils     ESOPHAGOGASTRODUODENOSCOPY  2017    chemical gatropathy, GE junction with pancreatiuc metaplasia      ORTHOPEDIC SURGERY  age 30     back and neck fusion, bone from hip removed to use on plates     ORTHOPEDIC SURGERY  age 30    L5 fusion, laminectomy of lumbar discs       Family history:  Family History   Problem Relation Age of Onset     Asthma  Mother      Arthritis Mother      Prostate Cancer Father      HEART DISEASE Paternal Grandfather      Hypertension Paternal Grandfather      Alcohol/Drug Paternal Grandfather      Other - See Comments Brother      Nerve and degenerative disease mild     Alcohol/Drug Brother      Other - See Comments Sister 21     Hip replacements, nerve, degerative disease     Alcohol/Drug Maternal Grandfather      Unknown/Adopted Paternal Grandmother      Other Cancer Paternal Aunt      Cervical cancer       Medications:  Prior to Admission medications    Medication Sig Start Date End Date Taking? Authorizing Provider   amitriptyline (ELAVIL) 25 MG tablet TAKE 1 TABLET BY MOUTH AT BEDTIME 6/7/18  Yes Tommy Lamb DO   amphetamine-dextroamphetamine (ADDERALL XR) 30 MG per 24 hr capsule Take 1 capsule (30 mg) by mouth 2 times daily 5/18/18  Yes Rip Xavier NP   aspirin 81 MG tablet Take 1 tablet (81 mg) by mouth daily 7/13/17  Yes Tommy Lamb DO   budesonide-formoterol (SYMBICORT) 80-4.5 MCG/ACT Inhaler Inhale 2 puffs into the lungs 2 times daily 4/24/18  Yes Tommy Lamb DO   buPROPion (WELLBUTRIN) 100 MG tablet Take 2 tablets by mouth twice a day. 5/14/18  Yes Tommy Lamb DO   gabapentin (NEURONTIN) 300 MG capsule TAKE 2 CAPSULES BY MOUTH THREE TIMES DAILY 6/14/18  Yes Tommy Lamb DO   hydrochlorothiazide (MICROZIDE) 12.5 MG capsule Take 1 capsule (12.5 mg) by mouth every morning 6/7/18  Yes Tommy Lamb DO   lithium (ESKALITH) 450 MG CR tablet Take two tablets by mouth at 4 pm 4/24/18  Yes Tommy Lamb DO   lithium 600 MG capsule TAKE 1 CAPSULE BY MOUTH EVERY EVENING 6/11/18  Yes KM Vila MD   omeprazole (PRILOSEC) 40 MG capsule Take 1 capsule (40 mg) by mouth daily Take 30-60 minutes before a meal. 7/12/17  Yes Francis Valverde DO   verapamil (VERELAN) 240 MG CP24 24 hr capsule Take 1 capsule (240 mg) by mouth At Bedtime 3/12/18   "Yes Tommy Lamb, DO       Allergies:  The patienthas No Known Allergies.  .  Social history:  Social History   Substance Use Topics     Smoking status: Current Every Day Smoker     Packs/day: 1.00     Years: 30.00     Smokeless tobacco: Never Used      Comment: quitplan referral declined 6/18/18     Alcohol use No     Marital status: .    Review of Systems:    Constitutional: Negative for fever, chills and weight loss.   HENT: Negative for ear pain, nosebleeds, congestion, sore throat, tinnitus and ear discharge.    Eyes: Negative for blurred vision, double vision, photophobia and pain.   Respiratory: Negative for cough, hemoptysis, shortness of breath, wheezing and stridor.    Cardiovascular: Negative for chest pain, palpitations and orthopnea.   Gastrointestinal: Per HPI  Genitourinary: Negative for urgency, frequency and hematuria.   Musculoskeletal: Negative for myalgias, back pain and joint pain.   Neurological: Negative for tingling, speech change and headaches.   Endo/Heme/Allergies: Does not bruise/bleed easily.   Psychiatric/Behavioral: Negative for depression, suicidal ideas and hallucinations. The patient is not nervous/anxious.    Physical Examination:  /80  Pulse 90  Temp 98.6  F (37  C)  Ht 5' 10\" (1.778 m)  Wt 227 lb (103 kg)  SpO2 99%  BMI 32.57 kg/m2  General: AAOx4, NAD, WN/WD, ambulating without assistance  HEENT:NCAT, EOMI, PERRL Sclerae anicteric; Trachea mideline, no JVD  Chest:   Clear to auscultation bilaterally.  Cardiac: S1S2 , regular rate and rhythm without additional sounds  Abdomen: Obese, soft, ND/NT no rebound no guarding  Extremities: Cursory exam unremarkable.  Skin: Warm, dry, < 2 sec cap refill  Neuro: CN 2-12 grossly intact, no focal deficit, GCS 15  Psych: happy, calm, asks appropriate questions    # Pain Assessment:  Current Pain Score 7/12/2017   Patient currently in pain? denies   Pain score (0-10) -   Pain location -   Pain descriptors - "   Andrei buckner pain level was assessed and he currently denies pain.        Assessment/Plan:  #1 GERD  #2 Obesity  #3 tobacco dependence      The indications, risks, benefits and technical aspects of esophagogastroduodenoscopy were reviewed with her questions asked and answered.  Antral biopsy for histologic examination will be performed and the place of H. pylori in gastritis was discussed.  Preoperative preparation, npo after midnight preceding the date was discussed and a request made to hold aspirin containing agents one week prior to ameliorate antiplatelet effect.  Questions asked and answered - will proceed based on scheduling availability.          Dr Valverde  [facility]  [address], [address]  [place] [id number]    Referring Provider:  Tommy Lamb DO  [address] [place] [id number]     Primary Care Provider:  Tommy Lamb

## 2018-06-18 NOTE — NURSING NOTE
"Chief Complaint   Patient presents with     Consult For     Russell's esophagus. Referred by Dr. Lamb.       Initial /80  Pulse 90  Temp 98.6  F (37  C)  Ht 5' 10\" (1.778 m)  Wt 227 lb (103 kg)  SpO2 99%  BMI 32.57 kg/m2 Estimated body mass index is 32.57 kg/(m^2) as calculated from the following:    Height as of this encounter: 5' 10\" (1.778 m).    Weight as of this encounter: 227 lb (103 kg).  Medication Reconciliation: complete    STAR COTE LPN    "

## 2018-06-19 NOTE — H&P (VIEW-ONLY)
Surgery Consult Clinic Note      RE: Andrei Whitman  : 1970    Chief Complaint:  surveillance EGD    History of Present Illness:  Mr. Whitman is known to me for having done an EGD/colon last year for diarrhea and GERD symptoms.  The EGD shoed gastritis, pancreatic metaplasia and changes associated with reflux. The colonoscopy was positive for 2 small tubular adenomas and diverticulosis.  He takes 40mg of omeprazole in the morning and eats 1 hour later.  GERD symptoms have greatly improved. Only gets occasional heart burn when laying down.  He specifically denies fever, chills, nausea, vomiting, chest pain, shortness of breath or palpitations.      Medical history:  Past Medical History:   Diagnosis Date     Russell's esophagus 2017    repeat EGD in one year.     Bipolar disorder (H)      Chronic cervical pain      Colon polyp, hyperplastic 2017    rectal     Colon polyps 2017    Descending colon x1 and sigmoid colon x1     DDD (degenerative disc disease), cervical      Depression, major      Pancolonic diverticulosis 2017       Surgical history:  Past Surgical History:   Procedure Laterality Date     APPENDECTOMY      age 14     COLONOSCOPY  2017    Left descending x1 tubular adenoma, sigmoid x1 tubular adenoma and rectal x1 hyperplastic polyp.  Pan diverticulosis     ENDOSCOPY UPPER, COLONOSCOPY, COMBINED N/A 2017    Procedure: COMBINED ENDOSCOPY UPPER, COLONOSCOPY;  UPPER ENDOSCOPY WITH BIOPSIES  AND COLONOSCOPY WITH POLYPECTOMY;  Surgeon: Francis Valverde DO;  Location: [facility]     ENT SURGERY  age 18    tonsils     ESOPHAGOGASTRODUODENOSCOPY  2017    chemical gatropathy, GE junction with pancreatiuc metaplasia      ORTHOPEDIC SURGERY  age 30     back and neck fusion, bone from hip removed to use on plates     ORTHOPEDIC SURGERY  age 30    L5 fusion, laminectomy of lumbar discs       Family history:  Family History   Problem Relation Age of Onset     Asthma  Mother      Arthritis Mother      Prostate Cancer Father      HEART DISEASE Paternal Grandfather      Hypertension Paternal Grandfather      Alcohol/Drug Paternal Grandfather      Other - See Comments Brother      Nerve and degenerative disease mild     Alcohol/Drug Brother      Other - See Comments Sister 21     Hip replacements, nerve, degerative disease     Alcohol/Drug Maternal Grandfather      Unknown/Adopted Paternal Grandmother      Other Cancer Paternal Aunt      Cervical cancer       Medications:  Prior to Admission medications    Medication Sig Start Date End Date Taking? Authorizing Provider   amitriptyline (ELAVIL) 25 MG tablet TAKE 1 TABLET BY MOUTH AT BEDTIME 6/7/18  Yes Tommy Lamb DO   amphetamine-dextroamphetamine (ADDERALL XR) 30 MG per 24 hr capsule Take 1 capsule (30 mg) by mouth 2 times daily 5/18/18  Yes Rip Xavier NP   aspirin 81 MG tablet Take 1 tablet (81 mg) by mouth daily 7/13/17  Yes Tommy Lamb DO   budesonide-formoterol (SYMBICORT) 80-4.5 MCG/ACT Inhaler Inhale 2 puffs into the lungs 2 times daily 4/24/18  Yes Tommy Lamb DO   buPROPion (WELLBUTRIN) 100 MG tablet Take 2 tablets by mouth twice a day. 5/14/18  Yes Tommy Lamb DO   gabapentin (NEURONTIN) 300 MG capsule TAKE 2 CAPSULES BY MOUTH THREE TIMES DAILY 6/14/18  Yes Tommy Lamb DO   hydrochlorothiazide (MICROZIDE) 12.5 MG capsule Take 1 capsule (12.5 mg) by mouth every morning 6/7/18  Yes Tommy Lamb DO   lithium (ESKALITH) 450 MG CR tablet Take two tablets by mouth at 4 pm 4/24/18  Yes Tommy Lamb DO   lithium 600 MG capsule TAKE 1 CAPSULE BY MOUTH EVERY EVENING 6/11/18  Yes KM Vila MD   omeprazole (PRILOSEC) 40 MG capsule Take 1 capsule (40 mg) by mouth daily Take 30-60 minutes before a meal. 7/12/17  Yes Francis Valverde DO   verapamil (VERELAN) 240 MG CP24 24 hr capsule Take 1 capsule (240 mg) by mouth At Bedtime 3/12/18   "Yes Tommy Lamb, DO       Allergies:  The patienthas No Known Allergies.  .  Social history:  Social History   Substance Use Topics     Smoking status: Current Every Day Smoker     Packs/day: 1.00     Years: 30.00     Smokeless tobacco: Never Used      Comment: quitplan referral declined 6/18/18     Alcohol use No     Marital status: .    Review of Systems:    Constitutional: Negative for fever, chills and weight loss.   HENT: Negative for ear pain, nosebleeds, congestion, sore throat, tinnitus and ear discharge.    Eyes: Negative for blurred vision, double vision, photophobia and pain.   Respiratory: Negative for cough, hemoptysis, shortness of breath, wheezing and stridor.    Cardiovascular: Negative for chest pain, palpitations and orthopnea.   Gastrointestinal: Per HPI  Genitourinary: Negative for urgency, frequency and hematuria.   Musculoskeletal: Negative for myalgias, back pain and joint pain.   Neurological: Negative for tingling, speech change and headaches.   Endo/Heme/Allergies: Does not bruise/bleed easily.   Psychiatric/Behavioral: Negative for depression, suicidal ideas and hallucinations. The patient is not nervous/anxious.    Physical Examination:  /80  Pulse 90  Temp 98.6  F (37  C)  Ht 5' 10\" (1.778 m)  Wt 227 lb (103 kg)  SpO2 99%  BMI 32.57 kg/m2  General: AAOx4, NAD, WN/WD, ambulating without assistance  HEENT:NCAT, EOMI, PERRL Sclerae anicteric; Trachea mideline, no JVD  Chest:   Clear to auscultation bilaterally.  Cardiac: S1S2 , regular rate and rhythm without additional sounds  Abdomen: Obese, soft, ND/NT no rebound no guarding  Extremities: Cursory exam unremarkable.  Skin: Warm, dry, < 2 sec cap refill  Neuro: CN 2-12 grossly intact, no focal deficit, GCS 15  Psych: happy, calm, asks appropriate questions    # Pain Assessment:  Current Pain Score 7/12/2017   Patient currently in pain? denies   Pain score (0-10) -   Pain location -   Pain descriptors - "   Andrei buckner pain level was assessed and he currently denies pain.        Assessment/Plan:  #1 GERD  #2 Obesity  #3 tobacco dependence      The indications, risks, benefits and technical aspects of esophagogastroduodenoscopy were reviewed with her questions asked and answered.  Antral biopsy for histologic examination will be performed and the place of H. pylori in gastritis was discussed.  Preoperative preparation, npo after midnight preceding the date was discussed and a request made to hold aspirin containing agents one week prior to ameliorate antiplatelet effect.  Questions asked and answered - will proceed based on scheduling availability.          Dr Valverde  [facility]  [address], [address]  [place] [id number]    Referring Provider:  Tommy Lamb DO  [address] [place] [id number]     Primary Care Provider:  Tommy Lamb

## 2018-06-20 ENCOUNTER — ANESTHESIA (OUTPATIENT)
Dept: SURGERY | Facility: HOSPITAL | Age: 48
End: 2018-06-20
Payer: MEDICARE

## 2018-06-20 ENCOUNTER — ANESTHESIA EVENT (OUTPATIENT)
Dept: SURGERY | Facility: HOSPITAL | Age: 48
End: 2018-06-20
Payer: MEDICARE

## 2018-06-20 ENCOUNTER — SURGERY (OUTPATIENT)
Age: 48
End: 2018-06-20

## 2018-06-20 ENCOUNTER — HOSPITAL ENCOUNTER (OUTPATIENT)
Facility: HOSPITAL | Age: 48
Discharge: HOME | End: 2018-06-20
Attending: SURGERY | Admitting: SURGERY
Payer: MEDICARE

## 2018-06-20 VITALS
RESPIRATION RATE: 16 BRPM | OXYGEN SATURATION: 98 % | TEMPERATURE: 98 F | BODY MASS INDEX: 32 KG/M2 | DIASTOLIC BLOOD PRESSURE: 75 MMHG | SYSTOLIC BLOOD PRESSURE: 109 MMHG | WEIGHT: 223 LBS

## 2018-06-20 PROCEDURE — 36000050 ZZH SURGERY LEVEL 2 1ST 30 MIN: Performed by: SURGERY

## 2018-06-20 PROCEDURE — 25000125 ZZHC RX 250: Performed by: NURSE ANESTHETIST, CERTIFIED REGISTERED

## 2018-06-20 PROCEDURE — 25000128 H RX IP 250 OP 636: Performed by: NURSE ANESTHETIST, CERTIFIED REGISTERED

## 2018-06-20 PROCEDURE — A9270 NON-COVERED ITEM OR SERVICE: HCPCS | Mod: GY | Performed by: SURGERY

## 2018-06-20 PROCEDURE — 43239 EGD BIOPSY SINGLE/MULTIPLE: CPT | Performed by: NURSE ANESTHETIST, CERTIFIED REGISTERED

## 2018-06-20 PROCEDURE — 37000008 ZZH ANESTHESIA TECHNICAL FEE, 1ST 30 MIN: Performed by: SURGERY

## 2018-06-20 PROCEDURE — 71000027 ZZH RECOVERY PHASE 2 EACH 15 MINS: Performed by: SURGERY

## 2018-06-20 PROCEDURE — 40000306 ZZH STATISTIC PRE PROC ASSESS II: Performed by: SURGERY

## 2018-06-20 PROCEDURE — 43239 EGD BIOPSY SINGLE/MULTIPLE: CPT | Performed by: SURGERY

## 2018-06-20 PROCEDURE — 25000132 ZZH RX MED GY IP 250 OP 250 PS 637: Mod: GY | Performed by: SURGERY

## 2018-06-20 PROCEDURE — 88305 TISSUE EXAM BY PATHOLOGIST: CPT | Mod: TC | Performed by: SURGERY

## 2018-06-20 RX ORDER — SODIUM CHLORIDE, SODIUM LACTATE, POTASSIUM CHLORIDE, CALCIUM CHLORIDE 600; 310; 30; 20 MG/100ML; MG/100ML; MG/100ML; MG/100ML
INJECTION, SOLUTION INTRAVENOUS CONTINUOUS
Status: DISCONTINUED | OUTPATIENT
Start: 2018-06-20 | End: 2018-06-20 | Stop reason: HOSPADM

## 2018-06-20 RX ORDER — NALOXONE HYDROCHLORIDE 0.4 MG/ML
.1-.4 INJECTION, SOLUTION INTRAMUSCULAR; INTRAVENOUS; SUBCUTANEOUS
Status: DISCONTINUED | OUTPATIENT
Start: 2018-06-20 | End: 2018-06-20 | Stop reason: HOSPADM

## 2018-06-20 RX ORDER — ONDANSETRON 4 MG/1
4 TABLET, ORALLY DISINTEGRATING ORAL EVERY 30 MIN PRN
Status: DISCONTINUED | OUTPATIENT
Start: 2018-06-20 | End: 2018-06-20 | Stop reason: HOSPADM

## 2018-06-20 RX ORDER — ONDANSETRON 2 MG/ML
4 INJECTION INTRAMUSCULAR; INTRAVENOUS EVERY 6 HOURS PRN
Status: DISCONTINUED | OUTPATIENT
Start: 2018-06-20 | End: 2018-06-20 | Stop reason: HOSPADM

## 2018-06-20 RX ORDER — ONDANSETRON 2 MG/ML
4 INJECTION INTRAMUSCULAR; INTRAVENOUS EVERY 30 MIN PRN
Status: DISCONTINUED | OUTPATIENT
Start: 2018-06-20 | End: 2018-06-20 | Stop reason: HOSPADM

## 2018-06-20 RX ORDER — LIDOCAINE 40 MG/G
CREAM TOPICAL
Status: DISCONTINUED | OUTPATIENT
Start: 2018-06-20 | End: 2018-06-20 | Stop reason: HOSPADM

## 2018-06-20 RX ORDER — SIMETHICONE 40MG/0.6ML
SUSPENSION, DROPS(FINAL DOSAGE FORM)(ML) ORAL PRN
Status: DISCONTINUED | OUTPATIENT
Start: 2018-06-20 | End: 2018-06-20 | Stop reason: HOSPADM

## 2018-06-20 RX ORDER — PROPOFOL 10 MG/ML
INJECTION, EMULSION INTRAVENOUS PRN
Status: DISCONTINUED | OUTPATIENT
Start: 2018-06-20 | End: 2018-06-20

## 2018-06-20 RX ORDER — MEPERIDINE HYDROCHLORIDE 50 MG/ML
12.5 INJECTION INTRAMUSCULAR; INTRAVENOUS; SUBCUTANEOUS
Status: DISCONTINUED | OUTPATIENT
Start: 2018-06-20 | End: 2018-06-20 | Stop reason: HOSPADM

## 2018-06-20 RX ORDER — LIDOCAINE HYDROCHLORIDE 20 MG/ML
INJECTION, SOLUTION INFILTRATION; PERINEURAL PRN
Status: DISCONTINUED | OUTPATIENT
Start: 2018-06-20 | End: 2018-06-20

## 2018-06-20 RX ADMIN — ONDANSETRON 4 MG: 2 INJECTION INTRAMUSCULAR; INTRAVENOUS at 11:01

## 2018-06-20 RX ADMIN — PROPOFOL 30 MG: 10 INJECTION, EMULSION INTRAVENOUS at 12:16

## 2018-06-20 RX ADMIN — PROPOFOL 20 MG: 10 INJECTION, EMULSION INTRAVENOUS at 12:25

## 2018-06-20 RX ADMIN — SODIUM CHLORIDE, POTASSIUM CHLORIDE, SODIUM LACTATE AND CALCIUM CHLORIDE: 600; 310; 30; 20 INJECTION, SOLUTION INTRAVENOUS at 11:04

## 2018-06-20 RX ADMIN — PROPOFOL 20 MG: 10 INJECTION, EMULSION INTRAVENOUS at 12:19

## 2018-06-20 RX ADMIN — PROPOFOL 20 MG: 10 INJECTION, EMULSION INTRAVENOUS at 12:23

## 2018-06-20 RX ADMIN — PROPOFOL 20 MG: 10 INJECTION, EMULSION INTRAVENOUS at 12:13

## 2018-06-20 RX ADMIN — PROPOFOL 20 MG: 10 INJECTION, EMULSION INTRAVENOUS at 12:22

## 2018-06-20 RX ADMIN — PROPOFOL 20 MG: 10 INJECTION, EMULSION INTRAVENOUS at 12:18

## 2018-06-20 RX ADMIN — PROPOFOL 50 MG: 10 INJECTION, EMULSION INTRAVENOUS at 12:15

## 2018-06-20 RX ADMIN — LIDOCAINE HYDROCHLORIDE 40 MG: 20 INJECTION, SOLUTION INFILTRATION; PERINEURAL at 12:13

## 2018-06-20 RX ADMIN — Medication 133 MG: at 12:14

## 2018-06-20 RX ADMIN — PROPOFOL 20 MG: 10 INJECTION, EMULSION INTRAVENOUS at 12:17

## 2018-06-20 RX ADMIN — PROPOFOL 20 MG: 10 INJECTION, EMULSION INTRAVENOUS at 12:21

## 2018-06-20 RX ADMIN — PROPOFOL 50 MG: 10 INJECTION, EMULSION INTRAVENOUS at 12:14

## 2018-06-20 RX ADMIN — PROPOFOL 20 MG: 10 INJECTION, EMULSION INTRAVENOUS at 12:20

## 2018-06-20 RX ADMIN — PROPOFOL 20 MG: 10 INJECTION, EMULSION INTRAVENOUS at 12:24

## 2018-06-20 ASSESSMENT — LIFESTYLE VARIABLES: TOBACCO_USE: 1

## 2018-06-20 ASSESSMENT — COPD QUESTIONNAIRES
COPD: 1
CAT_SEVERITY: MILD

## 2018-06-20 NOTE — ANESTHESIA POSTPROCEDURE EVALUATION
Patient: Andrei Whitman    Procedure(s):  UPPER ENDOSCOPY WITH BIOPSY - Wound Class: II-Clean Contaminated    Diagnosis:BARRETTS ESOPHAGUS  Diagnosis Additional Information: No value filed.    Anesthesia Type:  MAC    Note:  Anesthesia Post Evaluation    Patient location during evaluation: Phase 2  Patient participation: Able to fully participate in evaluation  Level of consciousness: awake and alert  Pain management: adequate  Airway patency: patent  Cardiovascular status: acceptable  Respiratory status: acceptable  Hydration status: acceptable  PONV: none     Anesthetic complications: None          Last vitals:  Vitals:    06/20/18 1315 06/20/18 1320 06/20/18 1325   BP: 120/74 122/74 109/75   Resp:   16   Temp:      SpO2: 97% 97% 98%         Electronically Signed By: JOEY Mathews CRNA  June 20, 2018  2:43 PM

## 2018-06-20 NOTE — DISCHARGE INSTRUCTIONS
UPPER ENDOSCOPY    PURPOSE:  An Upper Endoscopy is a procedure in which the doctor passes a flexible, lighted tube called a gastroscope through your mouth into your stomach in order to:    See the lining of the esophagus, stomach, and duodenum (first part of the small intestine).    Look for bleeding, inflammation (swelling), abnormal tumors or tissue, or ulcers.    Take biopsy specimens.  (Biopsies do not hurt.)    TELL YOUR DOCTOR IF:     You have a heart condition, heart murmur, or have had heart valve surgery.    You have had angioplasty with stents put in within the last year.    You are taking blood thinners - Aspirin, Anti-inflammatory pain pills (like Motrin, ibuprofen, Naproxen, Feldene, Advil, etc.), Coumadin, Plavix.    You have diabetes - contact your regular doctor for management of your insulin.    YOU NEED TO:    Have someone drive you home.  You are not allowed to drive until the next day.  (If you take a taxi, the person staying with you after surgery must ride with you in the taxi.  The  is not responsible for you).    Have someone stay with you for four (4) hours after you leave the hospital.    Know the time to be at admitting:  A nurse will call you with the time the afternoon before your procedure.  If your procedure is on Monday, you will be called on Friday.  If you have not been contacted with the time, call the Admitting Department at [phone] or [phone], ext. [phone] after 5 pm (Admitting is open 24 hours daily).    Talk with the Surgery Patient Education Nurses.  Please call them @ [phone] or toll free [phone] after 8:00 a.m. Monday through Friday.    TO PREPARE FOR THE PROCEDURE:      ONE WEEK BEFORE:    Stop Aspirin, anti-inflammatory pain pills (Motrin, ibuprofen, Naproxen, Feldene, Advil, etc.).  It is OK to take Tylenol (acetaminophen).    Your doctor will tell you what to do if you are on Coumadin or Plavix.     NIGHT BEFORE:    Do not eat or drink  anything after midnight.  Your stomach needs to be empty.     DAY OF YOUR PROCEDURE:    Take your pills you were told to take.  Do not take diabetic pills.  If you are on Insulin, take the dose your doctor told you to take.    Wear loose, comfortable clothing and shoes.    Remove ALL jewelry including wedding rings.  Leave valuables at home.    Come to the hospital Admitting Department located at the [facility] on the lower level.    In Same Day surgery, you will be asked to change into an exam gown.    You will be asked your name, birth date, and what you are having done by every person who is involved with your care.    Your health history, medications, and allergies will be reviewed and verified.    An IV (intravenous line) will be started in your hand.  DURING THE UPPER ENDOSCOPY:    Your doctor and a registered nurse will be with you throughout the procedure which takes about 30 minutes.    You will either lie on your left side or on your back.    Medications will be given to you to help you relax and reduce the gag reflex when swallowing the scope.    Your doctor may need to insert air into your stomach to see better.  This may cause fullness or a cramping sensation.  The air will be removed at the end of the exam.    AFTER THE PROCEDURE    You will return to Same Day Surgery to rest for about an hour before you go home.    The doctor will talk with you and your family.    A family member/friend may visit with you.    You may burp up any air remaining in your stomach.    You may feel dizzy or light-headed from the medicine.    Your nurse will go over the discharge instructions with you and your caregiver and answer any of your questions.      You will be contacted the next day to check on how you are doing.    If biopsies were taken, you will be contacted with the results usually within 3 days.  BACK AT HOME    Rest for an hour or two after you get home.    When your throat is no longer numb and you have a  gag reflex, take a few sips of cool water.  If you can swallow comfortably, you may start eating again.    You may have a mild sore throat for the rest of the day.  WHAT TO WATCH FOR:  Problems rarely occur after the exam, but it is important to be aware of the early signs of a complication.  Call your doctor immediately if you have:    Difficulty swallowing or breathing    Unusual pain in your stomach or chest    Vomiting blood or dark material that looks like coffee grounds    Black or tarry stools    Temperature over 100.6 degrees    MORE QUESTIONS?  Please ask your doctor or nurse before the exam begins  or call your doctor at the clinic.    IF YOU MUST CANCEL YOUR PROCEDURE THE EVENING/NIGHT BEFORE, PLEASE CALL HOSPITAL ADMITTING -[phone] OR TOLL FREE [phone], EXT. [phone].    Phone Numbers:  Hospital - [phone] [phone]  [facility] - [phone]  Surgery Patient Education - [phone] or toll free [phone]    Post-Anesthesia Patient Instructions    IMMEDIATELY FOLLOWING SURGERY:  Do not drive or operate machinery for the first twenty four hours after surgery.  Do not make any important decisions for twenty four hours after surgery or while taking narcotic pain medications or sedatives.  If you develop intractable nausea and vomiting or a severe headache please notify your doctor immediately.    FOLLOW-UP:  Please make an appointment with your surgeon as instructed. You do not need to follow up with anesthesia unless specifically instructed to do so.    WOUND CARE INSTRUCTIONS (if applicable):  Keep a dry clean dressing on the anesthesia/puncture wound site if there is drainage.  Once the wound has quit draining you may leave it open to air.  Generally you should leave the bandage intact for twenty four hours unless there is drainage.  If the epidural site drains for more than 36-48 hours please call the anesthesia department.    QUESTIONS?:  Please feel free to call your  physician or the hospital  if you have any questions, and they will be happy to assist you.

## 2018-06-20 NOTE — BRIEF OP NOTE
[facility] - Brief Operative Note    Pre-operative diagnosis: GERD   Post-operative diagnosis Irregular GE junction   Procedure: EGD   Surgeon: Francis Valverde DO   Anesthesia: Monitor Anesthesia Care    Estimated blood loss: 0   Blood transfusion: No transfusion was given during surgery   Drains: 0   Specimens: Duodenum, gastric antrum, GE junction   Findings: Irregular GE junction at 40 cm   Complications: None   Condition: Stable   Comments: Details included in dictated operative note.

## 2018-06-20 NOTE — ANESTHESIA CARE TRANSFER NOTE
Patient: Andrei Murphywood    Procedure(s):  UPPER ENDOSCOPY WITH BIOPSY - Wound Class: II-Clean Contaminated    Diagnosis: BARRETTS ESOPHAGUS  Diagnosis Additional Information: No value filed.    Anesthesia Type:   MAC     Note:  Airway :Nasal Cannula  Patient transferred to:Phase II  Handoff Report: Identifed the Patient, Identified the Reponsible Provider, Reviewed the pertinent medical history, Discussed the surgical course, Reviewed Intra-OP anesthesia mangement and issues during anesthesia, Set expectations for post-procedure period and Allowed opportunity for questions and acknowledgement of understanding      Vitals: (Last set prior to Anesthesia Care Transfer)    CRNA VITALS  6/20/2018 1200 - 6/20/2018 1233      6/20/2018             Resp Rate (set): 8    EKG: Sinus rhythm                Electronically Signed By: JOEY Manuel CRNA  June 20, 2018  12:33 PM

## 2018-06-20 NOTE — IP AVS SNAPSHOT
MRN:[id number]                      After Visit Summary   6/20/2018    Andrei Whitman    MRN: [id number]           Thank you!     Thank you for choosing [facility] for your care. Our goal is always to provide you with excellent care. Hearing back from our patients is one way we can continue to improve our services. Please take a few minutes to complete the written survey that you may receive in the mail after you visit with us. Thank you!        Patient Information     Date Of Birth          1970        About your hospital stay     You were admitted on:  June 20, 2018 You last received care in the:  [facility] Preop/Phase II    You were discharged on:  June 20, 2018       Who to Call     For medical emergencies, please call 911.  For non-urgent questions about your medical care, please call your primary care provider or clinic, [phone]  For questions related to your surgery, please call your surgery clinic        Attending Provider     Provider Francis Burrell,  Surgery       Primary Care Provider Office Phone # Fax #    Tommy Lamb [phone]      Your next 10 appointments already scheduled     Aug 06, 2018 10:40 AM CDT   (Arrive by 10:20 AM)   SHORT with Tommy Lamb DO   [facility] ([facility] )    [address]  [place] [address]   [phone]              Further instructions from your care team           UPPER ENDOSCOPY    PURPOSE:  An Upper Endoscopy is a procedure in which the doctor passes a flexible, lighted tube called a gastroscope through your mouth into your stomach in order to:    See the lining of the esophagus, stomach, and duodenum (first part of the small intestine).    Look for bleeding, inflammation (swelling), abnormal tumors or tissue, or ulcers.    Take biopsy specimens.  (Biopsies do not hurt.)    TELL YOUR DOCTOR IF:     You have a heart condition, heart murmur, or have  had heart valve surgery.    You have had angioplasty with stents put in within the last year.    You are taking blood thinners - Aspirin, Anti-inflammatory pain pills (like Motrin, ibuprofen, Naproxen, Feldene, Advil, etc.), Coumadin, Plavix.    You have diabetes - contact your regular doctor for management of your insulin.    YOU NEED TO:    Have someone drive you home.  You are not allowed to drive until the next day.  (If you take a taxi, the person staying with you after surgery must ride with you in the taxi.  The  is not responsible for you).    Have someone stay with you for four (4) hours after you leave the hospital.    Know the time to be at admitting:  A nurse will call you with the time the afternoon before your procedure.  If your procedure is on Monday, you will be called on Friday.  If you have not been contacted with the time, call the Admitting Department at [phone] or [phone], ext. [phone] after 5 pm (Admitting is open 24 hours daily).    Talk with the Surgery Patient Education Nurses.  Please call them @ [phone] or toll free [phone] after 8:00 a.m. Monday through Friday.    TO PREPARE FOR THE PROCEDURE:      ONE WEEK BEFORE:    Stop Aspirin, anti-inflammatory pain pills (Motrin, ibuprofen, Naproxen, Feldene, Advil, etc.).  It is OK to take Tylenol (acetaminophen).    Your doctor will tell you what to do if you are on Coumadin or Plavix.     NIGHT BEFORE:    Do not eat or drink anything after midnight.  Your stomach needs to be empty.     DAY OF YOUR PROCEDURE:    Take your pills you were told to take.  Do not take diabetic pills.  If you are on Insulin, take the dose your doctor told you to take.    Wear loose, comfortable clothing and shoes.    Remove ALL jewelry including wedding rings.  Leave valuables at home.    Come to the hospital Admitting Department located at the [facility] on the lower level.    In Same Day surgery, you will be asked to change into an  exam gown.    You will be asked your name, birth date, and what you are having done by every person who is involved with your care.    Your health history, medications, and allergies will be reviewed and verified.    An IV (intravenous line) will be started in your hand.  DURING THE UPPER ENDOSCOPY:    Your doctor and a registered nurse will be with you throughout the procedure which takes about 30 minutes.    You will either lie on your left side or on your back.    Medications will be given to you to help you relax and reduce the gag reflex when swallowing the scope.    Your doctor may need to insert air into your stomach to see better.  This may cause fullness or a cramping sensation.  The air will be removed at the end of the exam.    AFTER THE PROCEDURE    You will return to Same Day Surgery to rest for about an hour before you go home.    The doctor will talk with you and your family.    A family member/friend may visit with you.    You may burp up any air remaining in your stomach.    You may feel dizzy or light-headed from the medicine.    Your nurse will go over the discharge instructions with you and your caregiver and answer any of your questions.      You will be contacted the next day to check on how you are doing.    If biopsies were taken, you will be contacted with the results usually within 3 days.  BACK AT HOME    Rest for an hour or two after you get home.    When your throat is no longer numb and you have a gag reflex, take a few sips of cool water.  If you can swallow comfortably, you may start eating again.    You may have a mild sore throat for the rest of the day.  WHAT TO WATCH FOR:  Problems rarely occur after the exam, but it is important to be aware of the early signs of a complication.  Call your doctor immediately if you have:    Difficulty swallowing or breathing    Unusual pain in your stomach or chest    Vomiting blood or dark material that looks like coffee grounds    Black or tarry  stools    Temperature over 100.6 degrees    MORE QUESTIONS?  Please ask your doctor or nurse before the exam begins  or call your doctor at the clinic.    IF YOU MUST CANCEL YOUR PROCEDURE THE EVENING/NIGHT BEFORE, PLEASE CALL HOSPITAL ADMITTING -[phone] OR TOLL FREE [phone], [phone].    Phone Numbers:  [facility] - [phone]  [facility] - [phone]  Surgery Patient Education - [phone] or toll free [phone]    Post-Anesthesia Patient Instructions    IMMEDIATELY FOLLOWING SURGERY:  Do not drive or operate machinery for the first twenty four hours after surgery.  Do not make any important decisions for twenty four hours after surgery or while taking narcotic pain medications or sedatives.  If you develop intractable nausea and vomiting or a severe headache please notify your doctor immediately.    FOLLOW-UP:  Please make an appointment with your surgeon as instructed. You do not need to follow up with anesthesia unless specifically instructed to do so.    WOUND CARE INSTRUCTIONS (if applicable):  Keep a dry clean dressing on the anesthesia/puncture wound site if there is drainage.  Once the wound has quit draining you may leave it open to air.  Generally you should leave the bandage intact for twenty four hours unless there is drainage.  If the epidural site drains for more than 36-48 hours please call the anesthesia department.    QUESTIONS?:  Please feel free to call your physician or the hospital  if you have any questions, and they will be happy to assist you.       Pending Results     Date and Time Order Name Status Description    6/18/2018 1207 EKG CARDIAC - HIM SCAN Preliminary             Admission Information     Date & Time Provider Department Dept. Phone    6/20/2018 Francis Valverde DO HI Preop/Phase [phone]      Your Vitals Were     Blood Pressure Temperature Respirations Weight Pulse Oximetry BMI (Body Mass Index)    102/69 98  F (36.7   C) (Oral) 16 101.2 kg (223 lb) 95% 32 kg/m2      Care EveryWhere ID     This is your Care EveryWhere ID. This could be used by other organizations to access your [facility] medical records  [id number]        Equal Access to Services     SENG CEVALLOS AH: Hadii aad ku hadrojasmckinley Soeliseali, waaxda luqadaha, qaybta kaalmada adegirish, alka kelly teresamukund harris ojmari smith. So [facility] [phone].    ATENCIÓN: Si habla español, tiene a rodriguez disposición servicios gratuitos de asistencia lingüística. Llame al [phone].    We comply with applicable federal civil rights laws and [place] laws. We do not discriminate on the basis of race, color, national origin, age, disability, sex, sexual orientation, or gender identity.               Review of your medicines      CONTINUE these medicines which have NOT CHANGED        Dose / Directions    amitriptyline 25 MG tablet   Commonly known as:  ELAVIL   Used for:  Persistent insomnia        TAKE 1 TABLET BY MOUTH AT BEDTIME   Quantity:  30 tablet   Refills:  1       amphetamine-dextroamphetamine 30 MG per 24 hr capsule   Commonly known as:  ADDERALL XR   Used for:  ADHD (attention deficit hyperactivity disorder), inattentive type        Take 1 capsule (30 mg) by mouth 2 times daily   Quantity:  60 capsule   Refills:  0       aspirin 81 MG tablet   Used for:  Russell's esophagus with dysplasia        Dose:  81 mg   Take 1 tablet (81 mg) by mouth daily   Quantity:  30 tablet   Refills:  0       budesonide-formoterol 80-4.5 MCG/ACT Inhaler   Commonly known as:  SYMBICORT   Used for:  Simple chronic bronchitis (H)        Dose:  2 puff   Inhale 2 puffs into the lungs 2 times daily   Quantity:  3 Inhaler   Refills:  3       buPROPion 100 MG tablet   Commonly known as:  WELLBUTRIN   Used for:  Recurrent major depressive disorder, in partial remission (H)        Take 2 tablets by mouth twice a day.   Quantity:  120 tablet   Refills:  1       gabapentin 300 MG capsule   Commonly known as:   NEURONTIN   Used for:  Cervical radiculopathy        TAKE 2 CAPSULES BY MOUTH THREE TIMES DAILY   Quantity:  180 capsule   Refills:  4       hydrochlorothiazide 12.5 MG capsule   Commonly known as:  MICROZIDE   Used for:  Benign essential hypertension        Dose:  12.5 mg   Take 1 capsule (12.5 mg) by mouth every morning   Quantity:  90 capsule   Refills:  1       * lithium 450 MG CR tablet   Commonly known as:  ESKALITH   Used for:  Mood disorder (H)        Take two tablets by mouth at 4 pm   Quantity:  90 tablet   Refills:  3       * lithium 600 MG capsule   Used for:  Mood disorder (H)        TAKE 1 CAPSULE BY MOUTH EVERY EVENING   Quantity:  30 capsule   Refills:  0       omeprazole 40 MG capsule   Commonly known as:  priLOSEC   Used for:  Acute superficial gastritis without hemorrhage        Dose:  40 mg   Take 1 capsule (40 mg) by mouth daily Take 30-60 minutes before a meal.   Quantity:  90 capsule   Refills:  3       verapamil 240 MG Cp24 24 hr capsule   Commonly known as:  VERELAN   Used for:  Essential hypertension        Dose:  240 mg   Take 1 capsule (240 mg) by mouth At Bedtime   Quantity:  90 capsule   Refills:  1       * Notice:  This list has 2 medication(s) that are the same as other medications prescribed for you. Read the directions carefully, and ask your doctor or other care provider to review them with you.             Protect others around you: Learn how to safely use, store and throw away your medicines at www.disposemymeds.org.             Medication List: This is a list of all your medications and when to take them. Check marks below indicate your daily home schedule. Keep this list as a reference.      Medications           Morning Afternoon Evening Bedtime As Needed    amitriptyline 25 MG tablet   Commonly known as:  ELAVIL   TAKE 1 TABLET BY MOUTH AT BEDTIME                                amphetamine-dextroamphetamine 30 MG per 24 hr capsule   Commonly known as:  ADDERALL XR   Take 1  capsule (30 mg) by mouth 2 times daily                                aspirin 81 MG tablet   Take 1 tablet (81 mg) by mouth daily                                budesonide-formoterol 80-4.5 MCG/ACT Inhaler   Commonly known as:  SYMBICORT   Inhale 2 puffs into the lungs 2 times daily                                buPROPion 100 MG tablet   Commonly known as:  WELLBUTRIN   Take 2 tablets by mouth twice a day.                                gabapentin 300 MG capsule   Commonly known as:  NEURONTIN   TAKE 2 CAPSULES BY MOUTH THREE TIMES DAILY                                hydrochlorothiazide 12.5 MG capsule   Commonly known as:  MICROZIDE   Take 1 capsule (12.5 mg) by mouth every morning                                * lithium 450 MG CR tablet   Commonly known as:  ESKALITH   Take two tablets by mouth at 4 pm                                * lithium 600 MG capsule   TAKE 1 CAPSULE BY MOUTH EVERY EVENING                                omeprazole 40 MG capsule   Commonly known as:  priLOSEC   Take 1 capsule (40 mg) by mouth daily Take 30-60 minutes before a meal.                                verapamil 240 MG Cp24 24 hr capsule   Commonly known as:  VERELAN   Take 1 capsule (240 mg) by mouth At Bedtime                                * Notice:  This list has 2 medication(s) that are the same as other medications prescribed for you. Read the directions carefully, and ask your doctor or other care provider to review them with you.

## 2018-06-20 NOTE — OP NOTE
Procedure Date: 2018      PREOPERATIVE DIAGNOSES:  Gastroesophageal reflux disease.      POSTOPERATIVE DIAGNOSIS:  Irregular gastroesophageal junction.      PROCEDURE:  Esophagogastroduodenoscopy.        INDICATION:  A 47-year-old gentleman, longstanding history of reflux disease, here for diagnostic endoscopy.      SURGEON:  Evaristo Valverde DO      DESCRIPTION OF PROCEDURE:  The patient was brought into the endoscopy suite and placed in the left lateral decubitus position.  After preprocedural pause and attended monitored anesthesia was administered, the endoscope was advanced through the oral bite block through the oropharynx, past the cricopharyngeus and easily intubated the esophagus.  Under direct visualization, the endoscope was advanced down the esophagus, through the stomach, past the pylorus on to the distal portions of the duodenum.  The mucosa of the duodenum was unremarkable.  The first, second, third and fourth portions of the duodenum were evaluated upon slow withdrawal of the endoscope while random biopsies were obtained.  The endoscope was withdrawn to the antrum of the stomach, which was unremarkable as well and random biopsies were obtained.  The body of the stomach was unremarkable.  Retroflexion of the cardia and fundus was unremarkable.  The endoscope was withdrawn to the GE junction at 40 cm from the incisors.  This was irregular in nature and looking like changes associated with reflux.  Multiple biopsies were obtained.  The extra air was removed from the stomach, and the endoscope was slowly withdrawn evaluating the entire length of the esophagus, which was unremarkable.  The endoscope then was completely withdrawn.  The patient tolerated the procedure well and taken to postanesthesia care unit.         EVARISTO VALVERDE DO             D: 2018   T: 2018   MT: MEL      Name:     WENDY TAYLOR   MRN:      [id number]        Account:        [id number]   :       1970           Procedure Date: 06/20/2018      Document: [id number]

## 2018-06-20 NOTE — OR NURSING
Patient and responsible adult given discharge instructions with no questions regarding instructions. Bethanie score 20. Pain level 0/10.  Discharged from unit via amb. Patient discharged to home.

## 2018-06-20 NOTE — ANESTHESIA PREPROCEDURE EVALUATION
Anesthesia Evaluation     . Pt has had prior anesthetic. Type: General    No history of anesthetic complications          ROS/MED HX    ENT/Pulmonary:     (+)allergic rhinitis, tobacco use, Current use 1 packs/day  mild COPD, , . .    Neurologic:     (+)migraines (remote history),     Cardiovascular:     (+) hypertension-range: no beta blocker, ---. : . . FLOR (r/t tobacco), . :. . Previous cardiac testing date:results:date: results:ECG reviewed date:6/18/18 results:NSR date: results:          METS/Exercise Tolerance:  4 - Raking leaves, gardening   Hematologic:  - neg hematologic  ROS       Musculoskeletal:   (+) , , other musculoskeletal- DDD      GI/Hepatic:     (+) GERD (asymptomatic today) Asymptomatic on medication, Other GI/Hepatic Barretts      Renal/Genitourinary:  - ROS Renal section negative       Endo:     (+) Obesity, .      Psychiatric:     (+) psychiatric history depression, bipolar and anxiety      Infectious Disease:  - neg infectious disease ROS       Malignancy:      - no malignancy   Other: Comment: Back pain, has been off opioid medications for six years   (+) H/O Chronic Pain,                   Physical Exam  Normal systems: cardiovascular and pulmonary    Airway   Mallampati: II  TM distance: >3 FB  Neck ROM: full    Dental   (+) chipped    Cardiovascular   Rhythm and rate: regular and normal      Pulmonary    breath sounds clear to auscultation                    Anesthesia Plan      History & Physical Review  History and physical reviewed and following examination; no interval change.    ASA Status:  3 .    NPO Status:  > 8 hours    Plan for MAC with Intravenous and Propofol induction. Maintenance will be TIVA.  Reason for MAC:  Deep or markedly invasive procedure (G8) and Procedure to face, neck, head or breast  PONV prophylaxis:  Ondansetron (or other 5HT-3)  Risks and benefits of MAC anesthetic discussed including dental damage, aspiration, loss of airway, conversion to general  anesthetic, CV complications, MI, stroke, death. Pt wishes to proceed.         Postoperative Care  Postoperative pain management:  IV analgesics.      Consents  Anesthetic plan, risks, benefits and alternatives discussed with:  Patient.  Use of blood products discussed: Yes.   Use of blood products discussed with Patient.  Consented to blood products.  .                          .

## 2018-06-20 NOTE — IP AVS SNAPSHOT
[facility] Pre[facility] II    [address] [address]    Phone:  [phone]                                       After Visit Summary   6/20/2018    Andrei Whitman    MRN: [id number]           After Visit Summary Signature Page     I have received my discharge instructions, and my questions have been answered. I have discussed any challenges I see with this plan with the nurse or doctor.    ..........................................................................................................................................  Patient/Patient Representative Signature      ..........................................................................................................................................  Patient Representative Print Name and Relationship to Patient    ..................................................               ................................................  Date                                            Time    ..........................................................................................................................................  Reviewed by Signature/Title    ...................................................              ..............................................  Date                                                            Time

## 2018-06-22 LAB — COPATH REPORT: NORMAL

## 2018-07-11 DIAGNOSIS — F90.0 ADHD (ATTENTION DEFICIT HYPERACTIVITY DISORDER), INATTENTIVE TYPE: ICD-10-CM

## 2018-07-11 RX ORDER — DEXTROAMPHETAMINE SACCHARATE, AMPHETAMINE ASPARTATE MONOHYDRATE, DEXTROAMPHETAMINE SULFATE AND AMPHETAMINE SULFATE 7.5; 7.5; 7.5; 7.5 MG/1; MG/1; MG/1; MG/1
CAPSULE, EXTENDED RELEASE ORAL
Qty: 60 CAPSULE | Refills: 0 | Status: SHIPPED | OUTPATIENT
Start: 2018-07-11 | End: 2018-08-06

## 2018-07-11 NOTE — TELEPHONE ENCOUNTER
adderall      Last Written Prescription Date:  6/14/18  Last Fill Quantity: 60,   # refills: 0  Last Office Visit: 6/7/18  Future Office visit:    Next 5 appointments (look out 90 days)     Aug 06, 2018 10:40 AM CDT   (Arrive by 10:20 AM)   SHORT with Tommy Lamb DO   [facility] ([facility] [place] )    [address] [place] [address]   [phone]                   Routing refill request to provider for review/approval because:  Drug not on the [facility], [facility] or  Health refill protocol or controlled substance

## 2018-07-12 NOTE — TELEPHONE ENCOUNTER
Pt notified that the written RX is ready at the [facility]  registration to be picked up.   Left message

## 2018-07-17 ENCOUNTER — TRANSFERRED RECORDS (OUTPATIENT)
Dept: HEALTH INFORMATION MANAGEMENT | Facility: CLINIC | Age: 48
End: 2018-07-17

## 2018-07-18 ENCOUNTER — TRANSFERRED RECORDS (OUTPATIENT)
Dept: HEALTH INFORMATION MANAGEMENT | Facility: CLINIC | Age: 48
End: 2018-07-18

## 2018-07-19 DIAGNOSIS — K29.00 ACUTE SUPERFICIAL GASTRITIS WITHOUT HEMORRHAGE: ICD-10-CM

## 2018-07-20 RX ORDER — OMEPRAZOLE 40 MG/1
CAPSULE, DELAYED RELEASE ORAL
Qty: 30 CAPSULE | Refills: 3 | Status: SHIPPED | OUTPATIENT
Start: 2018-07-20 | End: 2018-11-19

## 2018-08-03 NOTE — PROGRESS NOTES
SUBJECTIVE:   Andrei Whitman is a 47 year old male who presents to clinic today for the following health issues:      Hypertension Follow-up      Outpatient blood pressures are not being checked.    Low Salt Diet: no added salt          BP Readings from Last 6 Encounters:   08/06/18 114/78   06/20/18 109/75   06/18/18 124/80   06/07/18 144/78   05/24/18 (!) 128/92   04/24/18 130/80           Wt Readings from Last 5 Encounters:   08/06/18 225 lb (102.1 kg)   06/20/18 223 lb (101.2 kg)   06/18/18 227 lb (103 kg)   06/07/18 234 lb (106.1 kg)   05/24/18 233 lb (105.7 kg)     COPD Follow-Up    Symptoms are currently: stable    Current fatigue or dyspnea with ambulation: none    Shortness of breath: stable    Increased or change in Cough/Sputum: Yes-  Coughing more often, white/black sputum     Fever(s): No    Baseline ambulation without stopping to rest:  1-2 blocks. Able to walk up 1 flights of stairs without stopping to rest.  Any ER/UC or hospital admissions since your last visit? No       He denies that his breathing worsens with humid weather.  History   Smoking Status     Current Every Day Smoker     Packs/day: 1.00     Years: 30.00   Smokeless Tobacco     Never Used     Comment: quitplan referral declined 6/18/18     No results found for: FEV1, UBV4OQA    Amount of exercise or physical activity: 2-3 days/week for an average of less than 15 minutes    Problems taking medications regularly: No    Medication side effects: Sensitive stomach     Diet: regular (no restrictions) , no added salt        -------------------------------------    Problem list and histories reviewed & adjusted, as indicated.  Additional history: as documented    Patient Active Problem List   Diagnosis     Cervicalgia     Lower back pain     Segmental and somatic dysfunction of lower extremity     GERD (gastroesophageal reflux disease)     Mood disorder (H)     Abnormal weight gain     Attention deficit hyperactivity disorder (ADHD),  predominantly inattentive type     ACP (advance care planning)     Flatulence, eructation, and gas pain     Bipolar disease, chronic (H)     Routine general medical examination at a health care facility     Benign essential hypertension     Simple chronic bronchitis (H)     Russell esophagus     Tobacco dependency     Past Surgical History:   Procedure Laterality Date     APPENDECTOMY      age 14     COLONOSCOPY  07/12/2017    Left descending x1 tubular adenoma, sigmoid x1 tubular adenoma and rectal x1 hyperplastic polyp.  Pan diverticulosis     ENDOSCOPY UPPER, COLONOSCOPY, COMBINED N/A 7/12/2017    Procedure: COMBINED ENDOSCOPY UPPER, COLONOSCOPY;  UPPER ENDOSCOPY WITH BIOPSIES  AND COLONOSCOPY WITH POLYPECTOMY;  Surgeon: Francis Valverde DO;  Location: [facility] OR     ENT SURGERY  age 18    tonsils     ESOPHAGOGASTRODUODENOSCOPY  07/12/2017    chemical gatropathy, GE junction with pancreatiuc metaplasia      ESOPHAGOSCOPY, GASTROSCOPY, DUODENOSCOPY (EGD), COMBINED N/A 6/20/2018    Procedure: COMBINED ESOPHAGOSCOPY, GASTROSCOPY, DUODENOSCOPY (EGD), BIOPSY SINGLE OR MULTIPLE;  UPPER ENDOSCOPY WITH BIOPSY;  Surgeon: Francis Valverde DO;  Location: [facility] OR     ORTHOPEDIC SURGERY  age 30     back and neck fusion, bone from hip removed to use on plates     ORTHOPEDIC SURGERY  age 30    L5 fusion, laminectomy of lumbar discs       Social History   Substance Use Topics     Smoking status: Current Every Day Smoker     Packs/day: 1.00     Years: 30.00     Smokeless tobacco: Never Used      Comment: quitplan referral declined 6/18/18     Alcohol use No     Family History   Problem Relation Age of Onset     Asthma Mother      Arthritis Mother      Prostate Cancer Father      HEART DISEASE Paternal Grandfather      Hypertension Paternal Grandfather      Alcohol/Drug Paternal Grandfather      Other - See Comments Brother      Nerve and degenerative disease mild     Alcohol/Drug Brother      Other - See Comments Sister 21      Hip replacements, nerve, degerative disease     Alcohol/Drug Maternal Grandfather      Unknown/Adopted Paternal Grandmother      Other Cancer Paternal Aunt      Cervical cancer           Reviewed and updated as needed this visit by clinical staff       Reviewed and updated as needed this visit by Provider         ROS:  CONSTITUTIONAL:NEGATIVE for fever, chills, change in weight  EYES: NEGATIVE for vision changes or irritation  ENT/MOUTH: POSITIVE for nasal congestion, postnasal drainage and sinus pressure and NEGATIVE for ear pain   RESP:See HPI  CV: NEGATIVE for chest pain, palpitations or peripheral edema  GI: NEGATIVE for nausea, abdominal pain, heartburn, or change in bowel habits  : NEGATIVE for frequency, dysuria, or hematuria  MUSCULOSKELETAL: Back pain and leg weakness and instability  ENDOCRINE: NEGATIVE for temperature intolerance, skin/hair changes  PSYCHIATRIC: NEGATIVE for changes in mood or affect  PSYCHIATRIC: Increased moodiness due to back pain.    OBJECTIVE:     /78 (BP Location: Left arm, Patient Position: Chair, Cuff Size: Adult Large)  Pulse 73  Temp 98.2  F (36.8  C) (Tympanic)  Wt 225 lb (102.1 kg)  SpO2 98%  BMI 32.28 kg/m2  Body mass index is 32.28 kg/(m^2).  GENERAL: healthy, alert and no distress  EYES: Eyes grossly normal to inspection, PERRL and conjunctivae and sclerae normal  NECK: no adenopathy, no asymmetry, masses, or scars and thyroid normal to palpation  RESP: lungs clear to auscultation - no rales, rhonchi or wheezes  CV: regular rate and rhythm, normal S1 S2, no S3 or S4, no murmur, click or rub, no peripheral edema and peripheral pulses strong  MS: no gross musculoskeletal defects noted, no edema  MS: Kyphosis of the thoracic back and head lowering posture  NEURO: Normal strength and tone of the upper extremities, mentation intact and speech normal  PSYCH: mentation appears normal, affect normal/bright    Diagnostic Test Results:  Labs ordered for next  visit    ASSESSMENT/PLAN:   (I10) Benign essential hypertension  (primary encounter diagnosis)  Comment: At goal.  Plan:   He will continue HCTZ 12.5 mg and verapamil 240 mg daily.    (J41.0) Simple chronic bronchitis (H)  Comment: He continues to have coughing and shortness of breath   Plan:  Increase budesonide-formoterol (SYMBICORT) dose to 160-4.5  MCG/ACT Inhaler, 2 puffs BID    (F17.200) Tobacco dependency  Comment: He will try quitting closer to his back operation date which he will be setting up soon.  Plan:   CBC with platelets    (Z79.899) Lithium use  Comment: He has no extrapyramidal symptoms, he is due to a lithium level.  Plan:   Comprehensive metabolic panel, TSH with free T4        reflex, Lithium level, CBC with platelets       (M50.30) DDD (degenerative disc disease), cervical  Comment: Change has cervical spine stenosis and upper thoracic stenosis which are amendable to surgery with spinal fusion of C3 to C5 and C6 to T2  Plan:   Andrei will be contacting [facility] to set up a surgery date.    (M48.02) Cervical stenosis of spinal canal  Comment: as noted above  Plan:   As above.      FUTURE APPOINTMENTS:       - Follow-up visit in 1-2 months for Pre operative exam.    Tommy Lamb DO,   [facility]

## 2018-08-05 DIAGNOSIS — G47.00 PERSISTENT INSOMNIA: ICD-10-CM

## 2018-08-06 ENCOUNTER — APPOINTMENT (OUTPATIENT)
Dept: LAB | Facility: OTHER | Age: 48
End: 2018-08-06
Attending: INTERNAL MEDICINE
Payer: MEDICARE

## 2018-08-06 ENCOUNTER — OFFICE VISIT (OUTPATIENT)
Dept: PEDIATRICS | Facility: OTHER | Age: 48
End: 2018-08-06
Attending: INTERNAL MEDICINE
Payer: MEDICARE

## 2018-08-06 VITALS
WEIGHT: 225 LBS | TEMPERATURE: 98.2 F | DIASTOLIC BLOOD PRESSURE: 78 MMHG | OXYGEN SATURATION: 98 % | HEART RATE: 73 BPM | SYSTOLIC BLOOD PRESSURE: 114 MMHG | BODY MASS INDEX: 32.28 KG/M2

## 2018-08-06 DIAGNOSIS — Z79.899 LITHIUM USE: ICD-10-CM

## 2018-08-06 DIAGNOSIS — J41.0 SIMPLE CHRONIC BRONCHITIS (H): ICD-10-CM

## 2018-08-06 DIAGNOSIS — I10 BENIGN ESSENTIAL HYPERTENSION: Primary | ICD-10-CM

## 2018-08-06 DIAGNOSIS — M48.02 CERVICAL STENOSIS OF SPINAL CANAL: ICD-10-CM

## 2018-08-06 DIAGNOSIS — M50.30 DDD (DEGENERATIVE DISC DISEASE), CERVICAL: ICD-10-CM

## 2018-08-06 DIAGNOSIS — F17.200 TOBACCO DEPENDENCY: ICD-10-CM

## 2018-08-06 PROCEDURE — G0463 HOSPITAL OUTPT CLINIC VISIT: HCPCS

## 2018-08-06 PROCEDURE — 99214 OFFICE O/P EST MOD 30 MIN: CPT | Performed by: INTERNAL MEDICINE

## 2018-08-06 RX ORDER — DEXTROAMPHETAMINE SACCHARATE, AMPHETAMINE ASPARTATE MONOHYDRATE, DEXTROAMPHETAMINE SULFATE AND AMPHETAMINE SULFATE 7.5; 7.5; 7.5; 7.5 MG/1; MG/1; MG/1; MG/1
CAPSULE, EXTENDED RELEASE ORAL
Qty: 60 CAPSULE | Refills: 0 | Status: SHIPPED | OUTPATIENT
Start: 2018-08-10 | End: 2018-09-07

## 2018-08-06 RX ORDER — BUDESONIDE AND FORMOTEROL FUMARATE DIHYDRATE 160; 4.5 UG/1; UG/1
2 AEROSOL RESPIRATORY (INHALATION) 2 TIMES DAILY
Qty: 3 INHALER | Refills: 3 | Status: SHIPPED | OUTPATIENT
Start: 2018-08-06 | End: 2019-09-03

## 2018-08-06 ASSESSMENT — ANXIETY QUESTIONNAIRES
1. FEELING NERVOUS, ANXIOUS, OR ON EDGE: SEVERAL DAYS
3. WORRYING TOO MUCH ABOUT DIFFERENT THINGS: SEVERAL DAYS
5. BEING SO RESTLESS THAT IT IS HARD TO SIT STILL: SEVERAL DAYS
IF YOU CHECKED OFF ANY PROBLEMS ON THIS QUESTIONNAIRE, HOW DIFFICULT HAVE THESE PROBLEMS MADE IT FOR YOU TO DO YOUR WORK, TAKE CARE OF THINGS AT HOME, OR GET ALONG WITH OTHER PEOPLE: VERY DIFFICULT
6. BECOMING EASILY ANNOYED OR IRRITABLE: MORE THAN HALF THE DAYS
2. NOT BEING ABLE TO STOP OR CONTROL WORRYING: MORE THAN HALF THE DAYS
7. FEELING AFRAID AS IF SOMETHING AWFUL MIGHT HAPPEN: SEVERAL DAYS
4. TROUBLE RELAXING: MORE THAN HALF THE DAYS
GAD7 TOTAL SCORE: 10

## 2018-08-06 ASSESSMENT — PAIN SCALES - GENERAL: PAINLEVEL: SEVERE PAIN (6)

## 2018-08-06 NOTE — NURSING NOTE
"Chief Complaint   Patient presents with     COPD     Hypertension       Initial /78 (BP Location: Left arm, Patient Position: Chair, Cuff Size: Adult Large)  Pulse 73  Temp 98.2  F (36.8  C) (Tympanic)  Wt 225 lb (102.1 kg)  SpO2 98%  BMI 32.28 kg/m2 Estimated body mass index is 32.28 kg/(m^2) as calculated from the following:    Height as of 6/18/18: 5' 10\" (1.778 m).    Weight as of this encounter: 225 lb (102.1 kg).  Medication Reconciliation: complete    Kelly Duarte    "

## 2018-08-06 NOTE — MR AVS SNAPSHOT
After Visit Summary   8/6/2018    Andrei Whitman    MRN: [id number]           Patient Information     Date Of Birth          1970        Visit Information        Provider Department      8/6/2018 10:40 AM Tommy Lamb DO [facility] [place]        Today's Diagnoses     Benign essential hypertension    -  1    Simple chronic bronchitis (H)        Tobacco dependency        ADHD (attention deficit hyperactivity disorder), inattentive type           Follow-ups after your visit        Follow-up notes from your care team     Return in about 3 months (around 11/6/2018) for COPD and bipolar disorder .      Your next 10 appointments already scheduled     Nov 07, 2018  9:20 AM CST   (Arrive by 9:00 AM)   SHORT with Tommy Lamb DO   [facility] ([facility] [place] )    [address] [place] [address]   [phone]              Who to contact     If you have questions or need follow up information about today's clinic visit or your schedule please contact [facility] directly at [phone].  Normal or non-critical lab and imaging results will be communicated to you by MyChart, letter or phone within 4 business days after the clinic has received the results. If you do not hear from us within 7 days, please contact the clinic through MyChart or phone. If you have a critical or abnormal lab result, we will notify you by phone as soon as possible.  Submit refill requests through [employer] or call your pharmacy and they will forward the refill request to us. Please allow 3 business days for your refill to be completed.          Additional Information About Your Visit        Care EveryWhere ID     This is your Care EveryWhere ID. This could be used by other organizations to access your [facility] medical records  [id number]        Your Vitals Were     Pulse Temperature Pulse Oximetry BMI (Body Mass Index)          73 98.2  F (36.8  C)  (Tympanic) 98% 32.28 kg/m2         Blood Pressure from Last 3 Encounters:   08/06/18 114/78   06/20/18 109/75   06/18/18 124/80    Weight from Last 3 Encounters:   08/06/18 225 lb (102.1 kg)   06/20/18 223 lb (101.2 kg)   06/18/18 227 lb (103 kg)              Today, you had the following     No orders found for display         Today's Medication Changes          These changes are accurate as of 8/6/18 11:25 AM.  If you have any questions, ask your nurse or doctor.               Start taking these medicines.        Dose/Directions    budesonide-formoterol 160-4.5 MCG/ACT Inhaler   Commonly known as:  SYMBICORT   Used for:  Simple chronic bronchitis (H)   Replaces:  budesonide-formoterol 80-4.5 MCG/ACT Inhaler   Started by:  Tommy Lamb DO        Dose:  2 puff   Inhale 2 puffs into the lungs 2 times daily   Quantity:  3 Inhaler   Refills:  3         These medicines have changed or have updated prescriptions.        Dose/Directions    amphetamine-dextroamphetamine 30 MG per 24 hr capsule   Commonly known as:  ADDERALL XR   This may have changed:  These instructions start on 8/10/2018. If you are unsure what to do until then, ask your doctor or other care provider.   Used for:  ADHD (attention deficit hyperactivity disorder), inattentive type   Changed by:  Tommy Lamb DO        Start taking on:  8/10/2018   Take 1 capsule (30 mg) by mouth 2 times daily   Quantity:  60 capsule   Refills:  0         Stop taking these medicines if you haven't already. Please contact your care team if you have questions.     budesonide-formoterol 80-4.5 MCG/ACT Inhaler   Commonly known as:  SYMBICORT   Replaced by:  budesonide-formoterol 160-4.5 MCG/ACT Inhaler   Stopped by:  Tommy Lamb DO                Where to get your medicines      These medications were sent to [id number] - [place] - [address]  [address] [place] [address]     Phone:  [phone]      budesonide-formoterol 160-4.5 MCG/ACT Inhaler         Some of these will need a paper prescription and others can be bought over the counter.  Ask your nurse if you have questions.     Bring a paper prescription for each of these medications     amphetamine-dextroamphetamine 30 MG per 24 hr capsule                Primary Care Provider Office Phone # Fax #    Tommy Lamb [phone] [address] [facility] [address]        Equal Access to Services     SENG CEVALLOS : Hadii aad ku hadasho Soomaali, waaxda luqadaha, qaybta kaalmada adeegyada, waxay idiin hayaan adeeg kharash la'brooke . So [facility] [phone].    ATENCIÓN: Si habla español, tiene a rodriguez disposición servicios gratuitos de asistencia lingüística. [facility] [phone].    We comply with applicable federal civil rights laws and [place] laws. We do not discriminate on the basis of race, color, national origin, age, disability, sex, sexual orientation, or gender identity.            Thank you!     Thank you for choosing [facility]  for your care. Our goal is always to provide you with excellent care. Hearing back from our patients is one way we can continue to improve our services. Please take a few minutes to complete the written survey that you may receive in the mail after your visit with us. Thank you!             Your Updated Medication List - Protect others around you: Learn how to safely use, store and throw away your medicines at www.disposemymeds.org.          This list is accurate as of 8/6/18 11:25 AM.  Always use your most recent med list.                   Brand Name Dispense Instructions for use Diagnosis    amitriptyline 25 MG tablet    ELAVIL    30 tablet    TAKE 1 TABLET BY MOUTH AT BEDTIME    Persistent insomnia       amphetamine-dextroamphetamine 30 MG per 24 hr capsule   Start taking on:  8/10/2018    ADDERALL XR    60 capsule    Take 1 capsule (30 mg) by mouth 2 times daily    ADHD  (attention deficit hyperactivity disorder), inattentive type       aspirin 81 MG tablet     30 tablet    Take 1 tablet (81 mg) by mouth daily    Russell's esophagus with dysplasia       budesonide-formoterol 160-4.5 MCG/ACT Inhaler    SYMBICORT    3 Inhaler    Inhale 2 puffs into the lungs 2 times daily    Simple chronic bronchitis (H)       buPROPion 100 MG tablet    WELLBUTRIN    120 tablet    Take 2 tablets by mouth twice a day.    Recurrent major depressive disorder, in partial remission (H)       gabapentin 300 MG capsule    NEURONTIN    180 capsule    TAKE 2 CAPSULES BY MOUTH THREE TIMES DAILY    Cervical radiculopathy       hydrochlorothiazide 12.5 MG capsule    MICROZIDE    90 capsule    Take 1 capsule (12.5 mg) by mouth every morning    Benign essential hypertension       * lithium 450 MG CR tablet    ESKALITH    90 tablet    Take two tablets by mouth at 4 pm    Mood disorder (H)       * lithium 600 MG capsule     30 capsule    TAKE 1 CAPSULE BY MOUTH EVERY EVENING    Mood disorder (H)       omeprazole 40 MG capsule    priLOSEC    30 capsule    TAKE 1 CAPSULE (40 MG) BY MOUTH DAILY TAKE 30-60 MINUTES BEFORE A MEAL.    Acute superficial gastritis without hemorrhage       verapamil 240 MG Cp24 24 hr capsule    VERELAN    90 capsule    Take 1 capsule (240 mg) by mouth At Bedtime    Essential hypertension       * Notice:  This list has 2 medication(s) that are the same as other medications prescribed for you. Read the directions carefully, and ask your doctor or other care provider to review them with you.

## 2018-08-07 ASSESSMENT — ANXIETY QUESTIONNAIRES: GAD7 TOTAL SCORE: 10

## 2018-08-07 ASSESSMENT — PATIENT HEALTH QUESTIONNAIRE - PHQ9: SUM OF ALL RESPONSES TO PHQ QUESTIONS 1-9: 13

## 2018-08-12 DIAGNOSIS — F39 MOOD DISORDER (H): ICD-10-CM

## 2018-08-14 RX ORDER — LITHIUM CARBONATE 600 MG/1
CAPSULE ORAL
Qty: 30 CAPSULE | Refills: 0 | Status: SHIPPED | OUTPATIENT
Start: 2018-08-14 | End: 2018-09-11

## 2018-08-14 NOTE — TELEPHONE ENCOUNTER
lithium 600 MG capsule     Last Written Prescription Date: 7/2/2018  Last Fill Quantity: 30,   # refills: 0  Last Office Visit: 8/6/2018  Future Office visit:    Next 5 appointments (look out 90 days)     Nov 07, 2018  9:20 AM CST   (Arrive by 9:00 AM)   SHORT with Tommy Lamb DO   [facility] [place] ([facility] [place] )    [address] [place] [address]   [phone]

## 2018-09-06 DIAGNOSIS — F33.41 RECURRENT MAJOR DEPRESSIVE DISORDER, IN PARTIAL REMISSION (H): ICD-10-CM

## 2018-09-07 DIAGNOSIS — F90.0 ATTENTION DEFICIT HYPERACTIVITY DISORDER (ADHD), PREDOMINANTLY INATTENTIVE TYPE: Primary | ICD-10-CM

## 2018-09-07 RX ORDER — DEXTROAMPHETAMINE SACCHARATE, AMPHETAMINE ASPARTATE MONOHYDRATE, DEXTROAMPHETAMINE SULFATE AND AMPHETAMINE SULFATE 7.5; 7.5; 7.5; 7.5 MG/1; MG/1; MG/1; MG/1
CAPSULE, EXTENDED RELEASE ORAL
Qty: 60 CAPSULE | Refills: 0 | Status: SHIPPED | OUTPATIENT
Start: 2018-09-07 | End: 2018-10-09

## 2018-09-07 RX ORDER — BUPROPION HYDROCHLORIDE 100 MG/1
TABLET ORAL
Qty: 120 TABLET | Refills: 1 | Status: SHIPPED | OUTPATIENT
Start: 2018-09-07 | End: 2018-11-26

## 2018-09-07 NOTE — TELEPHONE ENCOUNTER
amphetamine-dextroamphetamine (ADDERALL XR) 30 MG per 24 hr capsule    Last Written Prescription Date:  08/10/2018  Last Fill Quantity: 60,   # refills: 0  Last Office Visit: 08/06/2018  Future Office visit:    Next 5 appointments (look out 90 days)     Nov 07, 2018  9:20 AM CST   (Arrive by 9:00 AM)   SHORT with Tommy Lamb DO   [facility] ([facility]    [address] [place] [address]   [phone]                   Routing refill request to provider for review/approval because:  Drug not on the [facility], [facility] or [facility] refill protocol or controlled substance

## 2018-09-17 DIAGNOSIS — I10 ESSENTIAL HYPERTENSION: ICD-10-CM

## 2018-09-18 RX ORDER — VERAPAMIL HYDROCHLORIDE 240 MG/1
CAPSULE, EXTENDED RELEASE ORAL
Qty: 90 CAPSULE | Refills: 1 | Status: SHIPPED | OUTPATIENT
Start: 2018-09-18 | End: 2019-03-03

## 2018-10-09 DIAGNOSIS — F90.0 ATTENTION DEFICIT HYPERACTIVITY DISORDER (ADHD), PREDOMINANTLY INATTENTIVE TYPE: ICD-10-CM

## 2018-10-09 NOTE — TELEPHONE ENCOUNTER
amphetamine-dextroamphetamine (ADDERALL XR) 30 MG per 24 hr capsule  Last Written Prescription Date:  9/7/18  Last Fill Quantity: 60,   # refills: 0  Last Office Visit: 8/6/18  Future Office visit:    Next 5 appointments (look out 90 days)     Nov 07, 2018  9:20 AM CST   (Arrive by 9:00 AM)   PARMJIT with Tommy Lamb DO   [facility] [place] ([facility] [place]    [address] [place] [address]   [phone]                   Routing refill request to provider for review/approval because:  Drug not on the G, P or [facility] refill protocol or controlled substance

## 2018-10-10 NOTE — TELEPHONE ENCOUNTER
Patient called clinic on someone else's phone looking for script and when to come to clinic. Apparently patient doesn't have there own phone or way to contact us and walks here.  I let the  know that it probably wouldn't be done today and not to walk here today but try tomorrow.  Please see note below for Adderall XR 30 mg

## 2018-10-11 RX ORDER — DEXTROAMPHETAMINE SACCHARATE, AMPHETAMINE ASPARTATE MONOHYDRATE, DEXTROAMPHETAMINE SULFATE AND AMPHETAMINE SULFATE 7.5; 7.5; 7.5; 7.5 MG/1; MG/1; MG/1; MG/1
CAPSULE, EXTENDED RELEASE ORAL
Qty: 60 CAPSULE | Refills: 0 | Status: SHIPPED | OUTPATIENT
Start: 2018-10-11 | End: 2018-11-07

## 2018-11-05 NOTE — PROGRESS NOTES
SUBJECTIVE:   Andrei Whitman is a 47 year old male who presents to clinic today for the following health issues:      Hypertension Follow-up      Outpatient blood pressures are not being checked.    Low Salt Diet: not monitoring salt      BP Readings from Last 6 Encounters:   11/07/18 128/64   08/06/18 114/78   06/20/18 109/75   06/18/18 124/80   06/07/18 144/78   05/24/18 (!) 128/92       He denies any dizziness or generalized headaches.    COPD Follow-Up    Symptoms are currently: stable    Current fatigue or dyspnea with ambulation: worsened from baseline with climbing stairs    Shortness of breath: slightly worsened    Increased or change in Cough/Sputum: No    Fever(s): No    Baseline ambulation without stopping to rest:  1-2 blocks. Able to walk up 1 flights of stairs without stopping to rest.    Any ER/UC or hospital admissions since your last visit? No     History   Smoking Status     Current Every Day Smoker     Packs/day: 1.00     Years: 30.00   Smokeless Tobacco     Never Used     Comment: quitplan referral declined 6/18/18     No results found for: FEV1, EFP0ITF    Amount of exercise or physical activity: None    Problems taking medications regularly: No    Medication side effects: none    Diet: regular (no restrictions)      Chronic neck pain:  He has cervical DDD s/p fusion in 2001and was planning on having Surgery sometime within the year with the [facility], Dr. Sanders in [place], [place].  He had flex and stretch MRI studies in July or August in the [facility]. There was some communication gap as he was suppose to have the surgery set up with no call from the Surgeon office and Andrei had phone problems since then.  So, he is unsure if they have tried to call him.  His pain is stable and he is no longer having increased pain with lifting items.  His headaches have improved.      His pain is manly at the lower neck and goes up to the occiput.  His pain is aching and has numbness with  tingling.  He has spasms and pressure over the upper neck.  He has on and off tingling in the left forearm arm otherwise he has no radiculopath/ radiating pain.  He does feel he had shoulder weakness with rasing his arms, but feels that he has maintained arm and  strength.  His pain level is 5/10.  He remains interested in surgery.      -------------------------------------    Problem list and histories reviewed & adjusted, as indicated.  Additional history: as documented    Patient Active Problem List   Diagnosis     Cervicalgia     Lower back pain     Segmental and somatic dysfunction of lower extremity     GERD (gastroesophageal reflux disease)     Mood disorder (H)     Abnormal weight gain     Attention deficit hyperactivity disorder (ADHD), predominantly inattentive type     ACP (advance care planning)     Flatulence, eructation, and gas pain     Bipolar disease, chronic (H)     Routine general medical examination at a health care facility     Benign essential hypertension     Simple chronic bronchitis (H)     Russell esophagus     Tobacco dependency     Lithium use     DDD (degenerative disc disease), cervical     Cervical stenosis of spinal canal     Past Surgical History:   Procedure Laterality Date     APPENDECTOMY      age 14     COLONOSCOPY  07/12/2017    Left descending x1 tubular adenoma, sigmoid x1 tubular adenoma and rectal x1 hyperplastic polyp.  Pan diverticulosis     ENDOSCOPY UPPER, COLONOSCOPY, COMBINED N/A 7/12/2017    Procedure: COMBINED ENDOSCOPY UPPER, COLONOSCOPY;  UPPER ENDOSCOPY WITH BIOPSIES  AND COLONOSCOPY WITH POLYPECTOMY;  Surgeon: Francis Valverde DO;  Location: [facility] OR     ENT SURGERY  age 18    tonsils     ESOPHAGOGASTRODUODENOSCOPY  07/12/2017    chemical gatropathy, GE junction with pancreatiuc metaplasia      ESOPHAGOSCOPY, GASTROSCOPY, DUODENOSCOPY (EGD), COMBINED N/A 6/20/2018    Procedure: COMBINED ESOPHAGOSCOPY, GASTROSCOPY, DUODENOSCOPY (EGD), BIOPSY SINGLE OR  MULTIPLE;  UPPER ENDOSCOPY WITH BIOPSY;  Surgeon: Francis Valverde DO;  Location: [facility]     ORTHOPEDIC SURGERY  age 30     back and neck fusion, bone from hip removed to use on plates     ORTHOPEDIC SURGERY  age 30    L5 fusion, laminectomy of lumbar discs       Social History   Substance Use Topics     Smoking status: Current Every Day Smoker     Packs/day: 1.00     Years: 30.00     Smokeless tobacco: Never Used      Comment: quitplan referral declined 6/18/18     Alcohol use No     Family History   Problem Relation Age of Onset     Asthma Mother      Arthritis Mother      Prostate Cancer Father      HEART DISEASE Paternal Grandfather      Hypertension Paternal Grandfather      Alcohol/Drug Paternal Grandfather      Other - See Comments Brother      Nerve and degenerative disease mild     Alcohol/Drug Brother      Other - See Comments Sister 21     Hip replacements, nerve, degerative disease     Alcohol/Drug Maternal Grandfather      Unknown/Adopted Paternal Grandmother      Other Cancer Paternal Aunt      Cervical cancer           Reviewed and updated as needed this visit by clinical staff  Tobacco  Allergies  Meds  Med Hx  Surg Hx  Fam Hx  Soc Hx      Reviewed and updated as needed this visit by Provider         ROS:  Constitutional, HEENT, cardiovascular, pulmonary, gi and gu systems are negative, except as otherwise noted.    OBJECTIVE:     /64 (BP Location: Right arm, Patient Position: Chair, Cuff Size: Adult Large)  Pulse 72  Temp 97.9  F (36.6  C) (Tympanic)  Resp 20  Wt 230 lb 6.4 oz (104.5 kg)  SpO2 98%  BMI 33.06 kg/m2  Body mass index is 33.06 kg/(m^2).  GENERAL: healthy, alert and no distress  EYES: Eyes grossly normal to inspection and conjunctivae and sclerae normal  HENT: ear canals and TM's normal, nose and mouth without ulcers or lesions  HENT: nasal mucosa non-edematous but erythematous   NECK: no adenopathy, no asymmetry, masses, or scars and thyroid normal to  palpation  NECK: no carotid bruits  RESP: lungs clear to auscultation - no rales, rhonchi or wheezes  RESP: decreased breath sounds R lower posterior  CV: regular rate and rhythm, normal S1 S2, no S3 or S4, no murmur, click or rub, no peripheral edema and peripheral pulses strong  ABDOMEN: soft, nontender, no hepatosplenomegaly, no masses and bowel sounds normal  ABDOMEN: no bruits heard  MS: no gross musculoskeletal defects noted, no edema,  He hold his head forwards in a flexed position.  NEURO: Normal strength and tone, mentation intact and speech normal  NEURO: He has bilateral  strength of 4/5.    Diagnostic Test Results:  Results for orders placed or performed in visit on 11/07/18   Comprehensive metabolic panel   Result Value Ref Range    Sodium 140 133 - 144 mmol/L    Potassium 3.7 3.4 - 5.3 mmol/L    Chloride 108 94 - 109 mmol/L    Carbon Dioxide 29 20 - 32 mmol/L    Anion Gap 3 3 - 14 mmol/L    Glucose 89 70 - 99 mg/dL    Urea Nitrogen 3 (L) 7 - 30 mg/dL    Creatinine 0.87 0.66 - 1.25 mg/dL    GFR Estimate >90 >60 mL/min/1.7m2    GFR Estimate If Black >90 >60 mL/min/1.7m2    Calcium 9.0 8.5 - 10.1 mg/dL    Bilirubin Total 0.3 0.2 - 1.3 mg/dL    Albumin 3.8 3.4 - 5.0 g/dL    Protein Total 8.4 6.8 - 8.8 g/dL    Alkaline Phosphatase 116 40 - 150 U/L    ALT 19 0 - 70 U/L    AST 20 0 - 45 U/L   TSH with free T4 reflex   Result Value Ref Range    TSH 1.42 0.40 - 4.00 mU/L   Lithium level   Result Value Ref Range    Lithium Level 0.91 0.60 - 1.20 mmol/L   CBC with platelets   Result Value Ref Range    WBC 8.2 4.0 - 11.0 10e9/L    RBC Count 4.81 4.4 - 5.9 10e12/L    Hemoglobin 13.3 13.3 - 17.7 g/dL    Hematocrit 42.4 40.0 - 53.0 %    MCV 88 78 - 100 fl    MCH 27.7 26.5 - 33.0 pg    MCHC 31.4 (L) 31.5 - 36.5 g/dL    RDW 16.1 (H) 10.0 - 15.0 %    Platelet Count 296 150 - 450 10e9/L     Date Exam Time Accession # Performing Department Results    11/7/18 10:35 AM [id number] [facility]    [facility] Interactive Report and InfoRx   View the interactive report   PACS Images   Show images for XR CHEST 2 VW (Clinic Performed)   Study Result   PROCEDURE:  XR CHEST 2 VW     HISTORY:  ; Tobacco dependence; Simple chronic bronchitis (H);  Decreased breath sounds at right lung base.      COMPARISON:  2015     FINDINGS:   The cardiac silhouette is normal in size. The pulmonary vasculature is  normal.  The lungs are clear. There is mild blunting the right  costophrenic angle posteriorly unchanged from 2015.         IMPRESSION:  No acute cardiopulmonary disease.       TANA GARZON MD       ASSESSMENT/PLAN:   (J41.0) Simple chronic bronchitis (H)  Comment: Stable and mainly due to irritation from smoking.  I discussed with Andrei that he would likely be able to reduce his inhaler down to only a rescue inhaler were he to quit smoking.  Plan:   He will continue Symbicort 2 puffs BID,    (I10) Benign essential hypertension  Comment: At goal  Plan:   He will continue Microzide 12.5 mg and verapamil 240 mg daily and ASA 81 mg daily.    (M54.2) Cervicalgia  (primary encounter diagnosis)  Comment: Andrei has DDD of the cervical spine with headaches and occasional upper extremity symptoms.  He has seen the neurosurgeon and he does want to get his surgery sometime in the early year.  Plan:   He will contact neurosurgery as they were suppose to call him and his phone was not working    (M50.30) DDD (degenerative disc disease), cervical  Comment: Andrei has chronic back pain due to Lumbar DDD  Plan:  He is able to tolerate his pain without any analgesics.    (F17.200) Tobacco dependence  Comment: Andrei is wanting to have surgery in the near future and after discussion on slow healing iof the spine he would like to quit his smoking.  Plan: varenicline (CHANTIX STARTING MONTH QUINN) 0.5 MG        X 11 & 1 MG X 42 tablet, varenicline (CHANTIX)         1 MG tablet, XR CHEST 2 VW (Clinic Performed)          (R09.89)  Decreased breath sounds at right lung base  Comment: His chest xray in normal and likely his decreased breath sounds were due to poor effort in inspiration.  Plan:   Continue medications as above.                  FUTURE APPOINTMENTS:       - Follow-up visit in one month for possible pre op.    Tmomy Lamb DO,   [facility] [place]

## 2018-11-07 ENCOUNTER — OFFICE VISIT (OUTPATIENT)
Dept: PEDIATRICS | Facility: OTHER | Age: 48
End: 2018-11-07
Attending: INTERNAL MEDICINE
Payer: MEDICARE

## 2018-11-07 ENCOUNTER — RADIANT APPOINTMENT (OUTPATIENT)
Dept: GENERAL RADIOLOGY | Facility: OTHER | Age: 48
End: 2018-11-07
Attending: INTERNAL MEDICINE
Payer: MEDICARE

## 2018-11-07 VITALS
OXYGEN SATURATION: 98 % | RESPIRATION RATE: 20 BRPM | DIASTOLIC BLOOD PRESSURE: 64 MMHG | HEART RATE: 72 BPM | BODY MASS INDEX: 33.06 KG/M2 | WEIGHT: 230.4 LBS | TEMPERATURE: 97.9 F | SYSTOLIC BLOOD PRESSURE: 128 MMHG

## 2018-11-07 DIAGNOSIS — J41.0 SIMPLE CHRONIC BRONCHITIS (H): ICD-10-CM

## 2018-11-07 DIAGNOSIS — M54.2 CERVICALGIA: Primary | ICD-10-CM

## 2018-11-07 DIAGNOSIS — R06.89 DECREASED BREATH SOUNDS AT RIGHT LUNG BASE: ICD-10-CM

## 2018-11-07 DIAGNOSIS — I10 BENIGN ESSENTIAL HYPERTENSION: ICD-10-CM

## 2018-11-07 DIAGNOSIS — F17.200 TOBACCO DEPENDENCE: ICD-10-CM

## 2018-11-07 DIAGNOSIS — F17.200 TOBACCO DEPENDENCY: ICD-10-CM

## 2018-11-07 DIAGNOSIS — M50.30 DDD (DEGENERATIVE DISC DISEASE), CERVICAL: ICD-10-CM

## 2018-11-07 LAB
ALBUMIN SERPL-MCNC: 3.8 G/DL (ref 3.4–5)
ALP SERPL-CCNC: 116 U/L (ref 40–150)
ALT SERPL W P-5'-P-CCNC: 19 U/L (ref 0–70)
ANION GAP SERPL CALCULATED.3IONS-SCNC: 3 MMOL/L (ref 3–14)
AST SERPL W P-5'-P-CCNC: 20 U/L (ref 0–45)
BILIRUB SERPL-MCNC: 0.3 MG/DL (ref 0.2–1.3)
BUN SERPL-MCNC: 3 MG/DL (ref 7–30)
CALCIUM SERPL-MCNC: 9 MG/DL (ref 8.5–10.1)
CHLORIDE SERPL-SCNC: 108 MMOL/L (ref 94–109)
CO2 SERPL-SCNC: 29 MMOL/L (ref 20–32)
CREAT SERPL-MCNC: 0.87 MG/DL (ref 0.66–1.25)
ERYTHROCYTE [DISTWIDTH] IN BLOOD BY AUTOMATED COUNT: 16.1 % (ref 10–15)
GFR SERPL CREATININE-BSD FRML MDRD: >90 ML/MIN/1.7M2
GLUCOSE SERPL-MCNC: 89 MG/DL (ref 70–99)
HCT VFR BLD AUTO: 42.4 % (ref 40–53)
HGB BLD-MCNC: 13.3 G/DL (ref 13.3–17.7)
LITHIUM SERPL-SCNC: 0.91 MMOL/L (ref 0.6–1.2)
MCH RBC QN AUTO: 27.7 PG (ref 26.5–33)
MCHC RBC AUTO-ENTMCNC: 31.4 G/DL (ref 31.5–36.5)
MCV RBC AUTO: 88 FL (ref 78–100)
PLATELET # BLD AUTO: 296 10E9/L (ref 150–450)
POTASSIUM SERPL-SCNC: 3.7 MMOL/L (ref 3.4–5.3)
PROT SERPL-MCNC: 8.4 G/DL (ref 6.8–8.8)
RBC # BLD AUTO: 4.81 10E12/L (ref 4.4–5.9)
SODIUM SERPL-SCNC: 140 MMOL/L (ref 133–144)
TSH SERPL DL<=0.005 MIU/L-ACNC: 1.42 MU/L (ref 0.4–4)
WBC # BLD AUTO: 8.2 10E9/L (ref 4–11)

## 2018-11-07 PROCEDURE — 71046 X-RAY EXAM CHEST 2 VIEWS: CPT | Mod: TC

## 2018-11-07 PROCEDURE — 85027 COMPLETE CBC AUTOMATED: CPT | Mod: ZL | Performed by: INTERNAL MEDICINE

## 2018-11-07 PROCEDURE — 99214 OFFICE O/P EST MOD 30 MIN: CPT | Performed by: INTERNAL MEDICINE

## 2018-11-07 PROCEDURE — G0463 HOSPITAL OUTPT CLINIC VISIT: HCPCS

## 2018-11-07 PROCEDURE — 36415 COLL VENOUS BLD VENIPUNCTURE: CPT | Mod: ZL | Performed by: INTERNAL MEDICINE

## 2018-11-07 PROCEDURE — 80053 COMPREHEN METABOLIC PANEL: CPT | Mod: ZL | Performed by: INTERNAL MEDICINE

## 2018-11-07 PROCEDURE — G0463 HOSPITAL OUTPT CLINIC VISIT: HCPCS | Mod: 25

## 2018-11-07 PROCEDURE — 84443 ASSAY THYROID STIM HORMONE: CPT | Mod: ZL | Performed by: INTERNAL MEDICINE

## 2018-11-07 PROCEDURE — 80178 ASSAY OF LITHIUM: CPT | Mod: ZL | Performed by: INTERNAL MEDICINE

## 2018-11-07 RX ORDER — DEXTROAMPHETAMINE SACCHARATE, AMPHETAMINE ASPARTATE MONOHYDRATE, DEXTROAMPHETAMINE SULFATE AND AMPHETAMINE SULFATE 7.5; 7.5; 7.5; 7.5 MG/1; MG/1; MG/1; MG/1
CAPSULE, EXTENDED RELEASE ORAL
Qty: 60 CAPSULE | Refills: 0 | Status: SHIPPED | OUTPATIENT
Start: 2018-11-07 | End: 2019-02-08

## 2018-11-07 RX ORDER — VARENICLINE TARTRATE 1 MG/1
1 TABLET, FILM COATED ORAL 2 TIMES DAILY
Qty: 56 TABLET | Refills: 2 | Status: SHIPPED | OUTPATIENT
Start: 2018-11-07 | End: 2019-02-26

## 2018-11-07 ASSESSMENT — ANXIETY QUESTIONNAIRES
1. FEELING NERVOUS, ANXIOUS, OR ON EDGE: SEVERAL DAYS
2. NOT BEING ABLE TO STOP OR CONTROL WORRYING: SEVERAL DAYS
IF YOU CHECKED OFF ANY PROBLEMS ON THIS QUESTIONNAIRE, HOW DIFFICULT HAVE THESE PROBLEMS MADE IT FOR YOU TO DO YOUR WORK, TAKE CARE OF THINGS AT HOME, OR GET ALONG WITH OTHER PEOPLE: VERY DIFFICULT
GAD7 TOTAL SCORE: 10
3. WORRYING TOO MUCH ABOUT DIFFERENT THINGS: MORE THAN HALF THE DAYS
7. FEELING AFRAID AS IF SOMETHING AWFUL MIGHT HAPPEN: SEVERAL DAYS
6. BECOMING EASILY ANNOYED OR IRRITABLE: MORE THAN HALF THE DAYS
5. BEING SO RESTLESS THAT IT IS HARD TO SIT STILL: SEVERAL DAYS

## 2018-11-07 ASSESSMENT — PAIN SCALES - GENERAL: PAINLEVEL: MODERATE PAIN (5)

## 2018-11-07 ASSESSMENT — PATIENT HEALTH QUESTIONNAIRE - PHQ9
SUM OF ALL RESPONSES TO PHQ QUESTIONS 1-9: 12
5. POOR APPETITE OR OVEREATING: MORE THAN HALF THE DAYS

## 2018-11-07 NOTE — MR AVS SNAPSHOT
After Visit Summary   11/7/2018    Andrei Whitman    MRN: [id number]           Patient Information     Date Of Birth          1970        Visit Information        Provider Department      11/7/2018 9:20 AM Tommy Lamb DO [facility]        Today's Diagnoses     Cervicalgia    -  1    Simple chronic bronchitis (H)        Benign essential hypertension        DDD (degenerative disc disease), cervical        Tobacco dependence        Attention deficit hyperactivity disorder (ADHD), predominantly inattentive type        Lithium use        Tobacco dependency        Decreased breath sounds at right lung base           Follow-ups after your visit        Your next 10 appointments already scheduled     Dec 05, 2018  9:40 AM CST   (Arrive by 9:20 AM)   Pre-Op physical with Tommy Lamb DO   [facility] ([facility] [place] )    [address] [place] [address]   [phone]              Future tests that were ordered for you today     Open Future Orders        Priority Expected Expires Ordered    XR CHEST 2 VW (Clinic Performed) Routine 11/7/2018 11/7/2019 11/7/2018            Who to contact     If you have questions or need follow up information about today's clinic visit or your schedule please contact [facility] directly at [phone].  Normal or non-critical lab and imaging results will be communicated to you by MyChart, letter or phone within 4 business days after the clinic has received the results. If you do not hear from us within 7 days, please contact the clinic through MyChart or phone. If you have a critical or abnormal lab result, we will notify you by phone as soon as possible.  Submit refill requests through [employer] or call your pharmacy and they will forward the refill request to us. Please allow 3 business days for your refill to be completed.          Additional Information About  Your Visit        Care EveryWhere ID     This is your Care EveryWhere ID. This could be used by other organizations to access your [facility] medical records  [id number]        Your Vitals Were     Pulse Temperature Respirations Pulse Oximetry BMI (Body Mass Index)       72 97.9  F (36.6  C) (Tympanic) 20 98% 33.06 kg/m2        Blood Pressure from Last 3 Encounters:   11/07/18 128/64   08/06/18 114/78   06/20/18 109/75    Weight from Last 3 Encounters:   11/07/18 230 lb 6.4 oz (104.5 kg)   08/06/18 225 lb (102.1 kg)   06/20/18 223 lb (101.2 kg)              We Performed the Following     CBC with platelets     Comprehensive metabolic panel     Lithium level     TSH with free T4 reflex          Today's Medication Changes          These changes are accurate as of 11/7/18 10:04 AM.  If you have any questions, ask your nurse or doctor.               Start taking these medicines.        Dose/Directions    * varenicline 0.5 MG X 11 & 1 MG X 42 tablet   Commonly known as:  CHANTIX STARTING MONTH QUINN   Used for:  Tobacco dependence   Started by:  Tommy Lamb DO        Take 0.5 mg tab daily for 3 days, then 0.5 mg tab twice daily for 4 days, then 1 mg twice daily.   Quantity:  53 tablet   Refills:  0       * varenicline 1 MG tablet   Commonly known as:  CHANTIX   Used for:  Tobacco dependence   Started by:  Tommy Lamb DO        Dose:  1 mg   Take 1 tablet (1 mg) by mouth 2 times daily   Quantity:  56 tablet   Refills:  2       * Notice:  This list has 2 medication(s) that are the same as other medications prescribed for you. Read the directions carefully, and ask your doctor or other care provider to review them with you.         Where to get your medicines      These medications were sent to [id number] - [place] - [address]  [address] [place] [address]     Phone:  [phone]     varenicline 0.5 MG X 11 & 1 MG X 42 tablet    varenicline 1 MG tablet         Some of  these will need a paper prescription and others can be bought over the counter.  Ask your nurse if you have questions.     Bring a paper prescription for each of these medications     amphetamine-dextroamphetamine 30 MG per 24 hr capsule                Primary Care Provider Office Phone # Fax #    Tommy Lamb [phone] [address] [id number]        Equal Access to Services     SENG CEVALLOS : Hadii aad ku hadasho Soomaali, waaxda luqadaha, qaybta kaalmada adeegyada, waxay idiin hayaan adeeg kharash la'aan ah. So [facility] [phone].    ATENCIÓN: Si habla español, tiene a rodriguez disposición servicios gratuitos de asistencia lingüística. Llame al [phone].    We comply with applicable federal civil rights laws and [place] laws. We do not discriminate on the basis of race, color, national origin, age, disability, sex, sexual orientation, or gender identity.            Thank you!     Thank you for choosing [facility]  for your care. Our goal is always to provide you with excellent care. Hearing back from our patients is one way we can continue to improve our services. Please take a few minutes to complete the written survey that you may receive in the mail after your visit with us. Thank you!             Your Updated Medication List - Protect others around you: Learn how to safely use, store and throw away your medicines at www.disposemymeds.org.          This list is accurate as of 11/7/18 10:04 AM.  Always use your most recent med list.                   Brand Name Dispense Instructions for use Diagnosis    amitriptyline 25 MG tablet    ELAVIL    30 tablet    TAKE 1 TABLET BY MOUTH AT BEDTIME    Persistent insomnia       amphetamine-dextroamphetamine 30 MG per 24 hr capsule    ADDERALL XR    60 capsule    Take 1 capsule (30 mg) by mouth 2 times daily    Attention deficit hyperactivity disorder (ADHD), predominantly inattentive type        aspirin 81 MG tablet     30 tablet    Take 1 tablet (81 mg) by mouth daily    Russell's esophagus with dysplasia       budesonide-formoterol 160-4.5 MCG/ACT Inhaler    SYMBICORT    3 Inhaler    Inhale 2 puffs into the lungs 2 times daily    Simple chronic bronchitis (H)       buPROPion 100 MG tablet    WELLBUTRIN    120 tablet    TAKE 2 TABLETS BY MOUTH TWICE DAILY. DUE FOR OFFICE VISIT    Recurrent major depressive disorder, in partial remission (H)       gabapentin 300 MG capsule    NEURONTIN    180 capsule    TAKE 2 CAPSULES BY MOUTH THREE TIMES DAILY    Cervical radiculopathy       hydrochlorothiazide 12.5 MG capsule    MICROZIDE    90 capsule    Take 1 capsule (12.5 mg) by mouth every morning    Benign essential hypertension       * lithium 600 MG capsule     90 capsule    TAKE 1 CAPSULE BY MOUTH EVERY EVENING    Mood disorder (H)       * lithium 450 MG CR tablet    ESKALITH    90 tablet    TAKE 2 TABLETS BY MOUTH AT 4PM.    Mood disorder (H)       omeprazole 40 MG capsule    priLOSEC    30 capsule    TAKE 1 CAPSULE (40 MG) BY MOUTH DAILY TAKE 30-60 MINUTES BEFORE A MEAL.    Acute superficial gastritis without hemorrhage       * varenicline 0.5 MG X 11 & 1 MG X 42 tablet    CHANTIX STARTING MONTH QUINN    53 tablet    Take 0.5 mg tab daily for 3 days, then 0.5 mg tab twice daily for 4 days, then 1 mg twice daily.    Tobacco dependence       * varenicline 1 MG tablet    CHANTIX    56 tablet    Take 1 tablet (1 mg) by mouth 2 times daily    Tobacco dependence       verapamil 240 MG Cp24 24 hr capsule    VERELAN    90 capsule    TAKE 1 CAPSULE (240 MG) BY MOUTH AT BEDTIME    Essential hypertension       * Notice:  This list has 4 medication(s) that are the same as other medications prescribed for you. Read the directions carefully, and ask your doctor or other care provider to review them with you.

## 2018-11-07 NOTE — NURSING NOTE
"Chief Complaint   Patient presents with     Hypertension     COPD       Initial /64 (BP Location: Right arm, Patient Position: Chair, Cuff Size: Adult Large)  Pulse 72  Temp 97.9  F (36.6  C) (Tympanic)  Resp 20  Wt 230 lb 6.4 oz (104.5 kg)  SpO2 98%  BMI 33.06 kg/m2 Estimated body mass index is 33.06 kg/(m^2) as calculated from the following:    Height as of 6/18/18: 5' 10\" (1.778 m).    Weight as of this encounter: 230 lb 6.4 oz (104.5 kg).  Medication Reconciliation: complete    Monica Valerio LPN    "

## 2018-11-07 NOTE — LETTER
My COPD Action Plan     Name: Andrei Whitman    YOB: 1970   Date: 11/7/2018    My doctor: Tommy Lamb, DO, DO   My clinic: [facility] [place]    [address]  [place] [address]  [phone]  My Controller Medicine: Formoterol/Budesonide (Symbicort)   Dose: 2 puffs twice daily     My Rescue Medicine: None   Dose: NA     My Flare Up Medicine: None   Dose: NA     My COPD Severity: Unknown      Use of Oxygen: Oxygen Not Prescribed      Make sure you've had your pneumonia   vaccines.          GREEN ZONE       Doing well today      Usual level of activity and exercise    Usual amount of cough and mucus    No shortness of breath    Usual level of health (thinking clearly, sleeping well, feel like eating) Actions:      Take daily medicines    Use oxygen as prescribed    Follow regular exercise and diet plan    Avoid cigarette smoke and other irritants that harm the lungs           YELLOW ZONE          Having a bad day or flare up      Short of breath more than usual    A lot more sputum (mucus) than usual    Sputum looks yellow, green, tan, brown or bloody    More coughing or wheezing    Fever or chills    Less energy; trouble completing activities    Trouble thinking or focusing    Using quick relief inhaler or nebulizer more often    Poor sleep; symptoms wake me up    Do not feel like eating Actions:      Get plenty of rest    Take daily medicines    Use quick relief inhaler every NA hours    If you use oxygen, call you doctor to see if you should adjust your oxygen    Do breathing exercises or other things to help you relax    Let a loved one, friend or neighbor know you are feeling worse    Call your care team if you have 2 or more symptoms.  Start taking steroids or antibiotics if directed by your care team           RED ZONE       Need medical care now      Severe shortness of breath (feel you can't breathe)    Fever, chills    Not enough breath to do any  activity    Trouble coughing up mucus, walking or talking    Blood in mucus    Frequent coughing   Rescue medicines are not working    Not able to sleep because of breathing    Feel confused or drowsy    Chest pain    Actions:      Call your health care team.  If you cannot reach your care team, call 911 or go to the emergency room.        Annual Reminders:  Meet with Care Team, Flu Shot every Fall  Pharmacy:    [id number] - [place], [place] - [address] [facility] - [place], [place] - [address]

## 2018-11-08 ASSESSMENT — ANXIETY QUESTIONNAIRES: GAD7 TOTAL SCORE: 10

## 2018-11-09 ENCOUNTER — TELEPHONE (OUTPATIENT)
Dept: PEDIATRICS | Facility: OTHER | Age: 48
End: 2018-11-09

## 2018-11-19 DIAGNOSIS — K29.00 ACUTE SUPERFICIAL GASTRITIS WITHOUT HEMORRHAGE: ICD-10-CM

## 2018-11-19 RX ORDER — OMEPRAZOLE 40 MG/1
CAPSULE, DELAYED RELEASE ORAL
Qty: 30 CAPSULE | Refills: 1 | Status: SHIPPED | OUTPATIENT
Start: 2018-11-19 | End: 2019-01-26

## 2018-12-03 DIAGNOSIS — I10 BENIGN ESSENTIAL HYPERTENSION: ICD-10-CM

## 2018-12-03 DIAGNOSIS — M54.12 CERVICAL RADICULOPATHY: ICD-10-CM

## 2018-12-03 NOTE — PROGRESS NOTES
[facility] [place]  [address]  [place] [address]  [phone]  Dept: [phone]    PRE-OP EVALUATION:  Today's date: 2018    Surgery has not been set up or scheduled at this time.    Andrei Whitman (: 1970) presents for pre-operative evaluation assessment as requested by Dr. Sanders; [facility] in [place], [place].  He requires evaluation and anesthesia risk assessment prior to undergoing surgery/procedure for treatment of 3 disks in spine fused .    Proposed Surgery/ Procedure: TBD  Date of Surgery/ Procedure: TBD  Time of Surgery/ Procedure: TBD  Hospital/Surgical Facility: TBD  Fax number for surgical facility:   Primary Physician: Tommy Lamb  Type of Anesthesia Anticipated: General    Patient has a Health Care Directive or Living Will:  NO    1. NO - Do you have a history of heart attack, stroke, stent, bypass or surgery on an artery in the head, neck, heart or legs?  2. NO - Do you ever have any pain or discomfort in your chest?  3. NO - Do you have a history of  Heart Failure?  4. YES - ARE YOUR TROUBLED BY SHORTNESS OF BREATH WHEN WALKING ON THE LEVEL, UP A SLIGHT HILL OR AT NIGHT? Due to tobacco use and underling bronchitis  5. NO - Do you currently have a cold, bronchitis or other respiratory infection?  6. NO - Do you have a cough, shortness of breath or wheezing?  7. NO - Do you sometimes get pains in the calves of your legs when you walk?  8. NO - Do you or anyone in your family have previous history of blood clots?  9. NO - Do you or does anyone in your family have a serious bleeding problem such as prolonged bleeding following surgeries or cuts?  10. NO - Have you ever had problems with anemia or been told to take iron pills?  11. NO - Have you had any abnormal blood loss such as black, tarry or bloody stools, or abnormal vaginal bleeding?  12. NO - Have you ever had a blood transfusion?  13. NO - Have you or any of your relatives ever had  problems with anesthesia?  14. NO - Do you have sleep apnea, excessive snoring or daytime drowsiness?  15. NO - Do you have any prosthetic heart valves?  16. NO - Do you have prosthetic joints?      HPI:     HPI related to upcoming procedure:   Patient is a 47 yo male with HTN, COPD (chronic bronchitis) secondary to ongoing tobacco use and cervical disc disease with stenosis and radiculopathy and severe pain who requires cervical spine surgery to alleviate his neck pain and radicular symptoms.    COPD - Patient has a longstanding history of moderate-severe COPD . Patient has been doing well overall noting SOB and continues on medication regimen consisting of Symbicort BID and albuterol without adverse reactions or side effects.                                                                                                           .  DEPRESSION - Patient has a long history of Depression of moderate severity requiring medication for control with recent symptoms being waxing and waning moddiness..Current symptoms of depression include depressed mood, irritablility.                                                                                                                                                                                    .  HYPERTENSION - Patient has longstanding history of HTN , currently denies any symptoms referable to elevated blood pressure. Specifically denies chest pain, palpitations, dyspnea, orthopnea, PND or peripheral edema. Blood pressure readings have been in normal range. Current medication regimen is as listed below. Patient denies any side effects of medication.                                                                                                                       BP Readings from Last 6 Encounters:   12/05/18 152/86   11/07/18 128/64   08/06/18 114/78   06/20/18 109/75   06/18/18 124/80   06/07/18 144/78                                                                         .    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  Andrei Whitman has no plan or code status on file. Discussed available resources and provided with information. Confirmed code status reflects current choices pending further ACP discussions.  Confirmed/documented legally designated decision makers.  Added by Monica Valerio               Attention deficit hyperactivity disorder (ADHD), predominantly inattentive type 02/19/2016     Priority: Medium     Abnormal weight gain 09/25/2015     Priority: Medium     Mood disorder (H) 07/15/2015     Priority: Medium     GERD (gastroesophageal reflux disease) 03/12/2015     Priority: Medium     Segmental and somatic dysfunction of lower extremity 11/26/2014     Priority: Medium     Lower back pain 10/24/2014     Priority: Medium     Cervicalgia 10/02/2014     Priority: Medium      Past Medical History:   Diagnosis Date     Russell's esophagus 07/12/2017    repeat EGD in one year.     Bipolar disorder (H)      Chronic cervical pain      Colon polyp, hyperplastic 07/12/2017    rectal     Colon polyps  07/12/2017    Descending colon x1 and sigmoid colon x1     DDD (degenerative disc disease), cervical      Depression, major      Pancolonic diverticulosis 07/12/2017     Past Surgical History:   Procedure Laterality Date     APPENDECTOMY      age 14     COLONOSCOPY  07/12/2017    Left descending x1 tubular adenoma, sigmoid x1 tubular adenoma and rectal x1 hyperplastic polyp.  Pan diverticulosis     ENDOSCOPY UPPER, COLONOSCOPY, COMBINED N/A 7/12/2017    Procedure: COMBINED ENDOSCOPY UPPER, COLONOSCOPY;  UPPER ENDOSCOPY WITH BIOPSIES  AND COLONOSCOPY WITH POLYPECTOMY;  Surgeon: Francis Valverde DO;  Location: [facility] OR     ENT SURGERY  age 18    tonsils     ESOPHAGOGASTRODUODENOSCOPY  07/12/2017    chemical gatropathy, GE junction with pancreatiuc metaplasia      ESOPHAGOSCOPY, GASTROSCOPY, DUODENOSCOPY (EGD), COMBINED N/A 6/20/2018    Procedure: COMBINED ESOPHAGOSCOPY, GASTROSCOPY, DUODENOSCOPY (EGD), BIOPSY SINGLE OR MULTIPLE;  UPPER ENDOSCOPY WITH BIOPSY;  Surgeon: Francis Valverde DO;  Location: [facility] OR     ORTHOPEDIC SURGERY  age 30     back and neck fusion, bone from hip removed to use on plates     ORTHOPEDIC SURGERY  age 30    L5 fusion, laminectomy of lumbar discs     Current Outpatient Prescriptions   Medication Sig Dispense Refill     amitriptyline (ELAVIL) 25 MG tablet TAKE 1 TABLET BY MOUTH AT BEDTIME 30 tablet 3     amphetamine-dextroamphetamine (ADDERALL XR) 30 MG per 24 hr capsule Take 1 capsule (30 mg) by mouth 2 times daily 60 capsule 0     aspirin 81 MG tablet Take 1 tablet (81 mg) by mouth daily 30 tablet      budesonide-formoterol (SYMBICORT) 160-4.5 MCG/ACT Inhaler Inhale 2 puffs into the lungs 2 times daily 3 Inhaler 3     buPROPion (WELLBUTRIN) 100 MG tablet TAKE 2 TABLETS BY MOUTH TWICE DAILY. DUE FOR OFFICE VISIT 120 tablet 0     gabapentin (NEURONTIN) 300 MG capsule TAKE 2 CAPSULES BY MOUTH THREE TIMES DAILY 180 capsule 1     hydrochlorothiazide (MICROZIDE) 12.5 MG capsule TAKE 1 CAPSULE  (12.5 MG) BY MOUTH EVERY MORNING 90 capsule 0     lithium (ESKALITH) 450 MG CR tablet TAKE 2 TABLETS BY MOUTH AT 4PM. 90 tablet 3     lithium 600 MG capsule TAKE 1 CAPSULE BY MOUTH EVERY EVENING 90 capsule 1     omeprazole (PRILOSEC) 40 MG capsule TAKE 1 CAPSULE (40 MG) BY MOUTH DAILY TAKE 30-60 MINUTES BEFORE A MEAL. 30 capsule 1     varenicline (CHANTIX STARTING MONTH PAK) 0.5 MG X 11 & 1 MG X 42 tablet Take 0.5 mg tab daily for 3 days, then 0.5 mg tab twice daily for 4 days, then 1 mg twice daily. 53 tablet 0     varenicline (CHANTIX) 1 MG tablet Take 1 tablet (1 mg) by mouth 2 times daily 56 tablet 2     verapamil (VERELAN) 240 MG CP24 24 hr capsule TAKE 1 CAPSULE (240 MG) BY MOUTH AT BEDTIME 90 capsule 1     OTC products: None, except as noted above    No Known Allergies   Latex Allergy: NO    Social History   Substance Use Topics     Smoking status: Current Every Day Smoker     Packs/day: 1.00     Years: 30.00     Smokeless tobacco: Never Used      Comment: quitplan referral declined 6/18/18     Alcohol use No     History   Drug Use No       REVIEW OF SYSTEMS:   CONSTITUTIONAL: NEGATIVE for fever, chills, change in weight  INTEGUMENTARY/SKIN: NEGATIVE for worrisome rashes, moles or lesions  EYES: NEGATIVE for vision changes or irritation  ENT/MOUTH: NEGATIVE for ear, mouth and throat problems  RESP: NEGATIVE for significant cough or SOB  RESP:POSITIVE for dyspnea on exertion  CV: NEGATIVE for chest pain, palpitations or peripheral edema  GI: NEGATIVE for nausea, abdominal pain, heartburn, or change in bowel habits  : NEGATIVE for frequency, dysuria, or hematuria  MUSCULOSKELETAL:Chronic neck pain and arm pains with weakness of the upper extremities.   NEURO: NEGATIVE for weakness, dizziness or paresthesias  ENDOCRINE: NEGATIVE for temperature intolerance, skin/hair changes  HEME: NEGATIVE for bleeding problems  PSYCHIATRIC: NEGATIVE for changes in mood or affect    EXAM:   /80  Pulse 89  Temp 97.2  F  (36.2  C) (Tympanic)  Wt 233 lb (105.7 kg)  SpO2 98%  BMI 33.43 kg/m2    GENERAL APPEARANCE: healthy, alert and no distress     EYES: EOMI,  PERRL     HENT: ear canals and TM's normal and nose and mouth without ulcers or lesions     NECK: no adenopathy, no asymmetry, masses, or scars and thyroid normal to palpation     RESP: lungs clear to auscultation - no rales, rhonchi or wheezes     CV: regular rates and rhythm, normal S1 S2, no S3 or S4 and no murmur, click or rub     ABDOMEN:  soft, nontender, no HSM or masses and bowel sounds normal     ABDOMEN: no bruits heard     MS: extremities normal- no gross deformities noted, no evidence of inflammation in joints, FROM in all extremities.     SKIN: no suspicious lesions or rashes     NEURO: Normal strength and tone, sensory exam grossly normal, mentation intact and speech normal     NEURO: cranial nerves 3-12 intact     PSYCH: mentation appears normal. and affect normal/bright     LYMPHATICS: No cervical adenopathy    DIAGNOSTICS:          EKG Interpretation:      Interpreted by Tommy Lamb DO  Time reviewed:  Symptoms at time of EKG: None   Rhythm: Normal sinus   Rate: Normal  Axis: Left Axis Deviation  Ectopy: None  Conduction: 1st degree AV block  ST Segments/ T Waves: QTc prolongation   Q Waves: None  Comparison to prior: Unchanged from 06/18/18    Clinical Impression: no acute changes    12/12/18  9:15 AM [id number] [facility]       [facility]    PACS Images      Show images for NM Exercise stress test   Study Result     PROCEDURE: NM MPI TREADMILL 12/12/2018 9:15 AM     HISTORY: Shortness of breath; Preop general physical exam; Benign  essential hypertension; Tobacco dependency     COMPARISONS: None.     TECHNIQUE: At rest 10 mCi of technetium 99m sestamibi was injected  patient was stressed and 30 mCi of technetium 99m sestamibi was  injected.     FINDINGS: Images was a questionable abnormal area of  decreased tracer  uptake inferolaterally which was reversible on the rest image. This  defect is small in size. Otherwise there is a normal distribution of  tracer activity throughout the myocardium. The end-diastolic volume  measured 156 mL. The end-systolic volume was 70 mL's. Calculated  ejection fraction was 55%.                                                                        IMPRESSION: Questionable small area reversible ischemia  anterolaterally     TANA GARZON MD   Order-Level Documents:     There are no order-level documents.   Lab and Collection     NM Exercise stress test (Order: [id number]) - 12/12/2018   Result Information     Status: Final result (Exam End: 12/12/2018  9:15 AM) Provider Status: Open     Status:  Final result   Visible to patient:  No (Inaccessible in MyChart) Next appt:  None Dx:  Shortness of breath ; Benign essentia...   Details     Reading Physician Reading Date Result Priority   Tierra Barton MD 12/17/2018       Narrative     This is an exercise Cardiolite study on patient Andrei Whitman ordered  by Dr. Lamb for shortness of breath.    Patient was placed upon the treadmill using the John protocol. Went  for a total time of 4 minutes into stage II which time discontinued  secondary to fatigue especially legs and some shortness of breath.    Heart rate fior to a maximum of 132 and blood pressure maximum 160/80  for a double product of 21,100. He achieved 7 METs and 75% of maximum  heart rate.     Review of the electrocardiogram shows no acute ST-T changes. There  were occasional PAC PVC seen.    ASSESSMENT: Exercise Cardiolite study subjective the patient was  limited by fatigue especially his legs and some shortness of breath.  Objectively somewhat poor exercise tolerance without any obvious  evidence of ischemia on the EKG. The Cardiolite scans are pending.    TIERRA BRATON MD             Last Resulted: 12/17/18  6:17 AM Order Details View Encounter Lab and  Collection Details Routing Result History               Recent Labs   Lab Test  11/07/18   1010  06/07/18   1004   06/08/17   1458   HGB  13.3   --    --   12.8*   PLT  296   --    --   208   NA  140  138   < >  137   POTASSIUM  3.7  4.0   < >  3.5   CR  0.87  0.84   < >  1.03    < > = values in this interval not displayed.        IMPRESSION:   Reason for surgery/procedure: Patient has cervical disc disease with stenosis and radiculopathy and severe pain who requires cervical spine surgery to alleviate his neck pain and radicular symptoms.    Diagnosis/reason for consult:   Patient needs an assessment for her risk of cardiopulmonary complications under general anesthesia for the proposed procedure of TBD to be done on TBD by Dr. HORNE    The proposed surgical procedure is considered INTERMEDIATE risk.    REVISED CARDIAC RISK INDEX  The patient has the following serious cardiovascular risks for perioperative complications such as (MI, PE, VFib and 3  AV Block):  No serious cardiac risks  INTERPRETATION: 0 risks: Class I (very low risk - 0.4% complication rate)    The patient has the following additional risks for perioperative complications:  No identified additional risks      ICD-10-CM    1. Preop general physical exam Z01.818 EKG 12-lead complete w/read - (Clinic Performed)     NM Exercise stress test   2. Benign essential hypertension I10 EKG 12-lead complete w/read - (Clinic Performed)     NM Exercise stress test   3. Cervical stenosis of spinal canal M48.02    4. Cervicalgia M54.2    5. Mood disorder (H) F39    6. Simple chronic bronchitis (H) J41.0    7. Tobacco dependency F17.200 NM Exercise stress test   8. Shortness of breath  R06.02 NM Exercise stress test       RECOMMENDATIONS:       Cardiovascular Risk  Performs 4 METs exercise without symptoms (Light housework (dusting, washing dishes), Climb a flight of stairs, Slow step dance and Shoveling) .       Pulmonary Risk  Incentive spirometry post  op  Respiratory Therapy (Respiratory Care IP Consult)  post op  NG tube decompression if abdominal distension or significant vomiting       --Patient is to take all scheduled medications on the day of surgery EXCEPT for modifications listed below.    APPROVAL GIVEN to proceed with proposed procedure, without further diagnostic evaluation       Signed Electronically by: Tommy Lamb DO, DO    Copy of this evaluation report is provided to requesting physician.    [facility] Preop Guidelines    Revised Cardiac Risk Index    [facility]  [address]  [place] [address]  [phone]  Dept: [phone]

## 2018-12-04 RX ORDER — GABAPENTIN 300 MG/1
CAPSULE ORAL
Qty: 180 CAPSULE | Refills: 1 | Status: SHIPPED | OUTPATIENT
Start: 2018-12-04 | End: 2018-12-21

## 2018-12-04 RX ORDER — HYDROCHLOROTHIAZIDE 12.5 MG/1
CAPSULE ORAL
Qty: 90 CAPSULE | Refills: 0 | Status: SHIPPED | OUTPATIENT
Start: 2018-12-04 | End: 2019-03-03

## 2018-12-04 NOTE — TELEPHONE ENCOUNTER
gabapentin      Last Written Prescription Date:  6/14/18  Last Fill Quantity: 180,   # refills: 4  Last Office Visit: 11/7/18  Future Office visit:    Next 5 appointments (look out 90 days)     Dec 05, 2018  9:40 AM CST   (Arrive by 9:20 AM)   Pre-Op physical with Tommy Lamb DO   [facility] [place] ([facility] [place] )    [address] [place] [address]   [phone]                   Routing refill request to provider for review/approval because:  Drug not on the [facility], [facility] or [facility] refill protocol or controlled substance

## 2018-12-05 ENCOUNTER — OFFICE VISIT (OUTPATIENT)
Dept: PEDIATRICS | Facility: OTHER | Age: 48
End: 2018-12-05
Attending: INTERNAL MEDICINE
Payer: MEDICARE

## 2018-12-05 VITALS
OXYGEN SATURATION: 98 % | TEMPERATURE: 97.2 F | HEART RATE: 89 BPM | DIASTOLIC BLOOD PRESSURE: 80 MMHG | BODY MASS INDEX: 33.43 KG/M2 | SYSTOLIC BLOOD PRESSURE: 130 MMHG | WEIGHT: 233 LBS

## 2018-12-05 DIAGNOSIS — M54.2 CERVICALGIA: ICD-10-CM

## 2018-12-05 DIAGNOSIS — M48.02 CERVICAL STENOSIS OF SPINAL CANAL: ICD-10-CM

## 2018-12-05 DIAGNOSIS — G47.00 PERSISTENT INSOMNIA: ICD-10-CM

## 2018-12-05 DIAGNOSIS — Z01.818 PREOP GENERAL PHYSICAL EXAM: Primary | ICD-10-CM

## 2018-12-05 DIAGNOSIS — J41.0 SIMPLE CHRONIC BRONCHITIS (H): ICD-10-CM

## 2018-12-05 DIAGNOSIS — F17.200 TOBACCO DEPENDENCY: ICD-10-CM

## 2018-12-05 DIAGNOSIS — F39 MOOD DISORDER (H): ICD-10-CM

## 2018-12-05 DIAGNOSIS — R06.02 SHORTNESS OF BREATH: ICD-10-CM

## 2018-12-05 DIAGNOSIS — I10 BENIGN ESSENTIAL HYPERTENSION: ICD-10-CM

## 2018-12-05 DIAGNOSIS — F90.0 ATTENTION DEFICIT HYPERACTIVITY DISORDER (ADHD), PREDOMINANTLY INATTENTIVE TYPE: ICD-10-CM

## 2018-12-05 PROCEDURE — 93010 ELECTROCARDIOGRAM REPORT: CPT | Performed by: INTERNAL MEDICINE

## 2018-12-05 PROCEDURE — G0463 HOSPITAL OUTPT CLINIC VISIT: HCPCS

## 2018-12-05 PROCEDURE — 99214 OFFICE O/P EST MOD 30 MIN: CPT | Mod: 25 | Performed by: INTERNAL MEDICINE

## 2018-12-05 PROCEDURE — G0463 HOSPITAL OUTPT CLINIC VISIT: HCPCS | Mod: 25

## 2018-12-05 PROCEDURE — 93005 ELECTROCARDIOGRAM TRACING: CPT

## 2018-12-05 RX ORDER — DEXTROAMPHETAMINE SACCHARATE, AMPHETAMINE ASPARTATE MONOHYDRATE, DEXTROAMPHETAMINE SULFATE AND AMPHETAMINE SULFATE 7.5; 7.5; 7.5; 7.5 MG/1; MG/1; MG/1; MG/1
30 CAPSULE, EXTENDED RELEASE ORAL 2 TIMES DAILY
Qty: 60 CAPSULE | Refills: 0 | Status: SHIPPED | OUTPATIENT
Start: 2018-12-05 | End: 2019-02-08

## 2018-12-05 RX ORDER — DEXTROAMPHETAMINE SACCHARATE, AMPHETAMINE ASPARTATE MONOHYDRATE, DEXTROAMPHETAMINE SULFATE AND AMPHETAMINE SULFATE 7.5; 7.5; 7.5; 7.5 MG/1; MG/1; MG/1; MG/1
30 CAPSULE, EXTENDED RELEASE ORAL DAILY
Qty: 30 CAPSULE | Refills: 0 | Status: SHIPPED | OUTPATIENT
Start: 2019-01-05 | End: 2018-12-05

## 2018-12-05 RX ORDER — DEXTROAMPHETAMINE SACCHARATE, AMPHETAMINE ASPARTATE MONOHYDRATE, DEXTROAMPHETAMINE SULFATE AND AMPHETAMINE SULFATE 7.5; 7.5; 7.5; 7.5 MG/1; MG/1; MG/1; MG/1
30 CAPSULE, EXTENDED RELEASE ORAL DAILY
Qty: 30 CAPSULE | Refills: 0 | Status: SHIPPED | OUTPATIENT
Start: 2019-02-05 | End: 2018-12-05

## 2018-12-05 RX ORDER — DEXTROAMPHETAMINE SACCHARATE, AMPHETAMINE ASPARTATE MONOHYDRATE, DEXTROAMPHETAMINE SULFATE AND AMPHETAMINE SULFATE 7.5; 7.5; 7.5; 7.5 MG/1; MG/1; MG/1; MG/1
30 CAPSULE, EXTENDED RELEASE ORAL DAILY
Qty: 30 CAPSULE | Refills: 0 | Status: SHIPPED | OUTPATIENT
Start: 2018-12-05 | End: 2018-12-05

## 2018-12-05 ASSESSMENT — ANXIETY QUESTIONNAIRES
3. WORRYING TOO MUCH ABOUT DIFFERENT THINGS: MORE THAN HALF THE DAYS
IF YOU CHECKED OFF ANY PROBLEMS ON THIS QUESTIONNAIRE, HOW DIFFICULT HAVE THESE PROBLEMS MADE IT FOR YOU TO DO YOUR WORK, TAKE CARE OF THINGS AT HOME, OR GET ALONG WITH OTHER PEOPLE: VERY DIFFICULT
GAD7 TOTAL SCORE: 10
6. BECOMING EASILY ANNOYED OR IRRITABLE: MORE THAN HALF THE DAYS
2. NOT BEING ABLE TO STOP OR CONTROL WORRYING: MORE THAN HALF THE DAYS
7. FEELING AFRAID AS IF SOMETHING AWFUL MIGHT HAPPEN: SEVERAL DAYS
5. BEING SO RESTLESS THAT IT IS HARD TO SIT STILL: SEVERAL DAYS
1. FEELING NERVOUS, ANXIOUS, OR ON EDGE: SEVERAL DAYS

## 2018-12-05 ASSESSMENT — PATIENT HEALTH QUESTIONNAIRE - PHQ9
SUM OF ALL RESPONSES TO PHQ QUESTIONS 1-9: 12
5. POOR APPETITE OR OVEREATING: SEVERAL DAYS

## 2018-12-05 ASSESSMENT — PAIN SCALES - GENERAL: PAINLEVEL: SEVERE PAIN (6)

## 2018-12-05 NOTE — NURSING NOTE
"Chief Complaint   Patient presents with     Pre-Op Exam       Initial /86 (BP Location: Left arm, Patient Position: Sitting, Cuff Size: Adult Regular)  Pulse 89  Temp 97.2  F (36.2  C) (Tympanic)  Wt 233 lb (105.7 kg)  SpO2 98%  BMI 33.43 kg/m2 Estimated body mass index is 33.43 kg/(m^2) as calculated from the following:    Height as of 6/18/18: 5' 10\" (1.778 m).    Weight as of this encounter: 233 lb (105.7 kg).  Medication Reconciliation: complete    Julissa Jarquin LPN  "

## 2018-12-05 NOTE — MR AVS SNAPSHOT
After Visit Summary   12/5/2018    Andrei Whitman    MRN: [id number]           Patient Information     Date Of Birth          1970        Visit Information        Provider Department      12/5/2018 9:40 AM Tommy Lamb DO [facility] [place]        Today's Diagnoses     Preop general physical exam    -  1    Benign essential hypertension        Cervical stenosis of spinal canal        Cervicalgia        Mood disorder (H)        Simple chronic bronchitis (H)        Tobacco dependency        Shortness of breath         Persistent insomnia        Attention deficit hyperactivity disorder (ADHD), predominantly inattentive type          Care Instructions      Before Your Surgery      Call your surgeon if there is any change in your health. This includes signs of a cold or flu (such as a sore throat, runny nose, cough, rash or fever).    Do not smoke, drink alcohol or take over the counter medicine (unless your surgeon or primary care doctor tells you to) for the 24 hours before and after surgery.    If you take prescribed drugs: Follow your doctor s orders about which medicines to take and which to stop until after surgery.    Eating and drinking prior to surgery: follow the instructions from your surgeon    Take a shower or bath the night before surgery. Use the soap your surgeon gave you to gently clean your skin. If you do not have soap from your surgeon, use your regular soap. Do not shave or scrub the surgery site.  Wear clean pajamas and have clean sheets on your bed.     Before Your Surgery      Call your surgeon if there is any change in your health. This includes signs of a cold or flu (such as a sore throat, runny nose, cough, rash or fever).    Do not smoke, drink alcohol or take over the counter medicine (unless your surgeon or primary care doctor tells you to) for the 24 hours before and after surgery.    If you take prescribed drugs: Follow your doctor s orders  about which medicines to take and which to stop until after surgery.    Eating and drinking prior to surgery: follow the instructions from your surgeon    Take a shower or bath the night before surgery. Use the soap your surgeon gave you to gently clean your skin. If you do not have soap from your surgeon, use your regular soap. Do not shave or scrub the surgery site.  Wear clean pajamas and have clean sheets on your bed.           Follow-ups after your visit        Future tests that were ordered for you today     Open Future Orders        Priority Expected Expires Ordered    NM Exercise stress test Routine  12/5/2019 12/5/2018            Who to contact     If you have questions or need follow up information about today's clinic visit or your schedule please contact [facility] directly at [phone].  Normal or non-critical lab and imaging results will be communicated to you by MyChart, letter or phone within 4 business days after the clinic has received the results. If you do not hear from us within 7 days, please contact the clinic through MyChart or phone. If you have a critical or abnormal lab result, we will notify you by phone as soon as possible.  Submit refill requests through [facility] or call your pharmacy and they will forward the refill request to us. Please allow 3 business days for your refill to be completed.          Additional Information About Your Visit        Care EveryWhere ID     This is your Care EveryWhere ID. This could be used by other organizations to access your [facility] medical records  [id number]        Your Vitals Were     Pulse Temperature Pulse Oximetry BMI (Body Mass Index)          89 97.2  F (36.2  C) (Tympanic) 98% 33.43 kg/m2         Blood Pressure from Last 3 Encounters:   12/05/18 130/80   11/07/18 128/64   08/06/18 114/78    Weight from Last 3 Encounters:   12/05/18 233 lb (105.7 kg)   11/07/18 230 lb 6.4 oz (104.5 kg)   08/06/18 225 lb (102.1 kg)               We Performed the Following     EKG 12-lead complete w/read - (Clinic Performed)          Today's Medication Changes          These changes are accurate as of 12/5/18 10:13 AM.  If you have any questions, ask your nurse or doctor.               These medicines have changed or have updated prescriptions.        Dose/Directions    amitriptyline 25 MG tablet   Commonly known as:  ELAVIL   This may have changed:  See the new instructions.   Used for:  Persistent insomnia   Changed by:  Tommy Lamb DO        Dose:  25 mg   Take 1 tablet (25 mg) by mouth At Bedtime   Quantity:  90 tablet   Refills:  3       * amphetamine-dextroamphetamine 30 MG 24 hr capsule   Commonly known as:  ADDERALL XR   This may have changed:  Another medication with the same name was added. Make sure you understand how and when to take each.   Changed by:  Tommy Lamb DO        Take 1 capsule (30 mg) by mouth 2 times daily   Quantity:  60 capsule   Refills:  0       * amphetamine-dextroamphetamine 30 MG 24 hr capsule   Commonly known as:  ADDERALL XR   This may have changed:  You were already taking a medication with the same name, and this prescription was added. Make sure you understand how and when to take each.   Used for:  Attention deficit hyperactivity disorder (ADHD), predominantly inattentive type   Changed by:  Tommy Lamb DO        Dose:  30 mg   Take 1 capsule (30 mg) by mouth daily   Quantity:  30 capsule   Refills:  0       * amphetamine-dextroamphetamine 30 MG 24 hr capsule   Commonly known as:  ADDERALL XR   This may have changed:  You were already taking a medication with the same name, and this prescription was added. Make sure you understand how and when to take each.   Used for:  Attention deficit hyperactivity disorder (ADHD), predominantly inattentive type   Changed by:  Tommy Lamb DO        Dose:  30 mg   Start taking on:  1/5/2019   Take 1 capsule (30 mg) by  mouth daily   Quantity:  30 capsule   Refills:  0       * amphetamine-dextroamphetamine 30 MG 24 hr capsule   Commonly known as:  ADDERALL XR   This may have changed:  You were already taking a medication with the same name, and this prescription was added. Make sure you understand how and when to take each.   Used for:  Attention deficit hyperactivity disorder (ADHD), predominantly inattentive type   Changed by:  Tommy Lamb DO        Dose:  30 mg   Start taking on:  2/5/2019   Take 1 capsule (30 mg) by mouth daily   Quantity:  30 capsule   Refills:  0       * Notice:  This list has 4 medication(s) that are the same as other medications prescribed for you. Read the directions carefully, and ask your doctor or other care provider to review them with you.         Where to get your medicines      These medications were sent to [id number] - [place] - [address]  [address] [place] [address]     Phone:  [phone]     amitriptyline 25 MG tablet         Some of these will need a paper prescription and others can be bought over the counter.  Ask your nurse if you have questions.     Bring a paper prescription for each of these medications     amphetamine-dextroamphetamine 30 MG 24 hr capsule    amphetamine-dextroamphetamine 30 MG 24 hr capsule    amphetamine-dextroamphetamine 30 MG 24 hr capsule                Primary Care Provider Office Phone # Fax #    Tommy Lamb [phone]       [address] [facility] [facility]  [place] [address]        Equal Access to Services     [facility]: Hadii aad ku hadasho Soomaali, waaxda luqadaha, qaybta kaalmada adeegyada, alka mendoza haybrooke parker . So [facility] [phone].    ATENCIÓN: Si habla español, tiene a rodriguez disposición servicios gratuitos de asistencia lingüística. Llame al [phone].    We comply with applicable federal civil rights laws and [place] laws. We do not discriminate on the basis of  race, color, national origin, age, disability, sex, sexual orientation, or gender identity.            Thank you!     Thank you for choosing [facility]  for your care. Our goal is always to provide you with excellent care. Hearing back from our patients is one way we can continue to improve our services. Please take a few minutes to complete the written survey that you may receive in the mail after your visit with us. Thank you!             Your Updated Medication List - Protect others around you: Learn how to safely use, store and throw away your medicines at www.disposemymeds.org.          This list is accurate as of 12/5/18 10:13 AM.  Always use your most recent med list.                   Brand Name Dispense Instructions for use Diagnosis    amitriptyline 25 MG tablet    ELAVIL    90 tablet    Take 1 tablet (25 mg) by mouth At Bedtime    Persistent insomnia       * amphetamine-dextroamphetamine 30 MG 24 hr capsule    ADDERALL XR    60 capsule    Take 1 capsule (30 mg) by mouth 2 times daily        * amphetamine-dextroamphetamine 30 MG 24 hr capsule    ADDERALL XR    30 capsule    Take 1 capsule (30 mg) by mouth daily    Attention deficit hyperactivity disorder (ADHD), predominantly inattentive type       * amphetamine-dextroamphetamine 30 MG 24 hr capsule   Start taking on:  1/5/2019    ADDERALL XR    30 capsule    Take 1 capsule (30 mg) by mouth daily    Attention deficit hyperactivity disorder (ADHD), predominantly inattentive type       * amphetamine-dextroamphetamine 30 MG 24 hr capsule   Start taking on:  2/5/2019    ADDERALL XR    30 capsule    Take 1 capsule (30 mg) by mouth daily    Attention deficit hyperactivity disorder (ADHD), predominantly inattentive type       aspirin 81 MG tablet    ASA    30 tablet    Take 1 tablet (81 mg) by mouth daily    Russell's esophagus with dysplasia       budesonide-formoterol 160-4.5 MCG/ACT Inhaler    SYMBICORT    3 Inhaler    Inhale 2 puffs  into the lungs 2 times daily    Simple chronic bronchitis (H)       buPROPion 100 MG tablet    WELLBUTRIN    120 tablet    TAKE 2 TABLETS BY MOUTH TWICE DAILY. DUE FOR OFFICE VISIT    Recurrent major depressive disorder, in partial remission (H)       gabapentin 300 MG capsule    NEURONTIN    180 capsule    TAKE 2 CAPSULES BY MOUTH THREE TIMES DAILY    Cervical radiculopathy       hydrochlorothiazide 12.5 MG capsule    MICROZIDE    90 capsule    TAKE 1 CAPSULE (12.5 MG) BY MOUTH EVERY MORNING    Benign essential hypertension       * lithium 600 MG capsule    ESKALITH    90 capsule    TAKE 1 CAPSULE BY MOUTH EVERY EVENING    Mood disorder (H)       * lithium 450 MG CR tablet    ESKALITH    90 tablet    TAKE 2 TABLETS BY MOUTH AT 4PM.    Mood disorder (H)       omeprazole 40 MG DR capsule    priLOSEC    30 capsule    TAKE 1 CAPSULE (40 MG) BY MOUTH DAILY TAKE 30-60 MINUTES BEFORE A MEAL.    Acute superficial gastritis without hemorrhage       * varenicline 0.5 MG X 11 & 1 MG X 42 tablet    CHANTIX STARTING MONTH QUINN    53 tablet    Take 0.5 mg tab daily for 3 days, then 0.5 mg tab twice daily for 4 days, then 1 mg twice daily.        * varenicline 1 MG tablet    CHANTIX    56 tablet    Take 1 tablet (1 mg) by mouth 2 times daily        verapamil  MG 24 hr capsule    VERELAN    90 capsule    TAKE 1 CAPSULE (240 MG) BY MOUTH AT BEDTIME    Essential hypertension       * Notice:  This list has 8 medication(s) that are the same as other medications prescribed for you. Read the directions carefully, and ask your doctor or other care provider to review them with you.

## 2018-12-05 NOTE — LETTER
My COPD Action Plan     Name: Andrei Whitman    YOB: 1970   Date: 12/4/2018    My doctor: Tommy Lamb, DO, DO   My clinic: [facility] [place]    [facility] [address]  [place] [address]  [phone]  My Controller Medicine: Formoterol/Budesonide (Symbicort)   Dose: inhale to puffs BID     My Rescue Medicine: N/A   Dose:      My Flare Up Medicine: N/A   Dose:      My COPD Severity:       Use of Oxygen: Oxygen Not Prescribed      Make sure you've had your pneumonia   vaccines.          GREEN ZONE       Doing well today      Usual level of activity and exercise    Usual amount of cough and mucus    No shortness of breath    Usual level of health (thinking clearly, sleeping well, feel like eating) Actions:      Take daily medicines    Use oxygen as prescribed    Follow regular exercise and diet plan    Avoid cigarette smoke and other irritants that harm the lungs           YELLOW ZONE          Having a bad day or flare up      Short of breath more than usual    A lot more sputum (mucus) than usual    Sputum looks yellow, green, tan, brown or bloody    More coughing or wheezing    Fever or chills    Less energy; trouble completing activities    Trouble thinking or focusing    Using quick relief inhaler or nebulizer more often    Poor sleep; symptoms wake me up    Do not feel like eating Actions:      Get plenty of rest    Take daily medicines    Use quick relief inhaler every N/A hours    If you use oxygen, call you doctor to see if you should adjust your oxygen    Do breathing exercises or other things to help you relax    Let a loved one, friend or neighbor know you are feeling worse    Call your care team if you have 2 or more symptoms.  Start taking steroids or antibiotics if directed by your care team           RED ZONE       Need medical care now      Severe shortness of breath (feel you can't breathe)    Fever, chills    Not enough breath to do any activity    Trouble  coughing up mucus, walking or talking    Blood in mucus    Frequent coughing   Rescue medicines are not working    Not able to sleep because of breathing    Feel confused or drowsy    Chest pain    Actions:      Call your health care team.  If you cannot reach your care team, call 911 or go to the emergency room.        Annual Reminders:  Meet with Care Team, Flu Shot every Fall  Pharmacy:    [id number] - [place], [place] - [address] [facility] [place] - [address]

## 2018-12-07 ASSESSMENT — ANXIETY QUESTIONNAIRES: GAD7 TOTAL SCORE: 10

## 2018-12-10 DIAGNOSIS — F33.41 RECURRENT MAJOR DEPRESSIVE DISORDER, IN PARTIAL REMISSION (H): ICD-10-CM

## 2018-12-11 NOTE — TELEPHONE ENCOUNTER
Burpropion      Last Written Prescription Date:  11/28/18  Last Fill Quantity: 120,   # refills: 0  Last Office Visit: 12/5/18

## 2018-12-12 ENCOUNTER — HOSPITAL ENCOUNTER (OUTPATIENT)
Dept: NUCLEAR MEDICINE | Facility: HOSPITAL | Age: 48
Setting detail: NUCLEAR MEDICINE
End: 2018-12-12
Attending: INTERNAL MEDICINE
Payer: MEDICARE

## 2018-12-12 ENCOUNTER — HOSPITAL ENCOUNTER (OUTPATIENT)
Dept: GENERAL RADIOLOGY | Facility: HOSPITAL | Age: 48
Setting detail: NUCLEAR MEDICINE
End: 2018-12-12
Attending: INTERNAL MEDICINE
Payer: MEDICARE

## 2018-12-12 ENCOUNTER — HOSPITAL ENCOUNTER (OUTPATIENT)
Dept: NUCLEAR MEDICINE | Facility: HOSPITAL | Age: 48
Setting detail: NUCLEAR MEDICINE
Discharge: HOME | End: 2018-12-12
Attending: INTERNAL MEDICINE | Admitting: INTERNAL MEDICINE
Payer: MEDICARE

## 2018-12-12 DIAGNOSIS — R06.02 SHORTNESS OF BREATH: ICD-10-CM

## 2018-12-12 DIAGNOSIS — I10 BENIGN ESSENTIAL HYPERTENSION: ICD-10-CM

## 2018-12-12 DIAGNOSIS — F17.200 TOBACCO DEPENDENCY: ICD-10-CM

## 2018-12-12 DIAGNOSIS — Z01.818 PREOP GENERAL PHYSICAL EXAM: ICD-10-CM

## 2018-12-12 PROCEDURE — 25000128 H RX IP 250 OP 636: Performed by: INTERNAL MEDICINE

## 2018-12-12 PROCEDURE — 93018 CV STRESS TEST I&R ONLY: CPT | Performed by: INTERNAL MEDICINE

## 2018-12-12 PROCEDURE — 34300033 ZZH RX 343: Performed by: RADIOLOGY

## 2018-12-12 PROCEDURE — 93017 CV STRESS TEST TRACING ONLY: CPT

## 2018-12-12 PROCEDURE — A9500 TC99M SESTAMIBI: HCPCS | Performed by: INTERNAL MEDICINE

## 2018-12-12 PROCEDURE — A9500 TC99M SESTAMIBI: HCPCS | Performed by: RADIOLOGY

## 2018-12-12 PROCEDURE — 34300033 ZZH RX 343: Performed by: INTERNAL MEDICINE

## 2018-12-12 PROCEDURE — 93016 CV STRESS TEST SUPVJ ONLY: CPT | Performed by: INTERNAL MEDICINE

## 2018-12-12 PROCEDURE — 78452 HT MUSCLE IMAGE SPECT MULT: CPT | Mod: TC

## 2018-12-12 RX ORDER — BUPROPION HYDROCHLORIDE 100 MG/1
TABLET ORAL
Qty: 120 TABLET | Refills: 1 | Status: SHIPPED | OUTPATIENT
Start: 2018-12-12 | End: 2019-02-01

## 2018-12-12 RX ORDER — SODIUM CHLORIDE 9 MG/ML
INJECTION, SOLUTION INTRAVENOUS ONCE
Status: COMPLETED | OUTPATIENT
Start: 2018-12-12 | End: 2018-12-12

## 2018-12-12 RX ADMIN — Medication 10 MILLICURIE: at 06:33

## 2018-12-12 RX ADMIN — Medication 30 MILLICURIE: at 08:21

## 2018-12-12 RX ADMIN — SODIUM CHLORIDE: 9 INJECTION, SOLUTION INTRAVENOUS at 08:06

## 2018-12-21 DIAGNOSIS — M54.12 CERVICAL RADICULOPATHY: ICD-10-CM

## 2018-12-21 NOTE — TELEPHONE ENCOUNTER
gabapentin (NEURONTIN) 300 MG capsule  Last Written Prescription Date:  12/04/2018  Last Fill Quantity: 180,   # refills: 1  Last Office Visit: 12/05/2018  Future Office visit:       Routing refill request to provider for review/approval because:

## 2018-12-24 RX ORDER — GABAPENTIN 300 MG/1
CAPSULE ORAL
Qty: 180 CAPSULE | Refills: 1 | Status: SHIPPED | OUTPATIENT
Start: 2018-12-24 | End: 2019-03-03

## 2019-01-04 ENCOUNTER — TELEPHONE (OUTPATIENT)
Dept: INTERNAL MEDICINE | Facility: OTHER | Age: 49
End: 2019-01-04

## 2019-01-04 NOTE — TELEPHONE ENCOUNTER
Patient states he gave you a paper a few months back that stated his adderall was going to change from the formulary list. Patient states you took this paper and were going to change his medication, but also you were going to speak to Caridad Ornelas at the spine clinic regarding his surgery and he hasn't heard from you or the spine clinic and he already had his preop. Please advise. Thank you

## 2019-01-08 NOTE — TELEPHONE ENCOUNTER
10:54 AM    Reason for Call: Phone Call    Description: Patient called clinic and would like PCP nurse to give him a phone call back in regards from 1/4/19.   Please advise.  Harriet Momin CMA     Was an appointment offered for this call? No  If yes : Appointment type              Date    Preferred method for responding to this message: Telephone Call  What is your phone number ?[phone]    If we cannot reach you directly, may we leave a detailed response at the number you provided? Yes    Can this message wait until your PCP/provider returns, if available today? Not applicable    Harriet Momin MA

## 2019-01-08 NOTE — TELEPHONE ENCOUNTER
Called [facility] and they had script on file that will go through pharmacy so adderall xr 30 bid was filled today and patient was updated.

## 2019-01-08 NOTE — TELEPHONE ENCOUNTER
Patient called back in regards to message and is wanting nurse to call him. Please advise.  Hrariet Momin, CMA

## 2019-01-26 DIAGNOSIS — K29.00 ACUTE SUPERFICIAL GASTRITIS WITHOUT HEMORRHAGE: ICD-10-CM

## 2019-01-28 RX ORDER — OMEPRAZOLE 40 MG/1
CAPSULE, DELAYED RELEASE ORAL
Qty: 30 CAPSULE | Refills: 1 | Status: SHIPPED | OUTPATIENT
Start: 2019-01-28 | End: 2019-03-14

## 2019-02-01 DIAGNOSIS — F33.41 RECURRENT MAJOR DEPRESSIVE DISORDER, IN PARTIAL REMISSION (H): ICD-10-CM

## 2019-02-01 RX ORDER — BUPROPION HYDROCHLORIDE 100 MG/1
TABLET ORAL
Qty: 120 TABLET | Refills: 5 | Status: SHIPPED | OUTPATIENT
Start: 2019-02-01 | End: 2019-07-31

## 2019-02-01 NOTE — TELEPHONE ENCOUNTER
buPROPion (WELLBUTRIN) 100 MG tablet    Last refill request 12/12/18      Last office visit 12/5/18

## 2019-02-04 ENCOUNTER — TELEPHONE (OUTPATIENT)
Dept: PEDIATRICS | Facility: OTHER | Age: 49
End: 2019-02-04

## 2019-02-04 NOTE — TELEPHONE ENCOUNTER
Pt called, states that his adderall is no longer covered by insurance. Reports that he was able to fill script one time on 1/8 but pt needs alternative. States that he gave Dr Lamb a letter with the alternative meds when he was seen on 12/5/18. Please advise. Thank you!

## 2019-02-08 ENCOUNTER — TELEPHONE (OUTPATIENT)
Dept: PEDIATRICS | Facility: OTHER | Age: 49
End: 2019-02-08

## 2019-02-08 DIAGNOSIS — F90.0 ATTENTION DEFICIT HYPERACTIVITY DISORDER (ADHD), PREDOMINANTLY INATTENTIVE TYPE: Primary | ICD-10-CM

## 2019-02-08 RX ORDER — METHYLPHENIDATE HYDROCHLORIDE 30 MG/1
30 CAPSULE, EXTENDED RELEASE ORAL
Qty: 60 CAPSULE | Refills: 0 | Status: SHIPPED | OUTPATIENT
Start: 2019-02-08 | End: 2019-03-18

## 2019-02-08 NOTE — TELEPHONE ENCOUNTER
Pt called and states he was suppose to get his adderall switched to a different medication but it has not happened. Contacted pharmacy- please change Adderal to either methylphenidate HCL or dextroamphetamine. Yolanda Zavala LPN

## 2019-02-11 ENCOUNTER — TELEPHONE (OUTPATIENT)
Dept: PEDIATRICS | Facility: OTHER | Age: 49
End: 2019-02-11

## 2019-02-11 NOTE — TELEPHONE ENCOUNTER
02/11/2019 - Received PA request from [employer] for ritalin LA 30 mg.  Submitted as an expedited request thru [facility].  Waiting for response.  Kayli Ellis, HIS Specialist.

## 2019-02-11 NOTE — TELEPHONE ENCOUNTER
Pt was notified to contact insurance company to see if he has a deductible he needs to meet- this was an alternative that it was switched to. Yolanda Zavala LPN

## 2019-02-11 NOTE — TELEPHONE ENCOUNTER
APPROVAL - 02/11/2019 - received APPROVAL from [employer] for methylphenidate LA 30 mg capsule.  Approval dates:  02/11/2019 thru 12/31/2019.  Pharmacy advised.  Documentation scanned to Epic.  Kayli Ellis, HIS Specialist.

## 2019-02-11 NOTE — TELEPHONE ENCOUNTER
1:29 PM    Reason for Call: Phone Call    Description: Patient called and states that the Ritalin RX was not covered and would cost him around 200 dollars. patient is wondering what he can do now being that he is out of the medication. Patient is wondering if maybe can be switched to non time released medication as this one was on the list of covered medications. patient notifieid provider is out of office and stated can wait until provider returns    Was an appointment offered for this call? No  If yes : Appointment type              Date    Preferred method for responding to this message: Telephone Call  What is your phone number ?    If we cannot reach you directly, may we leave a detailed response at the number you provided? No    Can this message wait until your PCP/provider returns, if available today? YES    Harriet Momin MA

## 2019-02-12 NOTE — TELEPHONE ENCOUNTER
Pharmacy calling to see where rx for ritalin is at.  The Provider needs to sign it on paper as the computer part if done.  Please advice.  Thank you.

## 2019-02-12 NOTE — TELEPHONE ENCOUNTER
2:06 PM    Reason for Call: Phone Call    Description: patient called wondering where his medication is and what was going on with the PA. Writer contacted [facility] and they stated that PA was approved and patient will have a $3.40 copay and they do have the hard copy script  at the pharmacy and they will process the script for the patient as of 2/12/19.  Patient notified and verbalized understanding.   Harriet Momin CMA     Was an appointment offered for this call? No  If yes : Appointment type              Date    Preferred method for responding to this message: Telephone Call  What is your phone number ?    If we cannot reach you directly, may we leave a detailed response at the number you provided? Yes    Can this message wait until your PCP/provider returns, if available today? Not applicable    Harriet Momin MA

## 2019-02-12 NOTE — TELEPHONE ENCOUNTER
[facility] methylphenidate ritalin LA was signed on 2/8/19 and picked up by pt's girlfriend. Writer spoke to [facility] updated pharmacist script was already picked up by girlfriend.   Please read encounter for 2/8/19.

## 2019-02-18 DIAGNOSIS — K29.00 ACUTE SUPERFICIAL GASTRITIS WITHOUT HEMORRHAGE: ICD-10-CM

## 2019-02-18 RX ORDER — OMEPRAZOLE 40 MG/1
CAPSULE, DELAYED RELEASE ORAL
Qty: 30 CAPSULE | Refills: 1 | OUTPATIENT
Start: 2019-02-18
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
## 2019-02-26 ENCOUNTER — OFFICE VISIT (OUTPATIENT)
Dept: PEDIATRICS | Facility: OTHER | Age: 49
End: 2019-02-26
Attending: INTERNAL MEDICINE
Payer: MEDICARE

## 2019-02-26 VITALS
SYSTOLIC BLOOD PRESSURE: 130 MMHG | HEART RATE: 92 BPM | OXYGEN SATURATION: 98 % | WEIGHT: 237.2 LBS | DIASTOLIC BLOOD PRESSURE: 82 MMHG | TEMPERATURE: 98.8 F | BODY MASS INDEX: 34.03 KG/M2

## 2019-02-26 DIAGNOSIS — S46.012A STRAIN OF LEFT ROTATOR CUFF CAPSULE, INITIAL ENCOUNTER: ICD-10-CM

## 2019-02-26 DIAGNOSIS — F17.200 TOBACCO DEPENDENCE: ICD-10-CM

## 2019-02-26 DIAGNOSIS — M54.2 CERVICALGIA: ICD-10-CM

## 2019-02-26 DIAGNOSIS — F90.0 ATTENTION DEFICIT HYPERACTIVITY DISORDER (ADHD), PREDOMINANTLY INATTENTIVE TYPE: Primary | ICD-10-CM

## 2019-02-26 DIAGNOSIS — F31.9 BIPOLAR DISEASE, CHRONIC (H): ICD-10-CM

## 2019-02-26 DIAGNOSIS — M25.512 ACUTE PAIN OF LEFT SHOULDER: ICD-10-CM

## 2019-02-26 DIAGNOSIS — I10 BENIGN ESSENTIAL HYPERTENSION: ICD-10-CM

## 2019-02-26 PROCEDURE — 99214 OFFICE O/P EST MOD 30 MIN: CPT | Performed by: INTERNAL MEDICINE

## 2019-02-26 PROCEDURE — G0463 HOSPITAL OUTPT CLINIC VISIT: HCPCS

## 2019-02-26 RX ORDER — VARENICLINE TARTRATE 1 MG/1
1 TABLET, FILM COATED ORAL 2 TIMES DAILY
Qty: 56 TABLET | Refills: 4 | Status: SHIPPED | OUTPATIENT
Start: 2019-02-26 | End: 2019-09-12

## 2019-02-26 RX ORDER — METHYLPHENIDATE HYDROCHLORIDE 40 MG/1
40 CAPSULE, EXTENDED RELEASE ORAL
Qty: 60 CAPSULE | Refills: 0 | Status: SHIPPED | OUTPATIENT
Start: 2019-02-26 | End: 2019-03-18

## 2019-02-26 ASSESSMENT — PAIN SCALES - GENERAL: PAINLEVEL: MODERATE PAIN (5)

## 2019-02-26 NOTE — NURSING NOTE
"Chief Complaint   Patient presents with     A.D.H.D     Depression     Hypertension       Initial /82 (BP Location: Right arm, Patient Position: Chair, Cuff Size: Adult Large)   Pulse 92   Temp 98.8  F (37.1  C) (Tympanic)   Wt 107.6 kg (237 lb 3.2 oz)   SpO2 98%   BMI 34.03 kg/m   Estimated body mass index is 34.03 kg/m  as calculated from the following:    Height as of 6/18/18: 1.778 m (5' 10\").    Weight as of this encounter: 107.6 kg (237 lb 3.2 oz).  Medication Reconciliation: complete    Kelly De La Rosa LPN    "

## 2019-03-14 DIAGNOSIS — K29.00 ACUTE SUPERFICIAL GASTRITIS WITHOUT HEMORRHAGE: ICD-10-CM

## 2019-03-14 RX ORDER — OMEPRAZOLE 40 MG/1
CAPSULE, DELAYED RELEASE ORAL
Qty: 30 CAPSULE | Refills: 1 | Status: SHIPPED | OUTPATIENT
Start: 2019-03-14 | End: 2019-05-24

## 2019-03-14 NOTE — TELEPHONE ENCOUNTER
omeprazole (PRILOSEC) 40 MG DR capsule     Last Written Prescription Date:  1/28/19  Last Fill Quantity: 30,   # refills: 1  Last Office Visit: 2/26/19  Future Office visit:    Next 5 appointments (look out 90 days)    Mar 18, 2019 11:00 AM CDT  (Arrive by 10:45 AM)  Office Visit with Tommy Lamb DO  [facility] [place] ([facility] [place] ) [address]  [place] [address]  [phone]

## 2019-03-14 NOTE — PROGRESS NOTES
SUBJECTIVE:   Andrei Whitman is a 48 year old male who presents to clinic today for the following health issues:      Hypertension:  Andrei does not monitor his blood pressure outside of the clinic.  He does not monitor his sodium intake.       ADHD Follow up    Amount of exercise or physical activity: None    Problems taking medications regularly: No    Medication side effects: Acne    Diet: regular (no restrictions)    He reports that he has poor concentration.  This seems to be since his switch from Adderall to ritalin.    Depression:  He feels that his depression is worse.  He reports feeling overwhelmed with activities and chores.        PHQ-9 SCORE 11/7/2018 12/5/2018 3/18/2019   PHQ-9 Total Score - - -   PHQ-9 Total Score 12 12 14       SONDRA-7 SCORE 11/7/2018 12/5/2018 3/18/2019   Total Score 10 10 16       Neck and Shoulder Pain      Duration: Ongoing for 18 years    Description:  Location: Neck and lower back  Radiation: into the right neck, into the right shoulder, into the right forearm, into the right hand, into the left neck, nto the left shoulder, into the left forearm and into the left hand    Intensity:  moderate, worst is 8/10    Accompanying signs and symptoms: None    History (similar episodes/previous evaluation): Yes    Precipitating or alleviating factors: Overuse    Therapies tried and outcome: None    He has not re contacted his neurosurgeon as of yet.    Problem list and histories reviewed & adjusted, as indicated.  Additional history: as documented    Patient Active Problem List   Diagnosis     Cervicalgia     Lower back pain     Segmental and somatic dysfunction of lower extremity     GERD (gastroesophageal reflux disease)     Mood disorder (H)     Abnormal weight gain     Attention deficit hyperactivity disorder (ADHD), predominantly inattentive type     ACP (advance care planning)     Flatulence, eructation, and gas pain     Bipolar disease, chronic (H)     Routine general medical  examination at a health care facility     Benign essential hypertension     Simple chronic bronchitis (H)     Russell esophagus     Tobacco dependency     Lithium use     DDD (degenerative disc disease), cervical     Cervical stenosis of spinal canal     Past Surgical History:   Procedure Laterality Date     APPENDECTOMY      age 14     COLONOSCOPY  07/12/2017    Left descending x1 tubular adenoma, sigmoid x1 tubular adenoma and rectal x1 hyperplastic polyp.  Pan diverticulosis     ENDOSCOPY UPPER, COLONOSCOPY, COMBINED N/A 7/12/2017    Procedure: COMBINED ENDOSCOPY UPPER, COLONOSCOPY;  UPPER ENDOSCOPY WITH BIOPSIES  AND COLONOSCOPY WITH POLYPECTOMY;  Surgeon: Francis Valverde DO;  Location: [facility]     ENT SURGERY  age 18    tonsils     ESOPHAGOGASTRODUODENOSCOPY  07/12/2017    chemical gatropathy, GE junction with pancreatiuc metaplasia      ESOPHAGOSCOPY, GASTROSCOPY, DUODENOSCOPY (EGD), COMBINED N/A 6/20/2018    Procedure: COMBINED ESOPHAGOSCOPY, GASTROSCOPY, DUODENOSCOPY (EGD), BIOPSY SINGLE OR MULTIPLE;  UPPER ENDOSCOPY WITH BIOPSY;  Surgeon: Francis Valverde DO;  Location: [facility]     ORTHOPEDIC SURGERY  age 30     back and neck fusion, bone from hip removed to use on plates     ORTHOPEDIC SURGERY  age 30    L5 fusion, laminectomy of lumbar discs       Social History     Tobacco Use     Smoking status: Current Every Day Smoker     Packs/day: 0.30     Years: 30.00     Pack years: 9.00     Start date: 1/1/1989     Smokeless tobacco: Never Used     Tobacco comment: quitplan referral declined 3/18/19 on chantax   Substance Use Topics     Alcohol use: No     Alcohol/week: 0.0 oz     Family History   Problem Relation Age of Onset     Asthma Mother      Arthritis Mother      Prostate Cancer Father      Heart Disease Paternal Grandfather      Hypertension Paternal Grandfather      Alcohol/Drug Paternal Grandfather      Other - See Comments Brother         Nerve and degenerative disease mild     Alcohol/Drug  Brother      Other - See Comments Sister 21        Hip replacements, nerve, degerative disease     Alcohol/Drug Maternal Grandfather      Unknown/Adopted Paternal Grandmother      Other Cancer Paternal Aunt         Cervical cancer           Reviewed and updated as needed this visit by clinical staff       Reviewed and updated as needed this visit by Provider         ROS:  CONSTITUTIONAL: NEGATIVE for fever, chills, change in weight  CONSTITUTIONAL:appetite is good some days and poor other days  EYES: NEGATIVE for vision changes or irritation  ENT/MOUTH: NEGATIVE for ear, mouth and throat problems  RESP: NEGATIVE for significant cough or SOB  CV: NEGATIVE for chest pain, palpitations or peripheral edema  GI: NEGATIVE for nausea, abdominal pain, heartburn, or change in bowel habits  : NEGATIVE for frequency, dysuria, or hematuria  MUSCULOSKELETAL:See HPI  NEURO: NEGATIVE for weakness, dizziness or paresthesias  PSYCHIATRIC: See HPI    OBJECTIVE:     /90   Pulse 76   Temp 98.3  F (36.8  C) (Tympanic)   Resp 12   Wt 106.8 kg (235 lb 6.4 oz)   SpO2 98%   BMI 33.78 kg/m    Body mass index is 33.78 kg/m .  GENERAL: healthy, alert and no distress  EYES: Eyes grossly normal to inspection, PERRL and conjunctivae and sclerae normal  NECK: no adenopathy, no asymmetry, masses, or scars and thyroid normal to palpation  RESP: lungs clear to auscultation - no rales, rhonchi or wheezes  CV: regular rate and rhythm, normal S1 S2, no S3 or S4, no murmur, click or rub, no peripheral edema and peripheral pulses strong  ABDOMEN: soft, nontender, no hepatosplenomegaly, no masses and bowel sounds normal  ABDOMEN: no bruits heard  MS: no gross musculoskeletal defects noted, no edema  PSYCH: concentration poor, affect flat and anxious    Diagnostic Test Results:  none     ASSESSMENT/PLAN:   (I10) Essential hypertension  (primary encounter diagnosis)  Comment: At goal.    Plan:   He will continue his verapamil  mg and HCTZ  12.5 mg daily.    (F90.0) Attention deficit hyperactivity disorder (ADHD), predominantly inattentive type  Comment: poorly controlled with the change from Addreall XR to Ritalin LA.  This has been effecting his depression and anxiety and he feels like he cannot focus or complete tasks.  Plan:   Restart amphetamine-dextroamphetamine (ADDERALL) 30 MG tablet BID.  If there is a big push back from his insurance he may need to take short acting twice in the afternoon.    (F39) Mood disorder (H)  Comment: Worsened with the change in his stimulant medications  Plan:   He will continue Wellbutrin 200 mg BID and Lithium 900 mg at bedtime.              FUTURE APPOINTMENTS:       - Follow-up visit in 2 months for mood disorder, ADHD, anxiety.    Tommy Lamb DO,   [facility]

## 2019-03-18 ENCOUNTER — OFFICE VISIT (OUTPATIENT)
Dept: INTERNAL MEDICINE | Facility: OTHER | Age: 49
End: 2019-03-18
Attending: INTERNAL MEDICINE
Payer: MEDICARE

## 2019-03-18 VITALS
SYSTOLIC BLOOD PRESSURE: 134 MMHG | HEART RATE: 76 BPM | RESPIRATION RATE: 12 BRPM | DIASTOLIC BLOOD PRESSURE: 90 MMHG | OXYGEN SATURATION: 98 % | BODY MASS INDEX: 33.78 KG/M2 | TEMPERATURE: 98.3 F | WEIGHT: 235.4 LBS

## 2019-03-18 DIAGNOSIS — F39 MOOD DISORDER (H): ICD-10-CM

## 2019-03-18 DIAGNOSIS — I10 ESSENTIAL HYPERTENSION: Primary | ICD-10-CM

## 2019-03-18 DIAGNOSIS — F90.0 ATTENTION DEFICIT HYPERACTIVITY DISORDER (ADHD), PREDOMINANTLY INATTENTIVE TYPE: ICD-10-CM

## 2019-03-18 PROCEDURE — G0463 HOSPITAL OUTPT CLINIC VISIT: HCPCS

## 2019-03-18 PROCEDURE — 99214 OFFICE O/P EST MOD 30 MIN: CPT | Performed by: INTERNAL MEDICINE

## 2019-03-18 PROCEDURE — G0463 HOSPITAL OUTPT CLINIC VISIT: HCPCS | Mod: 25

## 2019-03-18 RX ORDER — DEXTROAMPHETAMINE SACCHARATE, AMPHETAMINE ASPARTATE, DEXTROAMPHETAMINE SULFATE AND AMPHETAMINE SULFATE 7.5; 7.5; 7.5; 7.5 MG/1; MG/1; MG/1; MG/1
30 TABLET ORAL 2 TIMES DAILY
Qty: 60 TABLET | Refills: 0 | Status: SHIPPED | OUTPATIENT
Start: 2019-03-18 | End: 2019-04-18 | Stop reason: DRUGHIGH

## 2019-03-18 ASSESSMENT — ANXIETY QUESTIONNAIRES
IF YOU CHECKED OFF ANY PROBLEMS ON THIS QUESTIONNAIRE, HOW DIFFICULT HAVE THESE PROBLEMS MADE IT FOR YOU TO DO YOUR WORK, TAKE CARE OF THINGS AT HOME, OR GET ALONG WITH OTHER PEOPLE: EXTREMELY DIFFICULT
3. WORRYING TOO MUCH ABOUT DIFFERENT THINGS: NEARLY EVERY DAY
2. NOT BEING ABLE TO STOP OR CONTROL WORRYING: NEARLY EVERY DAY
1. FEELING NERVOUS, ANXIOUS, OR ON EDGE: NEARLY EVERY DAY
7. FEELING AFRAID AS IF SOMETHING AWFUL MIGHT HAPPEN: SEVERAL DAYS
5. BEING SO RESTLESS THAT IT IS HARD TO SIT STILL: MORE THAN HALF THE DAYS
6. BECOMING EASILY ANNOYED OR IRRITABLE: SEVERAL DAYS
GAD7 TOTAL SCORE: 16

## 2019-03-18 ASSESSMENT — PATIENT HEALTH QUESTIONNAIRE - PHQ9
5. POOR APPETITE OR OVEREATING: NEARLY EVERY DAY
SUM OF ALL RESPONSES TO PHQ QUESTIONS 1-9: 14

## 2019-03-18 ASSESSMENT — PAIN SCALES - GENERAL: PAINLEVEL: MODERATE PAIN (5)

## 2019-03-18 NOTE — NURSING NOTE
"Chief Complaint   Patient presents with     A.D.H.D     Musculoskeletal Problem     shoulder       Initial /90 (BP Location: Right arm, Patient Position: Sitting, Cuff Size: Adult Large)   Pulse 76   Temp 98.3  F (36.8  C) (Tympanic)   Resp 12   Wt 106.8 kg (235 lb 6.4 oz)   SpO2 98%   BMI 33.78 kg/m   Estimated body mass index is 33.78 kg/m  as calculated from the following:    Height as of 6/18/18: 1.778 m (5' 10\").    Weight as of this encounter: 106.8 kg (235 lb 6.4 oz).  Medication Reconciliation: complete    Jessa Behrman, LPN  "

## 2019-03-19 ENCOUNTER — TELEPHONE (OUTPATIENT)
Dept: PEDIATRICS | Facility: OTHER | Age: 49
End: 2019-03-19

## 2019-03-19 ASSESSMENT — ANXIETY QUESTIONNAIRES: GAD7 TOTAL SCORE: 16

## 2019-03-19 NOTE — TELEPHONE ENCOUNTER
NO PA REQUIRED - 03/19/2019 - received response from [facility] that amphetamine-dextroamphetamine is available to patient at the amount listed - 60 for 30 days - with no prior authorization required.  I called Swapna at [facility] and she confirmed that the patient was able to get the 60 for 30 days yesterday afternoon.  Documentation scanned to Epic.  Kayli Ellis, HIS Specialist.

## 2019-03-19 NOTE — TELEPHONE ENCOUNTER
03/18/2019 - received PA request from Dr. Lamb for amphetamine-dextroamphetamine.  Attempted to submit thru [facility] as an urgent request.  Patient's plan was not working correctly to process PA request.  03/19/2019 - finally received form from [employer] for processing patient's PA request.  Submitted as an urgent request to [employer].  Waiting for response.  Kayli Ellis, HIS Specialist.

## 2019-04-15 DIAGNOSIS — M54.12 CERVICAL RADICULOPATHY: ICD-10-CM

## 2019-04-16 RX ORDER — GABAPENTIN 300 MG/1
CAPSULE ORAL
Qty: 180 CAPSULE | Refills: 1 | Status: SHIPPED | OUTPATIENT
Start: 2019-04-16 | End: 2019-07-16

## 2019-04-16 NOTE — PROGRESS NOTES
SUBJECTIVE:   Andrei Whitman is a 48 year old male who presents to clinic today for the following   health issues:      Depression and Anxiety Follow-Up    Status since last visit: Improved a little    Other associated symptoms:None    Complicating factors:     Significant life event: No     Current substance abuse: None    PHQ 12/5/2018 3/18/2019 4/18/2019   PHQ-9 Total Score 12 14 12   Q9: Thoughts of better off dead/self-harm past 2 weeks Several days Not at all Not at all   F/U: Thoughts of suicide or self-harm - - -   F/U: Safety concerns - - -     SONDRA-7 SCORE 12/5/2018 3/18/2019 4/18/2019   Total Score 10 16 10     He is taking his lithium 900 in the am.  He was taking an additional 600 in the evening but there seems to be an error on his refills as he has been out of the evening dose for 6 weeks.  He reports increased moodiness and anger for 1-2 hours in the morning and then he feels fine    PHQ-9  English  PHQ-9   Any Language  SONDRA-7  Suicide Assessment Five-step Evaluation and Treatment (SAFE-T)    Amount of exercise or physical activity: None    Problems taking medications regularly: No    Medication side effects: none    Diet: regular (no restrictions)      Medication Followup of  ADDERALL    Taking Medication as prescribed: yes    Side Effects:  None    Medication Helping Symptoms:  yes      He reports that now that he is back on his Adderall he feels better concentration and he has less anxiety.  He is focused better and not have other worries affect his activities        -------------------------------------    Additional history: as documented    Reviewed  and updated as needed this visit by clinical staff  Tobacco  Allergies  Meds  Med Hx  Surg Hx  Fam Hx  Soc Hx        Reviewed and updated as needed this visit by Provider         Patient Active Problem List   Diagnosis     Cervicalgia     Lower back pain     Segmental and somatic dysfunction of lower extremity     GERD (gastroesophageal  reflux disease)     Mood disorder (H)     Abnormal weight gain     Attention deficit hyperactivity disorder (ADHD), predominantly inattentive type     ACP (advance care planning)     Flatulence, eructation, and gas pain     Bipolar disease, chronic (H)     Routine general medical examination at a health care facility     Benign essential hypertension     Simple chronic bronchitis (H)     Russell esophagus     Tobacco dependency     Lithium use     DDD (degenerative disc disease), cervical     Cervical stenosis of spinal canal     Past Surgical History:   Procedure Laterality Date     APPENDECTOMY      age 14     COLONOSCOPY  07/12/2017    Left descending x1 tubular adenoma, sigmoid x1 tubular adenoma and rectal x1 hyperplastic polyp.  Pan diverticulosis     ENDOSCOPY UPPER, COLONOSCOPY, COMBINED N/A 7/12/2017    Procedure: COMBINED ENDOSCOPY UPPER, COLONOSCOPY;  UPPER ENDOSCOPY WITH BIOPSIES  AND COLONOSCOPY WITH POLYPECTOMY;  Surgeon: Francis Valverde DO;  Location: [facility]     ENT SURGERY  age 18    tonsils     ESOPHAGOGASTRODUODENOSCOPY  07/12/2017    chemical gatropathy, GE junction with pancreatiuc metaplasia      ESOPHAGOSCOPY, GASTROSCOPY, DUODENOSCOPY (EGD), COMBINED N/A 6/20/2018    Procedure: COMBINED ESOPHAGOSCOPY, GASTROSCOPY, DUODENOSCOPY (EGD), BIOPSY SINGLE OR MULTIPLE;  UPPER ENDOSCOPY WITH BIOPSY;  Surgeon: Francis Valverde DO;  Location: [facility]     ORTHOPEDIC SURGERY  age 30     back and neck fusion, bone from hip removed to use on plates     ORTHOPEDIC SURGERY  age 30    L5 fusion, laminectomy of lumbar discs       Social History     Tobacco Use     Smoking status: Current Every Day Smoker     Packs/day: 0.30     Years: 30.00     Pack years: 9.00     Start date: 1/1/1989     Smokeless tobacco: Never Used     Tobacco comment: quitplan referral declined 3/18/19 on chantax   Substance Use Topics     Alcohol use: No     Alcohol/week: 0.0 oz     Family History   Problem Relation Age of Onset      Asthma Mother      Arthritis Mother      Prostate Cancer Father      Heart Disease Paternal Grandfather      Hypertension Paternal Grandfather      Alcohol/Drug Paternal Grandfather      Other - See Comments Brother         Nerve and degenerative disease mild     Alcohol/Drug Brother      Other - See Comments Sister 21        Hip replacements, nerve, degerative disease     Alcohol/Drug Maternal Grandfather      Unknown/Adopted Paternal Grandmother      Other Cancer Paternal Aunt         Cervical cancer           ROS:  CONSTITUTIONAL: NEGATIVE for fever, chills, change in weight  INTEGUMENTARY/SKIN: NEGATIVE for worrisome rashes, moles or lesions  EYES: NEGATIVE for vision changes or irritation  ENT/MOUTH: NEGATIVE for ear, mouth and throat problems  RESP: NEGATIVE for significant cough or SOB  CV: NEGATIVE for chest pain, palpitations or peripheral edema  GI: NEGATIVE for nausea, abdominal pain, heartburn, or change in bowel habits  : NEGATIVE for frequency, dysuria, or hematuria  MUSCULOSKELETAL:Neck pain has been better over the past few months  NEURO: NEGATIVE for weakness, dizziness or paresthesias  PSYCHIATRIC: See HPI.    OBJECTIVE:     /90 (BP Location: Left arm, Patient Position: Sitting, Cuff Size: Adult Regular)   Pulse 78   Temp 98.2  F (36.8  C) (Tympanic)   Wt 107.5 kg (237 lb)   SpO2 98%   BMI 34.01 kg/m    Body mass index is 34.01 kg/m .  GENERAL: healthy, alert and no distress  EYES: Eyes grossly normal to inspection, PERRL and conjunctivae and sclerae normal  NECK: no adenopathy, no asymmetry, masses, or scars and thyroid normal to palpation  RESP: lungs clear to auscultation - no rales, rhonchi or wheezes  CV: frequent extasystoles with compensatory pause, normal S1 S2, no S3 or S4, no murmur, click or rub, peripheral pulses strong and no peripheral edema  ABDOMEN: soft, nontender, no hepatosplenomegaly, no masses and bowel sounds normal  MS: no gross musculoskeletal defects noted, no  edema  NEURO: mild tremor of the right hand  PSYCH: mentation appears normal, affect normal/bright    Diagnostic Test Results:  none     ASSESSMENT/PLAN:   (F39) Mood disorder (H)  (primary encounter diagnosis)  Comment: Andrei is doing ell for thge most party with brief anger episodes in the AM which may be due to missing his evening dose of lithium.  Plan:   He will continue Lithium 900 mg in the AM and check Lithium level and if low add the evening dose of 600 mg.  He will continue Wellbutrin 200 mg BID    (F90.0) Attention deficit hyperactivity disorder (ADHD), predominantly inattentive type  Comment: Improved  Plan:   He will continue Adderall 30 IR BID            FUTURE APPOINTMENTS:       - Follow-up visit in 2 months for mood disorder and ADHD    Tommy Lamb DO,   [facility] [place]

## 2019-04-16 NOTE — TELEPHONE ENCOUNTER
gabapentin (NEURONTIN) 300 MG capsule      Last Written Prescription Date:  3-5-19  Last Fill Quantity: 180,   # refills: 1  Last Office Visit: 3-18-19  Future Office visit:    Next 5 appointments (look out 90 days)    Apr 18, 2019 10:20 AM CDT  (Arrive by 10:05 AM)  SHORT with Tommy Lamb DO  [facility] ([facility] [place] ) [address]  [place] [address]  [phone]           Routing refill request to provider for review/approval because:  Drug not on the [facility], [facility] or [facility] refill protocol or controlled substance

## 2019-04-18 ENCOUNTER — OFFICE VISIT (OUTPATIENT)
Dept: INTERNAL MEDICINE | Facility: OTHER | Age: 49
End: 2019-04-18
Attending: INTERNAL MEDICINE
Payer: MEDICARE

## 2019-04-18 VITALS
DIASTOLIC BLOOD PRESSURE: 90 MMHG | OXYGEN SATURATION: 98 % | SYSTOLIC BLOOD PRESSURE: 138 MMHG | TEMPERATURE: 98.2 F | WEIGHT: 237 LBS | HEART RATE: 78 BPM | BODY MASS INDEX: 34.01 KG/M2

## 2019-04-18 DIAGNOSIS — F90.0 ATTENTION DEFICIT HYPERACTIVITY DISORDER (ADHD), PREDOMINANTLY INATTENTIVE TYPE: ICD-10-CM

## 2019-04-18 DIAGNOSIS — F39 MOOD DISORDER (H): Primary | ICD-10-CM

## 2019-04-18 PROCEDURE — G0463 HOSPITAL OUTPT CLINIC VISIT: HCPCS

## 2019-04-18 PROCEDURE — 99213 OFFICE O/P EST LOW 20 MIN: CPT | Performed by: INTERNAL MEDICINE

## 2019-04-18 RX ORDER — DEXTROAMPHETAMINE SACCHARATE, AMPHETAMINE ASPARTATE, DEXTROAMPHETAMINE SULFATE AND AMPHETAMINE SULFATE 7.5; 7.5; 7.5; 7.5 MG/1; MG/1; MG/1; MG/1
30 TABLET ORAL 2 TIMES DAILY
Qty: 60 TABLET | Refills: 0 | Status: SHIPPED | OUTPATIENT
Start: 2019-04-18 | End: 2019-06-19

## 2019-04-18 RX ORDER — DEXTROAMPHETAMINE SACCHARATE, AMPHETAMINE ASPARTATE, DEXTROAMPHETAMINE SULFATE AND AMPHETAMINE SULFATE 7.5; 7.5; 7.5; 7.5 MG/1; MG/1; MG/1; MG/1
30 TABLET ORAL 2 TIMES DAILY
Qty: 60 TABLET | Refills: 0 | Status: SHIPPED | OUTPATIENT
Start: 2019-05-18 | End: 2019-06-19

## 2019-04-18 RX ORDER — DEXTROAMPHETAMINE SACCHARATE, AMPHETAMINE ASPARTATE, DEXTROAMPHETAMINE SULFATE AND AMPHETAMINE SULFATE 7.5; 7.5; 7.5; 7.5 MG/1; MG/1; MG/1; MG/1
30 TABLET ORAL 2 TIMES DAILY
Qty: 60 TABLET | Refills: 0 | Status: SHIPPED | OUTPATIENT
Start: 2019-06-17 | End: 2019-06-19

## 2019-04-18 ASSESSMENT — PATIENT HEALTH QUESTIONNAIRE - PHQ9
SUM OF ALL RESPONSES TO PHQ QUESTIONS 1-9: 12
5. POOR APPETITE OR OVEREATING: SEVERAL DAYS

## 2019-04-18 ASSESSMENT — ANXIETY QUESTIONNAIRES
1. FEELING NERVOUS, ANXIOUS, OR ON EDGE: SEVERAL DAYS
GAD7 TOTAL SCORE: 10
5. BEING SO RESTLESS THAT IT IS HARD TO SIT STILL: SEVERAL DAYS
7. FEELING AFRAID AS IF SOMETHING AWFUL MIGHT HAPPEN: SEVERAL DAYS
2. NOT BEING ABLE TO STOP OR CONTROL WORRYING: MORE THAN HALF THE DAYS
6. BECOMING EASILY ANNOYED OR IRRITABLE: MORE THAN HALF THE DAYS
3. WORRYING TOO MUCH ABOUT DIFFERENT THINGS: MORE THAN HALF THE DAYS

## 2019-04-18 ASSESSMENT — PAIN SCALES - GENERAL: PAINLEVEL: SEVERE PAIN (6)

## 2019-04-18 NOTE — LETTER
My Depression Action Plan  Name: Andrei Whitman   Date of Birth 1970  Date: 4/16/2019    My doctor: Tommy Lamb   My clinic: [facility] [place]  [address]  [place] [address]  [phone]          GREEN    ZONE   Good Control    What it looks like:     Things are going generally well. You have normal up s and down s. You may even feel depressed from time to time, but bad moods usually last less than a day.   What you need to do:  1. Continue to care for yourself (see self care plan)  2. Check your depression survival kit and update it as needed  3. Follow your physician s recommendations including any medication.  4. Do not stop taking medication unless you consult with your physician first.           YELLOW         ZONE Getting Worse    What it looks like:     Depression is starting to interfere with your life.     It may be hard to get out of bed; you may be starting to isolate yourself from others.    Symptoms of depression are starting to last most all day and this has happened for several days.     You may have suicidal thoughts but they are not constant.   What you need to do:     1. Call your care team, your response to treatment will improve if you keep your care team informed of your progress. Yellow periods are signs an adjustment may need to be made.     2. Continue your self-care, even if you have to fake it!    3. Talk to someone in your support network    4. Open up your depression survival kit           RED    ZONE Medical Alert - Get Help    What it looks like:     Depression is seriously interfering with your life.     You may experience these or other symptoms: You can t get out of bed most days, can t work or engage in other necessary activities, you have trouble taking care of basic hygiene, or basic responsibilities, thoughts of suicide or death that will not go away, self-injurious behavior.     What you need to do:  1. Call your care team  and request a same-day appointment. If they are not available (weekends or after hours) call your local crisis line, emergency room or 911.            Depression Self Care Plan / Survival Kit    Self-Care for Depression  Here s the deal. Your body and mind are really not as separate as most people think.  What you do and think affects how you feel and how you feel influences what you do and think. This means if you do things that people who feel good do, it will help you feel better.  Sometimes this is all it takes.  There is also a place for medication and therapy depending on how severe your depression is, so be sure to consult with your medical provider and/ or Behavioral Health Consultant if your symptoms are worsening or not improving.     In order to better manage my stress, I will:    Exercise  Get some form of exercise, every day. This will help reduce pain and release endorphins, the  feel good  chemicals in your brain. This is almost as good as taking antidepressants!  This is not the same as joining a gym and then never going! (they count on that by the way ) It can be as simple as just going for a walk or doing some gardening, anything that will get you moving.      Hygiene   Maintain good hygiene (Get out of bed in the morning, Make your bed, Brush your teeth, Take a shower, and Get dressed like you were going to work, even if you are unemployed).  If your clothes don't fit try to get ones that do.    Diet  I will strive to eat foods that are good for me, drink plenty of water, and avoid excessive sugar, caffeine, alcohol, and other mood-altering substances.  Some foods that are helpful in depression are: complex carbohydrates, B vitamins, flaxseed, fish or fish oil, fresh fruits and vegetables.    Psychotherapy  I agree to participate in Individual Therapy (if recommended).    Medication  If prescribed medications, I agree to take them.  Missing doses can result in serious side effects.  I understand  that drinking alcohol, or other illicit drug use, may cause potential side effects.  I will not stop my medication abruptly without first discussing it with my provider.    Staying Connected With Others  I will stay in touch with my friends, family members, and my primary care provider/team.    Use your imagination  Be creative.  We all have a creative side; it doesn t matter if it s oil painting, sand castles, or mud pies! This will also kick up the endorphins.    Witness Beauty  (AKA stop and smell the roses) Take a look outside, even in mid-winter. Notice colors, textures. Watch the squirrels and birds.     Service to others  Be of service to others.  There is always someone else in need.  By helping others we can  get out of ourselves  and remember the really important things.  This also provides opportunities for practicing all the other parts of the program.    Humor  Laugh and be silly!  Adjust your TV habits for less news and crime-drama and more comedy.    Control your stress  Try breathing deep, massage therapy, biofeedback, and meditation. Find time to relax each day.     My support system    Clinic Contact:  Phone number:    Contact 1:  Phone number:    Contact 2:  Phone number:    Mandaen/:  Phone number:    Therapist:  Phone number:    Local crisis center:    Phone number:    Other community support:  Phone number:

## 2019-04-18 NOTE — NURSING NOTE
"Chief Complaint   Patient presents with     A.D.H.D     Depression     Anxiety       Initial /90 (BP Location: Left arm, Patient Position: Sitting, Cuff Size: Adult Regular)   Pulse 78   Temp 98.2  F (36.8  C) (Tympanic)   Wt 107.5 kg (237 lb)   SpO2 98%   BMI 34.01 kg/m   Estimated body mass index is 34.01 kg/m  as calculated from the following:    Height as of 6/18/18: 1.778 m (5' 10\").    Weight as of this encounter: 107.5 kg (237 lb).  Medication Reconciliation: complete    Jennifer Moy LPN  "

## 2019-04-19 ASSESSMENT — ANXIETY QUESTIONNAIRES: GAD7 TOTAL SCORE: 10

## 2019-05-24 DIAGNOSIS — K29.00 ACUTE SUPERFICIAL GASTRITIS WITHOUT HEMORRHAGE: ICD-10-CM

## 2019-05-24 RX ORDER — OMEPRAZOLE 40 MG/1
CAPSULE, DELAYED RELEASE ORAL
Qty: 30 CAPSULE | Refills: 1 | Status: SHIPPED | OUTPATIENT
Start: 2019-05-24 | End: 2019-07-23

## 2019-06-13 NOTE — PROGRESS NOTES
"Subjective     Andrei Whitman is a 48 year old male who presents to clinic today for the following health issues:    HPI   Mood and ADHD      Pt hasn't gotten back on Lithium yet.  He has not had his blood work done yet      He has been off his lithium 600 mg for three months.  He has noticed that he has morning irritability mainly in the AM.   He is taking 900 mg in the AM. He reports mild depression which \"is the same as normal\".  He denies any extreme energy, euphoria, or insomnia.       Mouth pain:  Andrei has had some left sided mouth and left sided neck pain.  He also reports that he has pain ( soreness) over the corners of his mouth.  He also reports that he has been coughing up clear liquid.  He does not feel like this is coming from his lungs.          -------------------------------------    Patient Active Problem List   Diagnosis     Cervicalgia     Lower back pain     Segmental and somatic dysfunction of lower extremity     GERD (gastroesophageal reflux disease)     Mood disorder (H)     Abnormal weight gain     Attention deficit hyperactivity disorder (ADHD), predominantly inattentive type     ACP (advance care planning)     Flatulence, eructation, and gas pain     Bipolar disease, chronic (H)     Routine general medical examination at a health care facility     Benign essential hypertension     Simple chronic bronchitis (H)     Russell esophagus     Tobacco dependency     Lithium use     DDD (degenerative disc disease), cervical     Cervical stenosis of spinal canal     Past Surgical History:   Procedure Laterality Date     APPENDECTOMY      age 14     COLONOSCOPY  07/12/2017    Left descending x1 tubular adenoma, sigmoid x1 tubular adenoma and rectal x1 hyperplastic polyp.  Pan diverticulosis     ENDOSCOPY UPPER, COLONOSCOPY, COMBINED N/A 7/12/2017    Procedure: COMBINED ENDOSCOPY UPPER, COLONOSCOPY;  UPPER ENDOSCOPY WITH BIOPSIES  AND COLONOSCOPY WITH POLYPECTOMY;  Surgeon: Francis Valverde, DO;  " Location: [facility]     ENT SURGERY  age 18    tonsils     ESOPHAGOGASTRODUODENOSCOPY  07/12/2017    chemical gatropathy, GE junction with pancreatiuc metaplasia      ESOPHAGOSCOPY, GASTROSCOPY, DUODENOSCOPY (EGD), COMBINED N/A 6/20/2018    Procedure: COMBINED ESOPHAGOSCOPY, GASTROSCOPY, DUODENOSCOPY (EGD), BIOPSY SINGLE OR MULTIPLE;  UPPER ENDOSCOPY WITH BIOPSY;  Surgeon: Francis Valverde DO;  Location: [facility]     ORTHOPEDIC SURGERY  age 30     back and neck fusion, bone from hip removed to use on plates     ORTHOPEDIC SURGERY  age 30    L5 fusion, laminectomy of lumbar discs       Social History     Tobacco Use     Smoking status: Current Every Day Smoker     Packs/day: 0.30     Years: 30.00     Pack years: 9.00     Start date: 1/1/1989     Smokeless tobacco: Never Used     Tobacco comment: quitplan referral declined 6/19/19 on chantax   Substance Use Topics     Alcohol use: No     Alcohol/week: 0.0 oz     Family History   Problem Relation Age of Onset     Asthma Mother      Arthritis Mother      Prostate Cancer Father      Heart Disease Paternal Grandfather      Hypertension Paternal Grandfather      Alcohol/Drug Paternal Grandfather      Other - See Comments Brother         Nerve and degenerative disease mild     Alcohol/Drug Brother      Other - See Comments Sister 21        Hip replacements, nerve, degerative disease     Alcohol/Drug Maternal Grandfather      Unknown/Adopted Paternal Grandmother      Other Cancer Paternal Aunt         Cervical cancer           -------------------------------------  Reviewed and updated as needed this visit by Provider         Review of Systems   ROS COMP: CONSTITUTIONAL: NEGATIVE for fever, chills, change in weight  INTEGUMENTARY/SKIN: NEGATIVE for worrisome rashes, moles or lesions  EYES: NEGATIVE for vision changes or irritation  ENT/MOUTH: POSITIVE for See HPI and NEGATIVE for ear pain , nasal congestion, postnasal drainage, sinus pressure and sore throat  RESP: NEGATIVE for  significant cough or SOB  CV: NEGATIVE for chest pain, palpitations or peripheral edema  GI: NEGATIVE for nausea, abdominal pain, heartburn, or change in bowel habits  : NEGATIVE for frequency, dysuria, or hematuria  MUSCULOSKELETAL:POSITIVE  for back pain which is chronic   NEURO: NEGATIVE for weakness, dizziness or paresthesias  ENDOCRINE: NEGATIVE for temperature intolerance, skin/hair changes  PSYCHIATRIC: See HPI      Objective    /80 (BP Location: Right arm, Patient Position: Sitting, Cuff Size: Adult Regular)   Pulse (!) 42   Temp 97.5  F (36.4  C) (Tympanic)   Resp 14   Wt 105.9 kg (233 lb 6.4 oz)   SpO2 98%   BMI 33.49 kg/m    There is no height or weight on file to calculate BMI.  Physical Exam   GENERAL: healthy, alert and no distress  EYES: Eyes grossly normal to inspection and conjunctivae and sclerae normal  HENT: ear canals and TM's normal, nose and mouth without ulcers or lesions  There is mild receding of the gum lines, bucc lisa mucosa in normal.  The lip angle are dry cracked.  NECK: no adenopathy, no asymmetry, masses, or scars and thyroid normal to palpation  NECK: tenderness over the left submandibular gland  RESP: lungs clear to auscultation - no rales, rhonchi or wheezes  CV: regular rate and rhythm, normal S1 S2, no S3 or S4, no murmur, click or rub, no peripheral edema and peripheral pulses strong  MS: no gross musculoskeletal defects noted, no edema  PSYCH: concentration poor and speech pressured  LYMPH: normal ant/post cervical, supraclavicular nodes    Diagnostic Test Results:  none             ASSESSMENT /PLAN:    (F39) Mood disorder (H)  Comment: Andrei has had difficulty in filling his 600 mg of Lithium.  He is stable with some mild irritability.  Plan:   Change lithium (ESKALITH) 450 MG CR tablet from 900 mg and 600 mg in the AM and PM respectively to 900 mg BID    (K05.10) Gingivitis  Comment: Secondary to dry mouth from medication which is also resulting in salivary  gland inflammation.  Plan:   Start hlorhexidine (PERIDEX) 0.12 % solution, 15 ml BID and start biotin BID.    (K13.0) Angular cheilosis  (primary encounter diagnosis)  Comment: Mild and reastion to the above.,  Plan:   As above.          Follow up with Provider - 6 weeks for mood disorder and mouth pain.         Tommy Lamb, DO

## 2019-06-19 ENCOUNTER — OFFICE VISIT (OUTPATIENT)
Dept: INTERNAL MEDICINE | Facility: OTHER | Age: 49
End: 2019-06-19
Attending: INTERNAL MEDICINE
Payer: MEDICARE

## 2019-06-19 VITALS
DIASTOLIC BLOOD PRESSURE: 80 MMHG | SYSTOLIC BLOOD PRESSURE: 116 MMHG | WEIGHT: 233.4 LBS | HEART RATE: 42 BPM | TEMPERATURE: 97.5 F | OXYGEN SATURATION: 98 % | RESPIRATION RATE: 14 BRPM | BODY MASS INDEX: 33.49 KG/M2

## 2019-06-19 DIAGNOSIS — K05.10 GINGIVITIS: ICD-10-CM

## 2019-06-19 DIAGNOSIS — K13.0 ANGULAR CHEILOSIS: Primary | ICD-10-CM

## 2019-06-19 DIAGNOSIS — F39 MOOD DISORDER (H): ICD-10-CM

## 2019-06-19 PROCEDURE — 99214 OFFICE O/P EST MOD 30 MIN: CPT | Performed by: INTERNAL MEDICINE

## 2019-06-19 PROCEDURE — G0463 HOSPITAL OUTPT CLINIC VISIT: HCPCS

## 2019-06-19 PROCEDURE — G0463 HOSPITAL OUTPT CLINIC VISIT: HCPCS | Mod: 25

## 2019-06-19 RX ORDER — LITHIUM CARBONATE 450 MG
900 TABLET, EXTENDED RELEASE ORAL 2 TIMES DAILY
Qty: 120 TABLET | Refills: 5 | Status: SHIPPED | OUTPATIENT
Start: 2019-06-19 | End: 2019-09-23

## 2019-06-19 RX ORDER — CHLORHEXIDINE GLUCONATE ORAL RINSE 1.2 MG/ML
15 SOLUTION DENTAL 2 TIMES DAILY
Qty: 1893 ML | Refills: 5 | Status: SHIPPED | OUTPATIENT
Start: 2019-06-19 | End: 2020-07-27

## 2019-06-19 RX ORDER — DEXTROAMPHETAMINE SACCHARATE, AMPHETAMINE ASPARTATE, DEXTROAMPHETAMINE SULFATE AND AMPHETAMINE SULFATE 7.5; 7.5; 7.5; 7.5 MG/1; MG/1; MG/1; MG/1
30 TABLET ORAL 2 TIMES DAILY
Qty: 60 TABLET | Refills: 0 | Status: SHIPPED | OUTPATIENT
Start: 2019-06-19 | End: 2019-07-16

## 2019-06-19 ASSESSMENT — PAIN SCALES - GENERAL: PAINLEVEL: MODERATE PAIN (4)

## 2019-06-19 NOTE — NURSING NOTE
"No chief complaint on file.      Initial /80 (BP Location: Right arm, Patient Position: Sitting, Cuff Size: Adult Regular)   Pulse (!) 42   Temp 97.5  F (36.4  C) (Tympanic)   Resp 14   Wt 105.9 kg (233 lb 6.4 oz)   SpO2 98%   BMI 33.49 kg/m   Estimated body mass index is 33.49 kg/m  as calculated from the following:    Height as of 6/18/18: 1.778 m (5' 10\").    Weight as of this encounter: 105.9 kg (233 lb 6.4 oz).  Medication Reconciliation: complete        "

## 2019-07-16 DIAGNOSIS — F90.0 ATTENTION DEFICIT HYPERACTIVITY DISORDER (ADHD), PREDOMINANTLY INATTENTIVE TYPE: Primary | ICD-10-CM

## 2019-07-16 NOTE — TELEPHONE ENCOUNTER
amphetamine-dextroamphetamine (ADDERALL) 30 MG tablet      Last Written Prescription Date:  6-19-19  Last Fill Quantity: 60,   # refills: 0  Last Office Visit: 6-19-19  Future Office visit:    Next 5 appointments (look out 90 days)    Jul 31, 2019 10:40 AM CDT  (Arrive by 10:25 AM)  SHORT with Tommy Lamb DO  [facility] ([facility] ) [address]  [place] [address]  [phone]           Routing refill request to provider for review/approval because:  Drug not on the [facility], [facility] or [facility] refill protocol or controlled substance

## 2019-07-17 RX ORDER — DEXTROAMPHETAMINE SACCHARATE, AMPHETAMINE ASPARTATE, DEXTROAMPHETAMINE SULFATE AND AMPHETAMINE SULFATE 7.5; 7.5; 7.5; 7.5 MG/1; MG/1; MG/1; MG/1
30 TABLET ORAL 2 TIMES DAILY
Qty: 60 TABLET | Refills: 0 | Status: SHIPPED | OUTPATIENT
Start: 2019-07-18 | End: 2019-08-19

## 2019-07-23 DIAGNOSIS — K29.00 ACUTE SUPERFICIAL GASTRITIS WITHOUT HEMORRHAGE: ICD-10-CM

## 2019-07-23 RX ORDER — OMEPRAZOLE 40 MG/1
CAPSULE, DELAYED RELEASE ORAL
Qty: 30 CAPSULE | Refills: 1 | Status: SHIPPED | OUTPATIENT
Start: 2019-07-23 | End: 2019-09-23

## 2019-07-25 NOTE — PROGRESS NOTES
Subjective     Andrei Whitman is a 48 year old male who presents to clinic today for the following health issues:    HPI   Mouth Problem      Duration: ongoing 5 months     Description (location/character/radiation): pain     Intensity:  moderate    Accompanying signs and symptoms: swollen glands, cuts in mouth,     History (similar episodes/previous evaluation): None    Precipitating or alleviating factors: chlorhexidine    Therapies tried and outcome: chlorhexidine helps take the pain away             Depression and Anxiety Follow-Up    How are you doing with your depression since your last visit? Improved     How are you doing with your anxiety since your last visit?  No change    Are you having other symptoms that might be associated with depression or anxiety? No    Have you had a significant life event? No     Do you have any concerns with your use of alcohol or other drugs? No    Social History     Tobacco Use     Smoking status: Current Every Day Smoker     Packs/day: 0.30     Years: 30.00     Pack years: 9.00     Start date: 1/1/1989     Smokeless tobacco: Never Used     Tobacco comment: quitplan referral declined 6/19/19 on chantax   Substance Use Topics     Alcohol use: No     Alcohol/week: 0.0 oz     Drug use: No     PHQ 3/18/2019 4/18/2019 7/31/2019   PHQ-9 Total Score 14 12 6   Q9: Thoughts of better off dead/self-harm past 2 weeks Not at all Not at all Several days   F/U: Thoughts of suicide or self-harm - - -   F/U: Safety concerns - - -     SONDRA-7 SCORE 3/18/2019 4/18/2019 7/31/2019   Total Score 16 10 7     His lithium was recently changed to 900 mg BID from 900 mg in the AM and 600 MG in the PM which was causing confusion with refills.      Suicide Assessment Five-step Evaluation and Treatment (SAFE-T)    Amount of exercise or physical activity: None    Problems taking medications regularly: No    Medication side effects: none    Diet: regular (no restrictions)       Tobacco cessation;  He is on  Chantix for 3 months.  He is smoking 5 cigarettes.  He has his last EGD 14 months ago for chronic GERD and screen for Russell Esophagus        Patient Active Problem List   Diagnosis     Cervicalgia     Lower back pain     Segmental and somatic dysfunction of lower extremity     GERD (gastroesophageal reflux disease)     Mood disorder (H)     Abnormal weight gain     Attention deficit hyperactivity disorder (ADHD), predominantly inattentive type     ACP (advance care planning)     Flatulence, eructation, and gas pain     Bipolar disease, chronic (H)     Routine general medical examination at a health care facility     Benign essential hypertension     Simple chronic bronchitis (H)     Russell esophagus     Tobacco dependency     Lithium use     DDD (degenerative disc disease), cervical     Cervical stenosis of spinal canal     Past Surgical History:   Procedure Laterality Date     APPENDECTOMY      age 14     COLONOSCOPY  07/12/2017    Left descending x1 tubular adenoma, sigmoid x1 tubular adenoma and rectal x1 hyperplastic polyp.  Pan diverticulosis     ENDOSCOPY UPPER, COLONOSCOPY, COMBINED N/A 7/12/2017    Procedure: COMBINED ENDOSCOPY UPPER, COLONOSCOPY;  UPPER ENDOSCOPY WITH BIOPSIES  AND COLONOSCOPY WITH POLYPECTOMY;  Surgeon: Francis Valverde DO;  Location: [facility]     ENT SURGERY  age 18    tonsils     ESOPHAGOGASTRODUODENOSCOPY  07/12/2017    chemical gatropathy, GE junction with pancreatiuc metaplasia      ESOPHAGOSCOPY, GASTROSCOPY, DUODENOSCOPY (EGD), COMBINED N/A 6/20/2018    Procedure: COMBINED ESOPHAGOSCOPY, GASTROSCOPY, DUODENOSCOPY (EGD), BIOPSY SINGLE OR MULTIPLE;  UPPER ENDOSCOPY WITH BIOPSY;  Surgeon: Francis Valverde DO;  Location: [facility]     ORTHOPEDIC SURGERY  age 30     back and neck fusion, bone from hip removed to use on plates     ORTHOPEDIC SURGERY  age 30    L5 fusion, laminectomy of lumbar discs       Social History     Tobacco Use     Smoking status: Current Every Day Smoker      Packs/day: 0.30     Years: 30.00     Pack years: 9.00     Start date: 1/1/1989     Smokeless tobacco: Never Used     Tobacco comment: quitplan referral declined 6/19/19 on chantax   Substance Use Topics     Alcohol use: No     Alcohol/week: 0.0 oz     Family History   Problem Relation Age of Onset     Asthma Mother      Arthritis Mother      Prostate Cancer Father      Heart Disease Paternal Grandfather      Hypertension Paternal Grandfather      Alcohol/Drug Paternal Grandfather      Other - See Comments Brother         Nerve and degenerative disease mild     Alcohol/Drug Brother      Other - See Comments Sister 21        Hip replacements, nerve, degerative disease     Alcohol/Drug Maternal Grandfather      Unknown/Adopted Paternal Grandmother      Other Cancer Paternal Aunt         Cervical cancer           -------------------------------------  Reviewed and updated as needed this visit by Provider         Review of Systems   ROS COMP: CONSTITUTIONAL: NEGATIVE for fever, chills, change in weight  INTEGUMENTARY/SKIN: NEGATIVE for worrisome rashes, moles or lesions  EYES: NEGATIVE for vision changes or irritation  ENT/MOUTH: POSITIVE for ear pain left and hoarse voice  RESP: NEGATIVE for significant cough or SOB  CV: NEGATIVE for chest pain, palpitations or peripheral edema  GI: POSITIVE for gas or bloating and nausea and NEGATIVE for abdominal pain , constipation, diarrhea, heartburn or reflux, hematemesis and hematochezia  : NEGATIVE for frequency, dysuria, or hematuria  MUSCULOSKELETAL:POSITIVE  for back pain lower back and paresthesias of the leg.  He has aching in the buttock  NEURO: NEGATIVE for weakness, dizziness or paresthesias  PSYCHIATRIC: See HPI      Objective    BP (!) 142/92 (BP Location: Right arm, Patient Position: Chair, Cuff Size: Adult Large)   Pulse 94   Temp 98.9  F (37.2  C) (Tympanic)   Wt 109.3 kg (241 lb)   SpO2 98%   BMI 34.58 kg/m    Body mass index is 34.58 kg/m .  Physical Exam    GENERAL: healthy, alert and no distress  EYES: Eyes grossly normal to inspection and conjunctivae and sclerae normal  HENT: ear canals and TM's normal, nose and mouth without ulcers or lesions  NECK: no adenopathy, no asymmetry, masses, or scars and thyroid normal to palpation  RESP: lungs clear to auscultation - no rales, rhonchi or wheezes  CV: regular rate and rhythm, normal S1 S2, no S3 or S4, no murmur, click or rub, no peripheral edema and peripheral pulses strong  ABDOMEN: soft, nontender, no hepatosplenomegaly, no masses and bowel sounds normal  ABDOMEN: no bruits heard  MS: no gross musculoskeletal defects noted, no edema  NEURO: Normal strength and tone, mentation intact and speech normal  PSYCH: mentation appears normal, affect normal/bright    Diagnostic Test Results:  Labs reviewed in Epic    Lithium level: 0.86                 ASSESSMENT /PLAN:  (F39) Mood disorder (H)  Comment: Stable.  He has a mixture of anxiety and depression which is likely bipolar disorder and well controlled.  Plan:   He will continue Lithium 900 mg BID and Elavil 25 mg at bedtime.    (F90.0) Attention deficit hyperactivity disorder (ADHD), predominantly inattentive type  Comment: Andrei has a mix of Anxiety and inattention with stable symptoms.  Plan:   He will continue Adderall 30 mg BID    (Z79.899) Lithium use  Comment: Stable labs with stable lithium level.  Plan:   Continue Lithium 900 mg BID.    (F17.200) Tobacco dependency  Comment: He has been on Chantix for months which he is tolerating well.  He is smoking up to 5 cigarettes per day out of habit.   Plan:   He agree to stop purchasing tobacco.  He will continue Chantix BID.    (K13.79) Mouth sores  Comment: Minimal improvement with treating his gum disease.  Plan:   fluconazole (DIFLUCAN) 200 MG tablet daily for 10 days.      (K13.0) Angular cheilosis  (primary encounter diagnosis)  Comment:   Plan:  fluconazole (DIFLUCAN) 200 MG tablet as above.    (R06.2)  Wheezing  Comment: Andrei has presumed Asthma vs COPD secondary to tobacco inflammation.  Plan:   He will continue Symbicort and hold this the day of his pulmonary testing.  General PFT Lab (Please always keep checked)          Follow up with Provider - 6 weeks for wheezing and mouth sores.        Tommy Lamb, DO

## 2019-07-31 ENCOUNTER — OFFICE VISIT (OUTPATIENT)
Dept: INTERNAL MEDICINE | Facility: OTHER | Age: 49
End: 2019-07-31
Attending: INTERNAL MEDICINE
Payer: MEDICARE

## 2019-07-31 VITALS
OXYGEN SATURATION: 98 % | TEMPERATURE: 98.9 F | WEIGHT: 241 LBS | SYSTOLIC BLOOD PRESSURE: 122 MMHG | BODY MASS INDEX: 34.58 KG/M2 | HEART RATE: 94 BPM | DIASTOLIC BLOOD PRESSURE: 92 MMHG

## 2019-07-31 DIAGNOSIS — F90.0 ATTENTION DEFICIT HYPERACTIVITY DISORDER (ADHD), PREDOMINANTLY INATTENTIVE TYPE: ICD-10-CM

## 2019-07-31 DIAGNOSIS — K13.79 MOUTH SORES: ICD-10-CM

## 2019-07-31 DIAGNOSIS — F33.41 RECURRENT MAJOR DEPRESSIVE DISORDER, IN PARTIAL REMISSION (H): ICD-10-CM

## 2019-07-31 DIAGNOSIS — F17.200 TOBACCO DEPENDENCY: ICD-10-CM

## 2019-07-31 DIAGNOSIS — R06.2 WHEEZING: ICD-10-CM

## 2019-07-31 DIAGNOSIS — F39 MOOD DISORDER (H): ICD-10-CM

## 2019-07-31 DIAGNOSIS — Z79.899 LITHIUM USE: ICD-10-CM

## 2019-07-31 DIAGNOSIS — K13.0 ANGULAR CHEILOSIS: Primary | ICD-10-CM

## 2019-07-31 LAB — LITHIUM SERPL-SCNC: 0.86 MMOL/L (ref 0.6–1.2)

## 2019-07-31 PROCEDURE — 36415 COLL VENOUS BLD VENIPUNCTURE: CPT | Mod: ZL | Performed by: INTERNAL MEDICINE

## 2019-07-31 PROCEDURE — G0463 HOSPITAL OUTPT CLINIC VISIT: HCPCS

## 2019-07-31 PROCEDURE — 99214 OFFICE O/P EST MOD 30 MIN: CPT | Performed by: INTERNAL MEDICINE

## 2019-07-31 PROCEDURE — 80178 ASSAY OF LITHIUM: CPT | Mod: ZL | Performed by: INTERNAL MEDICINE

## 2019-07-31 RX ORDER — FLUCONAZOLE 200 MG/1
200 TABLET ORAL DAILY
Qty: 10 TABLET | Refills: 0 | Status: SHIPPED | OUTPATIENT
Start: 2019-07-31 | End: 2019-09-23

## 2019-07-31 RX ORDER — BUPROPION HYDROCHLORIDE 100 MG/1
TABLET ORAL
Qty: 360 TABLET | Refills: 2 | Status: SHIPPED | OUTPATIENT
Start: 2019-07-31 | End: 2020-06-30

## 2019-07-31 ASSESSMENT — PAIN SCALES - GENERAL: PAINLEVEL: SEVERE PAIN (6)

## 2019-07-31 ASSESSMENT — PATIENT HEALTH QUESTIONNAIRE - PHQ9
SUM OF ALL RESPONSES TO PHQ QUESTIONS 1-9: 6
5. POOR APPETITE OR OVEREATING: SEVERAL DAYS

## 2019-07-31 ASSESSMENT — ANXIETY QUESTIONNAIRES
7. FEELING AFRAID AS IF SOMETHING AWFUL MIGHT HAPPEN: NOT AT ALL
1. FEELING NERVOUS, ANXIOUS, OR ON EDGE: SEVERAL DAYS
6. BECOMING EASILY ANNOYED OR IRRITABLE: SEVERAL DAYS
3. WORRYING TOO MUCH ABOUT DIFFERENT THINGS: MORE THAN HALF THE DAYS
2. NOT BEING ABLE TO STOP OR CONTROL WORRYING: SEVERAL DAYS
GAD7 TOTAL SCORE: 7
IF YOU CHECKED OFF ANY PROBLEMS ON THIS QUESTIONNAIRE, HOW DIFFICULT HAVE THESE PROBLEMS MADE IT FOR YOU TO DO YOUR WORK, TAKE CARE OF THINGS AT HOME, OR GET ALONG WITH OTHER PEOPLE: SOMEWHAT DIFFICULT
5. BEING SO RESTLESS THAT IT IS HARD TO SIT STILL: SEVERAL DAYS

## 2019-07-31 NOTE — NURSING NOTE
"Chief Complaint   Patient presents with     Dental Pain     MOOD CHANGES       Initial BP (!) 122/92 (BP Location: Right arm, Patient Position: Chair, Cuff Size: Adult Large)   Pulse 94   Temp 98.9  F (37.2  C) (Tympanic)   Wt 109.3 kg (241 lb)   SpO2 98%   BMI 34.58 kg/m   Estimated body mass index is 34.58 kg/m  as calculated from the following:    Height as of 6/18/18: 1.778 m (5' 10\").    Weight as of this encounter: 109.3 kg (241 lb).  Medication Reconciliation: complete     Costa Figueroa LPN    "

## 2019-08-01 ASSESSMENT — ANXIETY QUESTIONNAIRES: GAD7 TOTAL SCORE: 7

## 2019-08-19 DIAGNOSIS — F90.0 ATTENTION DEFICIT HYPERACTIVITY DISORDER (ADHD), PREDOMINANTLY INATTENTIVE TYPE: ICD-10-CM

## 2019-08-19 RX ORDER — DEXTROAMPHETAMINE SACCHARATE, AMPHETAMINE ASPARTATE, DEXTROAMPHETAMINE SULFATE AND AMPHETAMINE SULFATE 7.5; 7.5; 7.5; 7.5 MG/1; MG/1; MG/1; MG/1
30 TABLET ORAL 2 TIMES DAILY
Qty: 60 TABLET | Refills: 0 | Status: SHIPPED | OUTPATIENT
Start: 2019-08-19 | End: 2019-09-20

## 2019-08-20 NOTE — TELEPHONE ENCOUNTER
Attempted to reach patient twice to inform him his  prescription will be at the  for pickup. First call nurse was hung up on. Second call no answer with no voicemail.   amphetamine-dextroamphetamine (ADDERALL) 30 MG tablet prescription up at .

## 2019-08-27 ENCOUNTER — HOSPITAL ENCOUNTER (OUTPATIENT)
Dept: RESPIRATORY THERAPY | Facility: HOSPITAL | Age: 49
Discharge: HOME | End: 2019-08-27
Attending: INTERNAL MEDICINE | Admitting: INTERNAL MEDICINE
Payer: MEDICARE

## 2019-08-27 LAB
COHGB MFR BLD: 3.4 % (ref 0–2)
HGB BLD-MCNC: 13.3 G/DL (ref 13.3–17.7)

## 2019-08-27 PROCEDURE — 94726 PLETHYSMOGRAPHY LUNG VOLUMES: CPT

## 2019-08-27 PROCEDURE — 94729 DIFFUSING CAPACITY: CPT | Mod: 26 | Performed by: INTERNAL MEDICINE

## 2019-08-27 PROCEDURE — 94010 BREATHING CAPACITY TEST: CPT | Mod: 26 | Performed by: INTERNAL MEDICINE

## 2019-08-27 PROCEDURE — 94010 BREATHING CAPACITY TEST: CPT

## 2019-08-27 PROCEDURE — 82375 ASSAY CARBOXYHB QUANT: CPT | Performed by: INTERNAL MEDICINE

## 2019-08-27 PROCEDURE — 94726 PLETHYSMOGRAPHY LUNG VOLUMES: CPT | Mod: 26 | Performed by: INTERNAL MEDICINE

## 2019-08-27 PROCEDURE — 94729 DIFFUSING CAPACITY: CPT

## 2019-08-27 PROCEDURE — 85018 HEMOGLOBIN: CPT | Performed by: INTERNAL MEDICINE

## 2019-08-27 PROCEDURE — 36415 COLL VENOUS BLD VENIPUNCTURE: CPT | Performed by: INTERNAL MEDICINE

## 2019-08-27 RX ORDER — ALBUTEROL SULFATE 0.83 MG/ML
2.5 SOLUTION RESPIRATORY (INHALATION)
Status: DISCONTINUED | OUTPATIENT
Start: 2019-08-27 | End: 2019-08-28 | Stop reason: HOSPADM

## 2019-09-03 DIAGNOSIS — J41.0 SIMPLE CHRONIC BRONCHITIS (H): ICD-10-CM

## 2019-09-04 RX ORDER — BUDESONIDE AND FORMOTEROL FUMARATE DIHYDRATE 160; 4.5 UG/1; UG/1
AEROSOL RESPIRATORY (INHALATION)
Qty: 30.6 G | Refills: 6 | Status: SHIPPED | OUTPATIENT
Start: 2019-09-04 | End: 2019-09-23 | Stop reason: ALTCHOICE

## 2019-09-11 DIAGNOSIS — F17.200 TOBACCO DEPENDENCE: ICD-10-CM

## 2019-09-12 DIAGNOSIS — F17.200 TOBACCO DEPENDENCE: ICD-10-CM

## 2019-09-12 RX ORDER — VARENICLINE TARTRATE 1 MG/1
1 TABLET, FILM COATED ORAL 2 TIMES DAILY
Qty: 56 TABLET | Refills: 4 | Status: SHIPPED | OUTPATIENT
Start: 2019-09-12 | End: 2020-02-13

## 2019-09-13 RX ORDER — VARENICLINE TARTRATE 1 MG/1
TABLET, FILM COATED ORAL
Qty: 56 TABLET | Refills: 4 | OUTPATIENT
Start: 2019-09-13

## 2019-09-17 NOTE — PROGRESS NOTES
Subjective     Andrei Whitman is a 48 year old male who presents to clinic today for the following health issues:    HPI   COPD Follow-Up    Overall, how are your COPD symptoms since your last clinic visit?  Slightly worse    How much fatigue or shortness of breath do you have when you are walking?  More than usual    How much shortness of breath do you have when you are resting?  More than usual    How often do you cough? Sometimes    Have you noticed any change in your sputum/phlegm?  Yes- black, solid, heavy    Have you experienced a recent fever? Yes    Please describe how far you can walk without stopping to rest:  2-5 blocks    How many flights of stairs are you able to walk up without stopping?  1    Have you had any Emergency Room Visits, Urgent Care Visits, or Hospital Admissions because of your COPD since your last office visit?  No    History   Smoking Status     Current Every Day Smoker     Packs/day: 0.30     Years: 30.00     Start date: 1/1/1989   Smokeless Tobacco     Never Used     Comment: quitplan referral declined 6/19/19 on chantax     He reports that he had a common cold with congestion which made him feel worse.  He had heavy nasal congestion drip.  He reports that this is near resolved.    He is smoking a limited amount up to 5 cigarettes per day.    Cheilitis      How many servings of fruits and vegetables do you eat daily?  2-3    On average, how many sweetened beverages do you drink each day (soda, juice, sweet tea, etc)?   6    How many days per week do you miss taking your medication? 0    He completed his Fluconazole for 10 days.  He reports that his lips are better.  He has continued mouth sores due to his teeth digging into his mouth mucosa.      -------------------------------------    Patient Active Problem List   Diagnosis     Cervicalgia     Lower back pain     Segmental and somatic dysfunction of lower extremity     GERD (gastroesophageal reflux disease)     Mood disorder (H)      Abnormal weight gain     Attention deficit hyperactivity disorder (ADHD), predominantly inattentive type     ACP (advance care planning)     Flatulence, eructation, and gas pain     Bipolar disease, chronic (H)     Routine general medical examination at a health care facility     Benign essential hypertension     Simple chronic bronchitis (H)     Russell esophagus     Tobacco dependency     Lithium use     DDD (degenerative disc disease), cervical     Cervical stenosis of spinal canal     Past Surgical History:   Procedure Laterality Date     APPENDECTOMY      age 14     COLONOSCOPY  07/12/2017    Left descending x1 tubular adenoma, sigmoid x1 tubular adenoma and rectal x1 hyperplastic polyp.  Pan diverticulosis     ENDOSCOPY UPPER, COLONOSCOPY, COMBINED N/A 7/12/2017    Procedure: COMBINED ENDOSCOPY UPPER, COLONOSCOPY;  UPPER ENDOSCOPY WITH BIOPSIES  AND COLONOSCOPY WITH POLYPECTOMY;  Surgeon: Francis Valverde DO;  Location: [facility]     ENT SURGERY  age 18    tonsils     ESOPHAGOGASTRODUODENOSCOPY  07/12/2017    chemical gatropathy, GE junction with pancreatiuc metaplasia      ESOPHAGOSCOPY, GASTROSCOPY, DUODENOSCOPY (EGD), COMBINED N/A 6/20/2018    Procedure: COMBINED ESOPHAGOSCOPY, GASTROSCOPY, DUODENOSCOPY (EGD), BIOPSY SINGLE OR MULTIPLE;  UPPER ENDOSCOPY WITH BIOPSY;  Surgeon: Francis Valverde DO;  Location: [facility]     ORTHOPEDIC SURGERY  age 30     back and neck fusion, bone from hip removed to use on plates     ORTHOPEDIC SURGERY  age 30    L5 fusion, laminectomy of lumbar discs       Social History     Tobacco Use     Smoking status: Current Every Day Smoker     Packs/day: 0.30     Years: 30.00     Pack years: 9.00     Start date: 1/1/1989     Smokeless tobacco: Never Used     Tobacco comment: quitplan referral declined 6/19/19 on chantax   Substance Use Topics     Alcohol use: No     Alcohol/week: 0.0 standard drinks     Family History   Problem Relation Age of Onset     Asthma Mother       Arthritis Mother      Prostate Cancer Father      Heart Disease Paternal Grandfather      Hypertension Paternal Grandfather      Alcohol/Drug Paternal Grandfather      Other - See Comments Brother         Nerve and degenerative disease mild     Alcohol/Drug Brother      Other - See Comments Sister 21        Hip replacements, nerve, degerative disease     Alcohol/Drug Maternal Grandfather      Unknown/Adopted Paternal Grandmother      Other Cancer Paternal Aunt         Cervical cancer           -------------------------------------  Reviewed and updated as needed this visit by Provider         Review of Systems   ROS COMP: Constitutional, HEENT, cardiovascular, pulmonary, gi and gu systems are negative, except as otherwise noted.      Objective    BP (!) 122/100 (BP Location: Right arm, Patient Position: Chair, Cuff Size: Adult Large)   Pulse 61   Temp 99.3  F (37.4  C) (Tympanic)   Wt 110.7 kg (244 lb)   SpO2 98%   BMI 35.01 kg/m    Body mass index is 35.01 kg/m .  Physical Exam   GENERAL: healthy, alert and no distress  EYES: Eyes grossly normal to inspection and conjunctivae and sclerae normal  HENT: oropharynx clear and excessively dry mouth  NECK: no adenopathy, no asymmetry, masses, or scars and thyroid normal to palpation  RESP: lungs clear to auscultation - no rales, rhonchi or wheezes  CV: regular rate and rhythm, normal S1 S2, no S3 or S4, no murmur, click or rub, no peripheral edema and peripheral pulses strong  MS: no gross musculoskeletal defects noted, no edema        Diagnostic Test Results:  Labs reviewed in Epic  none         Assessment & Plan     (J98.4) Chronic lung disease  (primary encounter diagnosis)  Comment: His PFTs show lung disease at the gas exchange level.  There is obstruction.  Plan:   Stop Symbicort and start  fluticasone (FLOVENT HFA) 220 MCG/ACT inhaler 2 puffs BID.    (I10) Essential hypertension  Comment: Stable   Plan:  He will continue verapamil ER (VERELAN) 240 MG 24 hr  capsule daily and HCTZ     (R68.2) Clinical xerostomia  Comment: Secondary to lithium, HCTZ and elavil   Plan:  Start pilocarpine (SALAGEN) 5 MG tablet    (F17.200) Tobacco dependency  Comment: Andrei is using minimal tobacco and he has mainly mental addiction at this point.  We discussed cost of cigarettes per weeks being 20 -30 $ / week vs hard candies costing 3-4 $ /week.  Plan:   He will start using hard candies to help him quits tobacco.           Tobacco Cessation:   reports that he has been smoking. He started smoking about 30 years ago. He has a 9.00 pack-year smoking history. He has never used smokeless tobacco.  Tobacco Cessation Action Plan: Self help information given to patient         FUTURE APPOINTMENTS:       - Follow-up visit in 2 months for Lung disease and xerostomia     No follow-ups on file.    Tommy Lamb DO, DO  [facility] [place]

## 2019-09-18 DIAGNOSIS — F90.0 ATTENTION DEFICIT HYPERACTIVITY DISORDER (ADHD), PREDOMINANTLY INATTENTIVE TYPE: ICD-10-CM

## 2019-09-18 NOTE — TELEPHONE ENCOUNTER
Adderall      Last Written Prescription Date:  8/19/19  Last Fill Quantity: 60,   # refills: 0  Last Office Visit: 7/31/19  Future Office visit:    Next 5 appointments (look out 90 days)    Sep 23, 2019  1:40 PM CDT  (Arrive by 1:20 PM)  SHORT with Tommy Lamb DO  [facility] ([facility] ) [address]  [place] [address]  [phone]           Routing refill request to provider for review/approval because:

## 2019-09-20 RX ORDER — DEXTROAMPHETAMINE SACCHARATE, AMPHETAMINE ASPARTATE, DEXTROAMPHETAMINE SULFATE AND AMPHETAMINE SULFATE 7.5; 7.5; 7.5; 7.5 MG/1; MG/1; MG/1; MG/1
30 TABLET ORAL 2 TIMES DAILY
Qty: 60 TABLET | Refills: 0 | Status: SHIPPED | OUTPATIENT
Start: 2019-09-20 | End: 2019-10-21

## 2019-09-20 NOTE — TELEPHONE ENCOUNTER
Patient notified prescription is ready to be picked up at the [facility], Registration.

## 2019-09-22 DIAGNOSIS — K29.00 ACUTE SUPERFICIAL GASTRITIS WITHOUT HEMORRHAGE: ICD-10-CM

## 2019-09-22 DIAGNOSIS — I10 ESSENTIAL HYPERTENSION: ICD-10-CM

## 2019-09-22 DIAGNOSIS — I10 BENIGN ESSENTIAL HYPERTENSION: ICD-10-CM

## 2019-09-23 ENCOUNTER — OFFICE VISIT (OUTPATIENT)
Dept: INTERNAL MEDICINE | Facility: OTHER | Age: 49
End: 2019-09-23
Attending: INTERNAL MEDICINE
Payer: MEDICARE

## 2019-09-23 VITALS
OXYGEN SATURATION: 98 % | DIASTOLIC BLOOD PRESSURE: 100 MMHG | WEIGHT: 244 LBS | HEART RATE: 61 BPM | BODY MASS INDEX: 35.01 KG/M2 | TEMPERATURE: 99.3 F | SYSTOLIC BLOOD PRESSURE: 122 MMHG

## 2019-09-23 DIAGNOSIS — J98.4 CHRONIC LUNG DISEASE: Primary | ICD-10-CM

## 2019-09-23 DIAGNOSIS — K11.7 CLINICAL XEROSTOMIA: ICD-10-CM

## 2019-09-23 DIAGNOSIS — F17.200 TOBACCO DEPENDENCY: ICD-10-CM

## 2019-09-23 DIAGNOSIS — I10 ESSENTIAL HYPERTENSION: ICD-10-CM

## 2019-09-23 PROCEDURE — 99214 OFFICE O/P EST MOD 30 MIN: CPT | Performed by: INTERNAL MEDICINE

## 2019-09-23 PROCEDURE — G0463 HOSPITAL OUTPT CLINIC VISIT: HCPCS

## 2019-09-23 RX ORDER — OMEPRAZOLE 40 MG/1
40 CAPSULE, DELAYED RELEASE ORAL DAILY
Qty: 90 CAPSULE | Refills: 3 | Status: SHIPPED | OUTPATIENT
Start: 2019-09-23 | End: 2020-09-20

## 2019-09-23 RX ORDER — LITHIUM CARBONATE 450 MG
900 TABLET, EXTENDED RELEASE ORAL 2 TIMES DAILY
Qty: 120 TABLET | Refills: 5 | Status: SHIPPED | OUTPATIENT
Start: 2019-09-23 | End: 2020-03-26 | Stop reason: ALTCHOICE

## 2019-09-23 RX ORDER — FLUTICASONE PROPIONATE 220 UG/1
2 AEROSOL, METERED RESPIRATORY (INHALATION) 2 TIMES DAILY
Qty: 2 INHALER | Refills: 0 | Status: SHIPPED | OUTPATIENT
Start: 2019-09-23 | End: 2019-11-18

## 2019-09-23 RX ORDER — HYDROCHLOROTHIAZIDE 12.5 MG/1
CAPSULE ORAL
Qty: 90 CAPSULE | Refills: 3 | Status: SHIPPED | OUTPATIENT
Start: 2019-09-23 | End: 2020-09-10

## 2019-09-23 RX ORDER — VERAPAMIL HYDROCHLORIDE 240 MG/1
CAPSULE, EXTENDED RELEASE ORAL
Qty: 90 CAPSULE | Refills: 3 | Status: SHIPPED | OUTPATIENT
Start: 2019-09-23 | End: 2020-09-25

## 2019-09-23 RX ORDER — PILOCARPINE HYDROCHLORIDE 5 MG/1
5 TABLET, FILM COATED ORAL 3 TIMES DAILY
Qty: 90 TABLET | Refills: 1 | Status: SHIPPED | OUTPATIENT
Start: 2019-09-23 | End: 2019-11-22

## 2019-09-23 ASSESSMENT — PAIN SCALES - GENERAL: PAINLEVEL: MODERATE PAIN (4)

## 2019-09-23 NOTE — NURSING NOTE
"Chief Complaint   Patient presents with     COPD     cheilitis       Initial BP (!) 122/100 (BP Location: Right arm, Patient Position: Chair, Cuff Size: Adult Large)   Pulse 61   Temp 99.3  F (37.4  C) (Tympanic)   Wt 110.7 kg (244 lb)   SpO2 98%   BMI 35.01 kg/m   Estimated body mass index is 35.01 kg/m  as calculated from the following:    Height as of 6/18/18: 1.778 m (5' 10\").    Weight as of this encounter: 110.7 kg (244 lb).  Medication Reconciliation: complete  Costa Figueroa LPN  "

## 2019-09-24 RX ORDER — HYDROCHLOROTHIAZIDE 12.5 MG/1
CAPSULE ORAL
Qty: 30 CAPSULE | OUTPATIENT
Start: 2019-09-24

## 2019-09-24 RX ORDER — OMEPRAZOLE 40 MG/1
CAPSULE, DELAYED RELEASE ORAL
Qty: 30 CAPSULE | Refills: 1 | OUTPATIENT
Start: 2019-09-24

## 2019-09-24 RX ORDER — VERAPAMIL HYDROCHLORIDE 240 MG/1
CAPSULE, EXTENDED RELEASE ORAL
Qty: 30 CAPSULE | OUTPATIENT
Start: 2019-09-24

## 2019-10-18 DIAGNOSIS — F90.0 ATTENTION DEFICIT HYPERACTIVITY DISORDER (ADHD), PREDOMINANTLY INATTENTIVE TYPE: ICD-10-CM

## 2019-10-18 NOTE — TELEPHONE ENCOUNTER
Adderall      Last Written Prescription Date:  9/20/19  Last Fill Quantity: 60,   # refills: 0  Last Office Visit: 9/23/19  Future Office visit:    Next 5 appointments (look out 90 days)    Nov 27, 2019  2:20 PM CST  (Arrive by 2:05 PM)  SHORT with Tommy Lamb DO  [facility] ([facility] ) [address]  [place] [address]  [phone]           Routing refill request to provider for review/approval because:

## 2019-10-21 RX ORDER — DEXTROAMPHETAMINE SACCHARATE, AMPHETAMINE ASPARTATE, DEXTROAMPHETAMINE SULFATE AND AMPHETAMINE SULFATE 7.5; 7.5; 7.5; 7.5 MG/1; MG/1; MG/1; MG/1
30 TABLET ORAL 2 TIMES DAILY
Qty: 60 TABLET | Refills: 0 | Status: SHIPPED | OUTPATIENT
Start: 2019-10-21 | End: 2019-11-18

## 2019-10-22 NOTE — TELEPHONE ENCOUNTER
Attempt made to call patient to inform him that Rx, amphetamine-dextroamphetamine (ADDERALL) 30 MG tablet, is ready to be picked up at the  in [place].

## 2019-11-11 DIAGNOSIS — M54.12 CERVICAL RADICULOPATHY: ICD-10-CM

## 2019-11-12 RX ORDER — GABAPENTIN 300 MG/1
CAPSULE ORAL
Qty: 180 CAPSULE | Refills: 3 | Status: SHIPPED | OUTPATIENT
Start: 2019-11-12 | End: 2020-03-11

## 2019-11-12 NOTE — TELEPHONE ENCOUNTER
Gabapentin  Last Written Prescription Date: 7/16/19  Last Fill Quantity: 180 # of Refills: 3  Last Office Visit: 9/23/19

## 2019-11-13 NOTE — PROGRESS NOTES
Subjective     Andrei Whitman is a 48 year old male who presents to clinic today for the following health issues:    HPI   Dry Mouth    He has had months of soreness at the angles of his lips.  He had been on chlorhexidine and he was on fluconazole.  The fluconazole seemed to help.  He has also been on pilocarpine for his dry mouth which does help for a few hours.    Chronic lung disease    Overall, how are your  symptoms since your last clinic visit?  No change    How much fatigue or shortness of breath do you have when you are walking?  Same as usual    How much shortness of breath do you have when you are resting?  Same as usual    How often do you cough? Often    Have you noticed any change in your sputum/phlegm?  No    Have you experienced a recent fever? No    Please describe how far you can walk without stopping to rest:  The length of 3-5 rooms    How many flights of stairs are you able to walk up without stopping?  1    Have you had any Emergency Room Visits, Urgent Care Visits, or Hospital Admissions because of your lung disease since your last office visit?  No    History   Smoking Status     Current Every Day Smoker     Packs/day: 0.30     Years: 30.00     Start date: 1/1/1989   Smokeless Tobacco     Never Used     Comment: quitplan referral declined 6/19/19 on chantax     No results found for: FEV1, [id number]      Patient Active Problem List   Diagnosis     Cervicalgia     Lower back pain     Segmental and somatic dysfunction of lower extremity     GERD (gastroesophageal reflux disease)     Mood disorder (H)     Abnormal weight gain     Attention deficit hyperactivity disorder (ADHD), predominantly inattentive type     ACP (advance care planning)     Flatulence, eructation, and gas pain     Bipolar disease, chronic (H)     Routine general medical examination at a health care facility     Benign essential hypertension     Russell esophagus     Tobacco dependency     Lithium use     DDD (degenerative disc  disease), cervical     Cervical stenosis of spinal canal     Obesity (BMI 35.0-39.9) with comorbidity (H)     Chronic lung disease     Past Surgical History:   Procedure Laterality Date     APPENDECTOMY      age 14     COLONOSCOPY  07/12/2017    Left descending x1 tubular adenoma, sigmoid x1 tubular adenoma and rectal x1 hyperplastic polyp.  Pan diverticulosis     ENDOSCOPY UPPER, COLONOSCOPY, COMBINED N/A 7/12/2017    Procedure: COMBINED ENDOSCOPY UPPER, COLONOSCOPY;  UPPER ENDOSCOPY WITH BIOPSIES  AND COLONOSCOPY WITH POLYPECTOMY;  Surgeon: Francis Valverde DO;  Location: [facility]     ENT SURGERY  age 18    tonsils     ESOPHAGOGASTRODUODENOSCOPY  07/12/2017    chemical gatropathy, GE junction with pancreatiuc metaplasia      ESOPHAGOSCOPY, GASTROSCOPY, DUODENOSCOPY (EGD), COMBINED N/A 6/20/2018    Procedure: COMBINED ESOPHAGOSCOPY, GASTROSCOPY, DUODENOSCOPY (EGD), BIOPSY SINGLE OR MULTIPLE;  UPPER ENDOSCOPY WITH BIOPSY;  Surgeon: Francis Valverde DO;  Location: [facility]     ORTHOPEDIC SURGERY  age 30     back and neck fusion, bone from hip removed to use on plates     ORTHOPEDIC SURGERY  age 30    L5 fusion, laminectomy of lumbar discs       Social History     Tobacco Use     Smoking status: Current Every Day Smoker     Packs/day: 0.30     Years: 30.00     Pack years: 9.00     Start date: 1/1/1989     Smokeless tobacco: Never Used     Tobacco comment: quitplan referral declined 6/19/19 on chantax   Substance Use Topics     Alcohol use: No     Alcohol/week: 0.0 standard drinks     Family History   Problem Relation Age of Onset     Asthma Mother      Arthritis Mother      Prostate Cancer Father      Heart Disease Paternal Grandfather      Hypertension Paternal Grandfather      Alcohol/Drug Paternal Grandfather      Other - See Comments Brother         Nerve and degenerative disease mild     Alcohol/Drug Brother      Other - See Comments Sister 21        Hip replacements, nerve, degerative disease     Alcohol/Drug  Maternal Grandfather      Unknown/Adopted Paternal Grandmother      Other Cancer Paternal Aunt         Cervical cancer           -------------------------------------  Reviewed and updated as needed this visit by Provider         Review of Systems   ROS COMP: Constitutional, HEENT, cardiovascular, pulmonary, gi and gu systems are negative, except as otherwise noted.      Objective    /80 (BP Location: Right arm, Patient Position: Sitting, Cuff Size: Adult Regular)   Pulse 91   Temp 98.7  F (37.1  C) (Tympanic)   Resp 20   Wt 108.4 kg (239 lb)   SpO2 98%   BMI 34.29 kg/m    Body mass index is 34.29 kg/m .  Physical Exam   GENERAL: healthy, alert and no distress  HENT: ear canals and TM's normal, nose and mouth without ulcers or lesions  HENT: normal cephalic/atraumatic, ear canals and TM's normal, nose and mouth without ulcers or lesions and oral mucosa dry with buccal aphthous ulcers, and lip angle dryness and cracking  NECK: no adenopathy, no asymmetry, masses, or scars and thyroid normal to palpation  RESP: lungs clear to auscultation - no rales, rhonchi or wheezes  CV: regular rate and rhythm, normal S1 S2, no S3 or S4, no murmur, click or rub, no peripheral edema and peripheral pulses strong  MS: no gross musculoskeletal defects noted, no edema  PSYCH: mentation appears normal, affect normal/bright    Diagnostic Test Results:  Labs reviewed in Epic  Results for orders placed or performed in visit on 11/27/19 (from the past 24 hour(s))   Comprehensive metabolic panel   Result Value Ref Range    Sodium 138 133 - 144 mmol/L    Potassium 3.8 3.4 - 5.3 mmol/L    Chloride 107 94 - 109 mmol/L    Carbon Dioxide 26 20 - 32 mmol/L    Anion Gap 5 3 - 14 mmol/L    Glucose 86 70 - 99 mg/dL    Urea Nitrogen 7 7 - 30 mg/dL    Creatinine 0.88 0.66 - 1.25 mg/dL    GFR Estimate >90 >60 mL/min/[1.73_m2]    GFR Estimate If Black >90 >60 mL/min/[1.73_m2]    Calcium 9.5 8.5 - 10.1 mg/dL    Bilirubin Total 0.3 0.2 - 1.3  mg/dL    Albumin 3.9 3.4 - 5.0 g/dL    Protein Total 8.5 6.8 - 8.8 g/dL    Alkaline Phosphatase 115 40 - 150 U/L    ALT 21 0 - 70 U/L    AST 22 0 - 45 U/L               ASSESSMENT /PLAN:  (R68.2) Clinical xerostomia  (primary encounter diagnosis)  Comment: Improved.  He has only been taking pilocarpine 2 times per day.  Plan:   Increase pilocarpine (SALAGEN) 5 MG tablet to 4 times per day.      (K13.0) Angular cheilosis  Comment: Improved slightly.  Plan:  fluconazole (DIFLUCAN) 200 MG tablet daily for 21 days.    (K12.0) Aphthous ulcer of mouth  Comment: Recurrent.  Prednisone is 1st line but due to his mental health this may exacerbate his moodiness.  Plan:   montelukast (SINGULAIR) 10 MG tablet at bedtime          Follow up with Provider - 2 months for Mood disorder and mouth problems.        Tommy Lamb, DO, DO

## 2019-11-18 DIAGNOSIS — J98.4 CHRONIC LUNG DISEASE: ICD-10-CM

## 2019-11-18 DIAGNOSIS — F90.0 ATTENTION DEFICIT HYPERACTIVITY DISORDER (ADHD), PREDOMINANTLY INATTENTIVE TYPE: ICD-10-CM

## 2019-11-19 RX ORDER — FLUTICASONE PROPIONATE 220 UG/1
AEROSOL, METERED RESPIRATORY (INHALATION)
Qty: 24 G | Refills: 1 | Status: SHIPPED | OUTPATIENT
Start: 2019-11-19 | End: 2020-04-14

## 2019-11-19 RX ORDER — DEXTROAMPHETAMINE SACCHARATE, AMPHETAMINE ASPARTATE, DEXTROAMPHETAMINE SULFATE AND AMPHETAMINE SULFATE 7.5; 7.5; 7.5; 7.5 MG/1; MG/1; MG/1; MG/1
TABLET ORAL
Qty: 60 TABLET | Refills: 0 | Status: SHIPPED | OUTPATIENT
Start: 2019-11-19 | End: 2019-12-18

## 2019-11-19 NOTE — TELEPHONE ENCOUNTER
adderall       Last Written Prescription Date:  10/21/19  Last Fill Quantity: 60,   # refills: 0  Last Office Visit: 9/23/19  Future Office visit:    Next 5 appointments (look out 90 days)    Nov 27, 2019  2:20 PM CST  (Arrive by 2:05 PM)  SHORT with Tommy Lamb DO  [facility] [place] ([facility] [place] ) [address]  [place] [address]  [phone]           Routing refill request to provider for review/approval because:  Drug not on the [facility], [facility] or [facility] refill protocol or controlled substance

## 2019-11-22 DIAGNOSIS — K11.7 CLINICAL XEROSTOMIA: ICD-10-CM

## 2019-11-25 RX ORDER — PILOCARPINE HYDROCHLORIDE 5 MG/1
5 TABLET, FILM COATED ORAL 3 TIMES DAILY
Qty: 90 TABLET | Refills: 3 | Status: SHIPPED | OUTPATIENT
Start: 2019-11-25 | End: 2019-11-27

## 2019-11-25 NOTE — TELEPHONE ENCOUNTER
pilocarpine (SALAGEN) 5 MG tablet      Last Written Prescription Date:  9/23/19  Last Fill Quantity: 90,   # refills: 1  Last Office Visit: 9/23/19  Future Office visit:    Next 5 appointments (look out 90 days)    Nov 27, 2019  2:20 PM CST  (Arrive by 2:05 PM)  SHORT with Tommy Lamb DO  [facility] [place] ([facility] [place] ) [address]  [place] [address]  [phone]           Routing refill request to provider for review/approval because:  Drug not on the [facility], [facility] or [facility] refill protocol or controlled substance

## 2019-11-27 ENCOUNTER — OFFICE VISIT (OUTPATIENT)
Dept: INTERNAL MEDICINE | Facility: OTHER | Age: 49
End: 2019-11-27
Attending: INTERNAL MEDICINE
Payer: MEDICARE

## 2019-11-27 VITALS
TEMPERATURE: 98.7 F | BODY MASS INDEX: 34.29 KG/M2 | RESPIRATION RATE: 20 BRPM | HEART RATE: 91 BPM | SYSTOLIC BLOOD PRESSURE: 128 MMHG | OXYGEN SATURATION: 98 % | WEIGHT: 239 LBS | DIASTOLIC BLOOD PRESSURE: 80 MMHG

## 2019-11-27 DIAGNOSIS — K13.0 ANGULAR CHEILOSIS: ICD-10-CM

## 2019-11-27 DIAGNOSIS — K11.7 CLINICAL XEROSTOMIA: Primary | ICD-10-CM

## 2019-11-27 DIAGNOSIS — K12.0 APHTHOUS ULCER OF MOUTH: ICD-10-CM

## 2019-11-27 LAB
ALBUMIN SERPL-MCNC: 3.9 G/DL (ref 3.4–5)
ALP SERPL-CCNC: 115 U/L (ref 40–150)
ALT SERPL W P-5'-P-CCNC: 21 U/L (ref 0–70)
ANION GAP SERPL CALCULATED.3IONS-SCNC: 5 MMOL/L (ref 3–14)
AST SERPL W P-5'-P-CCNC: 22 U/L (ref 0–45)
BILIRUB SERPL-MCNC: 0.3 MG/DL (ref 0.2–1.3)
BUN SERPL-MCNC: 7 MG/DL (ref 7–30)
CALCIUM SERPL-MCNC: 9.5 MG/DL (ref 8.5–10.1)
CHLORIDE SERPL-SCNC: 107 MMOL/L (ref 94–109)
CO2 SERPL-SCNC: 26 MMOL/L (ref 20–32)
CREAT SERPL-MCNC: 0.88 MG/DL (ref 0.66–1.25)
GFR SERPL CREATININE-BSD FRML MDRD: >90 ML/MIN/{1.73_M2}
GLUCOSE SERPL-MCNC: 86 MG/DL (ref 70–99)
POTASSIUM SERPL-SCNC: 3.8 MMOL/L (ref 3.4–5.3)
PROT SERPL-MCNC: 8.5 G/DL (ref 6.8–8.8)
SODIUM SERPL-SCNC: 138 MMOL/L (ref 133–144)

## 2019-11-27 PROCEDURE — 99214 OFFICE O/P EST MOD 30 MIN: CPT | Performed by: INTERNAL MEDICINE

## 2019-11-27 PROCEDURE — 80053 COMPREHEN METABOLIC PANEL: CPT | Mod: ZL | Performed by: INTERNAL MEDICINE

## 2019-11-27 PROCEDURE — G0463 HOSPITAL OUTPT CLINIC VISIT: HCPCS

## 2019-11-27 PROCEDURE — 36415 COLL VENOUS BLD VENIPUNCTURE: CPT | Mod: ZL | Performed by: INTERNAL MEDICINE

## 2019-11-27 RX ORDER — FLUCONAZOLE 200 MG/1
200 TABLET ORAL DAILY
Qty: 21 TABLET | Refills: 0 | Status: SHIPPED | OUTPATIENT
Start: 2019-11-27 | End: 2020-01-30

## 2019-11-27 RX ORDER — MONTELUKAST SODIUM 10 MG/1
10 TABLET ORAL AT BEDTIME
Qty: 90 TABLET | Refills: 1 | Status: SHIPPED | OUTPATIENT
Start: 2019-11-27 | End: 2020-06-03

## 2019-11-27 RX ORDER — PILOCARPINE HYDROCHLORIDE 5 MG/1
5 TABLET, FILM COATED ORAL 4 TIMES DAILY
Qty: 360 TABLET | Refills: 3 | Status: SHIPPED | OUTPATIENT
Start: 2019-11-27 | End: 2020-09-03

## 2019-11-27 ASSESSMENT — ANXIETY QUESTIONNAIRES
GAD7 TOTAL SCORE: 10
6. BECOMING EASILY ANNOYED OR IRRITABLE: MORE THAN HALF THE DAYS
1. FEELING NERVOUS, ANXIOUS, OR ON EDGE: SEVERAL DAYS
3. WORRYING TOO MUCH ABOUT DIFFERENT THINGS: MORE THAN HALF THE DAYS
4. TROUBLE RELAXING: SEVERAL DAYS
2. NOT BEING ABLE TO STOP OR CONTROL WORRYING: MORE THAN HALF THE DAYS
IF YOU CHECKED OFF ANY PROBLEMS ON THIS QUESTIONNAIRE, HOW DIFFICULT HAVE THESE PROBLEMS MADE IT FOR YOU TO DO YOUR WORK, TAKE CARE OF THINGS AT HOME, OR GET ALONG WITH OTHER PEOPLE: VERY DIFFICULT
5. BEING SO RESTLESS THAT IT IS HARD TO SIT STILL: SEVERAL DAYS
7. FEELING AFRAID AS IF SOMETHING AWFUL MIGHT HAPPEN: SEVERAL DAYS

## 2019-11-27 ASSESSMENT — PAIN SCALES - GENERAL: PAINLEVEL: MODERATE PAIN (5)

## 2019-11-27 ASSESSMENT — PATIENT HEALTH QUESTIONNAIRE - PHQ9: SUM OF ALL RESPONSES TO PHQ QUESTIONS 1-9: 13

## 2019-11-27 NOTE — NURSING NOTE
"Chief Complaint   Patient presents with     COPD       Initial /80 (BP Location: Right arm, Patient Position: Sitting, Cuff Size: Adult Regular)   Pulse 91   Temp 98.7  F (37.1  C) (Tympanic)   Resp 20   Wt 108.4 kg (239 lb)   SpO2 98%   BMI 34.29 kg/m   Estimated body mass index is 34.29 kg/m  as calculated from the following:    Height as of 6/18/18: 1.778 m (5' 10\").    Weight as of this encounter: 108.4 kg (239 lb).  Medication Reconciliation: complete  Moraima Lechuga LPN  "

## 2019-11-28 ASSESSMENT — ANXIETY QUESTIONNAIRES: GAD7 TOTAL SCORE: 10

## 2019-12-02 ENCOUNTER — TELEPHONE (OUTPATIENT)
Dept: PEDIATRICS | Facility: OTHER | Age: 49
End: 2019-12-02

## 2019-12-02 NOTE — TELEPHONE ENCOUNTER
Patient is on two different doses of Singulair and he wants to know if he needs to be on both or the most recent you prescribed

## 2019-12-17 DIAGNOSIS — F90.0 ATTENTION DEFICIT HYPERACTIVITY DISORDER (ADHD), PREDOMINANTLY INATTENTIVE TYPE: ICD-10-CM

## 2019-12-17 NOTE — TELEPHONE ENCOUNTER
adderall      Last Written Prescription Date:  11/19/19  Last Fill Quantity: 60,   # refills: 0  Last Office Visit: 11/27/19  Future Office visit:    Next 5 appointments (look out 90 days)    Jan 30, 2020  9:20 AM CST  (Arrive by 9:05 AM)  SHORT with Tommy Lamb DO  [facility] [place] ([facility] [place] ) [address]  [place] [address]  [phone]           Routing refill request to provider for review/approval because:  Drug not on the [facility], [facility] or [facility] refill protocol or controlled substance

## 2019-12-18 RX ORDER — DEXTROAMPHETAMINE SACCHARATE, AMPHETAMINE ASPARTATE, DEXTROAMPHETAMINE SULFATE AND AMPHETAMINE SULFATE 7.5; 7.5; 7.5; 7.5 MG/1; MG/1; MG/1; MG/1
TABLET ORAL
Qty: 60 TABLET | Refills: 0 | Status: SHIPPED | OUTPATIENT
Start: 2019-12-18 | End: 2020-01-15

## 2020-01-23 NOTE — PROGRESS NOTES
Subjective     Andrei Whitman is a 49 year old male who presents to clinic today for the following health issues:    HPI   Depression and Anxiety Follow-Up    How are you doing with your depression since your last visit? No change    How are you doing with your anxiety since your last visit?  Worsened some    Are you having other symptoms that might be associated with depression or anxiety? No    Have you had a significant life event? No     Do you have any concerns with your use of alcohol or other drugs? No    Social History     Tobacco Use     Smoking status: Current Every Day Smoker     Packs/day: 0.30     Years: 30.00     Pack years: 9.00     Start date: 1/1/1989     Smokeless tobacco: Never Used     Tobacco comment: quitplan referral declined 6/19/19 on chantax   Substance Use Topics     Alcohol use: No     Alcohol/week: 0.0 standard drinks     Drug use: No     PHQ 7/31/2019 11/27/2019 1/30/2020   PHQ-9 Total Score 6 13 10   Q9: Thoughts of better off dead/self-harm past 2 weeks Several days Several days Not at all   F/U: Thoughts of suicide or self-harm - No -   F/U: Safety concerns - No -     SONDRA-7 SCORE 7/31/2019 11/27/2019 1/30/2020   Total Score 7 10 10     Last PHQ-9 1/30/2020   1.  Little interest or pleasure in doing things 1   2.  Feeling down, depressed, or hopeless 1   3.  Trouble falling or staying asleep, or sleeping too much 1   4.  Feeling tired or having little energy 2   5.  Poor appetite or overeating 2   6.  Feeling bad about yourself 1   7.  Trouble concentrating 1   8.  Moving slowly or restless 1   Q9: Thoughts of better off dead/self-harm past 2 weeks 0   PHQ-9 Total Score 10   Difficulty at work, home, or with people Very difficult   In the past two weeks have you had thoughts of suicide or self harm? -   Do you have concerns about your personal safety or the safety of others? -     SONDRA-7  1/30/2020   1. Feeling nervous, anxious, or on edge 1   2. Not being able to stop or control  worrying 2   3. Worrying too much about different things 2   4. Trouble relaxing 2   5. Being so restless that it is hard to sit still 1   6. Becoming easily annoyed or irritable 1   7. Feeling afraid, as if something awful might happen 1   SONDRA-7 Total Score 10   If you checked any problems, how difficult have they made it for you to do your work, take care of things at home, or get along with other people? Very difficult         Suicide Assessment Five-step Evaluation and Treatment (SAFE-T)    How many days per week do you miss taking your medication? 0    ADHD: SONDRA associated inattention:  His inattention seems to lacking.  He feels that he thinks too much which distracts him.  He reports that he has insomnia and is up beery 90 minutes.  He has been amitriptyline.       MOUTH Dryness:  Andrei has been on pilocarpine 5 mg which was increased to 4 times per day at his visit 2 months ago.  His mouth has been more moist.        Patient Active Problem List   Diagnosis     Cervicalgia     Lower back pain     Segmental and somatic dysfunction of lower extremity     GERD (gastroesophageal reflux disease)     Mood disorder (H)     Abnormal weight gain     Attention deficit hyperactivity disorder (ADHD), predominantly inattentive type     ACP (advance care planning)     Flatulence, eructation, and gas pain     Bipolar disease, chronic (H)     Routine general medical examination at a health care facility     Benign essential hypertension     Russell esophagus     Tobacco dependency     Lithium use     DDD (degenerative disc disease), cervical     Cervical stenosis of spinal canal     Obesity (BMI 35.0-39.9) with comorbidity (H)     Chronic lung disease     Past Surgical History:   Procedure Laterality Date     APPENDECTOMY      age 14     COLONOSCOPY  07/12/2017    Left descending x1 tubular adenoma, sigmoid x1 tubular adenoma and rectal x1 hyperplastic polyp.  Pan diverticulosis     ENDOSCOPY UPPER, COLONOSCOPY, COMBINED N/A  7/12/2017    Procedure: COMBINED ENDOSCOPY UPPER, COLONOSCOPY;  UPPER ENDOSCOPY WITH BIOPSIES  AND COLONOSCOPY WITH POLYPECTOMY;  Surgeon: Francis Valverde DO;  Location: [facility]     ENT SURGERY  age 18    tonsils     ESOPHAGOGASTRODUODENOSCOPY  07/12/2017    chemical gatropathy, GE junction with pancreatiuc metaplasia      ESOPHAGOSCOPY, GASTROSCOPY, DUODENOSCOPY (EGD), COMBINED N/A 6/20/2018    Procedure: COMBINED ESOPHAGOSCOPY, GASTROSCOPY, DUODENOSCOPY (EGD), BIOPSY SINGLE OR MULTIPLE;  UPPER ENDOSCOPY WITH BIOPSY;  Surgeon: Francis Valverde DO;  Location: [facility]     ORTHOPEDIC SURGERY  age 30     back and neck fusion, bone from hip removed to use on plates     ORTHOPEDIC SURGERY  age 30    L5 fusion, laminectomy of lumbar discs       Social History     Tobacco Use     Smoking status: Current Every Day Smoker     Packs/day: 0.30     Years: 30.00     Pack years: 9.00     Start date: 1/1/1989     Smokeless tobacco: Never Used     Tobacco comment: quitplan referral declined 6/19/19 on chantax   Substance Use Topics     Alcohol use: No     Alcohol/week: 0.0 standard drinks     Family History   Problem Relation Age of Onset     Asthma Mother      Arthritis Mother      Prostate Cancer Father      Heart Disease Paternal Grandfather      Hypertension Paternal Grandfather      Alcohol/Drug Paternal Grandfather      Other - See Comments Brother         Nerve and degenerative disease mild     Alcohol/Drug Brother      Other - See Comments Sister 21        Hip replacements, nerve, degerative disease     Alcohol/Drug Maternal Grandfather      Unknown/Adopted Paternal Grandmother      Other Cancer Paternal Aunt         Cervical cancer         BP Readings from Last 6 Encounters:   01/30/20 (!) 140/92   11/27/19 128/80   09/23/19 (!) 122/100   07/31/19 (!) 122/92   06/19/19 116/80   04/18/19 138/90     -------------------------------------  Reviewed and updated as needed this visit by Provider         Review of Systems    ROS COMP: Constitutional, HEENT, cardiovascular, pulmonary, gi and gu systems are negative, except as otherwise noted.      Objective    BP (!) 136/90   Pulse 81   Temp 96.8  F (36  C) (Tympanic)   Resp 22   Ht 1.829 m (6')   Wt 110.2 kg (243 lb)   SpO2 98%   BMI 32.96 kg/m    Body mass index is 32.96 kg/m .  Physical Exam   GENERAL: healthy, alert and no distress  EYES: Eyes grossly normal to inspection and conjunctivae and sclerae normal  HENT: mucosa is dry  NECK: no adenopathy, no asymmetry, masses, or scars and thyroid normal to palpation  RESP: lungs clear to auscultation - no rales, rhonchi or wheezes  CV: regular rate and rhythm, normal S1 S2, no S3 or S4, no murmur, click or rub, no peripheral edema and peripheral pulses strong  ABDOMEN: soft, nontender, no hepatosplenomegaly, no masses and bowel sounds normal  ABDOMEN: no bruits heard  MS: no gross musculoskeletal defects noted, no edema    Diagnostic Test Results:  Labs reviewed in Epic  none           ASSESSMENT /PLAN:    (F39) Mood disorder (H)  (primary encounter diagnosis)  Comment: Well controlled on Lithium and Elavil.  He is having some insomnia due to anxiety.  Also, he has continued dry mouth and he is willing to switch from lithium to Lamictal.  He does not want   Plan:  Increase amitriptyline (ELAVIL) 50 MG tablet.  We will plan on switching him to Lamictal and tapering off lithium.    (F51.04) Psychophysiological insomnia  Comment:   Plan:   amitriptyline (ELAVIL) 50 MG tablet          (F90.0) Attention deficit hyperactivity disorder (ADHD), predominantly inattentive type  Comment: Inattention worsened due to insomnia.  Plan:   Treat insomnia as above and continue Adderall 30 mg BID    (R68.2) Clinical xerostomia  Comment: Secondary to Lithium, Elavil and Adderall.  Plan:   He will continue Pilocarpine 4 times per day.    (K13.79) Mouth sores  Comment: Improved overall with increased mouth moisture and fluconazole.  Plan:   Continue  monitoring        Follow up with Provider - 2 weeks for insomnia            Tommy Lamb DO, DO

## 2020-01-30 ENCOUNTER — OFFICE VISIT (OUTPATIENT)
Dept: INTERNAL MEDICINE | Facility: OTHER | Age: 50
End: 2020-01-30
Attending: INTERNAL MEDICINE
Payer: MEDICARE

## 2020-01-30 VITALS
HEART RATE: 81 BPM | TEMPERATURE: 96.8 F | OXYGEN SATURATION: 98 % | SYSTOLIC BLOOD PRESSURE: 136 MMHG | HEIGHT: 72 IN | WEIGHT: 243 LBS | RESPIRATION RATE: 22 BRPM | DIASTOLIC BLOOD PRESSURE: 90 MMHG | BODY MASS INDEX: 32.91 KG/M2

## 2020-01-30 DIAGNOSIS — K11.7 CLINICAL XEROSTOMIA: ICD-10-CM

## 2020-01-30 DIAGNOSIS — F39 MOOD DISORDER (H): Primary | ICD-10-CM

## 2020-01-30 DIAGNOSIS — F90.0 ATTENTION DEFICIT HYPERACTIVITY DISORDER (ADHD), PREDOMINANTLY INATTENTIVE TYPE: ICD-10-CM

## 2020-01-30 DIAGNOSIS — F51.04 PSYCHOPHYSIOLOGICAL INSOMNIA: ICD-10-CM

## 2020-01-30 DIAGNOSIS — K13.79 MOUTH SORES: ICD-10-CM

## 2020-01-30 PROCEDURE — G0463 HOSPITAL OUTPT CLINIC VISIT: HCPCS

## 2020-01-30 PROCEDURE — 99214 OFFICE O/P EST MOD 30 MIN: CPT | Performed by: INTERNAL MEDICINE

## 2020-01-30 RX ORDER — AMITRIPTYLINE HYDROCHLORIDE 50 MG/1
50 TABLET ORAL AT BEDTIME
Qty: 90 TABLET | Refills: 1 | Status: ON HOLD | OUTPATIENT
Start: 2020-01-30 | End: 2020-08-13

## 2020-01-30 ASSESSMENT — PAIN SCALES - GENERAL: PAINLEVEL: SEVERE PAIN (6)

## 2020-01-30 ASSESSMENT — ANXIETY QUESTIONNAIRES
4. TROUBLE RELAXING: MORE THAN HALF THE DAYS
5. BEING SO RESTLESS THAT IT IS HARD TO SIT STILL: SEVERAL DAYS
1. FEELING NERVOUS, ANXIOUS, OR ON EDGE: SEVERAL DAYS
2. NOT BEING ABLE TO STOP OR CONTROL WORRYING: MORE THAN HALF THE DAYS
IF YOU CHECKED OFF ANY PROBLEMS ON THIS QUESTIONNAIRE, HOW DIFFICULT HAVE THESE PROBLEMS MADE IT FOR YOU TO DO YOUR WORK, TAKE CARE OF THINGS AT HOME, OR GET ALONG WITH OTHER PEOPLE: VERY DIFFICULT
7. FEELING AFRAID AS IF SOMETHING AWFUL MIGHT HAPPEN: SEVERAL DAYS
3. WORRYING TOO MUCH ABOUT DIFFERENT THINGS: MORE THAN HALF THE DAYS
6. BECOMING EASILY ANNOYED OR IRRITABLE: SEVERAL DAYS
GAD7 TOTAL SCORE: 10

## 2020-01-30 ASSESSMENT — MIFFLIN-ST. JEOR: SCORE: 2015.24

## 2020-01-30 ASSESSMENT — PATIENT HEALTH QUESTIONNAIRE - PHQ9: SUM OF ALL RESPONSES TO PHQ QUESTIONS 1-9: 10

## 2020-01-30 NOTE — NURSING NOTE
Chief Complaint   Patient presents with     Depression     Anxiety       Initial BP (!) 140/92 (BP Location: Right arm, Patient Position: Sitting, Cuff Size: Adult Regular)   Pulse 81   Temp 96.8  F (36  C) (Tympanic)   Resp 22   Ht 1.829 m (6')   Wt 110.2 kg (243 lb)   SpO2 98%   BMI 32.96 kg/m   Estimated body mass index is 32.96 kg/m  as calculated from the following:    Height as of this encounter: 1.829 m (6').    Weight as of this encounter: 110.2 kg (243 lb).  Medication Reconciliation: complete  Moraima Lechuga LPN

## 2020-01-31 ASSESSMENT — ANXIETY QUESTIONNAIRES: GAD7 TOTAL SCORE: 10

## 2020-02-05 NOTE — PROGRESS NOTES
Subjective     Andrei Whitman is a 49 year old male who presents to clinic today for the following health issues:    HPI   Depression and Anxiety Follow-Up    How are you doing with your depression since your last visit? No change    How are you doing with your anxiety since your last visit?  No change    Are you having other symptoms that might be associated with depression or anxiety? No    Have you had a significant life event? No     Do you have any concerns with your use of alcohol or other drugs? No    Social History     Tobacco Use     Smoking status: Current Every Day Smoker     Packs/day: 0.30     Years: 30.00     Pack years: 9.00     Start date: 1/1/1989     Smokeless tobacco: Never Used     Tobacco comment: quitplan referral declined 6/19/19 on chantax   Substance Use Topics     Alcohol use: No     Alcohol/week: 0.0 standard drinks     Drug use: No     PHQ 7/31/2019 11/27/2019 1/30/2020   PHQ-9 Total Score 6 13 10   Q9: Thoughts of better off dead/self-harm past 2 weeks Several days Several days Not at all   F/U: Thoughts of suicide or self-harm - No -   F/U: Safety concerns - No -     SONDRA-7 SCORE 7/31/2019 11/27/2019 1/30/2020   Total Score 7 10 10         Suicide Assessment Five-step Evaluation and Treatment (SAFE-T)      How many servings of fruits and vegetables do you eat daily?  2-3    On average, how many sweetened beverages do you drink each day (Examples: soda, juice, sweet tea, etc.  Do NOT count diet or artificially sweetened beverages)?   11    How many days per week do you exercise enough to make your heart beat faster? 3 or less    How many minutes a day do you exercise enough to make your heart beat faster? 9 or less    How many days per week do you miss taking your medication? 0    Insomnia    Still having trouble sleeping. Sleep 1.5 hours to 2 hours at a time.   His increase in Elavil did not make much of a change in his sleep.      Patient Active Problem List   Diagnosis      Cervicalgia     Lower back pain     Segmental and somatic dysfunction of lower extremity     GERD (gastroesophageal reflux disease)     Mood disorder (H)     Abnormal weight gain     Attention deficit hyperactivity disorder (ADHD), predominantly inattentive type     ACP (advance care planning)     Flatulence, eructation, and gas pain     Bipolar disease, chronic (H)     Routine general medical examination at a health care facility     Benign essential hypertension     Russell esophagus     Tobacco dependency     Lithium use     DDD (degenerative disc disease), cervical     Cervical stenosis of spinal canal     Obesity (BMI 35.0-39.9) with comorbidity (H)     Chronic lung disease     Past Surgical History:   Procedure Laterality Date     APPENDECTOMY      age 14     COLONOSCOPY  07/12/2017    Left descending x1 tubular adenoma, sigmoid x1 tubular adenoma and rectal x1 hyperplastic polyp.  Pan diverticulosis     ENDOSCOPY UPPER, COLONOSCOPY, COMBINED N/A 7/12/2017    Procedure: COMBINED ENDOSCOPY UPPER, COLONOSCOPY;  UPPER ENDOSCOPY WITH BIOPSIES  AND COLONOSCOPY WITH POLYPECTOMY;  Surgeon: Francis Valverde DO;  Location: [facility]     ENT SURGERY  age 18    tonsils     ESOPHAGOGASTRODUODENOSCOPY  07/12/2017    chemical gatropathy, GE junction with pancreatiuc metaplasia      ESOPHAGOSCOPY, GASTROSCOPY, DUODENOSCOPY (EGD), COMBINED N/A 6/20/2018    Procedure: COMBINED ESOPHAGOSCOPY, GASTROSCOPY, DUODENOSCOPY (EGD), BIOPSY SINGLE OR MULTIPLE;  UPPER ENDOSCOPY WITH BIOPSY;  Surgeon: Fracnis Valverde DO;  Location: [facility]     ORTHOPEDIC SURGERY  age 30     back and neck fusion, bone from hip removed to use on plates     ORTHOPEDIC SURGERY  age 30    L5 fusion, laminectomy of lumbar discs       Social History     Tobacco Use     Smoking status: Current Every Day Smoker     Packs/day: 0.30     Years: 30.00     Pack years: 9.00     Start date: 1/1/1989     Smokeless tobacco: Never Used     Tobacco comment: quitplan  referral declined 6/19/19 on chantax   Substance Use Topics     Alcohol use: No     Alcohol/week: 0.0 standard drinks     Family History   Problem Relation Age of Onset     Asthma Mother      Arthritis Mother      Prostate Cancer Father      Heart Disease Paternal Grandfather      Hypertension Paternal Grandfather      Alcohol/Drug Paternal Grandfather      Other - See Comments Brother         Nerve and degenerative disease mild     Alcohol/Drug Brother      Other - See Comments Sister 21        Hip replacements, nerve, degerative disease     Alcohol/Drug Maternal Grandfather      Unknown/Adopted Paternal Grandmother      Other Cancer Paternal Aunt         Cervical cancer           -------------------------------------  Reviewed and updated as needed this visit by Provider         Review of Systems   ROS COMP: Constitutional, HEENT, cardiovascular, pulmonary, gi and gu systems are negative, except as otherwise noted.      Objective    /88 (BP Location: Right arm, Patient Position: Chair, Cuff Size: Adult Large)   Pulse 90   Temp 98.4  F (36.9  C) (Tympanic)   Wt 111.6 kg (246 lb)   SpO2 97%   BMI 33.36 kg/m    Body mass index is 33.36 kg/m .  Physical Exam   GENERAL: healthy, alert and no distress  EYES: Eyes grossly normal to inspection and conjunctivae and sclerae normal  HENT: oropharynx is dry and his mouth angles are cracked.  NECK: no adenopathy, no asymmetry, masses, or scars and thyroid normal to palpation  RESP: lungs clear to auscultation - no rales, rhonchi or wheezes  CV: regular rate and rhythm, normal S1 S2, no S3 or S4, no murmur, click or rub, no peripheral edema and peripheral pulses strong  ABDOMEN: soft, nontender, no hepatosplenomegaly, no masses and bowel sounds normal  ABDOMEN: no bruits heard  MS: no gross musculoskeletal defects noted, no edema  PSYCH: mentation appears normal, affect normal/bright    Diagnostic Test Results:  Labs reviewed in Epic  Results for orders placed or  performed in visit on 02/13/20   Comprehensive metabolic panel     Status: Abnormal   Result Value Ref Range    Sodium 140 133 - 144 mmol/L    Potassium 3.8 3.4 - 5.3 mmol/L    Chloride 108 94 - 109 mmol/L    Carbon Dioxide 28 20 - 32 mmol/L    Anion Gap 4 3 - 14 mmol/L    Glucose 115 (H) 70 - 99 mg/dL    Urea Nitrogen 10 7 - 30 mg/dL    Creatinine 0.85 0.66 - 1.25 mg/dL    GFR Estimate >90 >60 mL/min/[1.73_m2]    GFR Estimate If Black >90 >60 mL/min/[1.73_m2]    Calcium 9.2 8.5 - 10.1 mg/dL    Bilirubin Total 0.4 0.2 - 1.3 mg/dL    Albumin 3.8 3.4 - 5.0 g/dL    Protein Total 8.4 6.8 - 8.8 g/dL    Alkaline Phosphatase 115 40 - 150 U/L    ALT 20 0 - 70 U/L    AST 14 0 - 45 U/L             ASSESSMENT /PLAN:  (F39) Mood disorder (H)  (primary encounter diagnosis)  Comment: Stable, but this is causing him excessive mouth dryness and he would like to get off of his lithium and change to Lamictal due to less needs for labs and to improve his mouth hygiene.  Plan:   I will have his start Lamictal and wean off of his lithium.     (F51.04) Psychophysiological insomnia  Comment: at this time poorly controlled and increased dosing of ELAVIL did not help.  Plan:   Andrei wants to hold off on taking any further medications for his Insomnia.    (F90.0) Attention deficit hyperactivity disorder (ADHD), predominantly inattentive type  Comment: Stable  Plan:   He will continue amphetamine-dextroamphetamine (ADDERALL) 30 MG tablet BID    (K13.0) Angular cheilosis  Comment: Slowly worsening off of fluconazole  Plan:   Restart fluconazole (DIFLUCAN) 200 MG tablet daily for 3 weeks     (F17.200) Tobacco dependence  Comment:   Plan:   Pneumococcal vaccine 23 valent PPSV23  (Pneumovax) [01327], 1st  Administration [40396] and he will continue his Chantix     (Z23) Encounter for immunization   Comment:   Plan:   Pneumococcal vaccine 23 valent PPSV23 (Pneumovax) [09612]        Follow up with Provider - 6 weeks for bipolar medication  adjustment.         Tommy Lamb DO, DO

## 2020-02-13 ENCOUNTER — OFFICE VISIT (OUTPATIENT)
Dept: INTERNAL MEDICINE | Facility: OTHER | Age: 50
End: 2020-02-13
Attending: INTERNAL MEDICINE
Payer: MEDICARE

## 2020-02-13 VITALS
BODY MASS INDEX: 33.36 KG/M2 | HEART RATE: 90 BPM | DIASTOLIC BLOOD PRESSURE: 88 MMHG | OXYGEN SATURATION: 97 % | SYSTOLIC BLOOD PRESSURE: 138 MMHG | WEIGHT: 246 LBS | TEMPERATURE: 98.4 F

## 2020-02-13 DIAGNOSIS — F17.200 TOBACCO DEPENDENCE: ICD-10-CM

## 2020-02-13 DIAGNOSIS — Z23 ENCOUNTER FOR IMMUNIZATION: ICD-10-CM

## 2020-02-13 DIAGNOSIS — F51.04 PSYCHOPHYSIOLOGICAL INSOMNIA: ICD-10-CM

## 2020-02-13 DIAGNOSIS — F39 MOOD DISORDER (H): Primary | ICD-10-CM

## 2020-02-13 DIAGNOSIS — K13.0 ANGULAR CHEILOSIS: ICD-10-CM

## 2020-02-13 DIAGNOSIS — F90.0 ATTENTION DEFICIT HYPERACTIVITY DISORDER (ADHD), PREDOMINANTLY INATTENTIVE TYPE: ICD-10-CM

## 2020-02-13 DIAGNOSIS — Z23 NEED FOR VACCINATION: ICD-10-CM

## 2020-02-13 LAB
ALBUMIN SERPL-MCNC: 3.8 G/DL (ref 3.4–5)
ALP SERPL-CCNC: 115 U/L (ref 40–150)
ALT SERPL W P-5'-P-CCNC: 20 U/L (ref 0–70)
ANION GAP SERPL CALCULATED.3IONS-SCNC: 4 MMOL/L (ref 3–14)
AST SERPL W P-5'-P-CCNC: 14 U/L (ref 0–45)
BILIRUB SERPL-MCNC: 0.4 MG/DL (ref 0.2–1.3)
BUN SERPL-MCNC: 10 MG/DL (ref 7–30)
CALCIUM SERPL-MCNC: 9.2 MG/DL (ref 8.5–10.1)
CHLORIDE SERPL-SCNC: 108 MMOL/L (ref 94–109)
CO2 SERPL-SCNC: 28 MMOL/L (ref 20–32)
CREAT SERPL-MCNC: 0.85 MG/DL (ref 0.66–1.25)
GFR SERPL CREATININE-BSD FRML MDRD: >90 ML/MIN/{1.73_M2}
GLUCOSE SERPL-MCNC: 115 MG/DL (ref 70–99)
POTASSIUM SERPL-SCNC: 3.8 MMOL/L (ref 3.4–5.3)
PROT SERPL-MCNC: 8.4 G/DL (ref 6.8–8.8)
SODIUM SERPL-SCNC: 140 MMOL/L (ref 133–144)

## 2020-02-13 PROCEDURE — 99214 OFFICE O/P EST MOD 30 MIN: CPT | Performed by: INTERNAL MEDICINE

## 2020-02-13 PROCEDURE — 36415 COLL VENOUS BLD VENIPUNCTURE: CPT | Mod: ZL | Performed by: INTERNAL MEDICINE

## 2020-02-13 PROCEDURE — 90732 PPSV23 VACC 2 YRS+ SUBQ/IM: CPT

## 2020-02-13 PROCEDURE — G0463 HOSPITAL OUTPT CLINIC VISIT: HCPCS | Mod: 25

## 2020-02-13 PROCEDURE — 80053 COMPREHEN METABOLIC PANEL: CPT | Mod: ZL | Performed by: INTERNAL MEDICINE

## 2020-02-13 PROCEDURE — G0009 ADMIN PNEUMOCOCCAL VACCINE: HCPCS | Performed by: INTERNAL MEDICINE

## 2020-02-13 PROCEDURE — G0463 HOSPITAL OUTPT CLINIC VISIT: HCPCS

## 2020-02-13 RX ORDER — FLUCONAZOLE 200 MG/1
200 TABLET ORAL DAILY
Qty: 21 TABLET | Refills: 0 | Status: SHIPPED | OUTPATIENT
Start: 2020-02-13 | End: 2020-03-26

## 2020-02-13 RX ORDER — DEXTROAMPHETAMINE SACCHARATE, AMPHETAMINE ASPARTATE, DEXTROAMPHETAMINE SULFATE AND AMPHETAMINE SULFATE 7.5; 7.5; 7.5; 7.5 MG/1; MG/1; MG/1; MG/1
30 TABLET ORAL 2 TIMES DAILY
Qty: 60 TABLET | Refills: 0 | Status: SHIPPED | OUTPATIENT
Start: 2020-02-13 | End: 2020-03-26

## 2020-02-13 RX ORDER — VARENICLINE TARTRATE 1 MG/1
TABLET, FILM COATED ORAL
Qty: 56 TABLET | Refills: 4 | Status: SHIPPED | OUTPATIENT
Start: 2020-02-13 | End: 2020-07-16

## 2020-02-13 RX ORDER — DEXTROAMPHETAMINE SACCHARATE, AMPHETAMINE ASPARTATE, DEXTROAMPHETAMINE SULFATE AND AMPHETAMINE SULFATE 7.5; 7.5; 7.5; 7.5 MG/1; MG/1; MG/1; MG/1
30 TABLET ORAL 2 TIMES DAILY
Qty: 60 TABLET | Refills: 0 | OUTPATIENT
Start: 2020-04-13

## 2020-02-13 RX ORDER — DEXTROAMPHETAMINE SACCHARATE, AMPHETAMINE ASPARTATE, DEXTROAMPHETAMINE SULFATE AND AMPHETAMINE SULFATE 7.5; 7.5; 7.5; 7.5 MG/1; MG/1; MG/1; MG/1
30 TABLET ORAL 2 TIMES DAILY
Qty: 60 TABLET | Refills: 0 | OUTPATIENT
Start: 2020-03-13

## 2020-02-13 RX ORDER — DEXTROAMPHETAMINE SACCHARATE, AMPHETAMINE ASPARTATE, DEXTROAMPHETAMINE SULFATE AND AMPHETAMINE SULFATE 7.5; 7.5; 7.5; 7.5 MG/1; MG/1; MG/1; MG/1
30 TABLET ORAL 2 TIMES DAILY
Qty: 60 TABLET | Refills: 0 | Status: SHIPPED | OUTPATIENT
Start: 2020-02-13 | End: 2020-03-13

## 2020-02-13 ASSESSMENT — ANXIETY QUESTIONNAIRES
5. BEING SO RESTLESS THAT IT IS HARD TO SIT STILL: SEVERAL DAYS
4. TROUBLE RELAXING: SEVERAL DAYS
GAD7 TOTAL SCORE: 9
IF YOU CHECKED OFF ANY PROBLEMS ON THIS QUESTIONNAIRE, HOW DIFFICULT HAVE THESE PROBLEMS MADE IT FOR YOU TO DO YOUR WORK, TAKE CARE OF THINGS AT HOME, OR GET ALONG WITH OTHER PEOPLE: VERY DIFFICULT
1. FEELING NERVOUS, ANXIOUS, OR ON EDGE: SEVERAL DAYS
7. FEELING AFRAID AS IF SOMETHING AWFUL MIGHT HAPPEN: SEVERAL DAYS
2. NOT BEING ABLE TO STOP OR CONTROL WORRYING: MORE THAN HALF THE DAYS
3. WORRYING TOO MUCH ABOUT DIFFERENT THINGS: MORE THAN HALF THE DAYS
6. BECOMING EASILY ANNOYED OR IRRITABLE: SEVERAL DAYS

## 2020-02-13 ASSESSMENT — PAIN SCALES - GENERAL: PAINLEVEL: MODERATE PAIN (4)

## 2020-02-13 ASSESSMENT — PATIENT HEALTH QUESTIONNAIRE - PHQ9: SUM OF ALL RESPONSES TO PHQ QUESTIONS 1-9: 11

## 2020-02-13 NOTE — LETTER
February 13, 2020      Andrei Whitman  [address]  [place] [address]        Dear ,    We are writing to inform you of your test results.    Your test results fall within the expected range(s) or remain unchanged from previous results.  Please continue with current treatment plan.    Resulted Orders   Comprehensive metabolic panel   Result Value Ref Range    Sodium 140 133 - 144 mmol/L    Potassium 3.8 3.4 - 5.3 mmol/L    Chloride 108 94 - 109 mmol/L    Carbon Dioxide 28 20 - 32 mmol/L    Anion Gap 4 3 - 14 mmol/L    Glucose 115 (H) 70 - 99 mg/dL    Urea Nitrogen 10 7 - 30 mg/dL    Creatinine 0.85 0.66 - 1.25 mg/dL    GFR Estimate >90 >60 mL/min/[1.73_m2]      Comment:      Non  GFR Calc  Starting 12/18/2018, serum creatinine based estimated GFR (eGFR) will be   calculated using the Chronic Kidney Disease Epidemiology Collaboration   (CKD-EPI) equation.      GFR Estimate If Black >90 >60 mL/min/[1.73_m2]      Comment:       GFR Calc  Starting 12/18/2018, serum creatinine based estimated GFR (eGFR) will be   calculated using the Chronic Kidney Disease Epidemiology Collaboration   (CKD-EPI) equation.      Calcium 9.2 8.5 - 10.1 mg/dL    Bilirubin Total 0.4 0.2 - 1.3 mg/dL    Albumin 3.8 3.4 - 5.0 g/dL    Protein Total 8.4 6.8 - 8.8 g/dL    Alkaline Phosphatase 115 40 - 150 U/L    ALT 20 0 - 70 U/L    AST 14 0 - 45 U/L       If you have any questions or concerns, please call the clinic at the number listed above.       Sincerely,        Tommy Lamb, , DO

## 2020-02-13 NOTE — NURSING NOTE
Chief Complaint   Patient presents with     Depression     Anxiety     Sleep Problem       Initial /88 (BP Location: Right arm, Patient Position: Chair, Cuff Size: Adult Large)   Pulse 90   Temp 98.4  F (36.9  C) (Tympanic)   Wt 111.6 kg (246 lb)   SpO2 97%   BMI 33.36 kg/m   Estimated body mass index is 33.36 kg/m  as calculated from the following:    Height as of 1/30/20: 1.829 m (6').    Weight as of this encounter: 111.6 kg (246 lb).  Medication Reconciliation: complete  Costa Figueroa LPN

## 2020-02-13 NOTE — TELEPHONE ENCOUNTER
chantix      Last Written Prescription Date:  9/12/19  Last Fill Quantity: 56,   # refills: 4  Last Office Visit: today  Future Office visit:    Next 5 appointments (look out 90 days)    Mar 26, 2020  1:40 PM CDT  (Arrive by 1:25 PM)  SHORT with Tommy Lamb DO  [facility] [place] ([facility] [place] ) [address]  [place] [address]  [phone]           Routing refill request to provider for review/approval because:  Drug not on the [facility], [facility] or [facility] refill protocol or controlled substance

## 2020-02-14 ASSESSMENT — ANXIETY QUESTIONNAIRES: GAD7 TOTAL SCORE: 9

## 2020-03-10 DIAGNOSIS — M54.12 CERVICAL RADICULOPATHY: ICD-10-CM

## 2020-03-10 NOTE — TELEPHONE ENCOUNTER
gabapentin      Last Written Prescription Date:  2/9/20  Last Fill Quantity: 180,   # refills: 0  Last Office Visit: 2/13/20  Future Office visit:    Next 5 appointments (look out 90 days)    Mar 26, 2020  1:40 PM CDT  (Arrive by 1:25 PM)  SHORT with Tommy Lamb DO  [facility] [place] ([facility] [place] ) [address]  [place] [address]  [phone]           Routing refill request to provider for review/approval because:  Drug not on the [facility], [facility] or [facility] refill protocol or controlled substance

## 2020-03-11 RX ORDER — GABAPENTIN 300 MG/1
CAPSULE ORAL
Qty: 180 CAPSULE | Refills: 3 | Status: SHIPPED | OUTPATIENT
Start: 2020-03-11 | End: 2020-03-18

## 2020-03-18 DIAGNOSIS — M54.12 CERVICAL RADICULOPATHY: ICD-10-CM

## 2020-03-18 RX ORDER — GABAPENTIN 300 MG/1
CAPSULE ORAL
Qty: 180 CAPSULE | Refills: 3 | Status: SHIPPED | OUTPATIENT
Start: 2020-03-18 | End: 2020-07-17

## 2020-03-18 NOTE — TELEPHONE ENCOUNTER
gabapentin (NEURONTIN) 300 MG capsule     Routing refill request to provider for review/approval because:  Drug not on the [facility] or [facility] refill protocol or controlled substance

## 2020-03-18 NOTE — TELEPHONE ENCOUNTER
Gabapentin      Last Written Prescription Date:  03/11/20  Last Fill Quantity: 180,   # refills: 3  Last Office Visit: 02/13/20  Future Office visit:    Next 5 appointments (look out 90 days)    Mar 26, 2020  1:40 PM CDT  (Arrive by 1:25 PM)  SHORT with Tommy Lamb DO  [facility] [place] ([facility] [place] ) [address]  [place] [address]  [phone]

## 2020-03-23 NOTE — PROGRESS NOTES
"Andrei Whitman is a 49 year old male who is being evaluated via a billable telephone visit.      The patient has been notified of following:     \"This telephone visit will be conducted via a call between you and your physician/provider. We have found that certain health care needs can be provided without the need for a physical exam.  This service lets us provide the care you need with a short phone conversation.  If a prescription is necessary we can send it directly to your pharmacy.  If lab work is needed we can place an order for that and you can then stop by our lab to have the test done at a later time.    If during the course of the call the physician/provider feels a telephone visit is not appropriate, you will not be charged for this service.\"     Andrei Whitman complains of  No chief complaint on file.      I have reviewed and updated the patient's Past Medical History, Social History, Family History and Medication List.    ALLERGIES  Patient has no known allergies.    Depression and Anxiety Follow-Up    How are you doing with your depression since your last visit? Improved     How are you doing with your anxiety since your last visit?  Improved     Are you having other symptoms that might be associated with depression or anxiety? No    Have you had a significant life event? No     Do you have any concerns with your use of alcohol or other drugs? No      He is on Lamictal 100 mg and feels better.  He has not come down on his lithium as of yet.  He continues to have extreme dry mouth and thirst.  He has no current mouth sores.        Social History     Tobacco Use     Smoking status: Current Every Day Smoker     Packs/day: 0.30     Years: 30.00     Pack years: 9.00     Start date: 1/1/1989     Smokeless tobacco: Never Used     Tobacco comment: quitplan referral declined 6/19/19 on chantax   Substance Use Topics     Alcohol use: No     Alcohol/week: 0.0 standard drinks     Drug use: No     PHQ 11/27/2019 " 1/30/2020 2/13/2020   PHQ-9 Total Score 13 10 11   Q9: Thoughts of better off dead/self-harm past 2 weeks Several days Not at all Several days   F/U: Thoughts of suicide or self-harm No - -   F/U: Safety concerns No - -     SONDRA-7 SCORE 11/27/2019 1/30/2020 2/13/2020   Total Score 10 10 9           10 point ROS of systems including Constitutional, Eyes, Respiratory, Cardiovascular, Gastroenterology, Genitourinary, Integumentary, Muscularskeletal, Psychiatric were all negative except for pertinent positives noted in my HPI.        Current Outpatient Medications   Medication     amitriptyline (ELAVIL) 50 MG tablet     amphetamine-dextroamphetamine (ADDERALL) 30 MG tablet     amphetamine-dextroamphetamine (ADDERALL) 30 MG tablet     aspirin 81 MG tablet     buPROPion (WELLBUTRIN) 100 MG tablet     CHANTIX CONTINUING MONTH QUINN 1 MG tablet     chlorhexidine (PERIDEX) 0.12 % solution     FLOVENT  [facility]G/ACT inhaler     gabapentin (NEURONTIN) 300 MG capsule     hydrochlorothiazide (MICROZIDE) 12.5 MG capsule     lamoTRIgine 42 x 25 MG & 7 x 100 MG KIT     lithium (ESKALITH CR/LITHOBID) 450 MG CR tablet     montelukast (SINGULAIR) 10 MG tablet     omeprazole (PRILOSEC) 40 MG DR capsule     pilocarpine (SALAGEN) 5 MG tablet     verapamil ER (VERELAN) 240 MG 24 hr capsule     No current facility-administered medications for this visit.      Past Surgical History:   Procedure Laterality Date     APPENDECTOMY      age 14     COLONOSCOPY  07/12/2017    Left descending x1 tubular adenoma, sigmoid x1 tubular adenoma and rectal x1 hyperplastic polyp.  Pan diverticulosis     ENDOSCOPY UPPER, COLONOSCOPY, COMBINED N/A 7/12/2017    Procedure: COMBINED ENDOSCOPY UPPER, COLONOSCOPY;  UPPER ENDOSCOPY WITH BIOPSIES  AND COLONOSCOPY WITH POLYPECTOMY;  Surgeon: Francis Valverde DO;  Location: [facility]     ENT SURGERY  age 18    tonsils     ESOPHAGOGASTRODUODENOSCOPY  07/12/2017    chemical gatropathy, GE junction with pancreatiuc  metaplasia      ESOPHAGOSCOPY, GASTROSCOPY, DUODENOSCOPY (EGD), COMBINED N/A 6/20/2018    Procedure: COMBINED ESOPHAGOSCOPY, GASTROSCOPY, DUODENOSCOPY (EGD), BIOPSY SINGLE OR MULTIPLE;  UPPER ENDOSCOPY WITH BIOPSY;  Surgeon: Francis Valverde DO;  Location: [facility]     ORTHOPEDIC SURGERY  age 30     back and neck fusion, bone from hip removed to use on plates     ORTHOPEDIC SURGERY  age 30    L5 fusion, laminectomy of lumbar discs     Past Medical History:   Diagnosis Date     Russell's esophagus 07/12/2017    repeat EGD in one year.     Bipolar disorder (H)      Chronic cervical pain      Colon polyp, hyperplastic 07/12/2017    rectal     Colon polyps 07/12/2017    Descending colon x1 and sigmoid colon x1     DDD (degenerative disc disease), cervical      Depression, major      Pancolonic diverticulosis 07/12/2017         Suicide Assessment Five-step Evaluation and Treatment (SAFE-T)        Assessment/Plan:  (F39) Mood disorder (H)  (primary encounter diagnosis)  Comment: Stable   Plan:  Increase lamoTRIgine (LAMICTAL) from 100 MG to  200 MG tablet.  He will cut him lithium down to 450 mg Twice per day for two weeks, then 450 mg daily for 1 weeks, then off.        Phone call duration:  7 minutes      Follow up with Provider - Telephone call in 3 weeks         Tommy Lamb DO, DO

## 2020-03-26 ENCOUNTER — VIRTUAL VISIT (OUTPATIENT)
Dept: INTERNAL MEDICINE | Facility: OTHER | Age: 50
End: 2020-03-26
Attending: INTERNAL MEDICINE
Payer: MEDICARE

## 2020-03-26 VITALS — WEIGHT: 244 LBS | BODY MASS INDEX: 33.05 KG/M2 | HEIGHT: 72 IN

## 2020-03-26 DIAGNOSIS — F39 MOOD DISORDER (H): Primary | ICD-10-CM

## 2020-03-26 PROCEDURE — G0463 HOSPITAL OUTPT CLINIC VISIT: HCPCS | Mod: TEL

## 2020-03-26 PROCEDURE — 99441 ZZC PHYSICIAN TELEPHONE EVALUATION 5-10 MIN: CPT | Performed by: INTERNAL MEDICINE

## 2020-03-26 RX ORDER — LAMOTRIGINE 200 MG/1
200 TABLET ORAL DAILY
Qty: 90 TABLET | Refills: 1 | Status: SHIPPED | OUTPATIENT
Start: 2020-03-26 | End: 2020-07-24

## 2020-03-26 ASSESSMENT — ANXIETY QUESTIONNAIRES
4. TROUBLE RELAXING: SEVERAL DAYS
5. BEING SO RESTLESS THAT IT IS HARD TO SIT STILL: SEVERAL DAYS
2. NOT BEING ABLE TO STOP OR CONTROL WORRYING: MORE THAN HALF THE DAYS
IF YOU CHECKED OFF ANY PROBLEMS ON THIS QUESTIONNAIRE, HOW DIFFICULT HAVE THESE PROBLEMS MADE IT FOR YOU TO DO YOUR WORK, TAKE CARE OF THINGS AT HOME, OR GET ALONG WITH OTHER PEOPLE: VERY DIFFICULT
3. WORRYING TOO MUCH ABOUT DIFFERENT THINGS: MORE THAN HALF THE DAYS
6. BECOMING EASILY ANNOYED OR IRRITABLE: MORE THAN HALF THE DAYS
GAD7 TOTAL SCORE: 11
7. FEELING AFRAID AS IF SOMETHING AWFUL MIGHT HAPPEN: MORE THAN HALF THE DAYS
1. FEELING NERVOUS, ANXIOUS, OR ON EDGE: SEVERAL DAYS

## 2020-03-26 ASSESSMENT — MIFFLIN-ST. JEOR: SCORE: 2019.78

## 2020-03-26 ASSESSMENT — PAIN SCALES - GENERAL: PAINLEVEL: NO PAIN (0)

## 2020-03-26 ASSESSMENT — PATIENT HEALTH QUESTIONNAIRE - PHQ9: SUM OF ALL RESPONSES TO PHQ QUESTIONS 1-9: 12

## 2020-03-27 ASSESSMENT — ANXIETY QUESTIONNAIRES: GAD7 TOTAL SCORE: 11

## 2020-04-13 DIAGNOSIS — J98.4 CHRONIC LUNG DISEASE: ICD-10-CM

## 2020-04-13 DIAGNOSIS — F90.0 ATTENTION DEFICIT HYPERACTIVITY DISORDER (ADHD), PREDOMINANTLY INATTENTIVE TYPE: ICD-10-CM

## 2020-04-14 RX ORDER — DEXTROAMPHETAMINE SACCHARATE, AMPHETAMINE ASPARTATE, DEXTROAMPHETAMINE SULFATE AND AMPHETAMINE SULFATE 7.5; 7.5; 7.5; 7.5 MG/1; MG/1; MG/1; MG/1
30 TABLET ORAL 2 TIMES DAILY
Qty: 60 TABLET | Refills: 0 | Status: SHIPPED | OUTPATIENT
Start: 2020-04-14 | End: 2020-05-19

## 2020-04-14 RX ORDER — FLUTICASONE PROPIONATE 220 UG/1
AEROSOL, METERED RESPIRATORY (INHALATION)
Qty: 24 G | Refills: 1 | Status: SHIPPED | OUTPATIENT
Start: 2020-04-14 | End: 2020-10-19

## 2020-04-14 NOTE — TELEPHONE ENCOUNTER
PCP is Dr. Lamb.  Medication is pended if you approve.  Thank you.    Adderall      Last Written Prescription Date:  3/13/20  Last Fill Quantity: 60,   # refills: 0  Last Office Visit: 3/26/20 Virtual visit  Future Office visit:       Routing refill request to provider for review/approval because:  Drug not on the [facility], P or [facility] refill protocol or controlled substance

## 2020-04-17 ENCOUNTER — TELEPHONE (OUTPATIENT)
Dept: PEDIATRICS | Facility: OTHER | Age: 50
End: 2020-04-17

## 2020-04-17 NOTE — TELEPHONE ENCOUNTER
11:03 AM    Reason for Call: Phone Call    Description: Patient needs to schedule a follow up med review appointment    Was an appointment offered for this call? No  If yes : Appointment type              Date    Preferred method for responding to this message: Telephone Call  What is your phone number ?    [phone]     If we cannot reach you directly, may we leave a detailed response at the number you provided? Yes    Can this message wait until your PCP/provider returns, if available today? YES    Luz Aviles

## 2020-05-18 DIAGNOSIS — F90.0 ATTENTION DEFICIT HYPERACTIVITY DISORDER (ADHD), PREDOMINANTLY INATTENTIVE TYPE: ICD-10-CM

## 2020-05-19 RX ORDER — DEXTROAMPHETAMINE SACCHARATE, AMPHETAMINE ASPARTATE, DEXTROAMPHETAMINE SULFATE AND AMPHETAMINE SULFATE 7.5; 7.5; 7.5; 7.5 MG/1; MG/1; MG/1; MG/1
30 TABLET ORAL 2 TIMES DAILY
Qty: 60 TABLET | Refills: 0 | Status: ON HOLD | OUTPATIENT
Start: 2020-05-19 | End: 2020-08-12

## 2020-05-19 RX ORDER — DEXTROAMPHETAMINE SACCHARATE, AMPHETAMINE ASPARTATE, DEXTROAMPHETAMINE SULFATE AND AMPHETAMINE SULFATE 7.5; 7.5; 7.5; 7.5 MG/1; MG/1; MG/1; MG/1
TABLET ORAL
Qty: 60 TABLET | Refills: 0 | Status: SHIPPED | OUTPATIENT
Start: 2020-06-19 | End: 2020-07-16

## 2020-05-19 NOTE — TELEPHONE ENCOUNTER
amphetamine-dextroamphetamine (ADDERALL) 30 MG tablet      Last Written Prescription Date:  4/14/20  Last Fill Quantity: 60,   # refills: 0  Last Office Visit: 3/26/20 virtual visit  Future Office visit:       Routing refill request to provider for review/approval because:  Drug not on the [facility], P or [facility] refill protocol or controlled substance

## 2020-05-20 ENCOUNTER — TELEPHONE (OUTPATIENT)
Dept: PEDIATRICS | Facility: OTHER | Age: 50
End: 2020-05-20

## 2020-05-20 NOTE — TELEPHONE ENCOUNTER
Please call the Patient's father, Carlos, to get Andrei an in clinic appointment with me next Tuesday the 26 th for medication adjustment.  Andrei does not have a phone and sees his father often.

## 2020-05-20 NOTE — TELEPHONE ENCOUNTER
Caroline Simmons called back.  She forgot to tell the provider that something happened with the patients teeth the past week or two.  He states he drank his girlfriends denture .  Whatever happened to his teeth it is eating through his teeth and gums.  She would also like his teeth checked when he comes in for the appointment.  Also would like labs done to see if there are any drugs in his system.

## 2020-05-20 NOTE — TELEPHONE ENCOUNTER
Patients sister Caroline Simmons would like to speak to the provider regarding the patients behavior.  She states that the patient can't know that she is calling to discuss with the provider.  States the patient is not with it right now and is acting very paranoid.  Please call the patients sister back and advise if appropriate.    [phone] Caroline Simmons

## 2020-05-27 ENCOUNTER — TELEPHONE (OUTPATIENT)
Dept: FAMILY MEDICINE | Facility: OTHER | Age: 50
End: 2020-05-27

## 2020-05-27 NOTE — TELEPHONE ENCOUNTER
Caroline stated she would try to get him to go in. Caroline concerned as the issue is he is not wanting to leave his house. Caroline again stated she would try to get him in to ED.

## 2020-05-27 NOTE — TELEPHONE ENCOUNTER
"Patient's sister called and stated that patient missed his appointment with you yesterday and she is now looking at what the next step would be. Patient was supposed to have a medication follow up. Patient cancelled due to feeling that \"everyone is out to get him.\" Caroline would like a return call at [phone]. Please advise   "

## 2020-07-15 DIAGNOSIS — M54.12 CERVICAL RADICULOPATHY: ICD-10-CM

## 2020-07-15 DIAGNOSIS — F17.200 TOBACCO DEPENDENCE: ICD-10-CM

## 2020-07-15 DIAGNOSIS — F90.0 ATTENTION DEFICIT HYPERACTIVITY DISORDER (ADHD), PREDOMINANTLY INATTENTIVE TYPE: ICD-10-CM

## 2020-07-16 RX ORDER — VARENICLINE TARTRATE 1 MG/1
TABLET, FILM COATED ORAL
Qty: 56 TABLET | Refills: 4 | Status: ON HOLD | OUTPATIENT
Start: 2020-07-16 | End: 2020-08-13

## 2020-07-16 NOTE — TELEPHONE ENCOUNTER
adderall 30mg      Last Written Prescription Date:  6/19/2020  Last Fill Quantity: 60,   # refills: 0  Routing refill request to provider for review/approval because:  Drug not on the [facility], [facility] or [facility] refill protocol or controlled substance    Gabapentin 300mg      Last Written Prescription Date:  3/18/20  Last Fill Quantity: 180,   # refills: 3  Last Office Visit: 3/26/20  Future Office visit:    Next 5 appointments (look out 90 days)    Aug 06, 2020 10:30 AM CDT  (Arrive by 10:15 AM)  SHORT with Tommy Lamb DO  [facility] [place] ([facility] [place] ) [address]  [place] [address]  [phone]           Routing refill request to provider for review/approval because:  Drug not on the [facility], [facility] or [facility] refill protocol or controlled substance

## 2020-07-17 RX ORDER — GABAPENTIN 300 MG/1
CAPSULE ORAL
Qty: 180 CAPSULE | Refills: 3 | Status: SHIPPED | OUTPATIENT
Start: 2020-07-17 | End: 2020-11-17

## 2020-07-17 RX ORDER — DEXTROAMPHETAMINE SACCHARATE, AMPHETAMINE ASPARTATE, DEXTROAMPHETAMINE SULFATE AND AMPHETAMINE SULFATE 7.5; 7.5; 7.5; 7.5 MG/1; MG/1; MG/1; MG/1
TABLET ORAL
Qty: 60 TABLET | Refills: 0 | Status: ON HOLD | OUTPATIENT
Start: 2020-07-17 | End: 2020-08-13

## 2020-07-22 DIAGNOSIS — K05.10 GINGIVITIS: ICD-10-CM

## 2020-07-24 ENCOUNTER — HOSPITAL ENCOUNTER (EMERGENCY)
Facility: HOSPITAL | Age: 50
Discharge: HOME | End: 2020-07-24
Attending: EMERGENCY MEDICINE | Admitting: EMERGENCY MEDICINE
Payer: MEDICARE

## 2020-07-24 ENCOUNTER — APPOINTMENT (OUTPATIENT)
Dept: GENERAL RADIOLOGY | Facility: HOSPITAL | Age: 50
End: 2020-07-24
Attending: EMERGENCY MEDICINE
Payer: MEDICARE

## 2020-07-24 VITALS
TEMPERATURE: 98.1 F | RESPIRATION RATE: 16 BRPM | SYSTOLIC BLOOD PRESSURE: 120 MMHG | OXYGEN SATURATION: 96 % | BODY MASS INDEX: 31.19 KG/M2 | HEART RATE: 85 BPM | WEIGHT: 230 LBS | DIASTOLIC BLOOD PRESSURE: 98 MMHG

## 2020-07-24 DIAGNOSIS — R10.32 ABDOMINAL PAIN, LEFT LOWER QUADRANT: ICD-10-CM

## 2020-07-24 DIAGNOSIS — M25.531 RIGHT WRIST PAIN: ICD-10-CM
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
## 2020-07-24 PROCEDURE — 99283 EMERGENCY DEPT VISIT LOW MDM: CPT | Mod: Z6 | Performed by: EMERGENCY MEDICINE

## 2020-07-24 PROCEDURE — 99283 EMERGENCY DEPT VISIT LOW MDM: CPT

## 2020-07-24 PROCEDURE — 73110 X-RAY EXAM OF WRIST: CPT | Mod: TC,RT

## 2020-07-24 RX ORDER — POLYETHYLENE GLYCOL 3350 17 G/17G
1 POWDER, FOR SOLUTION ORAL DAILY
Qty: 527 G | Refills: 0 | Status: ON HOLD | OUTPATIENT
Start: 2020-07-24 | End: 2020-08-13

## 2020-07-24 RX ORDER — BUDESONIDE AND FORMOTEROL FUMARATE DIHYDRATE 160; 4.5 UG/1; UG/1
AEROSOL RESPIRATORY (INHALATION)
COMMUNITY
Start: 2019-09-04 | End: 2020-08-04

## 2020-07-24 NOTE — ED TRIAGE NOTES
"Patient presents to emergency room with c/o right forearm pain and left lower abdominal pain. Hx hernia. Reports falling in the shower 10 days ago and put his right arm down so he would not hit his head. C/o \"cystic acne flaring up all over his body\" and areas possibly infected. Multiple complaints in triage.   "

## 2020-07-24 NOTE — ED AVS SNAPSHOT
[facility]  [address] [address]  Phone:  [phone]                                    Andrei Whitman   MRN: [id number]    Department:  [facility]   Date of Visit:  7/24/2020           After Visit Summary Signature Page    I have received my discharge instructions, and my questions have been answered. I have discussed any challenges I see with this plan with the nurse or doctor.    ..........................................................................................................................................  Patient/Patient Representative Signature      ..........................................................................................................................................  Patient Representative Print Name and Relationship to Patient    ..................................................               ................................................  Date                                   Time    ..........................................................................................................................................  Reviewed by Signature/Title    ...................................................              ..............................................  Date                                               Time          22EPIC Rev 08/18

## 2020-07-24 NOTE — ED PROVIDER NOTES
"  History     Chief Complaint   Patient presents with     Arm Pain     c/o right forearm pain. Injury approximately 10 days ago     Abdominal Pain     C/o left lower quadrant pain. hx hernia. Pain radiates down left leg.    Patient presents to emergency room with c/o right forearm pain and left lower abdominal pain. Hx hernia. Reports falling in the shower 10 days ago and put his right arm down so he would not hit his head. C/o \"cystic acne flaring up all over his body\" and areas possibly infected. Multiple complaints in triage.   HPI  Andrei Whitman is a 49 year old male who presents amatory during the [place] pandemic with complaints.    Primary concern, he notes he tripped over his cat at home 10 days ago onto his right extended upper extremity and since then he has had pain and swelling of the distal forearm.  He denies any numbness, tingling, weakness.  Denies any hand pain or finger pain.  No shoulder pain.    2nd concern, patient notes that over the last few months he has had pain in his left lower abdomen that feels worse when he is constipated.  He reports he needs to push in the area at times.  He is concerned he may have a hernia.  He denies any fevers, chills, change in appetite, diarrhea, bloody stools, testicular pain or swelling.  No weight loss or night sweats.  Patient does note he occasionally feels pain down into his thigh and leg.  He denies any loss of bowel or bladder control.  No saddle numbness.  No history of cancer.  Not on steroids.    Other conerns patient notes he has acne that is been worse since being off lithium in April this year.    He denies being homicidal, suicidal, psychotic.  He reports his bipolar is going fine.  He note he has a girlfriend who is supportive.  He denies any alcohol or drug related questions or issues.  Reports his \"mental health is better\"    Allergies:  No Known Allergies    Problem List:    Patient Active Problem List    Diagnosis Date Noted     Obesity " (BMI 35.0-39.9) with comorbidity (H) 09/23/2019     Priority: Medium     Chronic lung disease 09/23/2019     Priority: Medium     Lithium use 08/06/2018     Priority: Medium     DDD (degenerative disc disease), cervical 08/06/2018     Priority: Medium     Cervical stenosis of spinal canal 08/06/2018     Priority: Medium     Tobacco dependency 06/09/2018     Priority: Medium     Russell esophagus 06/07/2018     Priority: Medium     Benign essential hypertension 04/24/2018     Priority: Medium     Routine general medical examination at a health care facility 11/21/2016     Priority: Medium     Flatulence, eructation, and gas pain 08/15/2016     Priority: Medium     Bipolar disease, chronic (H) 08/15/2016     Priority: Medium     ACP (advance care planning) 06/20/2016     Priority: Medium     Advance Care Planning 6/20/2016: ACP Review of Chart / Resources Provided:  Reviewed chart for advance care plan.  Andrei Whitman has no plan or code status on file. Discussed available resources and provided with information. Confirmed code status reflects current choices pending further ACP discussions.  Confirmed/documented legally designated decision makers.  Added by Monica Valerio               Attention deficit hyperactivity disorder (ADHD), predominantly inattentive type 02/19/2016     Priority: Medium     Abnormal weight gain 09/25/2015     Priority: Medium     Mood disorder (H) 07/15/2015     Priority: Medium     GERD (gastroesophageal reflux disease) 03/12/2015     Priority: Medium     Segmental and somatic dysfunction of lower extremity 11/26/2014     Priority: Medium     Lower back pain 10/24/2014     Priority: Medium     Cervicalgia 10/02/2014     Priority: Medium        Past Medical History:    Past Medical History:   Diagnosis Date     Russell's esophagus 07/12/2017     Bipolar disorder (H)      Chronic cervical pain      Colon polyp, hyperplastic 07/12/2017     Colon polyps 07/12/2017     DDD (degenerative disc  disease), cervical      Depression, major      Pancolonic diverticulosis 07/12/2017       Past Surgical History:    Past Surgical History:   Procedure Laterality Date     APPENDECTOMY      age 14     COLONOSCOPY  07/12/2017    Left descending x1 tubular adenoma, sigmoid x1 tubular adenoma and rectal x1 hyperplastic polyp.  Pan diverticulosis     ENDOSCOPY UPPER, COLONOSCOPY, COMBINED N/A 7/12/2017    Procedure: COMBINED ENDOSCOPY UPPER, COLONOSCOPY;  UPPER ENDOSCOPY WITH BIOPSIES  AND COLONOSCOPY WITH POLYPECTOMY;  Surgeon: Francis Valverde DO;  Location: [facility]     ENT SURGERY  age 18    tonsils     ESOPHAGOGASTRODUODENOSCOPY  07/12/2017    chemical gatropathy, GE junction with pancreatiuc metaplasia      ESOPHAGOSCOPY, GASTROSCOPY, DUODENOSCOPY (EGD), COMBINED N/A 6/20/2018    Procedure: COMBINED ESOPHAGOSCOPY, GASTROSCOPY, DUODENOSCOPY (EGD), BIOPSY SINGLE OR MULTIPLE;  UPPER ENDOSCOPY WITH BIOPSY;  Surgeon: Francis Valverde DO;  Location: [facility]     ORTHOPEDIC SURGERY  age 30     back and neck fusion, bone from hip removed to use on plates     ORTHOPEDIC SURGERY  age 30    L5 fusion, laminectomy of lumbar discs       Family History:    Family History   Problem Relation Age of Onset     Asthma Mother      Arthritis Mother      Prostate Cancer Father      Heart Disease Paternal Grandfather      Hypertension Paternal Grandfather      Alcohol/Drug Paternal Grandfather      Other - See Comments Brother         Nerve and degenerative disease mild     Alcohol/Drug Brother      Other - See Comments Sister 21        Hip replacements, nerve, degerative disease     Alcohol/Drug Maternal Grandfather      Unknown/Adopted Paternal Grandmother      Other Cancer Paternal Aunt         Cervical cancer       Social History:  Marital Status:   [4]  Social History     Tobacco Use     Smoking status: Current Every Day Smoker     Packs/day: 0.50     Years: 30.00     Pack years: 15.00     Start date: 1/1/1989      Smokeless tobacco: Never Used     Tobacco comment: quitplan referral declined 6/19/19 on chantax   Substance Use Topics     Alcohol use: No     Alcohol/week: 0.0 standard drinks     Drug use: No   Patient notes supportive girlfriend.    Medications:    amitriptyline (ELAVIL) 50 MG tablet  amphetamine-dextroamphetamine (ADDERALL) 30 MG tablet  aspirin 81 MG tablet  buPROPion (WELLBUTRIN) 100 MG tablet  chlorhexidine (PERIDEX) 0.12 % solution  gabapentin (NEURONTIN) 300 MG capsule  hydrochlorothiazide (MICROZIDE) 12.5 MG capsule  montelukast (SINGULAIR) 10 MG tablet  omeprazole (PRILOSEC) 40 MG DR capsule  pilocarpine (SALAGEN) 5 MG tablet  polyethylene glycol (MIRALAX) 17 GM/SCOOP powder  verapamil ER (VERELAN) 240 MG 24 hr capsule  CHANTIX CONTINUING MONTH QUINN 1 MG tablet  FLOVENT  MCG/ACT inhaler  SYMBICORT 160-4.5 MCG/ACT Inhaler          Review of Systems  No fevers, chills, runny nose, sore throat or cough.  Good appetite.  No weight loss.  No bloody stools.  Abdominal pain 3 months reported he has 1 area of his abdominal wall that is tender.  He is concerned hernia.  He has 1 bowel movement a week.  Notes his bipolar is doing well off lithium.  Other 10 systems negative.  No saddle numbness      Physical Exam   BP: 159/93  Pulse: 85  Heart Rate: 77  Temp: 98.1  F (36.7  C)  Resp: 18  Weight: 104.3 kg (230 lb)  SpO2: 97 %      Physical Exam   Nursing note and vitals reviewed.  Constitutional: The patient appears well-developed.  Patient is wearing a red dog T-shirt.  He is polite conversant and tangential historian.  HENT:   Mouth/Throat: Oropharynx is clear and moist.        Normal inspection   Eyes: Pupils are equal, round, and reactive to light.  Icteric pupils 4 mm  Neck: Trachea normal. Neck supple. No rigidity. No tracheal deviation present.   Cardiovascular: Normal rate, regular rhythm, normal heart sounds and intact distal pulses-occluding DP..    No murmur heard.  Pulmonary/Chest: Effort normal  and breath sounds normal. No stridor. No respiratory distress.   Abdominal: Soft. Bowel sounds are normal. The patient exhibits no pulsatile midline mass. There is no tenderness.  Benign soft exam.  There is a small abdominal wall pimple in the left lower quadrant.  The abdominal lamination is completely benign.  No abdominal tenderness or hernias.   examination normal circumcised penis.  Normal testicles bilaterally.  No scrotal swelling or tenderness.  No masses.  No hernias.  No inguinal canal tenderness.  Musculoskeletal: Normal range of motion.        No signs of swelling.  No bony tenderness supple extremities.  Patient has no scaphoid tenderness.  He has intact pronation supination.  Normal median, radial, ulnar motor and sensory.  No pain with loading the scaphoid.  No pain with loading the thumb.  Patient has slight tenderness over the dorsal distal forearm when stressing the wrist extensors.  Negative Finkelstein's  Neurological: The patient is alert. No cranial nerve deficit.  Fluent speech.  No arm or leg weakness.  No clonus.  Normal strength at hip knee ankle and EHL.  Normal gait.  Skin: Skin is warm and dry. No rash noted.   Psychiatric: The patient's behavior is normal.      ED Course     ED Course as of Jul 24 0321 Fri Jul 24, 2020   0212 Tangential historian at triage, multiple complaints.  Presents ambulatory during covid pandemic.       0241 History exam.  Vitals are normal.  Patient's examination is reassuring.  He has no scaphoid tenderness.  His range of motion the right wrist is intact and normal.  He has subtle tenderness over the distal third of the forearm but no objective swelling.  He has intact pronation supination.    Patient has a small pimple on the left lower abdominal wall.  He has no inguinal hernia . examination is reassuring including testicles and inguinal canal.  Exam is completely benign.  He has normal CT and L-spine exam.  He has normal lower extremity exam including  motor and sensory.  Also note there is no scaphoid pain or pain with axial loading of the thumb.      0254 X-ray: Right wrist my read.  No acute fracture.  Normal alignment.      0259 Medically stable.           No results found for this or any previous visit (from the past 24 hour(s)).    Medications - No data to display    Assessments & Plan (with Medical Decision Making)     I have reviewed the nursing notes.    I have reviewed the findings, diagnosis, plan and need for follow up with the patient.  MARLENE Whitman 1970 presents to the emergency department.  Patient has been assessed and reassessed and at this time based on the information available to me, I feel the patient is medically stable for discharge.  At times conditions evolve or change,thus repeat medical evaluation is recommended if any additional concerns arise.      The patient is stable for discharge or transfer from the ED. patient does not have any signs of scaphoid fracture.  His x-rays of the right wrist are reassuring and negative for fracture or malalignment.  Patient has had reported abdominal pain for multiple months with a benign soft abdominal exam.  He has a good appetite and no fever.  I doubt perforated diverticulitis, acute diverticulitis.  There is no evidence of abdominal wall hernia, inguinal hernia or scrotal abnormalities.  Patient is a small pimple on his lower abdominal wall without signs of abscess.  Patient is medically stable.  He has had a colonoscopy remotely that showed diverticulosis.  He has had no weight loss or suggestion of neoplasm.  He has no radicular symptoms or red flags to suggest cauda equina syndrome.  Follow-up as indicated.  Shared decision making reviewed.  Patient due to his tangential history and nature is considered to potentially have high risks chief complaints, however patient's examination vital signs are very reassuring.  Symptoms are not suggestive of abdominal aortic aneurysm.   Follow-up as indicated below or return here per discharge instructions and as discussed.      Discharge Medication List as of 7/24/2020  3:03 AM      START taking these medications    Details   polyethylene glycol (MIRALAX) 17 GM/SCOOP powder Take 17 g (1 capful) by mouth daily for 5 days, Disp-527 g,R-0, Local Print             Final diagnoses:   Fall, initial encounter   Right wrist pain   Abdominal pain, left lower quadrant     Tommy Lamb DO  [address] [facility] [address] [phone]    In 1 week  for recheck regarless and ED follow up.  consider colonoscopy.    [facility]  [address] [address] [phone]    If symptoms worsen fever, bloody stools, vomiting,    See AVS.   7/24/2020   [facility]     Davian Valverde MD  07/24/20 0326

## 2020-07-24 NOTE — ED NOTES
Ace wrap placed on R wrist. Comfortable per pt report. Pain control discussed. Encouraged to call and get in with PCP sooner if needed. No other questions AVS provided.

## 2020-07-27 RX ORDER — CHLORHEXIDINE GLUCONATE ORAL RINSE 1.2 MG/ML
15 SOLUTION DENTAL 2 TIMES DAILY
Qty: 1893 ML | Refills: 3 | Status: SHIPPED | OUTPATIENT
Start: 2020-07-27 | End: 2021-12-09

## 2020-07-31 ENCOUNTER — HOSPITAL ENCOUNTER (EMERGENCY)
Facility: HOSPITAL | Age: 50
Discharge: HOME | End: 2020-07-31
Attending: NURSE PRACTITIONER | Admitting: NURSE PRACTITIONER
Payer: MEDICARE

## 2020-07-31 ENCOUNTER — APPOINTMENT (OUTPATIENT)
Dept: GENERAL RADIOLOGY | Facility: HOSPITAL | Age: 50
End: 2020-07-31
Attending: NURSE PRACTITIONER
Payer: MEDICARE

## 2020-07-31 VITALS
TEMPERATURE: 98.7 F | OXYGEN SATURATION: 98 % | SYSTOLIC BLOOD PRESSURE: 179 MMHG | RESPIRATION RATE: 18 BRPM | HEART RATE: 105 BPM | DIASTOLIC BLOOD PRESSURE: 105 MMHG

## 2020-07-31 DIAGNOSIS — R10.32 LEFT LOWER QUADRANT ABDOMINAL PAIN: ICD-10-CM

## 2020-07-31 DIAGNOSIS — M54.50 LUMBAR BACK PAIN: ICD-10-CM

## 2020-07-31 PROCEDURE — 99213 OFFICE O/P EST LOW 20 MIN: CPT | Mod: Z6 | Performed by: NURSE PRACTITIONER

## 2020-07-31 PROCEDURE — G0463 HOSPITAL OUTPT CLINIC VISIT: HCPCS

## 2020-07-31 PROCEDURE — 72100 X-RAY EXAM L-S SPINE 2/3 VWS: CPT | Mod: TC

## 2020-07-31 ASSESSMENT — ENCOUNTER SYMPTOMS
FACIAL ASYMMETRY: 0
RESPIRATORY NEGATIVE: 1
BACK PAIN: 1
HEMATURIA: 0
CONSTITUTIONAL NEGATIVE: 1
NUMBNESS: 0
FREQUENCY: 0
SEIZURES: 0
DIZZINESS: 0
FLANK PAIN: 0
DYSURIA: 0
CARDIOVASCULAR NEGATIVE: 1
SPEECH DIFFICULTY: 0
LIGHT-HEADEDNESS: 0
TREMORS: 0
WEAKNESS: 0
DIFFICULTY URINATING: 0

## 2020-07-31 NOTE — ED NOTES
Pt ambulated to room, when asked pt was unable to give a clear description or spot that he was having pain. Pt is slightly agitated and could not sit still and was walking all around the room. Pt was all over the place with the story and was being very unclear

## 2020-07-31 NOTE — DISCHARGE INSTRUCTIONS
No acute fracture or dislocation.    Ibuprofen 600 to 800 mg (3 - 4 tabs of over the counter med) every six to eight hours as needed; not to exceed maximum amount of 3200 mg in 24 hours.    Tylenol 650 to 1000 mg every four to six hours as needed (not to exceed more than 4000 mg in a 24 hour period).     May use interchangeably. Suggest medicating around the clock for the next 24-48 hours.

## 2020-07-31 NOTE — ED TRIAGE NOTES
"Pt is here with c/o \" I picked up something\" pt notes after doing that activity it caused pain at multiple site of body   When asked where pt hurts pt is started describing pain from head to toe    "

## 2020-07-31 NOTE — ED AVS SNAPSHOT
[facility]  [address] [address]  Phone:  [phone]                                    Andrei Whitman   MRN: [id number]    Department:  [facility]   Date of Visit:  7/31/2020           After Visit Summary Signature Page    I have received my discharge instructions, and my questions have been answered. I have discussed any challenges I see with this plan with the nurse or doctor.    ..........................................................................................................................................  Patient/Patient Representative Signature      ..........................................................................................................................................  Patient Representative Print Name and Relationship to Patient    ..................................................               ................................................  Date                                   Time    ..........................................................................................................................................  Reviewed by Signature/Title    ...................................................              ..............................................  Date                                               Time          22EPIC Rev 08/18

## 2020-07-31 NOTE — ED NOTES
Pt aggressively left UC yelling and visibly upset, pt did not stay to give nurse a chance to explain and go over discharge instructions. Pt ambulated out of UC doors

## 2020-07-31 NOTE — ED PROVIDER NOTES
History     Chief Complaint   Patient presents with     Back Pain     HPI  Andrei Whitman is a 49 year old male who who is here with multiple complaints. He was also seen in ED 7/24/20 for other similar complaints (see note).     States since February/March he felt he pulled a left sided groin muscle from lifting a glass table. Then the week later, he somehow tweaked it again, causing the pain in the same area. Now pt states on Saturday it 'burst', pumping fluid in and out. Felt it make a big 'puddle' in his pelvic area along with into the liver, then up his right side to his head. First time it went to his head, he tried stopping it by pushing his arm into his right armpit. He tells me he can feel a lump in the left lower abdomen.  He denies dysuria, urinary frequency/urgency, fevers, hematuria.  Tells me regular bowel movements, denying constipation and diarrhea.      Also feels his '3 lumbar discs' are spinning from a fall he had last night at home, denying hitting his head or any other injury. Tells me he has had a history back surgery. He reports 'all of my symptoms are stemming from my 3 discs'. 'That is what is causing this pain in my left abdomen up to my head.   States pain is constant and sharp, unable to rate it  He denies bowel/bladder dysfunction, saddle paresthesias, lower extremity weakness/paresthesias.   Denies chills, diaphoresis, nausea, vomiting.    Unable to stop speaking to tell me if he has recently taken anything for his symptoms.     Allergies:  No Known Allergies    Problem List:    Patient Active Problem List    Diagnosis Date Noted     Obesity (BMI 35.0-39.9) with comorbidity (H) 09/23/2019     Priority: Medium     Chronic lung disease 09/23/2019     Priority: Medium     Lithium use 08/06/2018     Priority: Medium     DDD (degenerative disc disease), cervical 08/06/2018     Priority: Medium     Cervical stenosis of spinal canal 08/06/2018     Priority: Medium     Tobacco dependency  06/09/2018     Priority: Medium     Russell esophagus 06/07/2018     Priority: Medium     Benign essential hypertension 04/24/2018     Priority: Medium     Routine general medical examination at a health care facility 11/21/2016     Priority: Medium     Flatulence, eructation, and gas pain 08/15/2016     Priority: Medium     Bipolar disease, chronic (H) 08/15/2016     Priority: Medium     ACP (advance care planning) 06/20/2016     Priority: Medium     Advance Care Planning 6/20/2016: ACP Review of Chart / Resources Provided:  Reviewed chart for advance care plan.  Andrei Whitman has no plan or code status on file. Discussed available resources and provided with information. Confirmed code status reflects current choices pending further ACP discussions.  Confirmed/documented legally designated decision makers.  Added by Monica Valerio               Attention deficit hyperactivity disorder (ADHD), predominantly inattentive type 02/19/2016     Priority: Medium     Abnormal weight gain 09/25/2015     Priority: Medium     Mood disorder (H) 07/15/2015     Priority: Medium     GERD (gastroesophageal reflux disease) 03/12/2015     Priority: Medium     Segmental and somatic dysfunction of lower extremity 11/26/2014     Priority: Medium     Lower back pain 10/24/2014     Priority: Medium     Cervicalgia 10/02/2014     Priority: Medium        Past Medical History:    Past Medical History:   Diagnosis Date     Russell's esophagus 07/12/2017     Bipolar disorder (H)      Chronic cervical pain      Colon polyp, hyperplastic 07/12/2017     Colon polyps 07/12/2017     DDD (degenerative disc disease), cervical      Depression, major      Pancolonic diverticulosis 07/12/2017       Past Surgical History:    Past Surgical History:   Procedure Laterality Date     APPENDECTOMY      age 14     COLONOSCOPY  07/12/2017    Left descending x1 tubular adenoma, sigmoid x1 tubular adenoma and rectal x1 hyperplastic polyp.  Pan diverticulosis      ENDOSCOPY UPPER, COLONOSCOPY, COMBINED N/A 7/12/2017    Procedure: COMBINED ENDOSCOPY UPPER, COLONOSCOPY;  UPPER ENDOSCOPY WITH BIOPSIES  AND COLONOSCOPY WITH POLYPECTOMY;  Surgeon: Francis Valverde DO;  Location: [facility]     ENT SURGERY  age 18    tonsils     ESOPHAGOGASTRODUODENOSCOPY  07/12/2017    chemical gatropathy, GE junction with pancreatiuc metaplasia      ESOPHAGOSCOPY, GASTROSCOPY, DUODENOSCOPY (EGD), COMBINED N/A 6/20/2018    Procedure: COMBINED ESOPHAGOSCOPY, GASTROSCOPY, DUODENOSCOPY (EGD), BIOPSY SINGLE OR MULTIPLE;  UPPER ENDOSCOPY WITH BIOPSY;  Surgeon: Francis Valverde DO;  Location: [facility]     ORTHOPEDIC SURGERY  age 30     back and neck fusion, bone from hip removed to use on plates     ORTHOPEDIC SURGERY  age 30    L5 fusion, laminectomy of lumbar discs       Family History:    Family History   Problem Relation Age of Onset     Asthma Mother      Arthritis Mother      Prostate Cancer Father      Heart Disease Paternal Grandfather      Hypertension Paternal Grandfather      Alcohol/Drug Paternal Grandfather      Other - See Comments Brother         Nerve and degenerative disease mild     Alcohol/Drug Brother      Other - See Comments Sister 21        Hip replacements, nerve, degerative disease     Alcohol/Drug Maternal Grandfather      Unknown/Adopted Paternal Grandmother      Other Cancer Paternal Aunt         Cervical cancer       Social History:  Marital Status:   [4]  Social History     Tobacco Use     Smoking status: Current Every Day Smoker     Packs/day: 0.50     Years: 30.00     Pack years: 15.00     Start date: 1/1/1989     Smokeless tobacco: Never Used     Tobacco comment: quitplan referral declined 6/19/19 on chantax   Substance Use Topics     Alcohol use: No     Alcohol/week: 0.0 standard drinks     Drug use: No        Medications:    amitriptyline (ELAVIL) 50 MG tablet  amphetamine-dextroamphetamine (ADDERALL) 30 MG tablet  aspirin 81 MG tablet  buPROPion  (WELLBUTRIN) 100 MG tablet  CHANTIX CONTINUING MONTH QUINN 1 MG tablet  chlorhexidine (PERIDEX) 0.12 % solution  FLOVENT  MCG/ACT inhaler  gabapentin (NEURONTIN) 300 MG capsule  hydrochlorothiazide (MICROZIDE) 12.5 MG capsule  montelukast (SINGULAIR) 10 MG tablet  omeprazole (PRILOSEC) 40 MG DR capsule  pilocarpine (SALAGEN) 5 MG tablet  SYMBICORT 160-4.5 MCG/ACT Inhaler  verapamil ER (VERELAN) 240 MG 24 hr capsule        Review of Systems   Constitutional: Negative.    Respiratory: Negative.    Cardiovascular: Negative.    Genitourinary: Negative for decreased urine volume, difficulty urinating, discharge, dysuria, flank pain, frequency, hematuria, penile pain, penile swelling, scrotal swelling, testicular pain and urgency.   Musculoskeletal: Positive for back pain (mid lower lumbar spinal pain).   Neurological: Negative for dizziness, tremors, seizures, syncope, facial asymmetry, speech difficulty, weakness, light-headedness and numbness.       Physical Exam   BP: (!) 179/105  Pulse: 105  Temp: 98.7  F (37.1  C)  Resp: 18  SpO2: 98 %      Physical Exam  Vitals signs and nursing note reviewed.   Constitutional:       Comments: Standing up in room, talking at a fast pace, jumping from subject to subject, not taking time to listen to any of my questions. No acute distress.    Neck:      Musculoskeletal: Normal range of motion and neck supple. No neck rigidity.   Cardiovascular:      Rate and Rhythm: Regular rhythm. Tachycardia present.   Pulmonary:      Effort: Pulmonary effort is normal.      Breath sounds: Normal breath sounds.   Abdominal:      General: Abdomen is flat. Bowel sounds are normal. There is no distension.      Palpations: Abdomen is soft. There is no mass.      Tenderness: There is no abdominal tenderness. There is no right CVA tenderness, left CVA tenderness, guarding or rebound.      Hernia: No hernia is present.      Comments: No pulsatile mass   Musculoskeletal:        Arms:       Comments:  Area marked is area of tenderness, where post operative surgical scar is.    Back: No obvious ecchymosis, edema or erythema.  Bony tenderness to: lumbar spine  Muscle tenderness to: none  Active range of motion at waist  Full ROM, flexion and extension with no obvious discomfort noted or reported  Straight leg raise negative  BLE strength: strong, symmetric bilaterally  Able to stand on heels and toes  Patellar reflexes intact  Bilateral pedal pulses intact  Sensation intact, capillary refill < 3 seconds bilateral LE's     Lymphadenopathy:      Cervical: No cervical adenopathy.   Neurological:      General: No focal deficit present.      Mental Status: He is alert and oriented to person, place, and time.         ED Course        1745: Once I informed patient of the results of his x-ray which showed no evidence of acute fracture dislocation, he did get upset asking what is causing his left lower quadrant pain and why it is causing his pain into not only has had but no his right and around neck.  He wants to know why he is so sick and he has been trying to find an answer for the past 4 months.  He states he is so sick that he is going to pass out here.  He is wondering why he keeps feeling so sick like he is going to vomit.  I did reiterate that his abdominal exam was overall negative and given his history I did not feel the need to do any further work-up, however now that he is reporting new symptoms I did encourage lab work and an abdominal and pelvic CT for further evaluation.  Patient deferred this, stating he want to be discharged, and that he wants to leave.  Patient did leave loudly, stomping his feet and asking where the exit was very loudly, not taking the time to receive his printed discharge instructions.     Results for orders placed or performed during the hospital encounter of 07/31/20 (from the past 24 hour(s))   Lumbar spine XR, 2-3 views    Narrative    XR LUMBAR SPINE 2-3 VIEWS    HISTORY: hx back  surgery, slipped last night, pain in post operative  surgical area of lumbar spine .    TECHNIQUE: 3 views of the lumbar spine.    COMPARISON: 5/29/2018.    FINDINGS:    Laminectomy changes are present at L3, L4 and L5. There is chronic  anterolisthesis of L4 on L5. Bulky facet degenerative changes are  redemonstrated in the low lumbar spine particularly at L4-5. No acute  fracture is identified.        Impression    IMPRESSION:     No evidence of acute fracture.      AL SAHA MD       Medications - No data to display    Assessments & Plan (with Medical Decision Making)   (M54.5) Lumbar back pain  (R10.32) Left lower quadrant abdominal pain    49-year-old male patient who initially presents with concerns over a lump in his left lower abdomen.  Reported this has been present for about 5 months and was a result of lifting a table.  He denied any concerning urinary or bowel symptoms. Initially had no complaints of fever, nausea, vomiting, contstipation or diarrhea, but then upon discharge, he reported how nauseated he was. Physical exam did not apprecite any hernias/masses. I palpated the exact area pt was pushing on with where he reported the 'lump' was. This felt as normal lower abd/pelvic muscle with no asymmetry or palpable mass. There was no palpable or rebound pain present. Further into questioning him, he reported he had fallen last night and hurt his back, stating his '3 lumbar discs' are the cause of all his pain. He does have distant history of lumbar disc surgery. There are no red flagged symptoms of bowel/bladder dysfunction, saddle paresthesias, lower extremity weakness/paresthesias, fevers, unintentional weight loss.     At this time, I have no concerns over cauda equina. At the end of the visit with the newly reported symptoms, along with worsening symptoms, recommended lab work along with an abd/pelvic CT for further evaluation of the etiology of his symptoms, but he deferred, asked to be  discharge, and left without his AVS.       I have reviewed the nursing notes.  I have reviewed the findings, diagnosis, plan and need for follow up with the patient.    Discharge Medication List as of 7/31/2020  5:42 PM          Final diagnoses:   Lumbar back pain   Left lower quadrant abdominal pain       7/31/2020   [facility]     Alice Jaimes, JOEY CNP  07/31/20 0843

## 2020-08-04 ENCOUNTER — HOSPITAL ENCOUNTER (EMERGENCY)
Facility: HOSPITAL | Age: 50
Discharge: HOME | End: 2020-08-04
Attending: EMERGENCY MEDICINE | Admitting: EMERGENCY MEDICINE
Payer: MEDICARE

## 2020-08-04 VITALS
DIASTOLIC BLOOD PRESSURE: 104 MMHG | TEMPERATURE: 98.3 F | OXYGEN SATURATION: 99 % | RESPIRATION RATE: 20 BRPM | SYSTOLIC BLOOD PRESSURE: 173 MMHG | HEART RATE: 88 BPM

## 2020-08-04 DIAGNOSIS — F31.9 BIPOLAR DISEASE, CHRONIC (H): ICD-10-CM

## 2020-08-04 DIAGNOSIS — F22 PARANOIA (H): Primary | ICD-10-CM

## 2020-08-04 LAB
ALBUMIN SERPL-MCNC: 4.1 G/DL (ref 3.4–5)
ALBUMIN UR-MCNC: 30 MG/DL
ALP SERPL-CCNC: 104 U/L (ref 40–150)
ALT SERPL W P-5'-P-CCNC: 26 U/L (ref 0–70)
AMORPH CRY #/AREA URNS HPF: ABNORMAL /HPF
AMPHETAMINES UR QL: ABNORMAL NG/ML
ANION GAP SERPL CALCULATED.3IONS-SCNC: 5 MMOL/L (ref 3–14)
APPEARANCE UR: CLEAR
AST SERPL W P-5'-P-CCNC: 25 U/L (ref 0–45)
BACTERIA #/AREA URNS HPF: ABNORMAL /HPF
BARBITURATES UR QL SCN: NOT DETECTED NG/ML
BASOPHILS # BLD AUTO: 0 10E9/L (ref 0–0.2)
BASOPHILS NFR BLD AUTO: 0.5 %
BENZODIAZ UR QL SCN: NOT DETECTED NG/ML
BILIRUB SERPL-MCNC: 0.6 MG/DL (ref 0.2–1.3)
BILIRUB UR QL STRIP: NEGATIVE
BUN SERPL-MCNC: 9 MG/DL (ref 7–30)
BUPRENORPHINE UR QL: NOT DETECTED NG/ML
CALCIUM SERPL-MCNC: 9.7 MG/DL (ref 8.5–10.1)
CANNABINOIDS UR QL: ABNORMAL NG/ML
CHLORIDE SERPL-SCNC: 106 MMOL/L (ref 94–109)
CO2 SERPL-SCNC: 28 MMOL/L (ref 20–32)
COCAINE UR QL SCN: NOT DETECTED NG/ML
COLOR UR AUTO: YELLOW
CREAT SERPL-MCNC: 0.9 MG/DL (ref 0.66–1.25)
D-METHAMPHET UR QL: NOT DETECTED NG/ML
DIFFERENTIAL METHOD BLD: ABNORMAL
EOSINOPHIL # BLD AUTO: 0.2 10E9/L (ref 0–0.7)
EOSINOPHIL NFR BLD AUTO: 2.1 %
ERYTHROCYTE [DISTWIDTH] IN BLOOD BY AUTOMATED COUNT: 17.2 % (ref 10–15)
GFR SERPL CREATININE-BSD FRML MDRD: >90 ML/MIN/{1.73_M2}
GLUCOSE SERPL-MCNC: 81 MG/DL (ref 70–99)
GLUCOSE UR STRIP-MCNC: NEGATIVE MG/DL
HCT VFR BLD AUTO: 38.3 % (ref 40–53)
HGB BLD-MCNC: 12.5 G/DL (ref 13.3–17.7)
HGB UR QL STRIP: NEGATIVE
HYALINE CASTS #/AREA URNS LPF: 7 /LPF
IMM GRANULOCYTES # BLD: 0 10E9/L (ref 0–0.4)
IMM GRANULOCYTES NFR BLD: 0.4 %
KETONES UR STRIP-MCNC: NEGATIVE MG/DL
LEUKOCYTE ESTERASE UR QL STRIP: NEGATIVE
LYMPHOCYTES # BLD AUTO: 2.1 10E9/L (ref 0.8–5.3)
LYMPHOCYTES NFR BLD AUTO: 27.3 %
MCH RBC QN AUTO: 26.7 PG (ref 26.5–33)
MCHC RBC AUTO-ENTMCNC: 32.6 G/DL (ref 31.5–36.5)
MCV RBC AUTO: 82 FL (ref 78–100)
METHADONE UR QL SCN: NOT DETECTED NG/ML
MONOCYTES # BLD AUTO: 0.6 10E9/L (ref 0–1.3)
MONOCYTES NFR BLD AUTO: 8.4 %
MUCOUS THREADS #/AREA URNS LPF: PRESENT /LPF
NEUTROPHILS # BLD AUTO: 4.7 10E9/L (ref 1.6–8.3)
NEUTROPHILS NFR BLD AUTO: 61.3 %
NITRATE UR QL: NEGATIVE
NRBC # BLD AUTO: 0 10*3/UL
NRBC BLD AUTO-RTO: 0 /100
OPIATES UR QL SCN: NOT DETECTED NG/ML
OXYCODONE UR QL SCN: NOT DETECTED NG/ML
PCP UR QL SCN: NOT DETECTED NG/ML
PH UR STRIP: 7 PH (ref 4.7–8)
PLATELET # BLD AUTO: 236 10E9/L (ref 150–450)
POTASSIUM SERPL-SCNC: 3.2 MMOL/L (ref 3.4–5.3)
PROPOXYPH UR QL: NOT DETECTED NG/ML
PROT SERPL-MCNC: 8.5 G/DL (ref 6.8–8.8)
RBC # BLD AUTO: 4.68 10E12/L (ref 4.4–5.9)
RBC #/AREA URNS AUTO: 2 /HPF (ref 0–2)
SODIUM SERPL-SCNC: 139 MMOL/L (ref 133–144)
SOURCE: ABNORMAL
SP GR UR STRIP: 1.02 (ref 1–1.03)
SPERM #/AREA URNS HPF: PRESENT /HPF
TRICYCLICS UR QL SCN: ABNORMAL NG/ML
UROBILINOGEN UR STRIP-MCNC: NORMAL MG/DL (ref 0–2)
WBC # BLD AUTO: 7.6 10E9/L (ref 4–11)
WBC #/AREA URNS AUTO: 2 /HPF (ref 0–5)

## 2020-08-04 PROCEDURE — 81001 URINALYSIS AUTO W/SCOPE: CPT | Performed by: NURSE PRACTITIONER

## 2020-08-04 PROCEDURE — 99283 EMERGENCY DEPT VISIT LOW MDM: CPT | Mod: Z6 | Performed by: EMERGENCY MEDICINE

## 2020-08-04 PROCEDURE — 80306 DRUG TEST PRSMV INSTRMNT: CPT | Performed by: NURSE PRACTITIONER

## 2020-08-04 PROCEDURE — 99283 EMERGENCY DEPT VISIT LOW MDM: CPT

## 2020-08-04 PROCEDURE — 80053 COMPREHEN METABOLIC PANEL: CPT | Performed by: NURSE PRACTITIONER

## 2020-08-04 PROCEDURE — 36415 COLL VENOUS BLD VENIPUNCTURE: CPT | Performed by: NURSE PRACTITIONER

## 2020-08-04 PROCEDURE — 85025 COMPLETE CBC W/AUTO DIFF WBC: CPT | Performed by: NURSE PRACTITIONER

## 2020-08-04 ASSESSMENT — ENCOUNTER SYMPTOMS
SHORTNESS OF BREATH: 0
ABDOMINAL PAIN: 0
FEVER: 0

## 2020-08-04 NOTE — ED PROVIDER NOTES
"  History     Chief Complaint   Patient presents with     Psychiatric Evaluation     agitated and notes \"something burst inside my body and pumped flds to my body in the last 12 hours\", also notes that his \"intestines are in my hips\". pt notes \"cutting hair for 6 days from my mouth, throat, nose and face\".      Paranoid     pt notes \"I think that someone did something to me\" and if \"you touch my butt right now water will come out of a hole\".      HPI  Andrei Whitman is a 49 year old male who presented to the emergency department with paranoid behavior.  Patient believes that something passed into his body and spread in his intestines.  He also believes that someone did something to him and cause a swelling in his left groin.  He also reports noting some blood in his urine a few weeks ago and earlier today.  He denies any flank pain, fever or chills.  No urinary frequency.  Patient is a poor historian with flight of ideas.    Allergies:  No Known Allergies    Problem List:    Patient Active Problem List    Diagnosis Date Noted     Obesity (BMI 35.0-39.9) with comorbidity (H) 09/23/2019     Priority: Medium     Chronic lung disease 09/23/2019     Priority: Medium     Lithium use 08/06/2018     Priority: Medium     DDD (degenerative disc disease), cervical 08/06/2018     Priority: Medium     Cervical stenosis of spinal canal 08/06/2018     Priority: Medium     Tobacco dependency 06/09/2018     Priority: Medium     Russell esophagus 06/07/2018     Priority: Medium     Benign essential hypertension 04/24/2018     Priority: Medium     Routine general medical examination at a health care facility 11/21/2016     Priority: Medium     Flatulence, eructation, and gas pain 08/15/2016     Priority: Medium     Bipolar disease, chronic (H) 08/15/2016     Priority: Medium     ACP (advance care planning) 06/20/2016     Priority: Medium     Advance Care Planning 6/20/2016: ACP Review of Chart / Resources Provided:  Reviewed chart " for advance care plan.  Andrei Whitman has no plan or code status on file. Discussed available resources and provided with information. Confirmed code status reflects current choices pending further ACP discussions.  Confirmed/documented legally designated decision makers.  Added by Monica Valerio               Attention deficit hyperactivity disorder (ADHD), predominantly inattentive type 02/19/2016     Priority: Medium     Abnormal weight gain 09/25/2015     Priority: Medium     Mood disorder (H) 07/15/2015     Priority: Medium     GERD (gastroesophageal reflux disease) 03/12/2015     Priority: Medium     Segmental and somatic dysfunction of lower extremity 11/26/2014     Priority: Medium     Lower back pain 10/24/2014     Priority: Medium     Cervicalgia 10/02/2014     Priority: Medium        Past Medical History:    Past Medical History:   Diagnosis Date     Russell's esophagus 07/12/2017     Bipolar disorder (H)      Chronic cervical pain      Colon polyp, hyperplastic 07/12/2017     Colon polyps 07/12/2017     DDD (degenerative disc disease), cervical      Depression, major      Pancolonic diverticulosis 07/12/2017       Past Surgical History:    Past Surgical History:   Procedure Laterality Date     APPENDECTOMY      age 14     COLONOSCOPY  07/12/2017    Left descending x1 tubular adenoma, sigmoid x1 tubular adenoma and rectal x1 hyperplastic polyp.  Pan diverticulosis     ENDOSCOPY UPPER, COLONOSCOPY, COMBINED N/A 7/12/2017    Procedure: COMBINED ENDOSCOPY UPPER, COLONOSCOPY;  UPPER ENDOSCOPY WITH BIOPSIES  AND COLONOSCOPY WITH POLYPECTOMY;  Surgeon: Francis Valverde DO;  Location: [facility]     ENT SURGERY  age 18    tonsils     ESOPHAGOGASTRODUODENOSCOPY  07/12/2017    chemical gatropathy, GE junction with pancreatiuc metaplasia      ESOPHAGOSCOPY, GASTROSCOPY, DUODENOSCOPY (EGD), COMBINED N/A 6/20/2018    Procedure: COMBINED ESOPHAGOSCOPY, GASTROSCOPY, DUODENOSCOPY (EGD), BIOPSY SINGLE OR MULTIPLE;   UPPER ENDOSCOPY WITH BIOPSY;  Surgeon: Francis Valverde DO;  Location: [facility]     ORTHOPEDIC SURGERY  age 30     back and neck fusion, bone from hip removed to use on plates     ORTHOPEDIC SURGERY  age 30    L5 fusion, laminectomy of lumbar discs       Family History:    Family History   Problem Relation Age of Onset     Asthma Mother      Arthritis Mother      Prostate Cancer Father      Heart Disease Paternal Grandfather      Hypertension Paternal Grandfather      Alcohol/Drug Paternal Grandfather      Other - See Comments Brother         Nerve and degenerative disease mild     Alcohol/Drug Brother      Other - See Comments Sister 21        Hip replacements, nerve, degerative disease     Alcohol/Drug Maternal Grandfather      Unknown/Adopted Paternal Grandmother      Other Cancer Paternal Aunt         Cervical cancer       Social History:  Marital Status:   [4]  Social History     Tobacco Use     Smoking status: Current Every Day Smoker     Packs/day: 0.50     Years: 30.00     Pack years: 15.00     Start date: 1/1/1989     Smokeless tobacco: Never Used     Tobacco comment: quitplan referral declined 6/19/19 on chantax   Substance Use Topics     Alcohol use: No     Alcohol/week: 0.0 standard drinks     Drug use: No        Medications:    amitriptyline (ELAVIL) 50 MG tablet  amphetamine-dextroamphetamine (ADDERALL) 30 MG tablet  aspirin 81 MG tablet  buPROPion (WELLBUTRIN) 100 MG tablet  CHANTIX CONTINUING MONTH QUINN 1 MG tablet  FLOVENT  MCG/ACT inhaler  gabapentin (NEURONTIN) 300 MG capsule  hydrochlorothiazide (MICROZIDE) 12.5 MG capsule  montelukast (SINGULAIR) 10 MG tablet  omeprazole (PRILOSEC) 40 MG DR capsule  pilocarpine (SALAGEN) 5 MG tablet  verapamil ER (VERELAN) 240 MG 24 hr capsule  chlorhexidine (PERIDEX) 0.12 % solution          Review of Systems   Constitutional: Negative for fever.   Respiratory: Negative for shortness of breath.    Cardiovascular: Negative for chest pain.    Gastrointestinal: Negative for abdominal pain.   All other systems reviewed and are negative.      Physical Exam   BP: 167/89  Pulse: 90  Heart Rate: 62  Temp: 98.2  F (36.8  C)  Resp: (rambling non stop)  SpO2: 99 %      Physical Exam  Vitals signs and nursing note reviewed.   Constitutional:       General: He is not in acute distress.     Appearance: He is well-developed. He is not diaphoretic.   HENT:      Head: Normocephalic and atraumatic.   Eyes:      Pupils: Pupils are equal, round, and reactive to light.   Cardiovascular:      Rate and Rhythm: Normal rate and regular rhythm.      Heart sounds: Normal heart sounds.   Pulmonary:      Effort: Pulmonary effort is normal. No respiratory distress.      Breath sounds: Normal breath sounds. No stridor.   Abdominal:      General: Bowel sounds are normal. There is no distension.      Tenderness: There is no abdominal tenderness.   Musculoskeletal:         General: No tenderness or deformity.   Neurological:      Mental Status: He is alert and oriented to person, place, and time.      Cranial Nerves: No cranial nerve deficit.   Psychiatric:         Mood and Affect: Mood is anxious.         Speech: Speech is tangential.         Behavior: Behavior is agitated.         Thought Content: Thought content is paranoid.         ED Course     Procedures    Results for orders placed or performed during the hospital encounter of 08/04/20 (from the past 24 hour(s))   CBC with platelets differential   Result Value Ref Range    WBC 7.6 4.0 - 11.0 10e9/L    RBC Count 4.68 4.4 - 5.9 10e12/L    Hemoglobin 12.5 (L) 13.3 - 17.7 g/dL    Hematocrit 38.3 (L) 40.0 - 53.0 %    MCV 82 78 - 100 fl    MCH 26.7 26.5 - 33.0 pg    MCHC 32.6 31.5 - 36.5 g/dL    RDW 17.2 (H) 10.0 - 15.0 %    Platelet Count 236 150 - 450 10e9/L    Diff Method Automated Method     % Neutrophils 61.3 %    % Lymphocytes 27.3 %    % Monocytes 8.4 %    % Eosinophils 2.1 %    % Basophils 0.5 %    % Immature Granulocytes  0.4 %    Nucleated RBCs 0 0 /100    Absolute Neutrophil 4.7 1.6 - 8.3 10e9/L    Absolute Lymphocytes 2.1 0.8 - 5.3 10e9/L    Absolute Monocytes 0.6 0.0 - 1.3 10e9/L    Absolute Eosinophils 0.2 0.0 - 0.7 10e9/L    Absolute Basophils 0.0 0.0 - 0.2 10e9/L    Abs Immature Granulocytes 0.0 0 - 0.4 10e9/L    Absolute Nucleated RBC 0.0    Comprehensive metabolic panel   Result Value Ref Range    Sodium 139 133 - 144 mmol/L    Potassium 3.2 (L) 3.4 - 5.3 mmol/L    Chloride 106 94 - 109 mmol/L    Carbon Dioxide 28 20 - 32 mmol/L    Anion Gap 5 3 - 14 mmol/L    Glucose 81 70 - 99 mg/dL    Urea Nitrogen 9 7 - 30 mg/dL    Creatinine 0.90 0.66 - 1.25 mg/dL    GFR Estimate >90 >60 mL/min/[1.73_m2]    GFR Estimate If Black >90 >60 mL/min/[1.73_m2]    Calcium 9.7 8.5 - 10.1 mg/dL    Bilirubin Total 0.6 0.2 - 1.3 mg/dL    Albumin 4.1 3.4 - 5.0 g/dL    Protein Total 8.5 6.8 - 8.8 g/dL    Alkaline Phosphatase 104 40 - 150 U/L    ALT 26 0 - 70 U/L    AST 25 0 - 45 U/L   UA reflex to Microscopic and Culture    Specimen: Midstream Urine   Result Value Ref Range    Color Urine Yellow     Appearance Urine Clear     Glucose Urine Negative NEG^Negative mg/dL    Bilirubin Urine Negative NEG^Negative    Ketones Urine Negative NEG^Negative mg/dL    Specific Gravity Urine 1.017 1.003 - 1.035    Blood Urine Negative NEG^Negative    pH Urine 7.0 4.7 - 8.0 pH    Protein Albumin Urine 30 (A) NEG^Negative mg/dL    Urobilinogen mg/dL Normal 0.0 - 2.0 mg/dL    Nitrite Urine Negative NEG^Negative    Leukocyte Esterase Urine Negative NEG^Negative    Source Midstream Urine     RBC Urine 2 0 - 2 /HPF    WBC Urine 2 0 - 5 /HPF    Bacteria Urine None (A) NEG^Negative /HPF    Mucous Urine Present (A) NEG^Negative /LPF    sperm Present (A) NEG^Negative /HPF    Hyaline Casts 7 (A) OTO2^O - 2 /LPF    Amorphous Crystals Few (A) NEG^Negative /HPF   Urine Drugs of Abuse Screen Panel 13   Result Value Ref Range    Cannabinoids (25-ysg-7-carboxy-9-THC) Detected,  Abnormal Result (A) NDET^Not Detected ng/mL    Phencyclidine (Phencyclidine) Not Detected NDET^Not Detected ng/mL    Cocaine (Benzoylecgonine) Not Detected NDET^Not Detected ng/mL    Methamphetamine (d-Methamphetamine) Not Detected NDET^Not Detected ng/mL    Opiates (Morphine) Not Detected NDET^Not Detected ng/mL    Amphetamine (d-Amphetamine) Detected, Abnormal Result (A) NDET^Not Detected ng/mL    Benzodiazepines (Nordiazepam) Not Detected NDET^Not Detected ng/mL    Tricyclic Antidepressants (Desipramine) Detected, Abnormal Result (A) NDET^Not Detected ng/mL    Methadone (Methadone) Not Detected NDET^Not Detected ng/mL    Barbiturates (Butalbital) Not Detected NDET^Not Detected ng/mL    Oxycodone (Oxycodone) Not Detected NDET^Not Detected ng/mL    Propoxyphene (Norpropoxyphene) Not Detected NDET^Not Detected ng/mL    Buprenorphine (Buprenorphine) Not Detected NDET^Not Detected ng/mL       Medications - No data to display    Assessments & Plan (with Medical Decision Making)   Paranoid behavior in a patient with bipolar disorder: Reassured patient that there is no blood in his urine since his urinalysis is normal.  Abdominal exam was normal.  Other labs done urine drug screen showed cannabinoids in the urine in addition to try cyclic antidepressants and amphetamine for which she has a prescription.  Normal CBC and CMP.  Patient was advised to follow-up with his psychiatrist and psychologist but he was not happy with stating that his problem is nothing to do with psychiatric disorder.  He was subsequently discharged home in stable condition.  I have reviewed the nursing notes.    I have reviewed the findings, diagnosis, plan and need for follow up with the patient.    Discharge Medication List as of 8/4/2020  7:10 PM          Final diagnoses:   Paranoia (H)   Bipolar disease, chronic (H)       8/4/2020   [facility]     Damian Chambers MD  08/04/20 1936

## 2020-08-04 NOTE — ED AVS SNAPSHOT
[facility]  [address] [address]  Phone:  [phone]                                    Andrei Whitman   MRN: [id number]    Department:  [facility]   Date of Visit:  8/4/2020           After Visit Summary Signature Page    I have received my discharge instructions, and my questions have been answered. I have discussed any challenges I see with this plan with the nurse or doctor.    ..........................................................................................................................................  Patient/Patient Representative Signature      ..........................................................................................................................................  Patient Representative Print Name and Relationship to Patient    ..................................................               ................................................  Date                                   Time    ..........................................................................................................................................  Reviewed by Signature/Title    ...................................................              ..............................................  Date                                               Time          22EPIC Rev 08/18

## 2020-08-04 NOTE — ED NOTES
"Pt states he wants to leave because he is not here for a psychological issue. Pt states he is having pains in his lower back and the LLQ radiating into the L side of his groin. Pt is speech is rambling. States he slipped \"a few times over the past few months\" and that is the origin of his back pain. Also states his back pain caused him to \"punch\" the palm of his L hand with his R hand causing pain in his L thumb. States he was in the ED for this a few days ago but left without being assessed. Cannot get a specific answer for his presentation to the ED today.   "
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
## 2020-08-10 ENCOUNTER — TELEPHONE (OUTPATIENT)
Dept: PEDIATRICS | Facility: OTHER | Age: 50
End: 2020-08-10

## 2020-08-10 DIAGNOSIS — F33.41 RECURRENT MAJOR DEPRESSIVE DISORDER, IN PARTIAL REMISSION (H): ICD-10-CM

## 2020-08-10 NOTE — TELEPHONE ENCOUNTER
Pt's sister Caroline called, would like to speak with PCP regarding pt's mental health. No consent on file. Sister states that she would like to give PCP some info, declines to explain this writer. Please advise. Thank you!      [phone]

## 2020-08-10 NOTE — TELEPHONE ENCOUNTER
Patient's father returned call to clinic.  He was notified of the note below. Would like to be updated once Dr. Lamb and ER discuss the patient.

## 2020-08-10 NOTE — TELEPHONE ENCOUNTER
Patient is doing worse than he ever has before. He has been in the ER a few times. ER wanted to do MRI/CT they think that they he might have had a mini stroke but he refused and left. He has barricaded him self in his apartment with things in front of the door and wont talk to anyone but one nephew. Caroline also thinks that the mold in his apartment could be affecting patient and his thought process. Patient is peeing blood often.Patient thinks that people are breaking into his apartment and stuffing things into his body and that is why he has the white hairs and dead skin. He has been picking white hairs out of his tounge and off of his nose, he brought the bag of white hair and dead skin to nephew. She is wondering if you can put something in his chart to have the ER evaluate him next time he is there and she believes that he will be going there soon.

## 2020-08-11 RX ORDER — BUPROPION HYDROCHLORIDE 100 MG/1
TABLET ORAL
Qty: 120 TABLET | Refills: 0 | Status: ON HOLD | OUTPATIENT
Start: 2020-08-11 | End: 2020-08-13

## 2020-08-11 NOTE — TELEPHONE ENCOUNTER
wellbutrin      Last Written Prescription Date:  6-  Last Fill Quantity: 120,   # refills: 0  Last Office Visit: 3-  Future Office visit:    Next 5 appointments (look out 90 days)    Sep 03, 2020  1:30 PM CDT  (Arrive by 1:15 PM)  SHORT with Tommy Lamb DO  [facility] [place] ([facility] [place] ) [address]  [place] [address]  [phone]

## 2020-08-12 ENCOUNTER — HOSPITAL ENCOUNTER (INPATIENT)
Facility: HOSPITAL | Age: 50
LOS: 1 days | Discharge: LEFT BEFORE DISCHARGE | DRG: 885 | End: 2020-08-13
Attending: INTERNAL MEDICINE | Admitting: PSYCHIATRY & NEUROLOGY
Payer: MEDICARE

## 2020-08-12 DIAGNOSIS — F31.9 BIPOLAR I DISORDER (H): Primary | ICD-10-CM

## 2020-08-12 DIAGNOSIS — F22 PARANOID PSYCHOSIS (H): ICD-10-CM

## 2020-08-12 PROBLEM — E66.01 MORBID OBESITY (H): Status: ACTIVE | Noted: 2019-09-23

## 2020-08-12 PROBLEM — R45.89 SUICIDAL BEHAVIOR: Status: ACTIVE | Noted: 2020-08-12

## 2020-08-12 PROBLEM — R68.2 DRY MOUTH: Status: ACTIVE | Noted: 2020-08-12

## 2020-08-12 PROBLEM — J98.4 CHRONIC LUNG DISEASE: Status: ACTIVE | Noted: 2019-09-23

## 2020-08-12 LAB
ALBUMIN SERPL-MCNC: 3.9 G/DL (ref 3.4–5)
ALP SERPL-CCNC: 91 U/L (ref 40–150)
ALT SERPL W P-5'-P-CCNC: 33 U/L (ref 0–70)
AMPHETAMINES UR QL: ABNORMAL NG/ML
ANION GAP SERPL CALCULATED.3IONS-SCNC: 8 MMOL/L (ref 3–14)
AST SERPL W P-5'-P-CCNC: 43 U/L (ref 0–45)
BARBITURATES UR QL SCN: NOT DETECTED NG/ML
BASOPHILS # BLD AUTO: 0 10E9/L (ref 0–0.2)
BASOPHILS NFR BLD AUTO: 0.5 %
BENZODIAZ UR QL SCN: NOT DETECTED NG/ML
BILIRUB SERPL-MCNC: 0.4 MG/DL (ref 0.2–1.3)
BUN SERPL-MCNC: 10 MG/DL (ref 7–30)
BUPRENORPHINE UR QL: NOT DETECTED NG/ML
CALCIUM SERPL-MCNC: 9.3 MG/DL (ref 8.5–10.1)
CANNABINOIDS UR QL: ABNORMAL NG/ML
CHLORIDE SERPL-SCNC: 104 MMOL/L (ref 94–109)
CO2 SERPL-SCNC: 26 MMOL/L (ref 20–32)
COCAINE UR QL SCN: NOT DETECTED NG/ML
CREAT SERPL-MCNC: 0.92 MG/DL (ref 0.66–1.25)
D-METHAMPHET UR QL: NOT DETECTED NG/ML
DIFFERENTIAL METHOD BLD: ABNORMAL
EOSINOPHIL # BLD AUTO: 0.2 10E9/L (ref 0–0.7)
EOSINOPHIL NFR BLD AUTO: 2.6 %
ERYTHROCYTE [DISTWIDTH] IN BLOOD BY AUTOMATED COUNT: 17 % (ref 10–15)
ETHANOL SERPL-MCNC: <0.01 G/DL
GFR SERPL CREATININE-BSD FRML MDRD: >90 ML/MIN/{1.73_M2}
GLUCOSE SERPL-MCNC: 89 MG/DL (ref 70–99)
HCT VFR BLD AUTO: 38.2 % (ref 40–53)
HGB BLD-MCNC: 12.2 G/DL (ref 13.3–17.7)
IMM GRANULOCYTES # BLD: 0 10E9/L (ref 0–0.4)
IMM GRANULOCYTES NFR BLD: 0.2 %
LABORATORY COMMENT REPORT: NORMAL
LYMPHOCYTES # BLD AUTO: 2.2 10E9/L (ref 0.8–5.3)
LYMPHOCYTES NFR BLD AUTO: 26.6 %
MCH RBC QN AUTO: 26.7 PG (ref 26.5–33)
MCHC RBC AUTO-ENTMCNC: 31.9 G/DL (ref 31.5–36.5)
MCV RBC AUTO: 84 FL (ref 78–100)
METHADONE UR QL SCN: NOT DETECTED NG/ML
MONOCYTES # BLD AUTO: 0.8 10E9/L (ref 0–1.3)
MONOCYTES NFR BLD AUTO: 9.3 %
NEUTROPHILS # BLD AUTO: 5.1 10E9/L (ref 1.6–8.3)
NEUTROPHILS NFR BLD AUTO: 60.8 %
NRBC # BLD AUTO: 0 10*3/UL
NRBC BLD AUTO-RTO: 0 /100
OPIATES UR QL SCN: NOT DETECTED NG/ML
OXYCODONE UR QL SCN: NOT DETECTED NG/ML
PCP UR QL SCN: NOT DETECTED NG/ML
PLATELET # BLD AUTO: 228 10E9/L (ref 150–450)
POTASSIUM SERPL-SCNC: 3.4 MMOL/L (ref 3.4–5.3)
PROPOXYPH UR QL: NOT DETECTED NG/ML
PROT SERPL-MCNC: 8.4 G/DL (ref 6.8–8.8)
RBC # BLD AUTO: 4.57 10E12/L (ref 4.4–5.9)
SARS-COV-2 RNA SPEC QL NAA+PROBE: NEGATIVE
SARS-COV-2 RNA SPEC QL NAA+PROBE: NORMAL
SODIUM SERPL-SCNC: 138 MMOL/L (ref 133–144)
SPECIMEN SOURCE: NORMAL
SPECIMEN SOURCE: NORMAL
TRICYCLICS UR QL SCN: ABNORMAL NG/ML
WBC # BLD AUTO: 8.3 10E9/L (ref 4–11)

## 2020-08-12 PROCEDURE — 99285 EMERGENCY DEPT VISIT HI MDM: CPT | Mod: Z6 | Performed by: INTERNAL MEDICINE

## 2020-08-12 PROCEDURE — 25000132 ZZH RX MED GY IP 250 OP 250 PS 637: Mod: GY | Performed by: NURSE PRACTITIONER

## 2020-08-12 PROCEDURE — 80053 COMPREHEN METABOLIC PANEL: CPT | Performed by: INTERNAL MEDICINE

## 2020-08-12 PROCEDURE — 80320 DRUG SCREEN QUANTALCOHOLS: CPT | Performed by: INTERNAL MEDICINE

## 2020-08-12 PROCEDURE — 12400000 ZZH R&B MH

## 2020-08-12 PROCEDURE — 99223 1ST HOSP IP/OBS HIGH 75: CPT | Mod: AI | Performed by: NURSE PRACTITIONER

## 2020-08-12 PROCEDURE — 36415 COLL VENOUS BLD VENIPUNCTURE: CPT | Performed by: INTERNAL MEDICINE

## 2020-08-12 PROCEDURE — 99222 1ST HOSP IP/OBS MODERATE 55: CPT | Performed by: NURSE PRACTITIONER

## 2020-08-12 PROCEDURE — 80306 DRUG TEST PRSMV INSTRMNT: CPT | Performed by: INTERNAL MEDICINE

## 2020-08-12 PROCEDURE — 85025 COMPLETE CBC W/AUTO DIFF WBC: CPT | Performed by: INTERNAL MEDICINE

## 2020-08-12 PROCEDURE — 87635 SARS-COV-2 COVID-19 AMP PRB: CPT | Performed by: INTERNAL MEDICINE

## 2020-08-12 PROCEDURE — C9803 HOPD COVID-19 SPEC COLLECT: HCPCS

## 2020-08-12 PROCEDURE — 99285 EMERGENCY DEPT VISIT HI MDM: CPT

## 2020-08-12 RX ORDER — BISACODYL 10 MG
10 SUPPOSITORY, RECTAL RECTAL DAILY PRN
Status: DISCONTINUED | OUTPATIENT
Start: 2020-08-12 | End: 2020-08-13 | Stop reason: HOSPADM

## 2020-08-12 RX ORDER — LORAZEPAM 1 MG/1
1 TABLET ORAL 2 TIMES DAILY
Status: DISCONTINUED | OUTPATIENT
Start: 2020-08-12 | End: 2020-08-12

## 2020-08-12 RX ORDER — GABAPENTIN 300 MG/1
600 CAPSULE ORAL 3 TIMES DAILY
Status: DISCONTINUED | OUTPATIENT
Start: 2020-08-12 | End: 2020-08-13 | Stop reason: HOSPADM

## 2020-08-12 RX ORDER — MONTELUKAST SODIUM 10 MG/1
10 TABLET ORAL AT BEDTIME
Status: DISCONTINUED | OUTPATIENT
Start: 2020-08-12 | End: 2020-08-13 | Stop reason: HOSPADM

## 2020-08-12 RX ORDER — PILOCARPINE HYDROCHLORIDE 5 MG/1
5 TABLET, FILM COATED ORAL 3 TIMES DAILY
Status: DISCONTINUED | OUTPATIENT
Start: 2020-08-12 | End: 2020-08-13 | Stop reason: HOSPADM

## 2020-08-12 RX ORDER — VERAPAMIL HYDROCHLORIDE 120 MG/1
240 TABLET, FILM COATED, EXTENDED RELEASE ORAL AT BEDTIME
Status: DISCONTINUED | OUTPATIENT
Start: 2020-08-12 | End: 2020-08-13 | Stop reason: HOSPADM

## 2020-08-12 RX ORDER — LORAZEPAM 1 MG/1
1 TABLET ORAL 2 TIMES DAILY
Status: DISCONTINUED | OUTPATIENT
Start: 2020-08-12 | End: 2020-08-13 | Stop reason: HOSPADM

## 2020-08-12 RX ORDER — ACETAMINOPHEN 325 MG/1
650 TABLET ORAL EVERY 4 HOURS PRN
Status: DISCONTINUED | OUTPATIENT
Start: 2020-08-12 | End: 2020-08-13 | Stop reason: HOSPADM

## 2020-08-12 RX ORDER — CHLORHEXIDINE GLUCONATE ORAL RINSE 1.2 MG/ML
15 SOLUTION DENTAL 2 TIMES DAILY
Status: DISCONTINUED | OUTPATIENT
Start: 2020-08-12 | End: 2020-08-13 | Stop reason: HOSPADM

## 2020-08-12 RX ORDER — NICOTINE 21 MG/24HR
1 PATCH, TRANSDERMAL 24 HOURS TRANSDERMAL DAILY
Status: DISCONTINUED | OUTPATIENT
Start: 2020-08-12 | End: 2020-08-13 | Stop reason: HOSPADM

## 2020-08-12 RX ORDER — FLUTICASONE PROPIONATE 220 UG/1
2 AEROSOL, METERED RESPIRATORY (INHALATION) 2 TIMES DAILY
Status: DISCONTINUED | OUTPATIENT
Start: 2020-08-12 | End: 2020-08-13 | Stop reason: HOSPADM

## 2020-08-12 RX ORDER — ALUMINA, MAGNESIA, AND SIMETHICONE 2400; 2400; 240 MG/30ML; MG/30ML; MG/30ML
30 SUSPENSION ORAL EVERY 4 HOURS PRN
Status: DISCONTINUED | OUTPATIENT
Start: 2020-08-12 | End: 2020-08-13 | Stop reason: HOSPADM

## 2020-08-12 RX ORDER — HYDROXYZINE HYDROCHLORIDE 25 MG/1
25-50 TABLET, FILM COATED ORAL 3 TIMES DAILY PRN
Status: DISCONTINUED | OUTPATIENT
Start: 2020-08-12 | End: 2020-08-13 | Stop reason: HOSPADM

## 2020-08-12 RX ORDER — HYDROCHLOROTHIAZIDE 12.5 MG/1
12.5 CAPSULE ORAL DAILY
Status: DISCONTINUED | OUTPATIENT
Start: 2020-08-12 | End: 2020-08-13 | Stop reason: HOSPADM

## 2020-08-12 RX ORDER — LAMOTRIGINE 200 MG/1
200 TABLET ORAL DAILY
Status: ON HOLD | COMMUNITY
End: 2020-08-13

## 2020-08-12 RX ADMIN — HYDROCHLOROTHIAZIDE 12.5 MG: 12.5 CAPSULE ORAL at 13:56

## 2020-08-12 RX ADMIN — MONTELUKAST 10 MG: 10 TABLET, FILM COATED ORAL at 20:33

## 2020-08-12 RX ADMIN — FLUTICASONE PROPIONATE 2 PUFF: 220 AEROSOL, METERED RESPIRATORY (INHALATION) at 20:33

## 2020-08-12 RX ADMIN — CHLORHEXIDINE GLUCONATE 15 ML: 1.2 RINSE ORAL at 20:33

## 2020-08-12 RX ADMIN — CHLORHEXIDINE GLUCONATE 15 ML: 1.2 RINSE ORAL at 13:55

## 2020-08-12 RX ADMIN — LORAZEPAM 1 MG: 1 TABLET ORAL at 17:15

## 2020-08-12 RX ADMIN — VERAPAMIL HYDROCHLORIDE 240 MG: 120 TABLET, FILM COATED, EXTENDED RELEASE ORAL at 20:33

## 2020-08-12 RX ADMIN — OMEPRAZOLE 40 MG: 20 CAPSULE, DELAYED RELEASE ORAL at 13:56

## 2020-08-12 RX ADMIN — FLUTICASONE PROPIONATE 2 PUFF: 220 AEROSOL, METERED RESPIRATORY (INHALATION) at 13:56

## 2020-08-12 RX ADMIN — GABAPENTIN 600 MG: 300 CAPSULE ORAL at 13:56

## 2020-08-12 RX ADMIN — GABAPENTIN 600 MG: 300 CAPSULE ORAL at 20:32

## 2020-08-12 RX ADMIN — PILOCARPINE HYDROCHLORIDE 5 MG: 5 TABLET, FILM COATED ORAL at 13:56

## 2020-08-12 RX ADMIN — PILOCARPINE HYDROCHLORIDE 5 MG: 5 TABLET, FILM COATED ORAL at 20:33

## 2020-08-12 ASSESSMENT — ENCOUNTER SYMPTOMS
CHILLS: 0
FREQUENCY: 0
PALPITATIONS: 0
MYALGIAS: 0
WHEEZING: 0
CONFUSION: 0
COUGH: 0
DIAPHORESIS: 0
VOMITING: 0
ABDOMINAL DISTENTION: 0
FLANK PAIN: 0
VOICE CHANGE: 0
DYSURIA: 0
NAUSEA: 0
BACK PAIN: 0
ANAL BLEEDING: 0
NECK PAIN: 0
DELUSIONS: 1
FEVER: 0
PARANOIA: 1
WEAKNESS: 0
BLOOD IN STOOL: 0
SLEEP DISTURBANCE: 0
LIGHT-HEADEDNESS: 0
HEADACHES: 0
NUMBNESS: 0
SHORTNESS OF BREATH: 0
DIZZINESS: 0
DISORGANIZED SPEECH: 1
CHEST TIGHTNESS: 0
COLOR CHANGE: 0
ABDOMINAL PAIN: 0

## 2020-08-12 ASSESSMENT — ACTIVITIES OF DAILY LIVING (ADL)
TOILETING: 0-->INDEPENDENT
HYGIENE/GROOMING: INDEPENDENT
RETIRED_COMMUNICATION: 0-->UNDERSTANDS/COMMUNICATES WITHOUT DIFFICULTY
RETIRED_EATING: 0-->INDEPENDENT
WHICH_OF_THE_ABOVE_FUNCTIONAL_RISKS_HAD_A_RECENT_ONSET_OR_CHANGE?: COGNITION
DRESS: SCRUBS (BEHAVIORAL HEALTH);INDEPENDENT
SWALLOWING: 0-->SWALLOWS FOODS/LIQUIDS WITHOUT DIFFICULTY
DRESS: 0-->INDEPENDENT
AMBULATION: 0-->INDEPENDENT
LAUNDRY: UNABLE TO COMPLETE
ORAL_HYGIENE: INDEPENDENT
DRESS: INDEPENDENT;SCRUBS (BEHAVIORAL HEALTH)
LAUNDRY: UNABLE TO COMPLETE
FALL_HISTORY_WITHIN_LAST_SIX_MONTHS: NO
COGNITION: 2 - DIFFICULTY WITH ORGANIZING THOUGHTS
BATHING: 0-->INDEPENDENT
TRANSFERRING: 0-->INDEPENDENT
HYGIENE/GROOMING: INDEPENDENT
ORAL_HYGIENE: INDEPENDENT

## 2020-08-12 ASSESSMENT — MIFFLIN-ST. JEOR: SCORE: 1810.21

## 2020-08-12 NOTE — ED PROVIDER NOTES
History     Chief Complaint   Patient presents with     Psychiatric Evaluation     someone is trying to kill me.     The history is provided by the patient.   Mental Health Problem   Presenting symptoms: delusional, disorganized speech, disorganized thought process and paranoid behavior    Presenting symptoms: no suicidal thoughts, no suicidal threats and no suicide attempt    Degree of incapacity (severity):  Moderate  Onset quality:  Gradual  Timing:  Constant  Progression:  Worsening  Chronicity:  Recurrent  Context: not alcohol use and not drug abuse    Associated symptoms: no abdominal pain, no chest pain and no headaches    Risk factors: hx of mental illness    Allergies:  No Known Allergies    Problem List:    Patient Active Problem List    Diagnosis Date Noted     Obesity (BMI 35.0-39.9) with comorbidity (H) 09/23/2019     Priority: Medium     Chronic lung disease 09/23/2019     Priority: Medium     Lithium use 08/06/2018     Priority: Medium     DDD (degenerative disc disease), cervical 08/06/2018     Priority: Medium     Cervical stenosis of spinal canal 08/06/2018     Priority: Medium     Tobacco dependency 06/09/2018     Priority: Medium     Russell esophagus 06/07/2018     Priority: Medium     Benign essential hypertension 04/24/2018     Priority: Medium     Routine general medical examination at a health care facility 11/21/2016     Priority: Medium     Flatulence, eructation, and gas pain 08/15/2016     Priority: Medium     Bipolar disease, chronic (H) 08/15/2016     Priority: Medium     ACP (advance care planning) 06/20/2016     Priority: Medium     Advance Care Planning 6/20/2016: ACP Review of Chart / Resources Provided:  Reviewed chart for advance care plan.  Andrei Whitman has no plan or code status on file. Discussed available resources and provided with information. Confirmed code status reflects current choices pending further ACP discussions.  Confirmed/documented legally designated  decision makers.  Added by Monica Valerio               Attention deficit hyperactivity disorder (ADHD), predominantly inattentive type 02/19/2016     Priority: Medium     Abnormal weight gain 09/25/2015     Priority: Medium     Mood disorder (H) 07/15/2015     Priority: Medium     GERD (gastroesophageal reflux disease) 03/12/2015     Priority: Medium     Segmental and somatic dysfunction of lower extremity 11/26/2014     Priority: Medium     Lower back pain 10/24/2014     Priority: Medium     Cervicalgia 10/02/2014     Priority: Medium        Past Medical History:    Past Medical History:   Diagnosis Date     Russell's esophagus 07/12/2017     Bipolar disorder (H)      Chronic cervical pain      Colon polyp, hyperplastic 07/12/2017     Colon polyps 07/12/2017     DDD (degenerative disc disease), cervical      Depression, major      Pancolonic diverticulosis 07/12/2017       Past Surgical History:    Past Surgical History:   Procedure Laterality Date     APPENDECTOMY      age 14     COLONOSCOPY  07/12/2017    Left descending x1 tubular adenoma, sigmoid x1 tubular adenoma and rectal x1 hyperplastic polyp.  Pan diverticulosis     ENDOSCOPY UPPER, COLONOSCOPY, COMBINED N/A 7/12/2017    Procedure: COMBINED ENDOSCOPY UPPER, COLONOSCOPY;  UPPER ENDOSCOPY WITH BIOPSIES  AND COLONOSCOPY WITH POLYPECTOMY;  Surgeon: Francis Valverde DO;  Location: [facility]     ENT SURGERY  age 18    tonsils     ESOPHAGOGASTRODUODENOSCOPY  07/12/2017    chemical gatropathy, GE junction with pancreatiuc metaplasia      ESOPHAGOSCOPY, GASTROSCOPY, DUODENOSCOPY (EGD), COMBINED N/A 6/20/2018    Procedure: COMBINED ESOPHAGOSCOPY, GASTROSCOPY, DUODENOSCOPY (EGD), BIOPSY SINGLE OR MULTIPLE;  UPPER ENDOSCOPY WITH BIOPSY;  Surgeon: Francis Valverde DO;  Location: [facility]     ORTHOPEDIC SURGERY  age 30     back and neck fusion, bone from hip removed to use on plates     ORTHOPEDIC SURGERY  age 30    L5 fusion, laminectomy of lumbar discs       Family  History:    Family History   Problem Relation Age of Onset     Asthma Mother      Arthritis Mother      Prostate Cancer Father      Heart Disease Paternal Grandfather      Hypertension Paternal Grandfather      Alcohol/Drug Paternal Grandfather      Other - See Comments Brother         Nerve and degenerative disease mild     Alcohol/Drug Brother      Other - See Comments Sister 21        Hip replacements, nerve, degerative disease     Alcohol/Drug Maternal Grandfather      Unknown/Adopted Paternal Grandmother      Other Cancer Paternal Aunt         Cervical cancer       Social History:  Marital Status:   [4]  Social History     Tobacco Use     Smoking status: Current Every Day Smoker     Packs/day: 0.50     Years: 30.00     Pack years: 15.00     Start date: 1/1/1989     Smokeless tobacco: Never Used     Tobacco comment: quitplan referral declined 6/19/19 on chantax   Substance Use Topics     Alcohol use: No     Alcohol/week: 0.0 standard drinks     Drug use: Yes     Types: Marijuana        Medications:    amitriptyline (ELAVIL) 50 MG tablet  amphetamine-dextroamphetamine (ADDERALL) 30 MG tablet  aspirin 81 MG tablet  buPROPion (WELLBUTRIN) 100 MG tablet  chlorhexidine (PERIDEX) 0.12 % solution  FLOVENT  MCG/ACT inhaler  gabapentin (NEURONTIN) 300 MG capsule  hydrochlorothiazide (MICROZIDE) 12.5 MG capsule  montelukast (SINGULAIR) 10 MG tablet  omeprazole (PRILOSEC) 40 MG DR capsule  pilocarpine (SALAGEN) 5 MG tablet  verapamil ER (VERELAN) 240 MG 24 hr capsule  CHANTIX CONTINUING MONTH QUINN 1 MG tablet          Review of Systems   Constitutional: Negative for chills, diaphoresis and fever.   HENT: Negative for voice change.    Eyes: Negative for visual disturbance.   Respiratory: Negative for cough, chest tightness, shortness of breath and wheezing.    Cardiovascular: Negative for chest pain, palpitations and leg swelling.   Gastrointestinal: Negative for abdominal distention, abdominal pain, anal  bleeding, blood in stool, nausea and vomiting.   Genitourinary: Negative for decreased urine volume, dysuria, flank pain and frequency.   Musculoskeletal: Negative for back pain, gait problem, myalgias and neck pain.   Skin: Negative for color change, pallor and rash.   Neurological: Negative for dizziness, syncope, weakness, light-headedness, numbness and headaches.   Psychiatric/Behavioral: Positive for paranoia. Negative for confusion, sleep disturbance and suicidal ideas.   All other systems reviewed and are negative.      Physical Exam   BP: (!) 146/102  Pulse: 51  Temp: 99.8  F (37.7  C)  Resp: 18  SpO2: 97 %      Physical Exam  Vitals signs and nursing note reviewed.   Constitutional:       Appearance: He is well-developed.   HENT:      Head: Normocephalic and atraumatic.   Eyes:      Conjunctiva/sclera: Conjunctivae normal.      Pupils: Pupils are equal, round, and reactive to light.   Neck:      Musculoskeletal: Normal range of motion and neck supple.      Thyroid: No thyromegaly.      Vascular: No JVD.      Trachea: No tracheal deviation.   Cardiovascular:      Rate and Rhythm: Normal rate and regular rhythm.      Heart sounds: Normal heart sounds. No murmur. No gallop.    Pulmonary:      Effort: Pulmonary effort is normal. No respiratory distress.      Breath sounds: Normal breath sounds. No stridor. No wheezing or rales.   Chest:      Chest wall: No tenderness.   Abdominal:      General: Bowel sounds are normal. There is no distension.      Palpations: Abdomen is soft. There is no mass.      Tenderness: There is no abdominal tenderness. There is no guarding or rebound.   Musculoskeletal: Normal range of motion.         General: No tenderness.   Lymphadenopathy:      Cervical: No cervical adenopathy.   Skin:     General: Skin is warm.      Coloration: Skin is not pale.      Findings: No erythema or rash.   Neurological:      Mental Status: He is alert and oriented to person, place, and time.    Psychiatric:         Attention and Perception: Attention normal.         Mood and Affect: Affect is labile.         Speech: Speech is rapid and pressured and tangential.         Behavior: Behavior normal. Behavior is cooperative.         Thought Content: Thought content is paranoid and delusional. Thought content does not include homicidal or suicidal ideation. Thought content does not include homicidal or suicidal plan.         Cognition and Memory: Cognition normal.         Judgment: Judgment is inappropriate.         ED Course        Procedures                   Results for orders placed or performed during the hospital encounter of 08/12/20 (from the past 24 hour(s))   Urine Drugs of Abuse Screen Panel 13   Result Value Ref Range    Cannabinoids (06-cer-1-carboxy-9-THC) Detected, Abnormal Result (A) NDET^Not Detected ng/mL    Phencyclidine (Phencyclidine) Not Detected NDET^Not Detected ng/mL    Cocaine (Benzoylecgonine) Not Detected NDET^Not Detected ng/mL    Methamphetamine (d-Methamphetamine) Not Detected NDET^Not Detected ng/mL    Opiates (Morphine) Not Detected NDET^Not Detected ng/mL    Amphetamine (d-Amphetamine) Detected, Abnormal Result (A) NDET^Not Detected ng/mL    Benzodiazepines (Nordiazepam) Not Detected NDET^Not Detected ng/mL    Tricyclic Antidepressants (Desipramine) Detected, Abnormal Result (A) NDET^Not Detected ng/mL    Methadone (Methadone) Not Detected NDET^Not Detected ng/mL    Barbiturates (Butalbital) Not Detected NDET^Not Detected ng/mL    Oxycodone (Oxycodone) Not Detected NDET^Not Detected ng/mL    Propoxyphene (Norpropoxyphene) Not Detected NDET^Not Detected ng/mL    Buprenorphine (Buprenorphine) Not Detected NDET^Not Detected ng/mL   Alcohol ethyl   Result Value Ref Range    Ethanol g/dL <0.01 0.01 g/dL   CBC with platelets differential   Result Value Ref Range    WBC 8.3 4.0 - 11.0 10e9/L    RBC Count 4.57 4.4 - 5.9 10e12/L    Hemoglobin 12.2 (L) 13.3 - 17.7 g/dL    Hematocrit  38.2 (L) 40.0 - 53.0 %    MCV 84 78 - 100 fl    MCH 26.7 26.5 - 33.0 pg    MCHC 31.9 31.5 - 36.5 g/dL    RDW 17.0 (H) 10.0 - 15.0 %    Platelet Count 228 150 - 450 10e9/L    Diff Method Automated Method     % Neutrophils 60.8 %    % Lymphocytes 26.6 %    % Monocytes 9.3 %    % Eosinophils 2.6 %    % Basophils 0.5 %    % Immature Granulocytes 0.2 %    Nucleated RBCs 0 0 /100    Absolute Neutrophil 5.1 1.6 - 8.3 10e9/L    Absolute Lymphocytes 2.2 0.8 - 5.3 10e9/L    Absolute Monocytes 0.8 0.0 - 1.3 10e9/L    Absolute Eosinophils 0.2 0.0 - 0.7 10e9/L    Absolute Basophils 0.0 0.0 - 0.2 10e9/L    Abs Immature Granulocytes 0.0 0 - 0.4 10e9/L    Absolute Nucleated RBC 0.0    Comprehensive metabolic panel   Result Value Ref Range    Sodium 138 133 - 144 mmol/L    Potassium 3.4 3.4 - 5.3 mmol/L    Chloride 104 94 - 109 mmol/L    Carbon Dioxide 26 20 - 32 mmol/L    Anion Gap 8 3 - 14 mmol/L    Glucose 89 70 - 99 mg/dL    Urea Nitrogen 10 7 - 30 mg/dL    Creatinine 0.92 0.66 - 1.25 mg/dL    GFR Estimate >90 >60 mL/min/[1.73_m2]    GFR Estimate If Black >90 >60 mL/min/[1.73_m2]    Calcium 9.3 8.5 - 10.1 mg/dL    Bilirubin Total 0.4 0.2 - 1.3 mg/dL    Albumin 3.9 3.4 - 5.0 g/dL    Protein Total 8.4 6.8 - 8.8 g/dL    Alkaline Phosphatase 91 40 - 150 U/L    ALT 33 0 - 70 U/L    AST 43 0 - 45 U/L       Medications - No data to display    Assessments & Plan (with Medical Decision Making)   Paranoid psychosis state, pt speaks fast and tangential , jumping from one topic to another. helieves someone trying to kill him with a wide variety of plans,  Including putting glue on his eyes and inside ears and brain and then moment later states that somebody trying to penetrate inside his abdomen. Pt not feeling safe at home any more    Appear to be on Manic phase with paranoid psychosis  Spoke to  Behavioral unit provider, accepted for admission.  Labs reviewed, pt is medially clear to transfer to behavioral unit.     I have reviewed the  nursing notes.    I have reviewed the findings, diagnosis, plan and need for follow up with the patient.      New Prescriptions    No medications on file       Final diagnoses:   Paranoid psychosis (H)       8/12/2020   [facility]     Timmy Grant MD  08/12/20 0212

## 2020-08-12 NOTE — ED NOTES
Pt provided urine sample. Pt talking incessantly. Pt sitting on edge of bed. After staff left the room pt is talking to himself.
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
## 2020-08-12 NOTE — PLAN OF CARE
Problem: Adult Behavioral Health Plan of Care  Goal: Patient-Specific Goal (Individualization)  Description: Pt will follow recommendations of treatment team.  Pt will take medications as prescribed.  Pt will complete ADL's independently.  Pt will sleep 6-8 hours each night.  Pt will attend 50 % of groups.    8/12/20- No wean at this time- to be assessed by treatment team and NP.  8/12/2020 1557 by Jennifer Garcia RN  Outcome: No Change  Note:        Problem: Thought Process Alteration  Goal: Optimal Thought Clarity  Description: Pt will have a logical and linear conversation.  Pt will have a decrease in paranoia/delusions during hospitalization.   8/12/2020 1557 by Jennifer Garcia RN  Outcome: No Change     Problem: Fall Injury Risk  Goal: Absence of Fall and Fall-Related Injury  Description: Pt will remain free from falls during hospitalization.  Pt will wear appropriate footwear during hospitalization.    8/12/2020 1557 by Jennifer Garcia RN  Outcome: Improving     Face to face shift report received from Rosalina QUINTANA. Rounding completed, pt observed.     Pt is pleasant and cooperative with nursing assessment. Pt does continue to be paranoid and endorses feeling that people are out to kill him. Pt discusses that he hasn't been doing well since his Lithium was stopped and another medication was started. Pt weaned appropriately at the beginning of this shift and then moved onto the open unit. Pt is compliant with medications this shift. Pt has not had any noted falls this shift. Pt on phone with his fiance and is emotional and crying when talking to her. Pt does attend groups this shift. Pt signed PUR[facility] for [facility].     Face to face report will be communicated to oncoming RN.    Jennifer Garcia RN  8/12/2020  9:29 PM
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
## 2020-08-12 NOTE — PROGRESS NOTES
CLINICAL NUTRITION SERVICES  -  ASSESSMENT NOTE    Andrei Whitman : Admission Nutrition Risk Screen - reduced oral intake    49 yom admitted for paranoid psychosis. Pt has a hx of diverticulosis, galvan's esophagus, depression and bipolar disorder. No weight documented so far this admission. Intake was good for his first meal documented    Diet Order:Regular  Intake: 100% x 1 meal this admission    Height: 6'   Weight: 230 lbs 0 oz  BMI: 31.19  Weight Status:  Obesity Grade I BMI 30-34.9  IBW: 77.6 kg (171 lb 1.2 oz)  Weight History:   Wt Readings from Last 10 Encounters:   07/24/20 104.3 kg (230 lb)   03/26/20 110.7 kg (244 lb)   02/13/20 111.6 kg (246 lb)   01/30/20 110.2 kg (243 lb)   11/27/19 108.4 kg (239 lb)   09/23/19 110.7 kg (244 lb)   07/31/19 109.3 kg (241 lb)   06/19/19 105.9 kg (233 lb 6.4 oz)   04/18/19 107.5 kg (237 lb)   03/18/19 106.8 kg (235 lb 6.4 oz)     Malnutrition Diagnosis: Unable to determine- unable to assess weight change without current weight.     NUTRITION RECOMMENDATIONS  - Encourage intake as needed    MONITORING AND EVALUATION:  RD will monitor intake, weight, labs
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
## 2020-08-12 NOTE — PLAN OF CARE
"Prior to Admission Medication Reconciliation:     Medications added:   [] None  [x] As listed below:    lamictal 200 mg- patient stopped on his own. Stated that he was on it years ago and that it made him go nuts but his primary wanted to try it again. He took the starter pack and then only made it up to 2 days on the 200 mg dosing before he thought he was going crazy again. I marked this medication to be removed by the MD since it was patient stopped due to bad side effects.     Medications deleted:   [] None  [x] As listed below: cleared outside meds:    Fluconazole 200 mg 21 tabs    lamictal 200 mg? Deleted during an ER visit. Stopped by provider or patient?    lamictal 25 mg (42)- 100 mg (7) tabs in a dose pack (discontinued by Dr. Lamb \"alternate therapy\")    Lithium carbonate 450 mg er 2 tabs BID- weaned off    Changes made to existing medications:   [] None  [x] As listed below:    Input notes in PTA meds on meds pt takes differently than prescribed:    wellbutrin- pt takes 2 tabs in the am and 1 tab in the afternoon on all days except Tuesdays and Fridays. One Tuesdays and Fridays pt takes the prescribed dose of 2 tabs BID. According to patient the prescribed dose is \"too much\" and he can't handle the ER version either.     Salagen- prescribed QID, pt takes TID    miralax- patient never took the 5 days supply of miralax prescribed 7/24/20- request for removal by MD, pt stated he doesn't need    Last times/dates taken verified with patient:  [x] Yes- completed myself  [] Nurse completed no changes made  [] Unable to review with patient:  [] Nurse completed/changes made:     Allergy modifications:    [x]Did not review  []Patient verified NKA  []Patient verified current existing allergies: no changes made  []New allergies: listed below      Medication reconciliation sources:   [x]Patient phone call  []Patient family member/emergency contact: **  [][facility] Report Review  [x]Epic Chart Review  []Care " Everywhere review  []Pharmacy med list: **   []Pharmacy phone call  [x]Outside meds dispense report: see below  []Nursing home or Assisted Living MAR:  []Other: **    Pharmacy desired at discharge: [facility]    Is patient on coumadin?  [x]No      Requests for consultation by provider or pharmacist:   [x] Patient understands why all of their meds were prescribed and how to take them. No questions.   [x] Fill dates coincide with compliancy for most maintenance meds.   [] Fill dates do not coincide with compliancy with maintenance meds. See notes in PTA medlist about how patient is taking.   [] Patient has questions about the following:        Comments: pt alert and oriented. Manages his own meds. Told me he thought someone was tampering with his meds so he stopped taking them for a couple days prior to Monday night (specifically his montelukast- stated there was an extra pill in the bottle). Spoke with me about his concerns about lamictal, and would like his mouth rinse asap.       Ellyn Zimmerman on 8/12/2020 at 8:12 AM       Discrepancies: [x] No []Not Applicable []Yes: listed below      Time spent on medication reconciliation:   []5-20 mins  [x]20-40 mins  []> 40 mins    Issues completing PTA medication reconciliation:  [] On hold for a long time  [] Waited for a call back  [] Fax didn't come through  [] Fax took a long time  [] Other:    Notifying appropriate party of changes/additions/discrepancies:  []No pertinent changes made, notification not necessary.   [] Notified attending provider via text page  [] Notified attending provider in person  [] Notified pharmacy  [x] Notified nurse  [] Attending provider not available, left detailed notes  [] Changes/additions made don't need provider notification because provider has not seen patient or input any orders  [] Changes/additions made don't need provider notification because changes made are to medications not ordered     Medications Prior to Admission   Medication Sig  Dispense Refill Last Dose     amitriptyline (ELAVIL) 50 MG tablet Take 1 tablet (50 mg) by mouth At Bedtime 90 tablet 1 8/10/2020 at Unknown time     amphetamine-dextroamphetamine (ADDERALL) 30 MG tablet TAKE 1 TABLET BY MOUTH TWICE A DAY 60 tablet 0 2020 at Unknown time     aspirin 81 MG tablet Take 1 tablet (81 mg) by mouth daily 30 tablet  2020 at Unknown time     buPROPion (WELLBUTRIN) 100 MG tablet TAKE 2 TABLETS BY MOUTH TWICE DAILY. DUE FOR OFFICE VISIT 120 tablet 0 2020 at 1700     CHANTIX CONTINUING MONTH QUINN 1 MG tablet TAKE 1 TABLET (1 MG) BY MOUTH 2 TIMES DAILY 56 tablet 4 2020 at 1700     chlorhexidine (PERIDEX) 0.12 % solution SWISH AND SPIT 15 MLS IN MOUTH 2 TIMES DAILY 1893 mL 3 Past Month at Unknown time     FLOVENT  MCG/ACT inhaler INHALE 2 PUFFS INTO THE LUNGS 2 TIMES DAILY 24 g 1 Past Week at Unknown time     gabapentin (NEURONTIN) 300 MG capsule TAKE 2 CAPSULES BY MOUTH 3 TIMES DAILY 180 capsule 3 2020 at Unknown time     hydrochlorothiazide (MICROZIDE) 12.5 MG capsule TAKE 1 CAPSULE (12.5 MG) BY MOUTH EVERY MORNING 90 capsule 3 2020 at Unknown time     montelukast (SINGULAIR) 10 MG tablet TAKE 1 TABLET (10 MG) BY MOUTH AT BEDTIME 90 tablet 0 8/10/2020 at Unknown time     omeprazole (PRILOSEC) 40 MG DR capsule Take 1 capsule (40 mg) by mouth daily 90 capsule 3 2020 at Unknown time     pilocarpine (SALAGEN) 5 MG tablet Take 1 tablet (5 mg) by mouth 4 times daily 360 tablet 3 2020 at Unknown time     verapamil ER (VERELAN) 240 MG 24 hr capsule TAKE 1 CAPSULE (240 MG) BY MOUTH AT BEDTIME 90 capsule 3 8/10/2020 at Unknown time     lamoTRIgine (LAMICTAL) 200 MG tablet Take 200 mg by mouth daily   More than a month at Unknown time     [] polyethylene glycol (MIRALAX) 17 GM/SCOOP powder Take 17 g (1 capful) by mouth daily for 5 days 527 g 0        Medication Dispense History (from 2019 to 8/10/2020)   Expand All  Collapse All   Amitriptyline  HCl      Dispensed  Days Supply  Quantity  Provider  Pharmacy    amitriptyline 50 mg tablet  05/07/2020  90  90 tablet   [facility]    amitriptyline 50 mg tablet  01/30/2020  90  90 tablet   [facility]    amitriptyline 25 mg tablet  10/27/2019  90  [facility]    amitriptyline 25 mg tablet  09/22/2019  30  30 tablet  [facility] Lamb[facility]  [facility]    amitriptyline 25 mg tablet  08/23/2019  30  30 tablet  [facility],   [facility]          Amphetamine-Dextroamphetamine      Dispensed  Days Supply  Quantity  Provider  Pharmacy    dextroamphetamine-amphetamine 30 mg tablet  07/17/2020  30  60 tablet   [facility]    dextroamphetamine-amphetamine 30 mg tablet  06/20/2020  30  60 tablet   [facility]    dextroamphetamine-amphetamine 30 mg tablet  05/19/2020  30  60 tablet   [facility]    dextroamphetamine-amphetamine 30 mg tablet  04/15/2020  30  60 tablet   [facility]...    AMPHETAMINE SALTS 30MG TABLET  04/14/2020  30  60  [facility] LEONE  [facility]    dextroamphetamine-amphetamine 30 mg tablet  03/13/2020  30  60 tablet   [facility]    dextroamphetamine-amphetamine 30 mg tablet  02/13/2020  30  60 tablet   [facility]...    dextroamphetamine-amphetamine 30 mg tablet  01/15/2020  30  60 tablet   [facility]    dextroamphetamine-amphetamine 30 mg tablet  12/18/2019  30  60 tablet   [facility]    dextroamphetamine-amphetamine 30 mg tablet  11/19/2019  30  60 tablet  [facility]    dextroamphetamine-amphetamine 30 mg tablet  10/22/2019  30  60 tablet  [facility]    dextroamphetamine-amphetamine 30 mg tablet  09/20/2019  30  60 tablet  [facility] Lamb [facility]    dextroamphetamine-amphetamine 30 mg tablet  08/21/2019  30  60 tablet  [facility] [place] [facility]          Budesonide-Formoterol Fumarate      Dispensed  Days Supply  Quantity  Provider  Pharmacy    SYMBICORT 160-4.5MCG/ACT INH  09/04/2019  90  30.6 g  KERA,INDIA  [facility]          Chlorhexidine Gluconate      Dispensed  Days Supply  Quantity  Provider  Pharmacy    CHLORHEXIDINE 0.12% RINSE  07/27/2020  63  1,892 mL  KERA,INDIA  [facility]    CHLORHEXIDINE 0.12% RINSE  04/24/2020  63  1,892 mL  KERA,INDIA  [facility]    CHLORHEXIDINE 0.12% RINSE  02/14/2020  63  1,892 mL  KERA,INDIA  [facility]    CHLORHEXIDINE 0.12% RINSE  11/18/2019  63  1,892 mL  KERA,INDIA  [facility]    CHLORHEXIDINE 0.12% RINSE  10/13/2019  63  1,892 mL  KERA,INDIA  [facility]    CHLORHEXIDINE 0.12% RINSE  08/31/2019  63  1,892 mL  KERA,INDIA  [facility]          Fluconazole      Dispensed  Days Supply  Quantity  Provider  Pharmacy    fluconazole 200 mg tablet  02/13/2020  21  21 tablet   [facility]    fluconazole 200 mg tablet  11/27/2019  21  21 tablet  [facility]...          Fluticasone Propionate HFA      Dispensed  Days Supply  Quantity  Provider  Pharmacy    FLOVENT HFA 220MCG/ACT INH AER  06/26/2020  60  24 g  KERA,INDIA  [facility]    FLOVENT HFA 220MCG/ACT INH AER  04/14/2020  60  24 g  KERA,INDIA  [facility]...    FLOVENT HFA 220MCG/ACT INH AER  02/03/2020  60  24 g  KERA,INDIA  [facility]...    FLOVENT HFA 220MCG/ACT INH AER  11/19/2019  60  24 g  KERA,INDIA  [facility]...    FLOVENT HFA 220MCG/ACT INH AER  09/23/2019  60  24 g  [facility] [place] [facility]...          Gabapentin      Dispensed  Days Supply  Quantity   Provider  Pharmacy    gabapentin 300 mg capsule  07/17/2020  30  180 capsule   [facility]    gabapentin 300 mg capsule  06/20/2020  30  180 capsule   [facility]    gabapentin 300 mg capsule  05/12/2020  30  180 capsule   [facility]    gabapentin 300 mg capsule  04/13/2020  30  180 capsule   [facility]    gabapentin 300 mg capsule  03/18/2020  30  180 capsule   [facility]    GABAPENTIN 300MG CAPSULE  02/09/2020  30  180  [facility] - [facility]...    gabapentin 300 mg capsule  02/09/2020  30  180 capsule   [facility]    gabapentin 300 mg capsule  01/12/2020  30  180 capsule   [facility]    gabapentin 300 mg capsule  12/11/2019  30  180 capsule   [facility]    gabapentin 300 mg capsule  11/12/2019  30  180 capsule  [place] [facility]...    gabapentin 300 mg capsule  10/13/2019  30  180 capsule  India Lamb [facility]...    gabapentin 300 mg capsule  09/12/2019  30  180 capsule  [facility]...    gabapentin 300 mg capsule  08/14/2019  30  180 capsule  [place] Lamb [facility]...          Sangrey Carbonate      Dispensed  Days Supply  Quantity  Provider  Pharmacy    LITHIUM CARBONATE 450MG ER TAB  03/23/2020  30  120  [facility]...    lithium carbonate  mg tablet,extended release  03/23/2020  30  120 tablet   [facility]    LITHIUM CARBONATE 450MG ER TAB  02/23/2020  30  120  [facility]...    lithium carbonate  mg tablet,extended release  02/23/2020  30  120 tablet   [facility]    LITHIUM CARBONATE 450MG ER TAB  01/23/2020  30  120  [facility] - [facility]...    lithium carbonate  mg tablet,extended release  01/23/2020  30  120 tablet   [facility]    lithium carbonate  mg tablet,extended release  12/22/2019  30  120 tablet   [facility]...    lithium carbonate  mg tablet,extended release  11/23/2019  30  120 tablet  [facility]...    lithium carbonate  mg tablet,extended release  10/23/2019  30  120 tablet  [facility]...    lithium carbonate  mg tablet,extended release  09/22/2019  30  120 tablet  Zainab, Tommy Farhan, DO  [facility]...    lithium carbonate  mg tablet,extended release  08/25/2019  30  120 tablet  Zainab, Tommy Farhan, DO  [facility]...          Montelukast Sodium      Dispensed  Days Supply  Quantity  Provider  Pharmacy    montelukast 10 mg tablet  06/03/2020  90  90 tablet   [facility]...    montelukast 10 mg tablet  02/27/2020  90  90 tablet   [facility]...    montelukast 10 mg tablet  11/27/2019  90  90 tablet  Zainab, Tommy Farhan, DO  [facility]...          Omeprazole      Dispensed  Days Supply  Quantity  Provider  Pharmacy    omeprazole 40 mg capsule,delayed release  06/13/2020  90  90 capsule   [facility]...    omeprazole 40 mg capsule,delayed release  03/17/2020  90  90 capsule   [facility]...    omeprazole 40 mg capsule,delayed release  12/22/2019  90  90 capsule   [facility]...    omeprazole 40 mg capsule,delayed release  09/23/2019  90  90 capsule  Zainab, Tommy Farhan, DO  [facility]...    omeprazole 40 mg capsule,delayed release  08/21/2019  30  30 capsule  [facility]...          Pilocarpine HCl      Dispensed  Days Supply  Quantity  Provider  Pharmacy    PILOCARPINE 5MG TABLET  04/21/2020  90  360  [facility] - [facility]...    pilocarpine 5 mg tablet  04/21/2020  90  360 tablet   [facility]...     pilocarpine 5 mg tablet  12/22/2019  90  360 tablet   [facility]    pilocarpine 5 mg tablet  11/25/2019  30  90 tablet  Memo Alonzo DO  [facility]...    pilocarpine 5 mg tablet  10/22/2019  30  90 tablet  Tommy Lamb   [facility]...    pilocarpine 5 mg tablet  09/23/2019  30  90 tablet  Tommy Lamb   [facility]...          Varenicline Tartrate      Dispensed  Days Supply  Quantity  Provider  Pharmacy    Chantix Continuing Month Box 1 mg tablet  07/16/2020  28  56 tablet   [facility]    Chantix Continuing Month Box 1 mg tablet  06/20/2020  28  56 tablet   [facility]    Chantix Continuing Month Box 1 mg tablet  05/12/2020  28  56 tablet   [facility]    Chantix Continuing Month Box 1 mg tablet  04/13/2020  28  56 tablet   [facility]...    Chantix Continuing Month Box 1 mg tablet  03/17/2020  28  56 tablet   [facility]...    Chantix Continuing Month Box 1 mg tablet  02/13/2020  28  56 tablet   [facility]...    Chantix Continuing Month Box 1 mg tablet  01/08/2020  28  56 tablet   [facility]...    Chantix Continuing Month Box 1 mg tablet  12/11/2019  28  56 tablet   [facility]...    Chantix Continuing Month Box 1 mg tablet  11/11/2019  28  56 tablet  Tommy Lamb   [facility]...    Chantix Continuing Month Box 1 mg tablet  10/16/2019  28  56 tablet  Tommy Lamb   [facility]...    Chantix Continuing Month Box 1 mg tablet  09/12/2019  28  56 tablet  Tommy Lamb   [facility]...          Verapamil HCl      Dispensed  Days Supply  Quantity  Provider  Pharmacy    verapamil  mg 24 hr capsule,extended release  06/13/2020  90  90 capsule   [facility]    verapamil  mg 24 hr capsule,extended release  03/17/2020  90  90 capsule    [facility]    verapamil  mg 24 hr capsule,extended release  12/22/2019  90  90 capsule   [facility]...    verapamil  mg 24 hr capsule,extended release  09/23/2019  90  90 capsule  [facility]    verapamil  mg 24 hr capsule,extended release  08/23/2019  30  30 capsule  [facility]...          buPROPion HCl      Dispensed  Days Supply  Quantity  Provider  Pharmacy    BUPROPION 100MG TABLET  06/30/2020  30  120  [facility]    bupropion HCl 100 mg tablet  06/30/2020  30  120 tablet   [facility]...    BUPROPION 100MG TABLET  03/10/2020  90  360  [facility]    bupropion HCl 100 mg tablet  03/10/2020  90  360 tablet   [facility]    bupropion HCl 100 mg tablet  11/18/2019  90  360 tablet  [facility]...          hydroCHLOROthiazide      Dispensed  Days Supply  Quantity  Provider  Pharmacy    hydrochlorothiazide 12.5 mg capsule  06/13/2020  90  90 capsule   [facility]...    hydrochlorothiazide 12.5 mg capsule  03/17/2020  90  90 capsule   [facility]...    hydrochlorothiazide 12.5 mg capsule  12/22/2019  90  90 capsule   [facility]...    hydrochlorothiazide 12.5 mg capsule  09/23/2019  90  90 capsule  [facility]...    hydrochlorothiazide 12.5 mg capsule  08/21/2019  30  30 capsule  [facility]...          lamoTRIgine      Dispensed  Days Supply  Quantity  Provider  Pharmacy    lamotrigine 200 mg tablet  03/26/2020  90  90 tablet   [facility]...    lamotrigine 25 mg (42)-100 mg (7) tablets in a dose pack  02/15/2020  28  49 tablet   [facility]...

## 2020-08-12 NOTE — PLAN OF CARE
"  Problem: Adult Behavioral Health Plan of Care  Goal: Patient-Specific Goal (Individualization)  Description: Pt will follow recommendations of treatment team.  Pt will take medications as prescribed.  Pt will complete ADL's independently.  Pt will sleep 6-8 hours each night.  Pt will attend 50 % of groups.    8/12/20- No wean at this time- to be assessed by treatment team and NP.  8/12/2020 0941 by Rosalina Felder RN  Outcome: No Change    End of shift report received from previous shift RN. Pt observed, sleeping in bed. Up for breakfast. Rambles, pressured speech, hard to track. Paranoid, although declines this. Disheveled. Believes people are \"out to get him\", states feels safe here. Endorses anxiety and depression although wouldn't elaborate on this. When asked about SI/HI, pt states \"oh yes, to all those. I feel sometimes people would be better off without me\", pt contracts for safety.     0950- Pt uses phone per request.   1015- Pt on phone with pam Ragland.  1400- Pt requests his mother to  house and car keys so she can \"take care of the janak\", pt also requests money to be given to girlfriend. Pt notified money would have to be approved by treatment team. Mom Sharon called unit and wants to pick keys up at 1430. Envelope from security to unit. Keys removed from envelope - see belongings note for update.   Compliant with medications. Pt states he went two days without chlorhexidine mouth rinse and his mouth broke out with sores. \"that's why I thought hair was stuck to my mouth. I have sores by my lips\".   Problem: Fall Injury Risk  Goal: Absence of Fall and Fall-Related Injury  Description: Pt will remain free from falls during hospitalization.  Pt will wear appropriate footwear during hospitalization.  Outcome: Improving   No falls this shift.   Problem: Thought Process Alteration  Goal: Optimal Thought Clarity  Description: Pt will have a logical and linear conversation.  Pt will have a " decrease in paranoia/delusions during hospitalization.   Outcome: No Change   Linear conversation held toward end of shift.   Face to face end of shift report communicated to oncoming RN.     Rosalina Felder RN  8/12/2020  3:15 PM

## 2020-08-12 NOTE — PROGRESS NOTES
08/12/20 0409   Patient Belongings   Did you bring any home meds/supplements to the hospital?  No   Patient Belongings locker;sent to security per site process   Patient Belongings Put in Hospital Secure Location (Security or Locker, etc.) keys;money (see comment);wallet;other (see comments)   Belongings Search Yes   Clothing Search Yes   Second Staff RIVAS   Comment Evidence 6 bags, Shoes, Belt, Boxer, tshirt, jeans, socks, [employer] bill, Lighter, flash light, cigarettes,rope, leash, properties lease,, [place] care program, [employer] basic rx plan, [employer] insurane, hurt with a crossbow license, all terrain vehicle, wallet, 2 cvs card, ebt card, keys, cash(85$) 5- 1$ 1-10$ 1-20$ 1-50$, 2- [employer] conver[employer] loyalty card, 3 firearm license, 2 [place] [employer], drivers license, 2 visa, family photos, 4 [place] card   List items sent to safe: properties lease,, [place] [facility] [place], [employer] basic rx plan, [employer] health insurane, hurt with a crossbow license, all terrain vehicle, wallet, 2 cvs card, ebt card, keys, cash(85$) 5- 1$ 1-10$ 1-20$ 1-50$, 2- [employer] conver[employer] loyalty card, 3 firearm license, 2 [place] [employer], drivers license, 2 visa, family photos, 4 [place] insurance card    All other belongings put in assigned cubby in belongings room.     8-  Parents picked up key ring with 8 keys    8-  Paulette Delgado picked up $70 in cash (1-$50, 1- $20)          I have reviewed my belongings list on admission and verify that it is correct.     Patient signature_______________________________    Second staff witness (if patient unable to sign) ______________________________       I have received all my belongings at discharge.    Patient signature________________________________    W[place]ley  8/12/2020  4:20 AM

## 2020-08-13 VITALS
OXYGEN SATURATION: 98 % | SYSTOLIC BLOOD PRESSURE: 123 MMHG | TEMPERATURE: 98.2 F | DIASTOLIC BLOOD PRESSURE: 77 MMHG | HEIGHT: 72 IN | RESPIRATION RATE: 18 BRPM | WEIGHT: 197.8 LBS | HEART RATE: 76 BPM | BODY MASS INDEX: 26.79 KG/M2

## 2020-08-13 PROCEDURE — 25000132 ZZH RX MED GY IP 250 OP 250 PS 637: Mod: GY | Performed by: NURSE PRACTITIONER

## 2020-08-13 PROCEDURE — 99238 HOSP IP/OBS DSCHRG MGMT 30/<: CPT | Performed by: NURSE PRACTITIONER

## 2020-08-13 RX ORDER — LORAZEPAM 1 MG/1
1 TABLET ORAL 2 TIMES DAILY
Qty: 10 TABLET | Refills: 0 | Status: SHIPPED | OUTPATIENT
Start: 2020-08-14 | End: 2020-09-03

## 2020-08-13 RX ORDER — CARBAMAZEPINE 200 MG/1
200 TABLET, EXTENDED RELEASE ORAL 2 TIMES DAILY
Qty: 60 TABLET | Refills: 0 | Status: SHIPPED | OUTPATIENT
Start: 2020-08-13 | End: 2020-09-17

## 2020-08-13 RX ORDER — CARBAMAZEPINE 100 MG/1
200 TABLET, EXTENDED RELEASE ORAL 2 TIMES DAILY
Status: DISCONTINUED | OUTPATIENT
Start: 2020-08-13 | End: 2020-08-13 | Stop reason: HOSPADM

## 2020-08-13 RX ADMIN — GABAPENTIN 600 MG: 300 CAPSULE ORAL at 14:24

## 2020-08-13 RX ADMIN — CARBAMAZEPINE 200 MG: 100 TABLET, EXTENDED RELEASE ORAL at 20:07

## 2020-08-13 RX ADMIN — HYDROCHLOROTHIAZIDE 12.5 MG: 12.5 CAPSULE ORAL at 08:20

## 2020-08-13 RX ADMIN — VERAPAMIL HYDROCHLORIDE 240 MG: 120 TABLET, FILM COATED, EXTENDED RELEASE ORAL at 20:06

## 2020-08-13 RX ADMIN — PILOCARPINE HYDROCHLORIDE 5 MG: 5 TABLET, FILM COATED ORAL at 14:24

## 2020-08-13 RX ADMIN — LORAZEPAM 1 MG: 1 TABLET ORAL at 08:21

## 2020-08-13 RX ADMIN — LORAZEPAM 1 MG: 1 TABLET ORAL at 16:49

## 2020-08-13 RX ADMIN — OMEPRAZOLE 40 MG: 20 CAPSULE, DELAYED RELEASE ORAL at 08:20

## 2020-08-13 RX ADMIN — GABAPENTIN 600 MG: 300 CAPSULE ORAL at 08:21

## 2020-08-13 RX ADMIN — MONTELUKAST 10 MG: 10 TABLET, FILM COATED ORAL at 20:07

## 2020-08-13 RX ADMIN — FLUTICASONE PROPIONATE 2 PUFF: 220 AEROSOL, METERED RESPIRATORY (INHALATION) at 09:41

## 2020-08-13 RX ADMIN — GABAPENTIN 600 MG: 300 CAPSULE ORAL at 20:08

## 2020-08-13 RX ADMIN — CARBAMAZEPINE 200 MG: 100 TABLET, EXTENDED RELEASE ORAL at 09:47

## 2020-08-13 RX ADMIN — PILOCARPINE HYDROCHLORIDE 5 MG: 5 TABLET, FILM COATED ORAL at 08:20

## 2020-08-13 RX ADMIN — CHLORHEXIDINE GLUCONATE 15 ML: 1.2 RINSE ORAL at 09:41

## 2020-08-13 RX ADMIN — PILOCARPINE HYDROCHLORIDE 5 MG: 5 TABLET, FILM COATED ORAL at 20:07

## 2020-08-13 ASSESSMENT — ACTIVITIES OF DAILY LIVING (ADL)
LAUNDRY: UNABLE TO COMPLETE
HYGIENE/GROOMING: INDEPENDENT
ORAL_HYGIENE: INDEPENDENT
DRESS: SCRUBS (BEHAVIORAL HEALTH);INDEPENDENT
LAUNDRY: UNABLE TO COMPLETE
ORAL_HYGIENE: INDEPENDENT
HYGIENE/GROOMING: INDEPENDENT
DRESS: SCRUBS (BEHAVIORAL HEALTH);INDEPENDENT

## 2020-08-13 NOTE — DISCHARGE INSTRUCTIONS
Behavioral Discharge Planning and Instructions    Summary: Patient was admitted with paranoia     Main Diagnosis: Bipolar disorder, type 1 most recent episode manic, severe with somatic and paranoid delusions, Marijuana use disorder, moderate    Major Treatments, Procedures and Findings: Stabilize with medications, connect with community programs.    Symptoms to Report: feeling more aggressive, increased confusion, losing more sleep, mood getting worse or thoughts of suicide    Lifestyle Adjustment: Take all medications as prescribed, meet with doctor/ medication provider, out patient therapist, , and [facility] worker as scheduled. Abstain from alcohol or any unprescribed drugs.    Psychiatry Follow-up:       [facility]  PCP- Dr. Lamb- Sept 3rd @ 1:30pm   [address] [place] [address]  Phone:  [phone]    Fax: [phone]    Resources:   Crisis Intervention: 351.197.5545 or 738-260-0732 (TTY: 828.714.3910).  Call anytime for help.  National [place] on Mental Illness (www.[place].karishma.org): 585.609.7191 or 017-490-8157.  Alcoholics Anonymous (www.alcoholics-anonymous.org): Check your phone book for your local chapter.  Suicide Awareness Voices of Education (SAVE) (www.save.org): 988-929-QZVG (2539)  National Suicide Prevention Line (www.mentalhealthmn.org): 482-948-FTAS (2893)  Mental Health Consumer/Survivor Network of [place] (www.mhcsn.net): 574.586.6121 or 354-393-6027  Mental Health Association of [place] (www.mentalhealth.org): 604.177.7015 or 962-820-3651    General Medication Instructions:   See your medication sheet(s) for instructions.   Take all medicines as directed.  Make no changes unless your doctor suggests them.   Go to all your doctor visits.  Be sure to have all your required lab tests. This way, your medicines can be refilled on time.  Do not use any drugs not prescribed by your doctor.  Avoid alcohol.    [place] Area:  [facility], Crisis stabilization [facility] [phone]  Critical access hospital Crisis  "Line: 9-507-709-2902  [facility]: 206-8637  Sexual Assault Program of [facility] [place]: 145.626.4802 or 1-718.789.1354  [place] [facility]: 8-563-572-7041 Ext: 279       Calls answered Mon-Fri-8:00 am--4:30 pm    [place]:  [facility]: 7-476-837-0021  [facility] - [phone]  [facility] first call for help: 6-506-737-0392  [facility]:  ([phone]    [place] Area:  [facility] Line: [phone]       Calls answered Tuesday--Saturday 4:00 pm--10:00 pm  [facility] Crisis Line - [phone]  [place] [facility] [phone]    [place] Statewide:  [place] Crisis and Referral Services: 6-337-764-1718  National Suicide Prevention Lifeline: 1-479-388-TALK (7611)   - idp7efrf- Text \"Life\" to 75299  First Call for Help: 2-1-1  [facility] Helpline- 6-754-ZVAW-HELP   Crisis Text Line: Text  [place]  to 559271    "

## 2020-08-13 NOTE — PLAN OF CARE
"Pt talked to me about wanting to sign out of the unit. He told me, \"I know these people are drugging me and injecting me with fluid in my thigh and rear!  I need to get out of here because my parents are trying to get me committed!  I don't feel safe anywhere really!  They have been stalking me around my house watching me.  They stalked me in my van as well and sent the police to get me!  Those guys on the unit are not who they say they are!  They are in on it too!  I want to sign the 12 hour notice and get out of here as soon as possible!\"  I asked him what wecyrus could do to help him feel safe here.  Pt said that he does feel somewhat safe but does not think that he is going to get the help he needs here.  Pt's speech was very rapid and tangential.  He showed me that areas of his body that they were injecting him. He told me that he had some sort of object injected near his hip that burst and is leaking poison into his body. He told me that the ER did not believe him and did not do the right labs.  "

## 2020-08-13 NOTE — PLAN OF CARE
"Problem: Adult Behavioral Health Plan of Care  Goal: Patient-Specific Goal (Individualization)  Description: Pt will follow recommendations of treatment team.  Pt will take medications as prescribed.  Pt will complete ADL's independently.  Pt will sleep 6-8 hours each night.  Pt will attend 50 % of groups.  8/13/2020 0954 by Rosalina Felder RN  Outcome: No Change     End of shift report received from previous shift RN. Pt observed, in bed. Up to lounge for breakfast. States he slept \"so good\". Pt appears well rested. Flat affect, mood is calm, appearance is disheveled although clean. No delusional statements made at this time. PURA signed and faxed for [facility].   1100- Spoke with pt's mom on phone. Reported pt was on lamictal in the past and it made him \"wild, running through the woods frantic, breaking things\". Reports lithium made him feel \"icky and bloated\". Reports apartment has \"black mold everywhere and water leaking onto his bed\". Mom doesn't want to see pt discharge back to his apartment as \"it needs to be condemned\". Behaviors PTA included couch tipped on end, furniture all over, windows closed off, doors barricaded, notes all over his, paranoia.     Per charge nurse, tx team approved pt's girlfriend to p/u envelope with her name on it in his personal belongings with $70. Envelope brought up by security. Paulette, pt's girlfriend, picked up money - see belongings documentation.   1200- received records from [facility], placed in chart.  1400- Pt declines anxiety, depression, SI/HI, AH, VH, paranoia.   1500- This writer will continue nursing care into the next shift.  1645- Pt arouses from nap. Manual recheck of pulse 76. Compliant with scheduled ativan. Pt talks of how is problems started \"with his hip. It popped four months ago\". Pt rates pain at 4/10, constant - declines interventions. Pt states \"there is a lake in my stomach. I can feel feel the waves. I felt the water go into my armpit and up into my head\".   1810- " "Pt requesting to update PURA to remove mom and dad. Pt also expresses wanting to leave tomorrow whether \"provider wants me to or not\". Writer prints forms for pt to review, pt is on phone. Will continue to monitor.  Face to face end of shift report communicated to oncoming RN.     Rosalina Felder RN  8/13/2020  6:00 PM    Problem: Fall Injury Risk  Goal: Absence of Fall and Fall-Related Injury  Description: Pt will remain free from falls during hospitalization.  Pt will wear appropriate footwear during hospitalization.  8/13/2020 0954 by Rosalina Felder, RN  Outcome: Improving  Nonskid footwear in place. Gait balanced and steady. Fall risk wrist band in place  Problem: Thought Process Alteration  Goal: Optimal Thought Clarity  Description: Pt will have a logical and linear conversation.  Pt will have a decrease in paranoia/delusions during hospitalization.   Outcome: Improving     "

## 2020-08-13 NOTE — PROGRESS NOTES
"[facility]  Psychiatric Progress Note      Impression:     Andrei refused to take lithium or lamictal. He slept very well last night and his delusions have decreased signirficnatly though he still is \"unsure\". He still quesitons if his girlfriends son was tyring to \"set him up\". He still questions whether his sister is involved in doing malicious things to him though when I tell him that these thoughts are secondary to his bibiana and paranoia he states \"you might be right\". I am able to challenge his delusions and he seems to understand and have better insight after a night of sleep. He is still a bit pressurred. We discussed starting tegretol and that he would need serum sodium levels checked and a tegretol level. He agreed to try this. I spoke with Dr. Lamb about his care.     Educated regarding medication indications, risks, benefits, side effects, contraindications and possible interactions. Verbally expressed understanding.        Diagnoses:   Bipolar disorder, type 1 most recent episode manic, severe with somatic and paranoid delusions     Marijuana use disorder, moderate         Attestation:  Patient has been seen and evaluated by me,  Karly Juarez NP          Interim History:   The patient's care was discussed with the treatment team and chart notes were reviewed.            Medications:       carBAMazepine  200 mg Oral BID     chlorhexidine  15 mL Swish & Spit BID     fluticasone  2 puff Inhalation BID     gabapentin  600 mg Oral TID     hydrochlorothiazide  12.5 mg Oral Daily     LORazepam  1 mg Oral BID     montelukast  10 mg Oral At Bedtime     nicotine  1 patch Transdermal Daily     nicotine   Transdermal Q8H     omeprazole  40 mg Oral Daily     pilocarpine  5 mg Oral TID     verapamil ER  240 mg Oral At Bedtime              10 point ROS negative        Allergies:   No Known Allergies         Psychiatric Examination:   /78   Pulse 77   Temp 98  F (36.7  C) (Tympanic)   " Resp 14   Ht 1.829 m (6')   Wt 89.7 kg (197 lb 12.8 oz)   SpO2 98%   BMI 26.83 kg/m    Weight is 197 lbs 12.8 oz  Body mass index is 26.83 kg/m .    Appearance:  awake, alertsomewhat disheveled  Attitude:  cooperative  Eye Contact:  good  Mood:  anxious and better  Affect:  intensity is heightened  Speech:  pressured speech though significant improvement  Psychomotor Behavior:  no evidence of tardive dyskinesia, dystonia, or tics  Thought Process:  circumstantial though improved  Associations:  loosening of associations present  Thought Content:  no evidence of suicidal ideation or homicidal ideation and no visual hallucinations present  Insight:  limited  Judgment:  limited  Oriented to:  time, person, and place  Attention Span and Concentration:  limited  Recent and Remote Memory:  intact  Fund of Knowledge: appropriate  Muscle Strength and Tone: normal  Gait and Station: Normal           Labs:     Results for orders placed or performed during the hospital encounter of 08/12/20   Urine Drugs of Abuse Screen Panel 13     Status: Abnormal   Result Value Ref Range    Cannabinoids (74-lno-5-carboxy-9-THC) Detected, Abnormal Result (A) NDET^Not Detected ng/mL    Phencyclidine (Phencyclidine) Not Detected NDET^Not Detected ng/mL    Cocaine (Benzoylecgonine) Not Detected NDET^Not Detected ng/mL    Methamphetamine (d-Methamphetamine) Not Detected NDET^Not Detected ng/mL    Opiates (Morphine) Not Detected NDET^Not Detected ng/mL    Amphetamine (d-Amphetamine) Detected, Abnormal Result (A) NDET^Not Detected ng/mL    Benzodiazepines (Nordiazepam) Not Detected NDET^Not Detected ng/mL    Tricyclic Antidepressants (Desipramine) Detected, Abnormal Result (A) NDET^Not Detected ng/mL    Methadone (Methadone) Not Detected NDET^Not Detected ng/mL    Barbiturates (Butalbital) Not Detected NDET^Not Detected ng/mL    Oxycodone (Oxycodone) Not Detected NDET^Not Detected ng/mL    Propoxyphene (Norpropoxyphene) Not Detected NDET^Not  Detected ng/mL    Buprenorphine (Buprenorphine) Not Detected NDET^Not Detected ng/mL   Alcohol ethyl     Status: None   Result Value Ref Range    Ethanol g/dL <0.01 0.01 g/dL   CBC with platelets differential     Status: Abnormal   Result Value Ref Range    WBC 8.3 4.0 - 11.0 10e9/L    RBC Count 4.57 4.4 - 5.9 10e12/L    Hemoglobin 12.2 (L) 13.3 - 17.7 g/dL    Hematocrit 38.2 (L) 40.0 - 53.0 %    MCV 84 78 - 100 fl    MCH 26.7 26.5 - 33.0 pg    MCHC 31.9 31.5 - 36.5 g/dL    RDW 17.0 (H) 10.0 - 15.0 %    Platelet Count 228 150 - 450 10e9/L    Diff Method Automated Method     % Neutrophils 60.8 %    % Lymphocytes 26.6 %    % Monocytes 9.3 %    % Eosinophils 2.6 %    % Basophils 0.5 %    % Immature Granulocytes 0.2 %    Nucleated RBCs 0 0 /100    Absolute Neutrophil 5.1 1.6 - 8.3 10e9/L    Absolute Lymphocytes 2.2 0.8 - 5.3 10e9/L    Absolute Monocytes 0.8 0.0 - 1.3 10e9/L    Absolute Eosinophils 0.2 0.0 - 0.7 10e9/L    Absolute Basophils 0.0 0.0 - 0.2 10e9/L    Abs Immature Granulocytes 0.0 0 - 0.4 10e9/L    Absolute Nucleated RBC 0.0    Comprehensive metabolic panel     Status: None   Result Value Ref Range    Sodium 138 133 - 144 mmol/L    Potassium 3.4 3.4 - 5.3 mmol/L    Chloride 104 94 - 109 mmol/L    Carbon Dioxide 26 20 - 32 mmol/L    Anion Gap 8 3 - 14 mmol/L    Glucose 89 70 - 99 mg/dL    Urea Nitrogen 10 7 - 30 mg/dL    Creatinine 0.92 0.66 - 1.25 mg/dL    GFR Estimate >90 >60 mL/min/[1.73_m2]    GFR Estimate If Black >90 >60 mL/min/[1.73_m2]    Calcium 9.3 8.5 - 10.1 mg/dL    Bilirubin Total 0.4 0.2 - 1.3 mg/dL    Albumin 3.9 3.4 - 5.0 g/dL    Protein Total 8.4 6.8 - 8.8 g/dL    Alkaline Phosphatase 91 40 - 150 U/L    ALT 33 0 - 70 U/L    AST 43 0 - 45 U/L   Asymptomatic COVID-19 Virus (Coronavirus) by PCR     Status: None    Specimen: Nasopharyngeal   Result Value Ref Range    COVID-19 Virus PCR to [facility] - Source Nasopharyngeal     COVID-19 Virus PCR to [facility] - Result       Test received-See reflex to  [place] test SARS CoV2 (COVID-19) Virus RT-PCR   SARS-CoV-2 COVID-19 Virus (Coronavirus) RT-PCR Nasopharyngeal     Status: None    Specimen: Nasopharyngeal   Result Value Ref Range    SARS-CoV-2 Virus Specimen Source Nasopharyngeal     SARS-CoV-2 PCR Result NEGATIVE     SARS-CoV-2 PCR Comment       Testing was performed using the Xpert Xpress SARS-CoV-2 Assay on the Cepheid Gene-Xpert   Instrument Systems. Additional information about this Emergency Use Authorization (EUA)   assay can be found via the Lab Guide.                  Plan/Treatment Team     BEHAVIORAL TEAM DISCUSSION    Progress: imrpoved. Did sleep  Less delusions    Continued Stay Criteria/Rationale: starting tegretol. Still some delusions    Medical/Physical: none    Precautions: none    Falls precaution?: No  Behavioral Orders   Procedures     Code 1 - Restrict to Unit     Routine Programming     As clinically indicated     Status 15     Every 15 minutes.                     Plan for this encounter/Med Changes/Labs:       Start tegretol 200 mg bid    Continue ativan prn     Adderall,  and elevil,not continued on admission              Participants: Karly Juarez NP,  Darlyn Schultz LSW,  Ida Brunner LSW, Kelechi Jimenes LSW, Brianna [facility], Banner OT, Ana Norton OT,         WILLIS  3 days

## 2020-08-13 NOTE — PLAN OF CARE
Problem: Adult Behavioral Health Plan of Care  Goal: Patient-Specific Goal (Individualization)  Description: Pt will follow recommendations of treatment team.  Pt will take medications as prescribed.  Pt will complete ADL's independently.  Pt will sleep 6-8 hours each night.  Pt will attend 50 % of groups.      8/12/2020 2329 by Jennifer Garcia, RN  Outcome: No Change  Note:        Problem: Fall Injury Risk  Goal: Absence of Fall and Fall-Related Injury  Description: Pt will remain free from falls during hospitalization.  Pt will wear appropriate footwear during hospitalization.    8/12/2020 2329 by Jennifer Garcia, RN  Outcome: Improving     Writer continued cares of pt from previous shift.    Pt appeared to be sleeping most of this shift. Pt did not have any noted falls this shift.    Face to face report will be communicated to oncoming RN.    Jennifer Garcia RN  8/13/2020  6:19 AM

## 2020-08-14 NOTE — DISCHARGE SUMMARY
"     Psychiatric Discharge Summary    Andrei Whitman MRN# [id number]   Age: 49 year old YOB: 1970     Date of Admission:  8/12/2020  Date of Discharge:  8/13/2020  Admitting Physician:  Luciano Moran MD  Discharge Physician:  Karly Juarez NP          Event Leading to Hospitalization and Hospital Stay    Andrei Whitman is a 49 year old  male who brought himself to the emergency room with paranoid delusions believing that people have been trying to kill him for the past 4 months.  He believes that people have been trying to kill him \"ever since I drink acid\" he then told the emergency room staff that these people were putting glue in his eyes and behind his ears as well as and his hair.  He brought in plastic bags with \"evidence\" to show the emergency room physicians.  There is apparently nothing evident other than some dry skin in these bags.  He showed them that they get stating \"these people did something to my head and now the glue is peeling off\".  He was apparently pulled over by the police when he was on his way to the emergency room for driving erratically.  He is apparently been driving \"all over for 2 days\" he has tactile hallucinations as well he states that he can feel a needle going through his foot every time he walks.  He is having olfactory hallucinations and smelling cleaning products.  He reports he has not been taking any of his medications for 4 days      Positive for thc and amphetamines. Is prescribed adderall        Lithium was tapered and discontinued in march/begining of April. At that time his lamictal was increased. It appears that lithium was tapered due excessive thirst and dry mouth. Elavil was increased for insomnia in February.         Has periods increased goal directed, energy, little need for sleep, irritable, greater than 1 week  or hospitalization required. grandiose, flight of idea, distractibility, psychomotor agitation, activities with   painful " "outcomeperiods where there is a depressed mood where feels sad empty hopeless tearful, ahedonia, hypersomnia, psychomotor agitation and periods of psychomotor retardation. Periods where there is suicidality, worthlessness and excess guilt.               -  Andrei did bring himself here on a voluntary basis.  To calm down I did give him 1 mg of Ativan the first night he was here and he actually did sleep very well after this.  I did not continue any of his other psychiatric medications as he refused to continue Lamictal and refused to start lithium again.  I did not continue his amitriptyline due to his bibiana and no stimulants were restarted.  The next day when I saw him he had calmed down significantly he was no longer as pressured as he was when he came in and he was not tangential.  He had some paranoid delusions that still remained though he was able to challenge them and was not as fixated on his delusional thought process.  He is now able to tell himself \"it probably was just dry skin on my head and not glue.  I was thinking crazy\".  He still has some paranoid thoughts that \"I was not sure if my girlfriend's son was trying to hurt me or not.  It could be paranoia but I really do not know\" this is significant improvement from his admission.  He did agree to start Tegretol though he had only had 1 dose prior to his discharge.  I did speak with his father who was obviously concerned that he was quite manic though other than his apartment in disarray, there is not a danger factor and where I could fill out a petition for commitment nor put him on a 72-hour hold at this time as he has had improvement.                Our team has made contact with his father to ensure readiness for discharge and his family wishes he would stay longer though at this point are aware that petition for commitment could not be pursued..     At time of discharge, there is no evidence that patient is in immediate danger of self or others.     "    Diagnoses:     Bipolar disorder type I most recent episode mixed severe with psychotic features of somatic and paranoid delusions         Labs:     Results for orders placed or performed during the hospital encounter of 08/12/20   Urine Drugs of Abuse Screen Panel 13     Status: Abnormal   Result Value Ref Range    Cannabinoids (71-mwf-4-carboxy-9-THC) Detected, Abnormal Result (A) NDET^Not Detected ng/mL    Phencyclidine (Phencyclidine) Not Detected NDET^Not Detected ng/mL    Cocaine (Benzoylecgonine) Not Detected NDET^Not Detected ng/mL    Methamphetamine (d-Methamphetamine) Not Detected NDET^Not Detected ng/mL    Opiates (Morphine) Not Detected NDET^Not Detected ng/mL    Amphetamine (d-Amphetamine) Detected, Abnormal Result (A) NDET^Not Detected ng/mL    Benzodiazepines (Nordiazepam) Not Detected NDET^Not Detected ng/mL    Tricyclic Antidepressants (Desipramine) Detected, Abnormal Result (A) NDET^Not Detected ng/mL    Methadone (Methadone) Not Detected NDET^Not Detected ng/mL    Barbiturates (Butalbital) Not Detected NDET^Not Detected ng/mL    Oxycodone (Oxycodone) Not Detected NDET^Not Detected ng/mL    Propoxyphene (Norpropoxyphene) Not Detected NDET^Not Detected ng/mL    Buprenorphine (Buprenorphine) Not Detected NDET^Not Detected ng/mL   Alcohol ethyl     Status: None   Result Value Ref Range    Ethanol g/dL <0.01 0.01 g/dL   CBC with platelets differential     Status: Abnormal   Result Value Ref Range    WBC 8.3 4.0 - 11.0 10e9/L    RBC Count 4.57 4.4 - 5.9 10e12/L    Hemoglobin 12.2 (L) 13.3 - 17.7 g/dL    Hematocrit 38.2 (L) 40.0 - 53.0 %    MCV 84 78 - 100 fl    MCH 26.7 26.5 - 33.0 pg    MCHC 31.9 31.5 - 36.5 g/dL    RDW 17.0 (H) 10.0 - 15.0 %    Platelet Count 228 150 - 450 10e9/L    Diff Method Automated Method     % Neutrophils 60.8 %    % Lymphocytes 26.6 %    % Monocytes 9.3 %    % Eosinophils 2.6 %    % Basophils 0.5 %    % Immature Granulocytes 0.2 %    Nucleated RBCs 0 0 /100    Absolute  Neutrophil 5.1 1.6 - 8.3 10e9/L    Absolute Lymphocytes 2.2 0.8 - 5.3 10e9/L    Absolute Monocytes 0.8 0.0 - 1.3 10e9/L    Absolute Eosinophils 0.2 0.0 - 0.7 10e9/L    Absolute Basophils 0.0 0.0 - 0.2 10e9/L    Abs Immature Granulocytes 0.0 0 - 0.4 10e9/L    Absolute Nucleated RBC 0.0    Comprehensive metabolic panel     Status: None   Result Value Ref Range    Sodium 138 133 - 144 mmol/L    Potassium 3.4 3.4 - 5.3 mmol/L    Chloride 104 94 - 109 mmol/L    Carbon Dioxide 26 20 - 32 mmol/L    Anion Gap 8 3 - 14 mmol/L    Glucose 89 70 - 99 mg/dL    Urea Nitrogen 10 7 - 30 mg/dL    Creatinine 0.92 0.66 - 1.25 mg/dL    GFR Estimate >90 >60 mL/min/[1.73_m2]    GFR Estimate If Black >90 >60 mL/min/[1.73_m2]    Calcium 9.3 8.5 - 10.1 mg/dL    Bilirubin Total 0.4 0.2 - 1.3 mg/dL    Albumin 3.9 3.4 - 5.0 g/dL    Protein Total 8.4 6.8 - 8.8 g/dL    Alkaline Phosphatase 91 40 - 150 U/L    ALT 33 0 - 70 U/L    AST 43 0 - 45 U/L   Asymptomatic COVID-19 Virus (Coronavirus) by PCR     Status: None    Specimen: Nasopharyngeal   Result Value Ref Range    COVID-19 Virus PCR to [facility] - Source Nasopharyngeal     COVID-19 Virus PCR to [facility] - Result       Test received-See reflex to [facility] test SARS CoV2 (COVID-19) Virus RT-PCR   SARS-CoV-2 COVID-19 Virus (Coronavirus) RT-PCR Nasopharyngeal     Status: None    Specimen: Nasopharyngeal   Result Value Ref Range    SARS-CoV-2 Virus Specimen Source Nasopharyngeal     SARS-CoV-2 PCR Result NEGATIVE     SARS-CoV-2 PCR Comment       Testing was performed using the Xpert Xpress SARS-CoV-2 Assay on the Cepheid Gene-Xpert   Instrument Systems. Additional information about this Emergency Use Authorization (EUA)   assay can be found via the Lab Guide.                      Discharge Medications:     Current Discharge Medication List      START taking these medications    Details   carBAMazepine (TEGRETOL XR) 200 MG 12 hr tablet Take 1 tablet (200 mg) by mouth 2 times daily  Qty: 60  tablet, Refills: 0    Associated Diagnoses: Bipolar I disorder (H)      LORazepam (ATIVAN) 1 MG tablet Take 1 tablet (1 mg) by mouth 2 times daily  Qty: 10 tablet, Refills: 0    Associated Diagnoses: Bipolar I disorder (H)         CONTINUE these medications which have NOT CHANGED    Details   aspirin 81 MG tablet Take 1 tablet (81 mg) by mouth daily  Qty: 30 tablet    Associated Diagnoses: Russell's esophagus with dysplasia      chlorhexidine (PERIDEX) 0.12 % solution SWISH AND SPIT 15 MLS IN MOUTH 2 TIMES DAILY  Qty: 1893 mL, Refills: 3    Associated Diagnoses: Gingivitis      FLOVENT  MCG/ACT inhaler INHALE 2 PUFFS INTO THE LUNGS 2 TIMES DAILY  Qty: 24 g, Refills: 1    Associated Diagnoses: Chronic lung disease      gabapentin (NEURONTIN) 300 MG capsule TAKE 2 CAPSULES BY MOUTH 3 TIMES DAILY  Qty: 180 capsule, Refills: 3    Associated Diagnoses: Cervical radiculopathy      hydrochlorothiazide (MICROZIDE) 12.5 MG capsule TAKE 1 CAPSULE (12.5 MG) BY MOUTH EVERY MORNING  Qty: 90 capsule, Refills: 3      montelukast (SINGULAIR) 10 MG tablet TAKE 1 TABLET (10 MG) BY MOUTH AT BEDTIME  Qty: 90 tablet, Refills: 0    Associated Diagnoses: Aphthous ulcer of mouth      omeprazole (PRILOSEC) 40 MG DR capsule Take 1 capsule (40 mg) by mouth daily  Qty: 90 capsule, Refills: 3      pilocarpine (SALAGEN) 5 MG tablet Take 1 tablet (5 mg) by mouth 4 times daily  Qty: 360 tablet, Refills: 3    Associated Diagnoses: Clinical xerostomia      verapamil ER (VERELAN) 240 MG 24 hr capsule TAKE 1 CAPSULE (240 MG) BY MOUTH AT BEDTIME  Qty: 90 capsule, Refills: 3    Associated Diagnoses: Essential hypertension         STOP taking these medications       amitriptyline (ELAVIL) 50 MG tablet Comments:   Reason for Stopping:         amphetamine-dextroamphetamine (ADDERALL) 30 MG tablet Comments:   Reason for Stopping:         buPROPion (WELLBUTRIN) 100 MG tablet Comments:   Reason for Stopping:         CHANTIX CONTINUING MONTH QUINN 1 MG  tablet Comments:   Reason for Stopping:         lamoTRIgine (LAMICTAL) 200 MG tablet Comments:   Reason for Stopping:         polyethylene glycol (MIRALAX) 17 GM/SCOOP powder Comments:   Reason for Stopping:                    Psychiatric Examination:   Appearance:  awake, alertsomewhat disheveled  Attitude:  cooperative  Eye Contact:  good  Mood:  anxious and better  Affect:  intensity is heightened  Speech:  pressured speech though significant improvement  Psychomotor Behavior:  no evidence of tardive dyskinesia, dystonia, or tics  Thought Process:  circumstantial though improved  Associations:  loosening of associations present  Thought Content:  no evidence of suicidal ideation or homicidal ideation and no visual hallucinations present  Insight:  limited  Judgment:  limited  Oriented to:  time, person, and place  Attention Span and Concentration:  limited  Recent and Remote Memory:  intact  Fund of Knowledge: appropriate  Muscle Strength and Tone: normal  Gait and Station: Normal.       Discharge Plan:       This discharge is AGAINST MEDICAL ADVICE      I will send his primary care provider a message letting him know that I did discharge him and that Tegretol was sent to his pharmacy as well as a small supply of Ativan.  I will let him know the sodium level will need to be drawn              Attestation:  The patient has been seen and evaluated by me,  April Dara Juarez NP         Discharge Services Provided:   30 minutes spent on discharge services, including:  Final examination of patient.  Review and discussion of Hospital stay.  Instructions for continued outpatient care/goals.  Preparation of discharge records.  Preparation of medications refills and new prescriptions.  Preparation of Applicable referral forms.

## 2020-08-14 NOTE — PLAN OF CARE
Pt signed 12 hr intent at 18:47 to leave.   This writer notified NP at 18:55, NP states pt is not a harm to self or others would not be able to place hold on pt. NP states she would be putting order in for pt to discharge AMA. Pt states he uses [facility] in [place], which closes at 20:00 so this writer will administer pt's HS medications before discharge. This writer will notify pt to  medications in the morning at [facility].    Nataly Venegas RN on 8/13/2020 at 7:27 PM

## 2020-08-14 NOTE — PLAN OF CARE
Discharge Note    Patient Discharged to home on 8/13/2020 8:17 PM via Private Car accompanied by staff.     Patient informed of discharge instructions in AVS. patient verbalizes understanding and denies having any questions pertaining to AVS. Patient stable at time of discharge. Patient denies SI, HI, and thoughts of self harm at time of discharge. Pt is discharging AMA, after signing 12 hr intent.  All personal belongings returned to patient. Discharge prescriptions sent to [facility] in [place] via electronic communication.     Nataly Venegas RN  8/13/2020  8:31 PM

## 2020-08-21 NOTE — PROGRESS NOTES
"Subjective     Andrei Whitman is a 49 year old male who presents to clinic today for the following health issues:    HPI       Depression and Anxiety Follow-Up    How are you doing with your depression since your last visit? { :087452::\"No change\"}    How are you doing with your anxiety since your last visit?  { :323292::\"No change\"}    Are you having other symptoms that might be associated with depression or anxiety? { :128956}    Have you had a significant life event? { :509636}     Do you have any concerns with your use of alcohol or other drugs? { :488986}    Social History     Tobacco Use     Smoking status: Current Every Day Smoker     Packs/day: 0.50     Years: 30.00     Pack years: 15.00     Start date: 1/1/1989     Smokeless tobacco: Never Used     Tobacco comment: quitplan referral declined 6/19/19 on chantax   Substance Use Topics     Alcohol use: No     Alcohol/week: 0.0 standard drinks     Drug use: Yes     Types: Marijuana     PHQ 1/30/2020 2/13/2020 3/26/2020   PHQ-9 Total Score 10 11 12   Q9: Thoughts of better off dead/self-harm past 2 weeks Not at all Several days Not at all   F/U: Thoughts of suicide or self-harm - - -   F/U: Safety concerns - - -     SONDRA-7 SCORE 1/30/2020 2/13/2020 3/26/2020   Total Score 10 9 11     {Last PHQ9 or GAD7 Responses (Optional):453561}    Suicide Assessment Five-step Evaluation and Treatment (SAFE-T)      How many servings of fruits and vegetables do you eat daily?  { :303037}    On average, how many sweetened beverages do you drink each day (Examples: soda, juice, sweet tea, etc.  Do NOT count diet or artificially sweetened beverages)?   { 1-11:763565}    How many days per week do you exercise enough to make your heart beat faster? { :090294}    How many minutes a day do you exercise enough to make your heart beat faster? { :558074}    How many days per week do you miss taking your medication? {0-7 :796268}    Concern - hernia   Onset: ***  Description: " "***  Intensity: {.:034313}  Progression of Symptoms:  {.:711866}  Accompanying Signs & Symptoms: ***  Previous history of similar problem: ***  Precipitating factors:        Worsened by: ***  Alleviating factors:        Improved by: ***  Therapies tried and outcome: {NONE DEFAULTED:683986::\" none \"}  {additonal problems for provider to add (Optional):750261}    Review of Systems   {ROS COMP (Optional):784557}      Objective    There were no vitals taken for this visit.  There is no height or weight on file to calculate BMI.  Physical Exam   {Exam List (Optional):005454}    {Diagnostic Test Results (Optional):943218}        {PROVIDER CHARTING PREFERENCE:292161}    "

## 2020-09-03 ENCOUNTER — ANCILLARY PROCEDURE (OUTPATIENT)
Dept: GENERAL RADIOLOGY | Facility: OTHER | Age: 50
End: 2020-09-03
Attending: INTERNAL MEDICINE
Payer: MEDICARE

## 2020-09-03 ENCOUNTER — OFFICE VISIT (OUTPATIENT)
Dept: PEDIATRICS | Facility: OTHER | Age: 50
End: 2020-09-03
Attending: INTERNAL MEDICINE
Payer: MEDICARE

## 2020-09-03 VITALS
OXYGEN SATURATION: 98 % | HEART RATE: 73 BPM | SYSTOLIC BLOOD PRESSURE: 122 MMHG | TEMPERATURE: 98.6 F | WEIGHT: 209.2 LBS | BODY MASS INDEX: 28.37 KG/M2 | DIASTOLIC BLOOD PRESSURE: 58 MMHG

## 2020-09-03 DIAGNOSIS — M79.672 LEFT FOOT PAIN: ICD-10-CM

## 2020-09-03 DIAGNOSIS — F31.9 BIPOLAR I DISORDER (H): ICD-10-CM

## 2020-09-03 DIAGNOSIS — M79.645 THUMB PAIN, LEFT: ICD-10-CM

## 2020-09-03 DIAGNOSIS — M79.642 PAIN OF LEFT HAND: ICD-10-CM

## 2020-09-03 DIAGNOSIS — M79.642 PAIN OF LEFT HAND: Primary | ICD-10-CM

## 2020-09-03 DIAGNOSIS — K11.7 CLINICAL XEROSTOMIA: ICD-10-CM

## 2020-09-03 PROCEDURE — 73630 X-RAY EXAM OF FOOT: CPT | Mod: TC,LT

## 2020-09-03 PROCEDURE — 99214 OFFICE O/P EST MOD 30 MIN: CPT | Performed by: INTERNAL MEDICINE

## 2020-09-03 PROCEDURE — 73120 X-RAY EXAM OF HAND: CPT | Mod: TC,LT

## 2020-09-03 PROCEDURE — G0463 HOSPITAL OUTPT CLINIC VISIT: HCPCS

## 2020-09-03 RX ORDER — LORAZEPAM 1 MG/1
1 TABLET ORAL 2 TIMES DAILY PRN
Qty: 20 TABLET | Refills: 0 | Status: SHIPPED | OUTPATIENT
Start: 2020-09-03 | End: 2020-10-05

## 2020-09-03 RX ORDER — PILOCARPINE HYDROCHLORIDE 5 MG/1
5 TABLET, FILM COATED ORAL 4 TIMES DAILY
Qty: 360 TABLET | Refills: 3 | Status: SHIPPED | OUTPATIENT
Start: 2020-09-03 | End: 2021-12-09

## 2020-09-03 ASSESSMENT — ANXIETY QUESTIONNAIRES
4. TROUBLE RELAXING: MORE THAN HALF THE DAYS
6. BECOMING EASILY ANNOYED OR IRRITABLE: MORE THAN HALF THE DAYS
GAD7 TOTAL SCORE: 11
7. FEELING AFRAID AS IF SOMETHING AWFUL MIGHT HAPPEN: MORE THAN HALF THE DAYS
IF YOU CHECKED OFF ANY PROBLEMS ON THIS QUESTIONNAIRE, HOW DIFFICULT HAVE THESE PROBLEMS MADE IT FOR YOU TO DO YOUR WORK, TAKE CARE OF THINGS AT HOME, OR GET ALONG WITH OTHER PEOPLE: VERY DIFFICULT
2. NOT BEING ABLE TO STOP OR CONTROL WORRYING: SEVERAL DAYS
3. WORRYING TOO MUCH ABOUT DIFFERENT THINGS: SEVERAL DAYS
1. FEELING NERVOUS, ANXIOUS, OR ON EDGE: MORE THAN HALF THE DAYS
5. BEING SO RESTLESS THAT IT IS HARD TO SIT STILL: SEVERAL DAYS

## 2020-09-03 ASSESSMENT — PATIENT HEALTH QUESTIONNAIRE - PHQ9: SUM OF ALL RESPONSES TO PHQ QUESTIONS 1-9: 9

## 2020-09-03 ASSESSMENT — PAIN SCALES - GENERAL: PAINLEVEL: SEVERE PAIN (6)

## 2020-09-03 NOTE — NURSING NOTE
Chief Complaint   Patient presents with     Hospital F/U       Initial /58 (BP Location: Right arm, Patient Position: Chair, Cuff Size: Adult Large)   Pulse 73   Temp 98.6  F (37  C) (Tympanic)   Wt 94.9 kg (209 lb 3.2 oz)   SpO2 98%   BMI 28.37 kg/m   Estimated body mass index is 28.37 kg/m  as calculated from the following:    Height as of 8/12/20: 1.829 m (6').    Weight as of this encounter: 94.9 kg (209 lb 3.2 oz).  Medication Reconciliation: complete  Costa Figueroa LPN

## 2020-09-03 NOTE — PROGRESS NOTES
Subjective     Andrei Whitman is a 49 year old male who presents to clinic today for the following health issues:    [facility]       Hospital Follow-up Visit:    Hospital/Nursing Home/IP Rehab Facility: [facility]  Date of Admission: 8-  Date of Discharge: 8-  Reason(s) for Admission: bipolar and paranoid psychosis       Was your hospitalization related to COVID-19? No   Problems taking medications regularly:  None  Medication changes since discharge: None  Problems adhering to non-medication therapy:  None    Summary of hospitalization:  [facility] discharge summary reviewed  Diagnostic Tests/Treatments reviewed.  Follow up needed: none  Other Healthcare Providers Involved in Patient s Care:         None  Update since discharge: improved.       Andrei was switched from lithium to Lamictal due to several side effects from his lithium.  He started having paranoia for several months and had refused to come into the clinic during this time.  He was finally admitted to the hospital and started on Tegretol as he refused to go back on Lithium.  He denies any delusions     Post Discharge Medication Reconciliation: discharge medications reconciled, continue medications without change.  Plan of care communicated with patient          Wt Readings from Last 5 Encounters:   09/03/20 94.9 kg (209 lb 3.2 oz)   08/12/20 89.7 kg (197 lb 12.8 oz)   07/24/20 104.3 kg (230 lb)   03/26/20 110.7 kg (244 lb)   02/13/20 111.6 kg (246 lb)     Musculoskeletal problem/pain  Onset/Duration: ongoing 5 days   Description  Location: toes - left foot and left palm pain over the thumb  Joint Swelling: YES  Redness: YES  Pain: YES  Warmth: no  Intensity:  moderate  Progression of Symptoms:  worsening and same  Accompanying signs and symptoms:   Fevers: no  Numbness/tingling/weakness: YES  History  Trauma to the area: YES  Recent illness:  no  Previous similar problem: no  Previous evaluation:  no  Precipitating or  alleviating factors:  Aggravating factors include: walking and climbing stairs  Therapies tried and outcome: nothing        He reports that he had a fall onto the left hip back in March.  His hand pain started at the same time and he had another fall in July onto the hand.  He recently fell a his lateral toes bent under his foot a few days ago.  He reports numbness of dorsal foot to the knee.    Past Medical History:   Diagnosis Date     Russell's esophagus 07/12/2017    repeat EGD in one year.     Bipolar disorder (H)      Chronic cervical pain      Colon polyp, hyperplastic 07/12/2017    rectal     Colon polyps 07/12/2017    Descending colon x1 and sigmoid colon x1     DDD (degenerative disc disease), cervical      Depression, major      Dry mouth 8/12/2020     Pancolonic diverticulosis 07/12/2017       Review of Systems   Constitutional, HEENT, cardiovascular, pulmonary, gi and gu systems are negative, except as otherwise noted.      Objective    /58 (BP Location: Right arm, Patient Position: Chair, Cuff Size: Adult Large)   Pulse 73   Temp 98.6  F (37  C) (Tympanic)   Wt 94.9 kg (209 lb 3.2 oz)   SpO2 98%   BMI 28.37 kg/m    Body mass index is 28.37 kg/m .  Physical Exam   GENERAL: healthy, alert and no distress  EYES: Eyes grossly normal to inspection, PERRL, EOMI and conjunctivae and sclerae normal  NECK: no adenopathy, no asymmetry, masses, or scars and thyroid normal to palpation  RESP: lungs clear to auscultation - no rales, rhonchi or wheezes  CV: regular rate and rhythm, normal S1 S2, no S3 or S4, no murmur, click or rub, no peripheral edema and peripheral pulses strong  ABDOMEN: soft, nontender, no hepatosplenomegaly, no masses and bowel sounds normal  ABDOMEN: no bruits heard  MS: no gross musculoskeletal defects noted, no edema  MS: extremities normal- no gross deformities noted  No obvious deformities of the left foot or left hand.  He has normal DP and TP pulses on the left and normal  radial pulses on the left.  There is no tenderness to palpation of the left thumb or left forefoot.   PSYCH: mentation appears normal, affect normal/bright  PSYCH: speech pressured    Results for orders placed or performed in visit on 09/03/20   XR Hand Left 2 Views (Clinic Performed)     Status: None    Narrative    PROCEDURE:  XR HAND LT 2 VW    HISTORY: Pain of left hand; Thumb pain, left    COMPARISON:  None.    TECHNIQUE:  3 views of the left hand were obtained.    FINDINGS:  There are mild degenerative arthritic changes seen at the  distal interphalangeal joint of the second finger and at the trapezium  first metacarpal articulation. The remainder of the joints appear  normal. There are no fractures or dislocations.       Impression    IMPRESSION: Arthritic changes distal interphalangeal joint of the  second finger and at the trapezium first metacarpal articulation.      TANA GARZON MD   Results for orders placed or performed in visit on 09/03/20   XR FOOT LT G/E 3 VW (Clinic Performed)     Status: None    Narrative    PROCEDURE:  XR FOOT LT G/E 3 VW    HISTORY: Left foot pain    COMPARISON:  None.    TECHNIQUE:  3 views of the left foot were obtained.    FINDINGS:  No fracture or dislocation is identified. The joint spaces  are preserved.  There is bony spurring at the insertion of the  Achilles tendon into the calcaneus.      Impression    IMPRESSION: Calcaneal spur      TANA GARZON MD               ASSESSMENT /PLAN:    (F31.9) Bipolar I disorder (H)  Comment: Stable on TEGRETOL 200 MG BID.  Plan:   He will continue Tegretol 200 mg BID and LORazepam (ATIVAN) 1 MG tablet twice daily PRN for agitation/ anxiety.    (K11.7) Clinical xerostomia  Comment: Improved somewhat off of lithium  Plan:  Continue pilocarpine (SALAGEN) 5 MG tablet 4 times daily and Peridex twice per day.    (M79.672) Left foot pain  Comment: His xray is negative and he likely has a contusion of the  foot.  Plan:  Reassurance.given.    (M90.452) Pain of left hand  (primary encounter diagnosis)  Comment: Possible sprain of the extensor tendons of the thumb.  His xray of the hand is normal  Plan:   Reassurance.given.    (M12.686) Thumb pain, left  Comment: As above   Plan:  Reassurance.given.    Follow up with Provider - Follow up in 2 months for Bipolar disorder and HTN    Tommy Lamb, DO, DO

## 2020-09-04 ASSESSMENT — ANXIETY QUESTIONNAIRES: GAD7 TOTAL SCORE: 11

## 2020-09-10 DIAGNOSIS — I10 ESSENTIAL HYPERTENSION: Primary | ICD-10-CM

## 2020-09-10 RX ORDER — HYDROCHLOROTHIAZIDE 12.5 MG/1
CAPSULE ORAL
Qty: 90 CAPSULE | Refills: 3 | Status: SHIPPED | OUTPATIENT
Start: 2020-09-10 | End: 2021-09-15

## 2020-09-10 NOTE — TELEPHONE ENCOUNTER
Microzide       Last Written Prescription Date:  9/23/2019  Last Fill Quantity: 90,   # refills: 3  Last Office Visit: 9/3/2020  Future Office visit:

## 2020-09-18 DIAGNOSIS — K12.0 APHTHOUS ULCER OF MOUTH: ICD-10-CM

## 2020-09-18 DIAGNOSIS — K21.9 GASTROESOPHAGEAL REFLUX DISEASE, ESOPHAGITIS PRESENCE NOT SPECIFIED: Primary | ICD-10-CM

## 2020-09-18 NOTE — TELEPHONE ENCOUNTER
Singulair       Last Written Prescription Date:  6/3/2020  Last Fill Quantity: 90,   # refills: 0    Prilosec       Last Written Prescription Date:  9/23/2019  Last Fill Quantity: 90,   # refills: 3  Last Office Visit: 9/3/2020  Future Office visit:

## 2020-09-20 RX ORDER — OMEPRAZOLE 40 MG/1
40 CAPSULE, DELAYED RELEASE ORAL DAILY
Qty: 90 CAPSULE | Refills: 3 | Status: SHIPPED | OUTPATIENT
Start: 2020-09-20 | End: 2021-09-15

## 2020-09-20 RX ORDER — MONTELUKAST SODIUM 10 MG/1
10 TABLET ORAL AT BEDTIME
Qty: 90 TABLET | Refills: 0 | Status: SHIPPED | OUTPATIENT
Start: 2020-09-20 | End: 2021-01-29

## 2020-09-25 DIAGNOSIS — I10 ESSENTIAL HYPERTENSION: ICD-10-CM

## 2020-09-25 RX ORDER — VERAPAMIL HYDROCHLORIDE 240 MG/1
CAPSULE, EXTENDED RELEASE ORAL
Qty: 90 CAPSULE | Refills: 1 | Status: SHIPPED | OUTPATIENT
Start: 2020-09-25 | End: 2021-05-18

## 2020-10-04 DIAGNOSIS — F31.9 BIPOLAR I DISORDER (H): ICD-10-CM

## 2020-10-05 ENCOUNTER — TELEPHONE (OUTPATIENT)
Dept: PEDIATRICS | Facility: OTHER | Age: 50
End: 2020-10-05

## 2020-10-05 RX ORDER — LORAZEPAM 1 MG/1
TABLET ORAL
Qty: 20 TABLET | Refills: 1 | Status: SHIPPED | OUTPATIENT
Start: 2020-10-05 | End: 2022-02-22

## 2020-10-05 NOTE — TELEPHONE ENCOUNTER
Ativan       Last Written Prescription Date:  9/3/2020  Last Fill Quantity: 20,   # refills: 0  Last Office Visit: 9/3/2020  Future Office visit:

## 2020-10-05 NOTE — TELEPHONE ENCOUNTER
Pt called stating that  was going to work on paperwork so that his drivers license would not get taken away. Patient can be reached at [phone]

## 2020-10-13 ENCOUNTER — TELEPHONE (OUTPATIENT)
Dept: PEDIATRICS | Facility: OTHER | Age: 50
End: 2020-10-13
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
## 2020-10-19 DIAGNOSIS — J98.4 CHRONIC LUNG DISEASE: ICD-10-CM

## 2020-10-19 RX ORDER — FLUTICASONE PROPIONATE 220 UG/1
AEROSOL, METERED RESPIRATORY (INHALATION)
Qty: 24 G | Refills: 1 | Status: SHIPPED | OUTPATIENT
Start: 2020-10-19 | End: 2021-05-26

## 2020-10-19 NOTE — TELEPHONE ENCOUNTER
flovent inhaler       Last Written Prescription Date:  4-  Last Fill Quantity: 24g,   # refills: 1  Last Office Visit: 9-3-2020  Future Office visit:

## 2020-11-06 NOTE — PROGRESS NOTES
"Subjective     Andrei Whitman is a 49 year old male who presents to clinic today for the following health issues:    HPI          New Patient/Transfer of Care - Dr Lamb.    Due for hep C screen.  Due for Tdap, Flu vaccines.  Declines.    Taken off off Lithium and changed Lamictal.  \"went crazy.\"  Had paranoid thoughts.  Went to ER.  Then psych.  Taken off Adderall, Chantix, Elavil.  Put on Tegretol and Ativan.  No ongoing psychiatry follow up.  Remote HC.  Had quit - too many changes in providers.  Mood feels better now.  Thinking is clearer.  Low motivation.  Not getting things done.  Would like to see Dr Hammond.    Tobacco use - 1/2 ppd - off chantix.  Can't tolerate nicotine replacement.  Negative COPD lung tests.  Flovent helps.  Takes regularly.  Singulair too.    On neurontin for pain.   Last MRI cervical spine 2018.    Takes Pilocarpin for try mouth.  peridex oral cares.    Hypertension Follow-up      Do you check your blood pressure regularly outside of the clinic? No     Are you following a low salt diet? No    Are your blood pressures ever more than 140 on the top number (systolic) OR more   than 90 on the bottom number (diastolic), for example 140/90? No   On hydrochlorothiazide.  Prior swelling in ankles.    On Verapamil too.    Depression and Anxiety Follow-Up    How are you doing with your depression since your last visit? Improved     How are you doing with your anxiety since your last visit?  Improved     Are you having other symptoms that might be associated with depression or anxiety? No    Have you had a significant life event? No     Do you have any concerns with your use of alcohol or other drugs? No     Ativan - if doesn't take it regularly - feels mood swings up/down; if takes daily - feels better    Social History     Tobacco Use     Smoking status: Current Every Day Smoker     Packs/day: 0.50     Years: 30.00     Pack years: 15.00     Start date: 1/1/1989     Smokeless tobacco: Never " Used     Tobacco comment: quitplan referral declined 6/19/19 on chantax   Substance Use Topics     Alcohol use: No     Alcohol/week: 0.0 standard drinks     Drug use: Yes     Types: Marijuana     PHQ 2/13/2020 3/26/2020 9/3/2020   PHQ-9 Total Score 11 12 9   Q9: Thoughts of better off dead/self-harm past 2 weeks Several days Not at all Not at all   F/U: Thoughts of suicide or self-harm - - -   F/U: Safety concerns - - -     SONDRA-7 SCORE 2/13/2020 3/26/2020 9/3/2020   Total Score 9 11 11         Suicide Assessment Five-step Evaluation and Treatment (SAFE-T)    Chronic/Recurring Back Pain Follow Up      Where is your back pain located? (Select all that apply) low back bilateral    How would you describe your back pain?  dull ache    Where does your back pain spread? nowhere    Since your last clinic visit for back pain, how has your pain changed? unchanged    Does your back pain interfere with your job? Not applicable    Since your last visit, have you tried any new treatment? No      How many servings of fruits and vegetables do you eat daily?  0-1    On average, how many sweetened beverages do you drink each day (Examples: soda, juice, sweet tea, etc.  Do NOT count diet or artificially sweetened beverages)?   litter     How many days per week do you exercise enough to make your heart beat faster? 3 or less    How many minutes a day do you exercise enough to make your heart beat faster? 9 or less    How many days per week do you miss taking your medication? 0    Prior DARRYL.  Last MRI 2018. Flex.    GERD/Heartburn  Onset/Duration: ongoing issue  Description: acid reflux   Intensity: mild  Progression of Symptoms: same  Accompanying Signs & Symptoms:  Does it feel like food gets stuck or trouble swallowing: YES  Nausea: no  Vomiting (bloody?): no  Abdominal Pain: YES  Black-Tarry stools: no  Bloody stools: no  History:  Previous similar episodes: YES  Previous ulcers: no  Precipitating factors:   Caffeine use:  YES  Alcohol use: no  NSAID/Aspirin use: YES  Tobacco use: YES  Worse with when he misses a dose of medication .  Alleviating factors: Prilosec   Therapies tried and outcome:             Lifestyle changes: None            Medications: Omeprazole (Prilosec) - working well    Insomnia  Onset/Duration: ongoing   Description:   Frequency of insomnia:  1-2 times a week  Time to fall asleep (sleep latency): 1 hour  Middle of night awakening:  YES  Early morning awakening:  YES  Progression of Symptoms:  improving  Accompanying Signs & Symptoms:  Daytime sleepiness/napping: no  Excessive snoring/apnea: no  Restless legs: YES  Waking to urinate: YES  Chronic pain:  YES  Depression symptoms (if yes, do PHQ9): YES  Anxiety symptoms (if yes, do SONDRA-7): YES  History:  Prior Insomnia: YES  New stressful situation: no  Precipitating factors:   Caffeine intake: YES  OTC decongestants: no  Any new medications: no  Alleviating factors:  Self medicating (alcohol, etc.):  no  Stress-reduction (exercise, yoga, meditation etc): no  Therapies tried and outcome: none      COPD Follow-Up    Overall, how are your COPD symptoms since your last clinic visit?  No change    How much fatigue or shortness of breath do you have when you are walking?  Same as usual    How much shortness of breath do you have when you are resting?  Same as usual    How often do you cough? Sometimes    Have you noticed any change in your sputum/phlegm?  No    Have you experienced a recent fever? No    Please describe how far you can walk without stopping to rest:  The length of 3-5 rooms    How many flights of stairs are you able to walk up without stopping?  1    Have you had any Emergency Room Visits, Urgent Care Visits, or Hospital Admissions because of your COPD since your last office visit?  No    History   Smoking Status     Current Every Day Smoker     Packs/day: 0.50     Years: 30.00     Start date: 1/1/1989   Smokeless Tobacco     Never Used     Comment:  quitplan referral declined 6/19/19 on chantax     No results found for: FEV1, TUR2HHJ      Review of Systems   Constitutional, HEENT, cardiovascular, pulmonary, gi and gu systems are negative, except as otherwise noted.      Objective    /78 (BP Location: Left arm, Patient Position: Chair, Cuff Size: Adult Regular)   Pulse 66   Temp 96.8  F (36  C) (Tympanic)   Ht 1.829 m (6')   Wt 104.3 kg (230 lb)   SpO2 98%   BMI 31.19 kg/m    Body mass index is 31.19 kg/m .  Physical Exam   GENERAL: healthy, alert and no distress  EYES: Eyes grossly normal to inspection, PERRL and conjunctivae and sclerae normal  NECK: no adenopathy, no asymmetry, masses, or scars and thyroid normal to palpation  RESP: lungs clear to auscultation - no rales, rhonchi or wheezes  CV: regular rate and rhythm, normal S1 S2, no S3 or S4, no murmur, click or rub, no peripheral edema and peripheral pulses strong  ABDOMEN: soft, nontender, no hepatosplenomegaly, no masses and bowel sounds normal  MS: no gross musculoskeletal defects noted, no edema  PSYCH: mentation appears normal, affect normal/bright            Assessment & Plan     Encounter for medical examination to establish care  Dr Lamb transfer.  All records here.    Benign essential hypertension  Stable.  Continue Verapamil and hydrochlorothiazide.    Tobacco use disorder  Ongoing use.  Not ready to quit.    Encounter for tobacco use cessation counseling  As above.    Dry mouth  Secondary to medications.  Using Salagen.    Bipolar disease, chronic (H)  Referral placed to psychiatry.  Has seen Dr Hammond in the past.  Significant other sees her as well.  Currently stable with Tegretol.    Mood disorder (H)  As above.    Gastroesophageal reflux disease, unspecified whether esophagitis present  Stable with PPI use.    DDD (degenerative disc disease), cervical  Pain tolerable with Neurontin.   Consider repeat imaging if ready to move toward injections, procedures.      Essential  hypertension  Stable as above.       Tobacco Cessation:   reports that he has been smoking. He started smoking about 31 years ago. He has a 15.00 pack-year smoking history. He has never used smokeless tobacco.  Tobacco Cessation Action Plan: Information offered: Patient not interested at this time      BMI:   Estimated body mass index is 31.19 kg/m  as calculated from the following:    Height as of this encounter: 1.829 m (6').    Weight as of this encounter: 104.3 kg (230 lb).            Sayda Solorzano MD  [facility] [place]

## 2020-11-07 NOTE — TELEPHONE ENCOUNTER
Unfortunately, I am not going to be able to do this for WENDY due to tight time constrictions..  I suggest that he has his new provider address this issue.

## 2020-11-11 ENCOUNTER — OFFICE VISIT (OUTPATIENT)
Dept: FAMILY MEDICINE | Facility: OTHER | Age: 50
End: 2020-11-11
Attending: FAMILY MEDICINE
Payer: MEDICARE

## 2020-11-11 VITALS
HEART RATE: 66 BPM | TEMPERATURE: 96.8 F | SYSTOLIC BLOOD PRESSURE: 112 MMHG | HEIGHT: 72 IN | BODY MASS INDEX: 31.15 KG/M2 | WEIGHT: 230 LBS | DIASTOLIC BLOOD PRESSURE: 78 MMHG | OXYGEN SATURATION: 98 %

## 2020-11-11 DIAGNOSIS — Z00.00 ENCOUNTER FOR MEDICAL EXAMINATION TO ESTABLISH CARE: Primary | ICD-10-CM

## 2020-11-11 DIAGNOSIS — F31.9 BIPOLAR DISEASE, CHRONIC (H): ICD-10-CM

## 2020-11-11 DIAGNOSIS — F39 MOOD DISORDER (H): ICD-10-CM

## 2020-11-11 DIAGNOSIS — F17.200 TOBACCO USE DISORDER: ICD-10-CM

## 2020-11-11 DIAGNOSIS — K21.9 GASTROESOPHAGEAL REFLUX DISEASE, UNSPECIFIED WHETHER ESOPHAGITIS PRESENT: ICD-10-CM

## 2020-11-11 DIAGNOSIS — Z71.6 ENCOUNTER FOR TOBACCO USE CESSATION COUNSELING: ICD-10-CM

## 2020-11-11 DIAGNOSIS — I10 BENIGN ESSENTIAL HYPERTENSION: ICD-10-CM

## 2020-11-11 DIAGNOSIS — I10 ESSENTIAL HYPERTENSION: ICD-10-CM

## 2020-11-11 DIAGNOSIS — R68.2 DRY MOUTH: ICD-10-CM

## 2020-11-11 DIAGNOSIS — M50.30 DDD (DEGENERATIVE DISC DISEASE), CERVICAL: ICD-10-CM

## 2020-11-11 PROCEDURE — G0463 HOSPITAL OUTPT CLINIC VISIT: HCPCS

## 2020-11-11 PROCEDURE — 99213 OFFICE O/P EST LOW 20 MIN: CPT | Performed by: FAMILY MEDICINE

## 2020-11-11 ASSESSMENT — MIFFLIN-ST. JEOR: SCORE: 1956.27

## 2020-11-11 ASSESSMENT — PAIN SCALES - GENERAL: PAINLEVEL: SEVERE PAIN (6)

## 2020-11-11 NOTE — NURSING NOTE
Chief Complaint   Patient presents with     Establish Care       Initial /78 (BP Location: Left arm, Patient Position: Chair, Cuff Size: Adult Regular)   Pulse 66   Temp 96.8  F (36  C) (Tympanic)   Ht 1.829 m (6')   Wt 104.3 kg (230 lb)   SpO2 98%   BMI 31.19 kg/m   Estimated body mass index is 31.19 kg/m  as calculated from the following:    Height as of this encounter: 1.829 m (6').    Weight as of this encounter: 104.3 kg (230 lb).  Medication Reconciliation: complete  Costa Figueroa LPN

## 2020-11-12 ASSESSMENT — ANXIETY QUESTIONNAIRES
7. FEELING AFRAID AS IF SOMETHING AWFUL MIGHT HAPPEN: SEVERAL DAYS
4. TROUBLE RELAXING: MORE THAN HALF THE DAYS
6. BECOMING EASILY ANNOYED OR IRRITABLE: SEVERAL DAYS
GAD7 TOTAL SCORE: 9
1. FEELING NERVOUS, ANXIOUS, OR ON EDGE: SEVERAL DAYS
2. NOT BEING ABLE TO STOP OR CONTROL WORRYING: MORE THAN HALF THE DAYS
3. WORRYING TOO MUCH ABOUT DIFFERENT THINGS: SEVERAL DAYS
5. BEING SO RESTLESS THAT IT IS HARD TO SIT STILL: SEVERAL DAYS

## 2020-11-12 ASSESSMENT — PATIENT HEALTH QUESTIONNAIRE - PHQ9: SUM OF ALL RESPONSES TO PHQ QUESTIONS 1-9: 10

## 2020-11-13 ASSESSMENT — ANXIETY QUESTIONNAIRES: GAD7 TOTAL SCORE: 9

## 2020-11-16 NOTE — TELEPHONE ENCOUNTER
Copy of DMV letter placed in scanning and copy placed at PAC desk(brown folder under G). Original that was found on PAC desk placed in mail back to patient.

## 2020-11-19 ENCOUNTER — TELEPHONE (OUTPATIENT)
Dept: FAMILY MEDICINE | Facility: OTHER | Age: 50
End: 2020-11-19

## 2020-11-19 NOTE — TELEPHONE ENCOUNTER
UNABLE TO REACH PATIENT TO DISCUSS MH REFERRAL.     Dr. Hammond is not taking new patients at this time. Unable to discuss options with patient. Will be sending a letter and deferring MH referral until patient calls/contacts us.

## 2020-11-25 DIAGNOSIS — F31.9 BIPOLAR I DISORDER (H): ICD-10-CM

## 2020-11-25 RX ORDER — LORAZEPAM 1 MG/1
TABLET ORAL
Qty: 20 TABLET | OUTPATIENT
Start: 2020-11-25

## 2020-11-25 NOTE — TELEPHONE ENCOUNTER
lorazepam      Last Written Prescription Date:  10/5/20  Last Fill Quantity: 20,   # refills: 1  Last Office Visit: 11/11/20  Future Office visit:       Routing refill request to provider for review/approval because:  Drug not on the G, P or [facility] refill protocol or controlled substance

## 2020-12-16 DIAGNOSIS — M54.12 CERVICAL RADICULOPATHY: ICD-10-CM

## 2020-12-16 RX ORDER — GABAPENTIN 300 MG/1
CAPSULE ORAL
Qty: 180 CAPSULE | Refills: 0 | Status: SHIPPED | OUTPATIENT
Start: 2020-12-16 | End: 2021-01-15

## 2020-12-16 NOTE — TELEPHONE ENCOUNTER
Gabapentin 300 MG      Last Written Prescription Date:  11-  Last Fill Quantity: 180,   # refills: 0  Last Office Visit: 11-  Future Office visit:    Next 5 appointments (look out 90 days)    Dec 28, 2020 11:00 AM  Telephone Visit with Annette Hammond MD  [facility] [place] ([facility] [place] ) [address] [place] [address]  [place] [address] [phone]   Jan 07, 2021  9:45 AM  (Arrive by 9:30 AM)  SHORT with Sayda Paula MD  [facility] [place] ([facility] [place] ) [address]  [place] [address]  [phone]

## 2020-12-24 NOTE — PROGRESS NOTES
Subjective     Andrei Whitman is a 50 year old male who presents to clinic today for the following health issues:    HPI         Hypertension Follow-up    Do you check your blood pressure regularly outside of the clinic? No     Are you following a low salt diet? No    Are your blood pressures ever more than 140 on the top number (systolic) OR more   than 90 on the bottom number (diastolic), for example 140/90? No   Normal CMP 8/2020    Depression and Anxiety Follow-Up - follows with psychiatry - DR Hammond    How are you doing with your depression since your last visit? Worsened     How are you doing with your anxiety since your last visit?  Worsened     Are you having other symptoms that might be associated with depression or anxiety? No    Have you had a significant life event? No     Do you have any concerns with your use of alcohol or other drugs? No    Social History     Tobacco Use     Smoking status: Current Every Day Smoker     Packs/day: 0.50     Years: 30.00     Pack years: 15.00     Start date: 1/1/1989     Smokeless tobacco: Never Used     Tobacco comment: quitplan referral declined 6/19/19 on chantax   Substance Use Topics     Alcohol use: No     Alcohol/week: 0.0 standard drinks     Drug use: Yes     Types: Marijuana     PHQ 11/12/2020 12/28/2020 1/7/2021   PHQ-9 Total Score 10 14 14   Q9: Thoughts of better off dead/self-harm past 2 weeks Not at all Not at all Not at all   F/U: Thoughts of suicide or self-harm - - -   F/U: Safety concerns - - -     SONDRA-7 SCORE 11/12/2020 12/28/2020 1/7/2021   Total Score 9 13 14         Suicide Assessment Five-step Evaluation and Treatment (SAFE-T)    Chronic Pain Follow-Up  Where in your body do you have pain? Right shoulder, right side of neck, right arm; arm feels weak, tingling; left leg/foot numbness; flare ups with any lifting - groceries, laundry, walking; x months, progressive past 2 weeks  How has your pain affected your ability to work? Unable to  work  Which of these pain treatments have you tried since your last clinic visit? Other: none  How well are you sleeping? Poor  How has your mood been since your last visit? Much worse  Have you had a significant life event? No  Other aggravating factors: movement  Taking medication as directed? Yes  Last MRI cervical and lumbar 2018; referral to [facility] spine then.  Additional MRI then done [facility].  Surgery advised.  Phone broke - none for 2 months - never got surgery scheduled.  Other social stressors, separation.  Neck, back had seemed better for a while.    PHQ-9 SCORE 11/12/2020 12/28/2020 1/7/2021   PHQ-9 Total Score - - -   PHQ-9 Total Score 10 14 14     SONDRA-7 SCORE 11/12/2020 12/28/2020 1/7/2021   Total Score 9 13 14     No flowsheet data found.  Encounter-Level CSA:    There are no encounter-level csa.     Patient-Level CSA:    There are no patient-level csa.         How many servings of fruits and vegetables do you eat daily?  0-1    On average, how many sweetened beverages do you drink each day (Examples: soda, juice, sweet tea, etc.  Do NOT count diet or artificially sweetened beverages)?   4    How many days per week do you exercise enough to make your heart beat faster? 3 or less    How many minutes a day do you exercise enough to make your heart beat faster? 9 or less    How many days per week do you miss taking your medication? 0    Insomnia  Onset/Duration: chronic  Description:   Frequency of insomnia:  nightly  Time to fall asleep (sleep latency): 15 minutes  Middle of night awakening:  no  Early morning awakening:  YES  Progression of Symptoms:  worsening  Accompanying Signs & Symptoms:  Daytime sleepiness/napping: no  Excessive snoring/apnea: no  Restless legs: YES  Waking to urinate: YES  Chronic pain:  YES  Depression symptoms (if yes, do PHQ9): YES  Anxiety symptoms (if yes, do SONDRA-7): YES  History:  Prior Insomnia: YES  New stressful situation: no  Precipitating factors:   Caffeine  intake: YES  OTC decongestants: no  Any new medications: no  Alleviating factors:  Self medicating (alcohol, etc.):  no  Stress-reduction (exercise, yoga, meditation etc): no  Therapies tried and outcome: none      Also - needs form/letter from [facility].  He had lost his license after getting pulled over.  Was related to his psychosis, medications.  No history of seizure disorder.  Letter was suppose to be done by prior PCP, Dr Lamb, but was never completed.  Patient currently without phone or internet.   Referral to [facility] from Dr Hammond, but has only Medicare.    Review of Systems   Constitutional, HEENT, cardiovascular, pulmonary, gi and gu systems are negative, except as otherwise noted.      Objective    /82 (BP Location: Right arm, Patient Position: Chair, Cuff Size: Adult Large)   Pulse 86   Temp 97.1  F (36.2  C) (Tympanic)   Wt 95.1 kg (209 lb 9.6 oz)   SpO2 98%   BMI 28.43 kg/m    Body mass index is 28.43 kg/m .  Physical Exam   GENERAL: healthy, alert and no distress  NECK: no adenopathy, no asymmetry, masses, or scars and thyroid normal to palpation  RESP: lungs clear to auscultation - no rales, rhonchi or wheezes  CV: regular rate and rhythm, normal S1 S2, no S3 or S4, no murmur, click or rub, no peripheral edema and peripheral pulses strong  MS: normal muscle tone, peripheral pulses normal and gait normal, no ataxia; tender to palpation diffuse neck and back; limited painful ROM neck in all directions, worse with anything to the right  SKIN: no suspicious lesions or rashes  NEURO: Normal strength and tone, symmetric, and mentation intact and speech normal; symmetric reflexes in all 4 extremities  PSYCH: mentation appears normal and affect flat    Scanned records from [facility] reviewed, as well as MRI reports.        Assessment & Plan     DDD (degenerative disc disease), cervical  Last MRI 2018.  Surgery then advised.  Complicated social situation -lack of phone, transportation.  Lost to  follow up for a while.  Now symptoms progressive.  Need to update imaging next.  Then consider referral back to [facility] vs injections/therapy if appropriate.  - MR Cervical Spine w/o Contrast; Future    Cervical radiculopathy  As above.  - MR Cervical Spine w/o Contrast; Future    Essential hypertension  Stable.  At goal.    Tobacco use disorder  Plans to quit if surgery needed.  Declines assistance.  Has done it on his own.  Replacement causes side effects.    Encounter for tobacco use cessation counseling  As above.    Bipolar disease, chronic (H)  Managed by psychiatry.       BMI:   Estimated body mass index is 28.43 kg/m  as calculated from the following:    Height as of 11/11/20: 1.829 m (6').    Weight as of this encounter: 95.1 kg (209 lb 9.6 oz).   Weight management plan: Discussed healthy diet and exercise guidelines         Patient Instructions   MRI cervical spine ordered.  Will review DMV form.  Social work, [facility], consulted.      Complicated social factors - no phone, internet, license.  [facility] won't take medicare.  Social work - Naveen involved.  Will provide support as able.        Sayda Solorzano MD  [facility] - [place]

## 2020-12-28 ENCOUNTER — VIRTUAL VISIT (OUTPATIENT)
Dept: PSYCHIATRY | Facility: OTHER | Age: 50
End: 2020-12-28
Attending: PSYCHIATRY & NEUROLOGY
Payer: MEDICARE

## 2020-12-28 DIAGNOSIS — F31.9 BIPOLAR I DISORDER (H): ICD-10-CM

## 2020-12-28 DIAGNOSIS — F31.73 BIPOLAR DISORDER, IN PARTIAL REMISSION, MOST RECENT EPISODE MANIC (H): Primary | ICD-10-CM

## 2020-12-28 PROCEDURE — 99443 PR PHYSICIAN TELEPHONE EVALUATION 21-30 MIN: CPT | Mod: 95 | Performed by: PSYCHIATRY & NEUROLOGY

## 2020-12-28 RX ORDER — CARBAMAZEPINE 200 MG/1
200 TABLET, EXTENDED RELEASE ORAL 2 TIMES DAILY
Qty: 60 TABLET | Refills: 4 | Status: SHIPPED | OUTPATIENT
Start: 2020-12-28 | End: 2021-03-15

## 2020-12-28 ASSESSMENT — ANXIETY QUESTIONNAIRES
6. BECOMING EASILY ANNOYED OR IRRITABLE: NEARLY EVERY DAY
4. TROUBLE RELAXING: MORE THAN HALF THE DAYS
GAD7 TOTAL SCORE: 13
1. FEELING NERVOUS, ANXIOUS, OR ON EDGE: MORE THAN HALF THE DAYS
7. FEELING AFRAID AS IF SOMETHING AWFUL MIGHT HAPPEN: SEVERAL DAYS
2. NOT BEING ABLE TO STOP OR CONTROL WORRYING: MORE THAN HALF THE DAYS
5. BEING SO RESTLESS THAT IT IS HARD TO SIT STILL: SEVERAL DAYS
3. WORRYING TOO MUCH ABOUT DIFFERENT THINGS: MORE THAN HALF THE DAYS
IF YOU CHECKED OFF ANY PROBLEMS ON THIS QUESTIONNAIRE, HOW DIFFICULT HAVE THESE PROBLEMS MADE IT FOR YOU TO DO YOUR WORK, TAKE CARE OF THINGS AT HOME, OR GET ALONG WITH OTHER PEOPLE: EXTREMELY DIFFICULT

## 2020-12-28 ASSESSMENT — PATIENT HEALTH QUESTIONNAIRE - PHQ9: SUM OF ALL RESPONSES TO PHQ QUESTIONS 1-9: 14

## 2020-12-28 NOTE — PROGRESS NOTES
"Andrei Whitman is a 50 year old male who is being evaluated via a billable telephone visit.      The patient has been notified of following:     \"This telephone visit will be conducted via a call between you and your physician/provider. We have found that certain health care needs can be provided without the need for a physical exam.  This service lets us provide the care you need with a short phone conversation.  If a prescription is necessary we can send it directly to your pharmacy.  If lab work is needed we can place an order for that and you can then stop by our lab to have the test done at a later time.    Telephone visits are billed at different rates depending on your insurance coverage. During this emergency period, for some insurers they may be billed the same as an in-person visit.  Please reach out to your insurance provider with any questions.    If during the course of the call the physician/provider feels a telephone visit is not appropriate, you will not be charged for this service.\"    Patient has given verbal consent for Telephone visit?  Yes    What phone number would you like to be contacted at? [phone]     How would you like to obtain your AVS? Wanda    Phone call duration: 28 minutes        OUTPATIENT PSYCHIATRY DIAGNOSTIC ASSESSMENT     IDENTIFICATION:  Andrei Whitman is a 50 year old male   The patient was a good historian.      HISTORY OF PRESENT ILLNESS     Andrei Whitman is a 51 yo with past psychiatric diagnoses of SONDRA, MDD, as well as Bipolar disorder type I most recent episode mixed severe with psychotic features of somatic and paranoid delusions (per hospitalization here [facility] August 2020). Andrei worked with [facility]: one of their notes scanned into Epic from 2014 of which then he was dx with SONDRA and MDD (I didn't see mention of bipolar). Back then Andrei used to work with Dr. Browning and notes \"then they kicked him out and I went from 23 meds to 0 meds. I laid in bed for like 12 " "days going through withdrawals.\" and \"I never did go back on pain meds.\" Back then per notes from [facility] in 2014 Andrei was on Wellbutrin, amitriptyline, Neurontin.     Andrei notes \"I've been diagnosed with bipolar like 6 times.\" Also diagnosed with ADD in past. Notes everytime he ends up having to go back on his ADD meds because \"I can't get anything done\". Andrei had a back injury and surgeries since 29 yo. He denies history of overt injury rather been diagnosed with degenerative disc disease. Andrei goes on to note \"that med Zainab put me on made me crazy just like it did 11 years ago\". I ask what med and he notes lamotrigine. He ntoes soon he was off it several days got better. I noted this fall he was pretty sick and I inquired if he has ever had this (psychosis and bibiana) before and Andrei notes the only other time was 11 years ago when he was on the Lamictal. He notes 11 years ago when on the Lamictal he had episode was trying to sell his house, throwing furniture off his decI pointed out 2011 was when he left from working with Dr. Browning hence could he have went \"crazy\" because of abruptly going off 20+ meds rather than from Lamictal and he points out he was still on all the meds when \"I went crazy\". \"He took me off the lithium at the same time\". Also was taken off Chantix which he was on for 2 years. Also went off lithium. \"Ativan makes me cry if I take it as needed\" so \"I can't take it\". Was on amitriptyline in past for sleep and notes sleep ongoing issue largely in relation to physical issues - pain , fingers / arms go numb.      Per discharge summer 8/13/20 from [facility] on admission in August \"paranoid delusions believing that people have been trying to kill him for the past 4 months.  He believes that people have been trying to kill him \"ever since I drink acid\" he then told the emergency room staff that these people were putting glue in his eyes and behind his ears as well as and his hair.  He brought in " "plastic bags with \"evidence\" to show the emergency room physicians.\" April commented in regards to hospital course: \"Andrei did bring himself here on a voluntary basis.  To calm down I did give him 1 mg of Ativan the first night he was here and he actually did sleep very well after this.  I did not continue any of his other psychiatric medications as he refused to continue Lamictal and refused to start lithium again.  I did not continue his amitriptyline due to his bibiana and no stimulants were restarted.  The next day when I saw him he had calmed down significantly he was no longer as pressured as he was when he came in and he was not tangential.  He had some paranoid delusions that still remained though he was able to challenge them and was not as fixated on his delusional thought process.  He is now able to tell himself \"it probably was just dry skin on my head and not glue.  I was thinking crazy\".  He still has some paranoid thoughts that \"I was not sure if my girlfriend's son was trying to hurt me or not.  It could be paranoia but I really do not know\" this is significant improvement from his admission.  He did agree to start Tegretol though he had only had 1 dose prior to his discharge.\"    Also per discharge summary from April 8/13/20: \"Lithium was tapered and discontinued in march/begining of April. At that time his lamictal was increased. It appears that lithium was tapered due excessive thirst and dry mouth. Elavil was increased for insomnia in February.\"     PSYCHIATRIC HISTORY         Past Critical History:   Suicidal Ideation Hx [passive, active]-  None  Psychosis Hx-  Yes fall 2020: from [facility] for psychiatric admission \"paranoid delusions believing that people have been trying to kill him for the past 4 months.  He believes that people have been trying to kill him \"ever since I drink acid\" he then told the emergency room staff that these people were putting glue in his eyes and behind his ears as well as " "and his hair.  He brought in plastic bags with \"evidence\" to show the emergency room physicians.  There is apparently nothing evident other than some dry skin in these bags.  He showed them that they get stating \"these people did something to my head and now the glue is peeling off\"  Psych Hosp [ #, most recent, committed]-  August 2020  ECT [#, most recent]-  None      SYMPTOMS FOLLOW BELOW:  PSYCHIATRIC REVIEW OF SYMPTOMS     Depression:  depressed mood, anhedonia, low energy, insomnia, indecisiveness, feeling hopeless and overwhelmed  Deidra/Hypomania: Has periods increased goal directed, energy, little need for sleep, irritable, greater than 1 week  or hospitalization required. grandiose, flight of idea, distractibility, psychomotor agitation, activities with   painful outcomeperiods where there is a depressed mood where feels sad empty hopeless tearful, ahedonia, hypersomnia, psychomotor agitation and periods of psychomotor retardation. Periods where there is suicidality, worthlessness and excess guilt.   Anxiety:  excessive worry, feeling fearful and nervous/overwhelmed    Trauma-related: nephjacque Whitman was murdered in [place] several years ago. His step-son Ross passed away 2/2 overdose.  Psychosis:  No hallucinations. / DELUSIONS are not present   [see MSE for detail]      CHEMICAL DEPENDENCY      CURRENT (incl IV): MJ    Past Use  (incl IV):   Alcohol, drugs past and notes put himself in treatment. No longer an issue      MEDICAL/SURGICAL HISTORY            Medical Team:    PMD: Dr. Paula, prior was working with Dr. Lamb        Neurologic Hx: [head injury, LOC-duration, seizures, other]  Back surgeries  Patient Active Problem List   Diagnosis     Cervicalgia     Lower back pain     Segmental and somatic dysfunction of lower extremity     GERD (gastroesophageal reflux disease)     Mood disorder (H)     Abnormal weight gain     Attention deficit hyperactivity disorder (ADHD), predominantly " inattentive type     ACP (advance care planning)     Flatulence, eructation, and gas pain     Bipolar disease, chronic (H)     Benign essential hypertension     Russell esophagus     Tobacco dependency     Lithium use     DDD (degenerative disc disease), cervical     Cervical stenosis of spinal canal     Obesity (BMI 35.0-39.9) with comorbidity (H)     Chronic lung disease     Suicidal behavior     Dry mouth     Anxiety state     Insomnia, unspecified     Left hip pain     MEDICAL ROS   A comprehensive 12 point review of systems was performed and found to be negative  except for neck and back pain, headaches frequently    ALLERGY   Patient has no known allergies.    MEDICATIONS                                                                     bold psych meds     Current Outpatient Medications   Medication Sig     aspirin 81 MG tablet Take 1 tablet (81 mg) by mouth daily     carBAMazepine (TEGRETOL XR) 200 MG 12 hr tablet TAKE 1 TABLET (200 MG) BY MOUTH 2 TIMES DAILY     chlorhexidine (PERIDEX) 0.12 % solution SWISH AND SPIT 15 MLS IN MOUTH 2 TIMES DAILY     FLOVENT  MCG/ACT inhaler INHALE 2 PUFFS INTO THE LUNGS 2 TIMES DAILY     gabapentin (NEURONTIN) 300 MG capsule TAKE 2 CAPSULES BY MOUTH 3 TIMES DAILY     hydrochlorothiazide (MICROZIDE) 12.5 MG capsule TAKE 1 CAPSULE (12.5 MG) BY MOUTH EVERY MORNING     montelukast (SINGULAIR) 10 MG tablet TAKE 1 TABLET (10 MG) BY MOUTH AT BEDTIME     omeprazole (PRILOSEC) 40 MG DR capsule TAKE 1 CAPSULE (40 MG) BY MOUTH DAILY     pilocarpine (SALAGEN) 5 MG tablet Take 1 tablet (5 mg) by mouth 4 times daily     verapamil ER (VERELAN) 240 MG 24 hr capsule TAKE 1 CAPSULE (240 MG) BY MOUTH AT BEDTIME     LORazepam (ATIVAN) 1 MG tablet TAKE 1 TABLET (1 MG) BY MOUTH 2 TIMES DAILY AS NEEDED FOR AGITATIONOR ANXIETY (Patient not taking: Reported on 12/28/2020)     No current facility-administered medications for this visit.        VITALS   There were no vitals taken for this  visit.     PHQ9                             [unfilled]  LABS                                                                                  PSYCHLAB1;  PSYCHLAB2         Last Comprehensive Metabolic Panel:  Sodium   Date Value Ref Range Status   08/12/2020 138 133 - 144 mmol/L Final     Potassium   Date Value Ref Range Status   08/12/2020 3.4 3.4 - 5.3 mmol/L Final     Chloride   Date Value Ref Range Status   08/12/2020 104 94 - 109 mmol/L Final     Carbon Dioxide   Date Value Ref Range Status   08/12/2020 26 20 - 32 mmol/L Final     Anion Gap   Date Value Ref Range Status   08/12/2020 8 3 - 14 mmol/L Final     Glucose   Date Value Ref Range Status   08/12/2020 89 70 - 99 mg/dL Final     Urea Nitrogen   Date Value Ref Range Status   08/12/2020 10 7 - 30 mg/dL Final     Creatinine   Date Value Ref Range Status   08/12/2020 0.92 0.66 - 1.25 mg/dL Final     GFR Estimate   Date Value Ref Range Status   08/12/2020 >90 >60 mL/min/[1.73_m2] Final     Comment:     Non  GFR Calc  Starting 12/18/2018, serum creatinine based estimated GFR (eGFR) will be   calculated using the Chronic Kidney Disease Epidemiology Collaboration   (CKD-EPI) equation.       Calcium   Date Value Ref Range Status   08/12/2020 9.3 8.5 - 10.1 mg/dL Final     CBC RESULTS:   Recent Labs   Lab Test 08/12/20  0113   WBC 8.3   RBC 4.57   HGB 12.2*   HCT 38.2*   MCV 84   MCH 26.7   MCHC 31.9   RDW 17.0*          FAMILY HISTORY                                                                           patient reported     Family history is significant for:  unknown      SOCIAL HISTORY                                                                            patient reported   Employment/Financial Support- on disability for bipolar disorder and for his back disease. Finished HCC with [employer] and was planning to go for radiology degree. Then started in [employer] for [employer]. Also worked welding.  and notes dated Brianna Martinez  of [employer] and raised Ross who passed away form overdose. And Andrei's nephew Andrei murdered.  Living Situation/Family/Relationships- lives with his ADAM Caldwell   Children- Graduated high school, some college. Is on disability for mental illness.   Is  and has 30 year old son. Had break up with long term girlfriend last year.   He is close with his father Carlos and sister Caroline  Trauma history (self-report)-  Denies hx abuse  Social/Spiritual Support- sister Caroline, dad Carlos, GF Paulette [place]  Interests / Hobbies: used to be hunting, fishing  MENTAL STATUS EXAM                                                                            .  Speech:  normal and regular rate and rhythm  Mood:  normal, depressed and anxious  Thought Process/Associations:  circumstantial   Thought Content:  Thought content was unremarkable for suicidal ideation and violent ideation.    Perception:  none  Insight:  adequate.  Judgment: good.  Attention/Concentration:  Easily distracted and off topic, able to redirect  Language:  intact and no problems  Fund of Knowledge:  intact    Memory:  Immediate, short-term, long-term appeared intact     These cognitive functions grossly appear as described, but were not formally tested.    ASSESSMENT                                                                                             Andrei Whitman is a 51 yo with bipolar disorder with psychiatric hospitalization August 2020. Was on amitriptyline, Adderall, Wellbutrin : all activating meds although Andrei notes he feels was the Lamictal that precipitated bibiana. [facility] took care of Andrei hospital stay and noted Depakote for him past weight gain, lithium polyuria and thirst, uncertain whether he had been on neuroleptics (I saw Latuda mentioned in [facility] notes). Andrei had sx bibiana along with paranoia and somatic delusions of August. Sx cleared and he is still taking the Tegretol. Not taking Ativan and prefers not to. Doing well in terms no sx  bibiana or psychosis. IS still having depression, anxiety and he feels sx ADHD of which isn't getting anything done. Struggling in that no license and helps take care of his GF Paulette thus extra difficult in that they have no transportation. I asked if he would be okay with me putting in referral for [facility] as I think could really benefit. He is agreeable to this.    Meds today: we agreed to stick with as are giving I'm just getting to know him. Additionally we were cut short given he noted phone likely to run out of minutes hence we had to wrap. Will avoid meds future (if we change / adjust): lithium, Depakote, Lamictal. He notes he's ended up having to go back on his ADHD meds everytime someone has taken him off - for today we agreed to not start.       TREATMENT RISK STATEMENT:  The risks, benefits, alternatives and potential adverse effects have been explained and are understood by the pt.  The pt agrees to the treatment plan with the ability to do so.   The pt knows to call the clinic for any problems or access emergency care if needed.        DIAGNOSES                     Bipolar disorder, most recent episode manic (with psychosis), in partial remission    PLAN                                                                                                                    1)  MEDICATIONS:         -- Continue Tegretol  mg twice daily. He is not taking the Ativan  2)  THERAPY:  No Change    3)  LABS: should recheck a BMP and CBC given started on Tegretol 8/2020    4)  PT MONITOR [call for probs]:  Worsening symptoms, SI/HI, SEs from meds    5)  REFERRALS [CD, medical, other]:  None    6)  RTC:    ~1 month

## 2020-12-29 ASSESSMENT — ANXIETY QUESTIONNAIRES: GAD7 TOTAL SCORE: 13

## 2020-12-30 ENCOUNTER — TELEPHONE (OUTPATIENT)
Dept: PSYCHIATRY | Facility: OTHER | Age: 50
End: 2020-12-30

## 2021-01-07 ENCOUNTER — OFFICE VISIT (OUTPATIENT)
Dept: FAMILY MEDICINE | Facility: OTHER | Age: 51
End: 2021-01-07
Attending: FAMILY MEDICINE
Payer: MEDICARE

## 2021-01-07 VITALS
BODY MASS INDEX: 28.43 KG/M2 | OXYGEN SATURATION: 98 % | HEART RATE: 86 BPM | WEIGHT: 209.6 LBS | DIASTOLIC BLOOD PRESSURE: 82 MMHG | TEMPERATURE: 97.1 F | SYSTOLIC BLOOD PRESSURE: 138 MMHG

## 2021-01-07 DIAGNOSIS — Z71.6 ENCOUNTER FOR TOBACCO USE CESSATION COUNSELING: ICD-10-CM

## 2021-01-07 DIAGNOSIS — F17.200 TOBACCO USE DISORDER: ICD-10-CM

## 2021-01-07 DIAGNOSIS — M50.30 DDD (DEGENERATIVE DISC DISEASE), CERVICAL: Primary | ICD-10-CM

## 2021-01-07 DIAGNOSIS — F31.9 BIPOLAR DISEASE, CHRONIC (H): ICD-10-CM

## 2021-01-07 DIAGNOSIS — M54.12 CERVICAL RADICULOPATHY: ICD-10-CM

## 2021-01-07 DIAGNOSIS — I10 ESSENTIAL HYPERTENSION: ICD-10-CM

## 2021-01-07 PROCEDURE — G0463 HOSPITAL OUTPT CLINIC VISIT: HCPCS

## 2021-01-07 PROCEDURE — 99214 OFFICE O/P EST MOD 30 MIN: CPT | Performed by: FAMILY MEDICINE

## 2021-01-07 ASSESSMENT — ANXIETY QUESTIONNAIRES
4. TROUBLE RELAXING: NEARLY EVERY DAY
6. BECOMING EASILY ANNOYED OR IRRITABLE: MORE THAN HALF THE DAYS
2. NOT BEING ABLE TO STOP OR CONTROL WORRYING: MORE THAN HALF THE DAYS
GAD7 TOTAL SCORE: 14
1. FEELING NERVOUS, ANXIOUS, OR ON EDGE: MORE THAN HALF THE DAYS
3. WORRYING TOO MUCH ABOUT DIFFERENT THINGS: MORE THAN HALF THE DAYS
IF YOU CHECKED OFF ANY PROBLEMS ON THIS QUESTIONNAIRE, HOW DIFFICULT HAVE THESE PROBLEMS MADE IT FOR YOU TO DO YOUR WORK, TAKE CARE OF THINGS AT HOME, OR GET ALONG WITH OTHER PEOPLE: VERY DIFFICULT
7. FEELING AFRAID AS IF SOMETHING AWFUL MIGHT HAPPEN: SEVERAL DAYS
5. BEING SO RESTLESS THAT IT IS HARD TO SIT STILL: MORE THAN HALF THE DAYS

## 2021-01-07 ASSESSMENT — PAIN SCALES - GENERAL: PAINLEVEL: EXTREME PAIN (8)

## 2021-01-07 ASSESSMENT — PATIENT HEALTH QUESTIONNAIRE - PHQ9: SUM OF ALL RESPONSES TO PHQ QUESTIONS 1-9: 14

## 2021-01-07 NOTE — PATIENT INSTRUCTIONS
MRI cervical spine ordered.  Will review DMV form.  Social work, [facility], consulted.

## 2021-01-07 NOTE — NURSING NOTE
Chief Complaint   Patient presents with     Hypertension     Anxiety     Depression     Gastrophageal Reflux     Musculoskeletal Problem       Initial /82 (BP Location: Right arm, Patient Position: Chair, Cuff Size: Adult Large)   Pulse 86   Temp 97.1  F (36.2  C) (Tympanic)   Wt 95.1 kg (209 lb 9.6 oz)   SpO2 98%   BMI 28.43 kg/m   Estimated body mass index is 28.43 kg/m  as calculated from the following:    Height as of 11/11/20: 1.829 m (6').    Weight as of this encounter: 95.1 kg (209 lb 9.6 oz).  Medication Reconciliation: complete  Costa Figueroa LPN

## 2021-01-08 ASSESSMENT — ANXIETY QUESTIONNAIRES: GAD7 TOTAL SCORE: 14

## 2021-01-14 ENCOUNTER — TELEPHONE (OUTPATIENT)
Dept: FAMILY MEDICINE | Facility: OTHER | Age: 51
End: 2021-01-14

## 2021-01-15 DIAGNOSIS — M54.12 CERVICAL RADICULOPATHY: ICD-10-CM

## 2021-01-15 RX ORDER — GABAPENTIN 300 MG/1
CAPSULE ORAL
Qty: 180 CAPSULE | Refills: 0 | Status: SHIPPED | OUTPATIENT
Start: 2021-01-15 | End: 2021-02-15

## 2021-01-15 NOTE — TELEPHONE ENCOUNTER
gabapentin (NEURONTIN) 300 MG capsule      Last Written Prescription Date:  12/16/20  Last Fill Quantity: 180,   # refills: 0  Last Office Visit: 1/7/21  Future Office visit:       Routing refill request to provider for review/approval because:  Drug not on the [facility], P or [facility] refill protocol or controlled substance

## 2021-01-19 ENCOUNTER — HOSPITAL ENCOUNTER (OUTPATIENT)
Dept: MRI IMAGING | Facility: HOSPITAL | Age: 51
Discharge: HOME | End: 2021-01-19
Attending: FAMILY MEDICINE | Admitting: FAMILY MEDICINE
Payer: MEDICARE

## 2021-01-19 DIAGNOSIS — M54.12 CERVICAL RADICULOPATHY: ICD-10-CM

## 2021-01-19 DIAGNOSIS — M50.30 DDD (DEGENERATIVE DISC DISEASE), CERVICAL: ICD-10-CM

## 2021-01-19 PROCEDURE — 72141 MRI NECK SPINE W/O DYE: CPT | Mod: MG

## 2021-01-19 PROCEDURE — G1004 CDSM NDSC: HCPCS

## 2021-01-26 ENCOUNTER — TELEPHONE (OUTPATIENT)
Dept: BEHAVIORAL HEALTH | Facility: OTHER | Age: 51
End: 2021-01-26

## 2021-01-29 ENCOUNTER — TELEPHONE (OUTPATIENT)
Dept: FAMILY MEDICINE | Facility: OTHER | Age: 51
End: 2021-01-29

## 2021-01-29 ENCOUNTER — VIRTUAL VISIT (OUTPATIENT)
Dept: PSYCHIATRY | Facility: OTHER | Age: 51
End: 2021-01-29
Attending: PSYCHIATRY & NEUROLOGY
Payer: MEDICARE

## 2021-01-29 DIAGNOSIS — K12.0 APHTHOUS ULCER OF MOUTH: ICD-10-CM

## 2021-01-29 DIAGNOSIS — M48.02 CERVICAL STENOSIS OF SPINAL CANAL: ICD-10-CM

## 2021-01-29 DIAGNOSIS — M50.30 DDD (DEGENERATIVE DISC DISEASE), CERVICAL: ICD-10-CM

## 2021-01-29 DIAGNOSIS — Z79.899 ENCOUNTER FOR LONG-TERM (CURRENT) USE OF MEDICATIONS: Primary | ICD-10-CM

## 2021-01-29 DIAGNOSIS — M54.50 LOWER BACK PAIN: Primary | ICD-10-CM

## 2021-01-29 PROCEDURE — 99443 PR PHYSICIAN TELEPHONE EVALUATION 21-30 MIN: CPT | Mod: 95 | Performed by: PSYCHIATRY & NEUROLOGY

## 2021-01-29 RX ORDER — MONTELUKAST SODIUM 10 MG/1
10 TABLET ORAL AT BEDTIME
Qty: 90 TABLET | Refills: 0 | Status: SHIPPED | OUTPATIENT
Start: 2021-01-29 | End: 2021-05-18

## 2021-01-29 ASSESSMENT — PATIENT HEALTH QUESTIONNAIRE - PHQ9
SUM OF ALL RESPONSES TO PHQ QUESTIONS 1-9: 15
5. POOR APPETITE OR OVEREATING: NEARLY EVERY DAY

## 2021-01-29 ASSESSMENT — ANXIETY QUESTIONNAIRES
3. WORRYING TOO MUCH ABOUT DIFFERENT THINGS: NEARLY EVERY DAY
1. FEELING NERVOUS, ANXIOUS, OR ON EDGE: MORE THAN HALF THE DAYS
7. FEELING AFRAID AS IF SOMETHING AWFUL MIGHT HAPPEN: SEVERAL DAYS
5. BEING SO RESTLESS THAT IT IS HARD TO SIT STILL: MORE THAN HALF THE DAYS
6. BECOMING EASILY ANNOYED OR IRRITABLE: MORE THAN HALF THE DAYS
GAD7 TOTAL SCORE: 16
2. NOT BEING ABLE TO STOP OR CONTROL WORRYING: NEARLY EVERY DAY

## 2021-01-29 ASSESSMENT — PAIN SCALES - GENERAL: PAINLEVEL: SEVERE PAIN (6)
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
## 2021-01-29 NOTE — TELEPHONE ENCOUNTER
montelukast (SINGULAIR) 10 MG tablet      Last Written Prescription Date:  9/20/20  Last Fill Quantity: 90,   # refills: 0  Last Office Visit: 1/7/21  Future Office visit:       Routing refill request to provider for review/approval

## 2021-01-30 ASSESSMENT — ANXIETY QUESTIONNAIRES: GAD7 TOTAL SCORE: 16

## 2021-02-01 NOTE — TELEPHONE ENCOUNTER
.Writer received VM from pt stating he would like to meet in clinic to order phone as he now has access to debit card.  Writer attempted to call back, but he was unavailable.  Writer LVM and will remain available.    Tried again 2/19, but he was unavailable.  LVM and will not perform additional attempts at this time.    Griselda Bernal, \[facility]\""  Social Work Care Coordinator  [facility]  [phone] option 2 or [phone]

## 2021-02-09 DIAGNOSIS — Z79.899 ENCOUNTER FOR LONG-TERM (CURRENT) USE OF MEDICATIONS: ICD-10-CM

## 2021-02-09 LAB
ALBUMIN SERPL-MCNC: 4.1 G/DL (ref 3.4–5)
ALP SERPL-CCNC: 148 U/L (ref 40–150)
ALT SERPL W P-5'-P-CCNC: 14 U/L (ref 0–70)
ANION GAP SERPL CALCULATED.3IONS-SCNC: 4 MMOL/L (ref 3–14)
AST SERPL W P-5'-P-CCNC: 14 U/L (ref 0–45)
BILIRUB SERPL-MCNC: 0.3 MG/DL (ref 0.2–1.3)
BUN SERPL-MCNC: 10 MG/DL (ref 7–30)
CALCIUM SERPL-MCNC: 9.3 MG/DL (ref 8.5–10.1)
CARBAMAZEPINE SERPL-MCNC: 7.4 MG/L (ref 4–12)
CHLORIDE SERPL-SCNC: 108 MMOL/L (ref 94–109)
CO2 SERPL-SCNC: 27 MMOL/L (ref 20–32)
CREAT SERPL-MCNC: 0.78 MG/DL (ref 0.66–1.25)
ERYTHROCYTE [DISTWIDTH] IN BLOOD BY AUTOMATED COUNT: 16.5 % (ref 10–15)
GFR SERPL CREATININE-BSD FRML MDRD: >90 ML/MIN/{1.73_M2}
GLUCOSE SERPL-MCNC: 124 MG/DL (ref 70–99)
HCT VFR BLD AUTO: 44.3 % (ref 40–53)
HGB BLD-MCNC: 14.3 G/DL (ref 13.3–17.7)
MCH RBC QN AUTO: 27.4 PG (ref 26.5–33)
MCHC RBC AUTO-ENTMCNC: 32.3 G/DL (ref 31.5–36.5)
MCV RBC AUTO: 85 FL (ref 78–100)
PLATELET # BLD AUTO: 230 10E9/L (ref 150–450)
POTASSIUM SERPL-SCNC: 3.8 MMOL/L (ref 3.4–5.3)
PROT SERPL-MCNC: 8.5 G/DL (ref 6.8–8.8)
RBC # BLD AUTO: 5.21 10E12/L (ref 4.4–5.9)
SODIUM SERPL-SCNC: 139 MMOL/L (ref 133–144)
WBC # BLD AUTO: 9 10E9/L (ref 4–11)

## 2021-02-09 PROCEDURE — 36415 COLL VENOUS BLD VENIPUNCTURE: CPT | Mod: ZL | Performed by: PSYCHIATRY & NEUROLOGY

## 2021-02-09 PROCEDURE — 85027 COMPLETE CBC AUTOMATED: CPT | Mod: ZL | Performed by: PSYCHIATRY & NEUROLOGY

## 2021-02-09 PROCEDURE — 80156 ASSAY CARBAMAZEPINE TOTAL: CPT | Mod: ZL | Performed by: PSYCHIATRY & NEUROLOGY

## 2021-02-09 PROCEDURE — 80053 COMPREHEN METABOLIC PANEL: CPT | Mod: ZL | Performed by: PSYCHIATRY & NEUROLOGY

## 2021-02-09 NOTE — PROGRESS NOTES
Griselda would you please relay to Andrei labs looked good. In particular Tegretol in therapeutic range and sodium (Tegretol can cause hyponatremia) and CBC looked good (Tegretol can cause decrease counts). Glucose was up bit at 124 but he may have not been fasting.     Thank you!

## 2021-02-10 ENCOUNTER — HOSPITAL ENCOUNTER (OUTPATIENT)
Dept: INTERVENTIONAL RADIOLOGY/VASCULAR | Facility: HOSPITAL | Age: 51
Discharge: HOME | End: 2021-02-10
Attending: FAMILY MEDICINE | Admitting: RADIOLOGY
Payer: MEDICARE

## 2021-02-10 DIAGNOSIS — M50.30 DDD (DEGENERATIVE DISC DISEASE), CERVICAL: ICD-10-CM

## 2021-02-10 DIAGNOSIS — M48.02 CERVICAL STENOSIS OF SPINAL CANAL: ICD-10-CM

## 2021-02-10 PROCEDURE — G0463 HOSPITAL OUTPT CLINIC VISIT: HCPCS

## 2021-02-12 DIAGNOSIS — M54.12 CERVICAL RADICULOPATHY: ICD-10-CM

## 2021-02-14 NOTE — TELEPHONE ENCOUNTER
Gabapentin       Last Written Prescription Date:  1/15/2021  Last Fill Quantity: 180,   # refills: 0  Last Office Visit: 1/7/2021  Future Office visit:

## 2021-02-15 RX ORDER — GABAPENTIN 300 MG/1
CAPSULE ORAL
Qty: 180 CAPSULE | Refills: 0 | Status: SHIPPED | OUTPATIENT
Start: 2021-02-15 | End: 2021-03-15

## 2021-03-05 ENCOUNTER — HOSPITAL ENCOUNTER (OUTPATIENT)
Facility: HOSPITAL | Age: 51
Discharge: HOME | End: 2021-03-05
Attending: RADIOLOGY | Admitting: RADIOLOGY
Payer: MEDICARE

## 2021-03-05 ENCOUNTER — HOSPITAL ENCOUNTER (OUTPATIENT)
Dept: INTERVENTIONAL RADIOLOGY/VASCULAR | Facility: HOSPITAL | Age: 51
End: 2021-03-05
Attending: FAMILY MEDICINE
Payer: MEDICARE

## 2021-03-05 DIAGNOSIS — M47.812 CERVICAL SPONDYLOSIS: ICD-10-CM

## 2021-03-05 PROCEDURE — 250N000011 HC RX IP 250 OP 636: Performed by: RADIOLOGY

## 2021-03-05 PROCEDURE — 62321 NJX INTERLAMINAR CRV/THRC: CPT

## 2021-03-05 PROCEDURE — C1751 CATH, INF, PER/CENT/MIDLINE: HCPCS

## 2021-03-05 RX ORDER — IOPAMIDOL 612 MG/ML
15 INJECTION, SOLUTION INTRATHECAL ONCE
Status: COMPLETED | OUTPATIENT
Start: 2021-03-05 | End: 2021-03-05

## 2021-03-05 RX ORDER — METHYLPREDNISOLONE ACETATE 80 MG/ML
INJECTION, SUSPENSION INTRA-ARTICULAR; INTRALESIONAL; INTRAMUSCULAR; SOFT TISSUE
Status: DISCONTINUED
Start: 2021-03-05 | End: 2021-03-06 | Stop reason: HOSPADM

## 2021-03-05 RX ORDER — METHYLPREDNISOLONE ACETATE 80 MG/ML
80 INJECTION, SUSPENSION INTRA-ARTICULAR; INTRALESIONAL; INTRAMUSCULAR; SOFT TISSUE ONCE
Status: COMPLETED | OUTPATIENT
Start: 2021-03-05 | End: 2021-03-05

## 2021-03-05 RX ADMIN — IOPAMIDOL 6 ML: 612 INJECTION, SOLUTION INTRATHECAL at 14:04

## 2021-03-05 RX ADMIN — METHYLPREDNISOLONE ACETATE 80 MG: 80 INJECTION, SUSPENSION INTRA-ARTICULAR; INTRALESIONAL; INTRAMUSCULAR; SOFT TISSUE at 14:03

## 2021-03-05 NOTE — DISCHARGE INSTRUCTIONS
Cell number on file:    Telephone Information:   Mobile [phone]     Is it ok to leave a message at this number(s)? Yes    Dr. Harp completed your procedure on 3/5/2021.    Current Pain Level (0-10 Scale): 6/10  Post Pain Level (0-10):  6/10    Radiology Discharge instructions for Steroid Injection    Activity Level:     Do not do any heavy activity or exercise for 24 hours.   Do not drive for 4 hours after your injection.  Diet:   Return to your normal diet.  Medications:   If you have stopped taking your Aspirin, Coumadin/Warfarin, Ibuprofen, or any   other blood thinner for this procedure you may resume in the morning unless   your primary care provider has given you other instructions.    Diabetics may see an increase in blood sugar after steroid injections. If you are concerned about your blood sugar, please contact your family doctor.    Site Care:  Remove the bandage and bathe or shower the morning after the procedure.      This is a Pain Management procedure.  You will be contacted in two weeks for follow up.    Call your Primary Care Provider if you have the following (if your primary care provider is not available please seek emergency care):   Nausea with vomiting   Severe headache   Drowsiness or confusion   Redness or drainage at the injection or puncture site   Temperature over 101 degrees F   Other concerns   Worsening back pain   Stiff neck

## 2021-03-05 NOTE — IP AVS SNAPSHOT
[facility]  [address] [address]  Phone: [phone]  Fax: [phone]                                    After Visit Summary   3/5/2021    Andrei Whitman    MRN: [id number]           After Visit Summary Signature Page    I have received my discharge instructions, and my questions have been answered. I have discussed any challenges I see with this plan with the nurse or doctor.    ..........................................................................................................................................  Patient/Patient Representative Signature      ..........................................................................................................................................  Patient Representative Print Name and Relationship to Patient    ..................................................               ................................................  Date                                   Time    ..........................................................................................................................................  Reviewed by Signature/Title    ...................................................              ..............................................  Date                                               Time          22EPIC Rev 08/18

## 2021-03-15 ENCOUNTER — VIRTUAL VISIT (OUTPATIENT)
Dept: PSYCHIATRY | Facility: OTHER | Age: 51
End: 2021-03-15
Attending: PSYCHIATRY & NEUROLOGY
Payer: MEDICARE

## 2021-03-15 DIAGNOSIS — F31.9 BIPOLAR I DISORDER (H): ICD-10-CM

## 2021-03-15 DIAGNOSIS — M54.12 CERVICAL RADICULOPATHY: ICD-10-CM

## 2021-03-15 PROCEDURE — 99443 PR PHYSICIAN TELEPHONE EVALUATION 21-30 MIN: CPT | Mod: 95 | Performed by: PSYCHIATRY & NEUROLOGY

## 2021-03-15 RX ORDER — CARBAMAZEPINE 200 MG/1
200 TABLET, EXTENDED RELEASE ORAL 2 TIMES DAILY
Qty: 60 TABLET | Refills: 4 | Status: SHIPPED | OUTPATIENT
Start: 2021-04-15 | End: 2021-09-14

## 2021-03-15 RX ORDER — GABAPENTIN 300 MG/1
CAPSULE ORAL
Qty: 180 CAPSULE | Refills: 0 | Status: SHIPPED | OUTPATIENT
Start: 2021-03-15 | End: 2021-04-19

## 2021-03-15 ASSESSMENT — PAIN SCALES - GENERAL: PAINLEVEL: MILD PAIN (2)

## 2021-03-15 ASSESSMENT — ANXIETY QUESTIONNAIRES
5. BEING SO RESTLESS THAT IT IS HARD TO SIT STILL: MORE THAN HALF THE DAYS
3. WORRYING TOO MUCH ABOUT DIFFERENT THINGS: MORE THAN HALF THE DAYS
1. FEELING NERVOUS, ANXIOUS, OR ON EDGE: MORE THAN HALF THE DAYS
2. NOT BEING ABLE TO STOP OR CONTROL WORRYING: MORE THAN HALF THE DAYS
7. FEELING AFRAID AS IF SOMETHING AWFUL MIGHT HAPPEN: MORE THAN HALF THE DAYS
GAD7 TOTAL SCORE: 14
6. BECOMING EASILY ANNOYED OR IRRITABLE: MORE THAN HALF THE DAYS

## 2021-03-15 ASSESSMENT — PATIENT HEALTH QUESTIONNAIRE - PHQ9
SUM OF ALL RESPONSES TO PHQ QUESTIONS 1-9: 14
5. POOR APPETITE OR OVEREATING: MORE THAN HALF THE DAYS

## 2021-03-15 NOTE — TELEPHONE ENCOUNTER
Neurontin 300 mg  Last Written Prescription Date:  2/15/2021  Last Fill Quantity: 180,   # refills: 0  Last Office Visit: 1/7/21  Future Office visit:

## 2021-03-15 NOTE — NURSING NOTE
Chief Complaint   Patient presents with     RECHECK     Telephone visit.         Initial There were no vitals taken for this visit. Estimated body mass index is 28.43 kg/m  as calculated from the following:    Height as of 11/11/20: 1.829 m (6').    Weight as of 1/7/21: 95.1 kg (209 lb 9.6 oz).  Medication Reconciliation: complete  DAV REYES LPN

## 2021-03-15 NOTE — PROGRESS NOTES
"  Phone call duration: 28 minutes.3 minutes reviewing chart, ordering medications, documentation. Total time of  31 minutes.    Andrei Whitman is a 50 year old male who is being evaluated via a billable telephone visit.      The patient has been notified of following:     \"This telephone visit will be conducted via a call between you and your physician/provider. We have found that certain health care needs can be provided without the need for a physical exam.  This service lets us provide the care you need with a short phone conversation.  If a prescription is necessary we can send it directly to your pharmacy.  If lab work is needed we can place an order for that and you can then stop by our lab to have the test done at a later time.    Telephone visits are billed at different rates depending on your insurance coverage. During this emergency period, for some insurers they may be billed the same as an in-person visit.  Please reach out to your insurance provider with any questions.    If during the course of the call the physician/provider feels a telephone visit is not appropriate, you will not be charged for this service.\"    Patient has given verbal consent for Telephone visit?  Yes    What phone number would you like to be contacted at? [phone]     How would you like to obtain your AVS? [facility]      PSYCHIATRY CLINIC PROGRESS NOTE     SUBJECTIVE / INTERIM HISTORY                                                                          Last visit Jan '21: Continue Tegretol  mg twice daily. He is not taking the Ativan  - got license back which has helped treemdendously. Reminds me ran a stop sign on way to ER  - they got out [place] dad[place] the [place]   - neck MRI: nurse reviewed MRI with him today and then I did as well. Andrei has decided rather than going straight [facility] (neurosurg) he's going to getting injections first. He did get injections in the past and helped bit for month, was worth " "it. Now getting headaches, even with low weights (jug of pop or even a coffee cup). Gets tired out if he spends a lot of time of a day getting tasks done. May \"pay for it\" for 3 days. Numbness and pain intefering with his sleep  -[place]. For while bought it and he lived there with Brianna and when was with her then in 2010 when  Brianna and moved to [place] with Paulette when they started relationship  - Andrei notes \"I've been diagnosed with bipolar like 6 times.\" Also diagnosed with ADD in past. Notes everytime he ends up having to go back on his ADD meds because \"I can't get anything done\".   -  Finished [employer] with [employer] and was planning to go for radiology degree. Then started in [employer] for [employer]. Also worked welding.  and notes [facility] [employer] and raised Ross who passed away form overdose. And Andrei's nephew Andrei murdered.  - Social/Spiritual Support- sister Caroline, dad Carlos, GF Paulette Alfonzo  - Also per discharge summary from April 8/13/20: \"Lithium was tapered and discontinued in march/begining of April. At that time his lamictal was increased. It appears that lithium was tapered due excessive thirst and dry mouth. Elavil was increased for insomnia in February.\"      MEDICAL ROS-  back injury and surgeries since 31 yo. He denies history of overt injury rather been diagnosed with degenerative disc disease.  MEDICAL / SURGICAL HISTORY                     Patient Active Problem List   Diagnosis     Cervicalgia     Lower back pain     Segmental and somatic dysfunction of lower extremity     GERD (gastroesophageal reflux disease)     Mood disorder (H)     Abnormal weight gain     Attention deficit hyperactivity disorder (ADHD), predominantly inattentive type     ACP (advance care planning)     Flatulence, eructation, and gas pain     Bipolar disease, chronic (H)     Benign essential hypertension     Russell esophagus     Tobacco dependency     " Lithium use     DDD (degenerative disc disease), cervical     Cervical stenosis of spinal canal     Obesity (BMI 35.0-39.9) with comorbidity (H)     Chronic lung disease     Suicidal behavior     Dry mouth     Anxiety state     Insomnia, unspecified     Left hip pain     ALLERGY   Patient has no known allergies.  MEDICATIONS                                                                                             Current Outpatient Medications   Medication Sig     aspirin 81 MG tablet Take 1 tablet (81 mg) by mouth daily     carBAMazepine (TEGRETOL XR) 200 MG 12 hr tablet Take 1 tablet (200 mg) by mouth 2 times daily     chlorhexidine (PERIDEX) 0.12 % solution SWISH AND SPIT 15 MLS IN MOUTH 2 TIMES DAILY     FLOVENT  MCG/ACT inhaler INHALE 2 PUFFS INTO THE LUNGS 2 TIMES DAILY     gabapentin (NEURONTIN) 300 MG capsule TAKE 2 CAPSULES BY MOUTH 3 TIMES DAILY     hydrochlorothiazide (MICROZIDE) 12.5 MG capsule TAKE 1 CAPSULE (12.5 MG) BY MOUTH EVERY MORNING     montelukast (SINGULAIR) 10 MG tablet TAKE 1 TABLET (10 MG) BY MOUTH AT BEDTIME     omeprazole (PRILOSEC) 40 MG DR capsule TAKE 1 CAPSULE (40 MG) BY MOUTH DAILY     pilocarpine (SALAGEN) 5 MG tablet Take 1 tablet (5 mg) by mouth 4 times daily     verapamil ER (VERELAN) 240 MG 24 hr capsule TAKE 1 CAPSULE (240 MG) BY MOUTH AT BEDTIME     LORazepam (ATIVAN) 1 MG tablet TAKE 1 TABLET (1 MG) BY MOUTH 2 TIMES DAILY AS NEEDED FOR AGITATIONOR ANXIETY (Patient not taking: Reported on 1/29/2021)     No current facility-administered medications for this visit.        VITALS   There were no vitals taken for this visit.     PHQ9                     [unfilled]  LABS                                                                                                                           Recent Labs   Lab Test 12/15/16  0902 01/26/15  1234   CHOL 121 147   HDL 37* 37*   LDL 60 79   TRIG 121 157*   CHOLHDLRATIO  --  4.0     CBC RESULTS:   Recent Labs   Lab Test  08/12/20  0113   WBC 8.3   RBC 4.57   HGB 12.2*   HCT 38.2*   MCV 84   MCH 26.7   MCHC 31.9   RDW 17.0*        Last Comprehensive Metabolic Panel:  Sodium   Date Value Ref Range Status   02/09/2021 139 133 - 144 mmol/L Final     Potassium   Date Value Ref Range Status   02/09/2021 3.8 3.4 - 5.3 mmol/L Final     Chloride   Date Value Ref Range Status   02/09/2021 108 94 - 109 mmol/L Final     Carbon Dioxide   Date Value Ref Range Status   02/09/2021 27 20 - 32 mmol/L Final     Anion Gap   Date Value Ref Range Status   02/09/2021 4 3 - 14 mmol/L Final     Glucose   Date Value Ref Range Status   02/09/2021 124 (H) 70 - 99 mg/dL Final     Urea Nitrogen   Date Value Ref Range Status   02/09/2021 10 7 - 30 mg/dL Final     Creatinine   Date Value Ref Range Status   02/09/2021 0.78 0.66 - 1.25 mg/dL Final     GFR Estimate   Date Value Ref Range Status   02/09/2021 >90 >60 mL/min/[1.73_m2] Final     Comment:     Non  GFR Calc  Starting 12/18/2018, serum creatinine based estimated GFR (eGFR) will be   calculated using the Chronic Kidney Disease Epidemiology Collaboration   (CKD-EPI) equation.       Calcium   Date Value Ref Range Status   02/09/2021 9.3 8.5 - 10.1 mg/dL Final     Tegretol 7.4 as of 2/8/21    MENTAL STATUS EXAM                                                                                       No problems with speech. Mood was depressed, anxious. Thought process, including associations, was unremarkable and thought content was devoid of suicidal and homicidal ideation and psychotic thought. No hallucinations. Insight was good. Judgment was intact and adequate for safety. Fund of knowledge was intact. Pt demonstrates no obvious problems with attention, concentration, language, recent or remote memory although these were not formally tested.     ASSESSMENT                                                                                                      HISTORICAL:  Initial psych note  12/28/20          NOTES:      Andrei Whitman is a 49 yo with bipolar disorder with psychiatric hospitalization August 2020. Was on amitriptyline, Adderall, Wellbutrin : all activating meds although Andrei notes he feels was the Lamictal that precipitated bibiana. April Larry took care of Andrei hospital stay and noted Depakote for him past weight gain, lithium polyuria and thirst, uncertain whether he had been on neuroleptics (I saw Latuda mentioned in [facility] notes). Andrei had sx bibiana along with paranoia and somatic delusions of August 2020. Sx cleared and he is still taking the Tegretol  mg twice daily. Not taking Ativan and prefers not to. Doing well in terms no sx bibiana or psychosis. IS still having depression, anxiety and he feels sx ADHD of which isn't getting anything done. Struggling in that no license and helps take care of his GF Paulette thus extra difficult in that they have no transportation.    Meds: we agreed to stick with Tegretol  mg twice daily and we discussed verapamil and Tegretol can interact in that TEgretol can decrease the concentration of verapamil and Verapamil can increase concentration of tegretol. We thus checked Tegretol level and is in normal range. Andrei is generally doing well: injections helping with pain, got his license back. These are definitely helping ease stress / anxiety and improve mood.     TREATMENT RISK STATEMENT:  The risks, benefits, alternatives and potential adverse effects have been explained and are understood by the pt.  The pt agrees to the treatment plan with the ability to do so.   The pt knows to call the clinic for any problems or access emergency care if needed.        DIAGNOSES                     Bipolar disorder, most recent episode manic (with psychosis), in partial remission    PLAN                                                                                                                    1)  MEDICATIONS:         -- Continue Tegretol  mg  twice daily. He is not taking the Ativan  2)  THERAPY:  No Change    3)  LABS:  Tegretol level 2/8/21, CMP, CBC    4)  PT MONITOR [call for probs]:  Worsening symptoms, SI/HI, SEs from meds    5)  REFERRALS [CD, medical, other]:  None    6)  RTC:    ~1 month

## 2021-03-16 ENCOUNTER — TELEPHONE (OUTPATIENT)
Dept: PSYCHIATRY | Facility: OTHER | Age: 51
End: 2021-03-16

## 2021-03-16 ASSESSMENT — ANXIETY QUESTIONNAIRES: GAD7 TOTAL SCORE: 14

## 2021-03-16 NOTE — TELEPHONE ENCOUNTER
3/16/21**TOMMY to schedule 1 month follow up appointment with Blue (approx. 4/16/21)    Gideon SINGH

## 2021-03-19 ENCOUNTER — TELEPHONE (OUTPATIENT)
Dept: INTERVENTIONAL RADIOLOGY/VASCULAR | Facility: HOSPITAL | Age: 51
End: 2021-03-19

## 2021-03-19 NOTE — TELEPHONE ENCOUNTER
CONSULT PATIENT  PAIN INJECTION POST CALL    Procedure: Epidural TL C7-T1  Radiologist(s): Dr. Francis Harp  Date of Procedure: 3-5-21    I responded to the patient's questions/concerns.    Relief of pain from this injection    A = 90%  A- = 85%  B = 80%  B- =75%  C = 70%  C- = 65%  D = 60%  D- = 50%  F < 50%       Would you say this injection has been beneficial? Yes  If yes, for how long? Patient states this injection is currently relieving at least 30% of his pain.    Was there one injection that worked better than the other? No    Where is the pain? Half way down the back of neck, entire neck area. On the from right side of the neck patient states he noticed some swelling there after lifting a box.  Can you describe the pain? Sort of sharp, and pressure. Both arms feel weak.  Does the pain radiate anywhere? no  If yes, where does it radiate and where does the pain stop?n/a    Is this new pain? Yes: new front right side of neck swelling. Patient states is from lifting a box.    Patient would like to pursue another injection.      Liliam Cohen

## 2021-04-08 ENCOUNTER — ANESTHESIA (OUTPATIENT)
Dept: SURGERY | Facility: HOSPITAL | Age: 51
End: 2021-04-08
Payer: MEDICARE

## 2021-04-08 ENCOUNTER — HOSPITAL ENCOUNTER (EMERGENCY)
Facility: HOSPITAL | Age: 51
Discharge: HOME | End: 2021-04-08
Attending: PHYSICIAN ASSISTANT | Admitting: SURGERY
Payer: MEDICARE

## 2021-04-08 ENCOUNTER — HOSPITAL ENCOUNTER (OUTPATIENT)
Facility: HOSPITAL | Age: 51
End: 2021-04-08
Attending: SURGERY | Admitting: SURGERY
Payer: MEDICARE

## 2021-04-08 ENCOUNTER — ANESTHESIA EVENT (OUTPATIENT)
Dept: SURGERY | Facility: HOSPITAL | Age: 51
End: 2021-04-08
Payer: MEDICARE

## 2021-04-08 ENCOUNTER — APPOINTMENT (OUTPATIENT)
Dept: CT IMAGING | Facility: HOSPITAL | Age: 51
End: 2021-04-08
Attending: PHYSICIAN ASSISTANT
Payer: MEDICARE

## 2021-04-08 VITALS
TEMPERATURE: 99.4 F | OXYGEN SATURATION: 92 % | HEART RATE: 88 BPM | SYSTOLIC BLOOD PRESSURE: 135 MMHG | DIASTOLIC BLOOD PRESSURE: 84 MMHG | RESPIRATION RATE: 16 BRPM

## 2021-04-08 DIAGNOSIS — G89.18 ACUTE POST-OPERATIVE PAIN: ICD-10-CM

## 2021-04-08 DIAGNOSIS — K61.0 PERIANAL ABSCESS: Primary | ICD-10-CM

## 2021-04-08 DIAGNOSIS — K61.0 PERIANAL ABSCESS: ICD-10-CM

## 2021-04-08 DIAGNOSIS — R55 SYNCOPE: ICD-10-CM

## 2021-04-08 LAB
ALBUMIN UR-MCNC: NEGATIVE MG/DL
ANION GAP SERPL CALCULATED.3IONS-SCNC: 5 MMOL/L (ref 3–14)
APPEARANCE UR: CLEAR
BASOPHILS # BLD AUTO: 0.1 10E9/L (ref 0–0.2)
BASOPHILS NFR BLD AUTO: 0.3 %
BILIRUB UR QL STRIP: NEGATIVE
BUN SERPL-MCNC: 7 MG/DL (ref 7–30)
CALCIUM SERPL-MCNC: 9 MG/DL (ref 8.5–10.1)
CHLORIDE SERPL-SCNC: 105 MMOL/L (ref 94–109)
CO2 SERPL-SCNC: 29 MMOL/L (ref 20–32)
COLOR UR AUTO: NORMAL
CREAT SERPL-MCNC: 0.81 MG/DL (ref 0.66–1.25)
CRP SERPL-MCNC: 41.2 MG/L (ref 0–8)
DIFFERENTIAL METHOD BLD: ABNORMAL
EOSINOPHIL # BLD AUTO: 0.1 10E9/L (ref 0–0.7)
EOSINOPHIL NFR BLD AUTO: 0.5 %
ERYTHROCYTE [DISTWIDTH] IN BLOOD BY AUTOMATED COUNT: 16.2 % (ref 10–15)
GFR SERPL CREATININE-BSD FRML MDRD: >90 ML/MIN/{1.73_M2}
GLUCOSE SERPL-MCNC: 96 MG/DL (ref 70–99)
GLUCOSE UR STRIP-MCNC: NEGATIVE MG/DL
HCT VFR BLD AUTO: 40.8 % (ref 40–53)
HGB BLD-MCNC: 13.5 G/DL (ref 13.3–17.7)
HGB UR QL STRIP: NEGATIVE
IMM GRANULOCYTES # BLD: 0.1 10E9/L (ref 0–0.4)
IMM GRANULOCYTES NFR BLD: 0.6 %
KETONES UR STRIP-MCNC: NEGATIVE MG/DL
LABORATORY COMMENT REPORT: NORMAL
LEUKOCYTE ESTERASE UR QL STRIP: NEGATIVE
LYMPHOCYTES # BLD AUTO: 1.2 10E9/L (ref 0.8–5.3)
LYMPHOCYTES NFR BLD AUTO: 8.3 %
MCH RBC QN AUTO: 29.1 PG (ref 26.5–33)
MCHC RBC AUTO-ENTMCNC: 33.1 G/DL (ref 31.5–36.5)
MCV RBC AUTO: 88 FL (ref 78–100)
MONOCYTES # BLD AUTO: 1.3 10E9/L (ref 0–1.3)
MONOCYTES NFR BLD AUTO: 8.6 %
NEUTROPHILS # BLD AUTO: 12.2 10E9/L (ref 1.6–8.3)
NEUTROPHILS NFR BLD AUTO: 81.7 %
NITRATE UR QL: NEGATIVE
NRBC # BLD AUTO: 0 10*3/UL
NRBC BLD AUTO-RTO: 0 /100
PH UR STRIP: 7 PH (ref 4.7–8)
PLATELET # BLD AUTO: 223 10E9/L (ref 150–450)
POTASSIUM SERPL-SCNC: 3.1 MMOL/L (ref 3.4–5.3)
RBC # BLD AUTO: 4.64 10E12/L (ref 4.4–5.9)
SARS-COV-2 RNA RESP QL NAA+PROBE: NEGATIVE
SODIUM SERPL-SCNC: 139 MMOL/L (ref 133–144)
SOURCE: NORMAL
SP GR UR STRIP: 1.01 (ref 1–1.03)
SPECIMEN SOURCE: NORMAL
TROPONIN I SERPL-MCNC: <0.015 UG/L (ref 0–0.04)
UROBILINOGEN UR STRIP-MCNC: NORMAL MG/DL (ref 0–2)
WBC # BLD AUTO: 14.9 10E9/L (ref 4–11)

## 2021-04-08 PROCEDURE — 255N000002 HC RX 255 OP 636: Performed by: PHYSICIAN ASSISTANT

## 2021-04-08 PROCEDURE — 250N000011 HC RX IP 250 OP 636: Performed by: NURSE ANESTHETIST, CERTIFIED REGISTERED

## 2021-04-08 PROCEDURE — 99285 EMERGENCY DEPT VISIT HI MDM: CPT | Mod: 25

## 2021-04-08 PROCEDURE — 81003 URINALYSIS AUTO W/O SCOPE: CPT | Performed by: PHYSICIAN ASSISTANT

## 2021-04-08 PROCEDURE — 99284 EMERGENCY DEPT VISIT MOD MDM: CPT | Mod: 25 | Performed by: SURGERY

## 2021-04-08 PROCEDURE — 74177 CT ABD & PELVIS W/CONTRAST: CPT

## 2021-04-08 PROCEDURE — 250N000013 HC RX MED GY IP 250 OP 250 PS 637: Performed by: SURGERY

## 2021-04-08 PROCEDURE — U0005 INFEC AGEN DETEC AMPLI PROBE: HCPCS | Performed by: PHYSICIAN ASSISTANT

## 2021-04-08 PROCEDURE — 250N000011 HC RX IP 250 OP 636: Performed by: SURGERY

## 2021-04-08 PROCEDURE — 258N000003 HC RX IP 258 OP 636: Performed by: NURSE ANESTHETIST, CERTIFIED REGISTERED

## 2021-04-08 PROCEDURE — 86140 C-REACTIVE PROTEIN: CPT | Performed by: PHYSICIAN ASSISTANT

## 2021-04-08 PROCEDURE — 370N000017 HC ANESTHESIA TECHNICAL FEE, PER MIN: Performed by: SURGERY

## 2021-04-08 PROCEDURE — 360N000076 HC SURGERY LEVEL 3, PER MIN: Performed by: SURGERY

## 2021-04-08 PROCEDURE — U0003 INFECTIOUS AGENT DETECTION BY NUCLEIC ACID (DNA OR RNA); SEVERE ACUTE RESPIRATORY SYNDROME CORONAVIRUS 2 (SARS-COV-2) (CORONAVIRUS DISEASE [COVID-19]), AMPLIFIED PROBE TECHNIQUE, MAKING USE OF HIGH THROUGHPUT TECHNOLOGIES AS DESCRIBED BY CMS-2020-01-R: HCPCS | Performed by: PHYSICIAN ASSISTANT

## 2021-04-08 PROCEDURE — 46050 I&D PERIANAL ABSCESS SUPFC: CPT | Performed by: SURGERY

## 2021-04-08 PROCEDURE — 84484 ASSAY OF TROPONIN QUANT: CPT | Performed by: PHYSICIAN ASSISTANT

## 2021-04-08 PROCEDURE — 99285 EMERGENCY DEPT VISIT HI MDM: CPT | Performed by: PHYSICIAN ASSISTANT

## 2021-04-08 PROCEDURE — 46050 I&D PERIANAL ABSCESS SUPFC: CPT | Performed by: NURSE ANESTHETIST, CERTIFIED REGISTERED

## 2021-04-08 PROCEDURE — C9803 HOPD COVID-19 SPEC COLLECT: HCPCS

## 2021-04-08 PROCEDURE — 70450 CT HEAD/BRAIN W/O DYE: CPT

## 2021-04-08 PROCEDURE — 93010 ELECTROCARDIOGRAM REPORT: CPT | Performed by: INTERNAL MEDICINE

## 2021-04-08 PROCEDURE — 85025 COMPLETE CBC W/AUTO DIFF WBC: CPT | Performed by: PHYSICIAN ASSISTANT

## 2021-04-08 PROCEDURE — 36415 COLL VENOUS BLD VENIPUNCTURE: CPT

## 2021-04-08 PROCEDURE — 99140 ANES COMP EMERGENCY COND: CPT | Performed by: NURSE ANESTHETIST, CERTIFIED REGISTERED

## 2021-04-08 PROCEDURE — 93005 ELECTROCARDIOGRAM TRACING: CPT | Mod: XE

## 2021-04-08 PROCEDURE — C9290 INJ, BUPIVACAINE LIPOSOME: HCPCS | Performed by: SURGERY

## 2021-04-08 PROCEDURE — 710N000010 HC RECOVERY PHASE 1, LEVEL 2, PER MIN: Performed by: SURGERY

## 2021-04-08 PROCEDURE — 250N000011 HC RX IP 250 OP 636: Performed by: PHYSICIAN ASSISTANT

## 2021-04-08 PROCEDURE — 80048 BASIC METABOLIC PNL TOTAL CA: CPT | Performed by: PHYSICIAN ASSISTANT

## 2021-04-08 PROCEDURE — 250N000025 HC SEVOFLURANE, PER MIN: Performed by: SURGERY

## 2021-04-08 PROCEDURE — 250N000009 HC RX 250: Performed by: NURSE ANESTHETIST, CERTIFIED REGISTERED

## 2021-04-08 PROCEDURE — 272N000001 HC OR GENERAL SUPPLY STERILE: Performed by: SURGERY

## 2021-04-08 RX ORDER — ONDANSETRON 2 MG/ML
4 INJECTION INTRAMUSCULAR; INTRAVENOUS EVERY 30 MIN PRN
Status: DISCONTINUED | OUTPATIENT
Start: 2021-04-08 | End: 2021-04-09 | Stop reason: HOSPADM

## 2021-04-08 RX ORDER — HYDRALAZINE HYDROCHLORIDE 20 MG/ML
2.5-5 INJECTION INTRAMUSCULAR; INTRAVENOUS EVERY 10 MIN PRN
Status: DISCONTINUED | OUTPATIENT
Start: 2021-04-08 | End: 2021-04-09 | Stop reason: HOSPADM

## 2021-04-08 RX ORDER — MEPERIDINE HYDROCHLORIDE 25 MG/ML
12.5 INJECTION INTRAMUSCULAR; INTRAVENOUS; SUBCUTANEOUS
Status: DISCONTINUED | OUTPATIENT
Start: 2021-04-08 | End: 2021-04-09 | Stop reason: HOSPADM

## 2021-04-08 RX ORDER — FENTANYL CITRATE 50 UG/ML
25-50 INJECTION, SOLUTION INTRAMUSCULAR; INTRAVENOUS
Status: DISCONTINUED | OUTPATIENT
Start: 2021-04-08 | End: 2021-04-09 | Stop reason: HOSPADM

## 2021-04-08 RX ORDER — LIDOCAINE HYDROCHLORIDE 20 MG/ML
INJECTION, SOLUTION INFILTRATION; PERINEURAL PRN
Status: DISCONTINUED | OUTPATIENT
Start: 2021-04-08 | End: 2021-04-09

## 2021-04-08 RX ORDER — NALOXONE HYDROCHLORIDE 0.4 MG/ML
0.4 INJECTION, SOLUTION INTRAMUSCULAR; INTRAVENOUS; SUBCUTANEOUS
Status: DISCONTINUED | OUTPATIENT
Start: 2021-04-08 | End: 2021-04-09 | Stop reason: HOSPADM

## 2021-04-08 RX ORDER — IOPAMIDOL 612 MG/ML
100 INJECTION, SOLUTION INTRAVASCULAR ONCE
Status: COMPLETED | OUTPATIENT
Start: 2021-04-08 | End: 2021-04-08

## 2021-04-08 RX ORDER — LIDOCAINE 40 MG/G
CREAM TOPICAL
Status: CANCELLED | OUTPATIENT
Start: 2021-04-08

## 2021-04-08 RX ORDER — SODIUM CHLORIDE, SODIUM LACTATE, POTASSIUM CHLORIDE, CALCIUM CHLORIDE 600; 310; 30; 20 MG/100ML; MG/100ML; MG/100ML; MG/100ML
INJECTION, SOLUTION INTRAVENOUS CONTINUOUS
Status: DISCONTINUED | OUTPATIENT
Start: 2021-04-08 | End: 2021-04-09 | Stop reason: HOSPADM

## 2021-04-08 RX ORDER — IBUPROFEN 200 MG
600 TABLET ORAL
Status: DISCONTINUED | OUTPATIENT
Start: 2021-04-08 | End: 2021-04-09 | Stop reason: HOSPADM

## 2021-04-08 RX ORDER — SODIUM CHLORIDE, SODIUM LACTATE, POTASSIUM CHLORIDE, CALCIUM CHLORIDE 600; 310; 30; 20 MG/100ML; MG/100ML; MG/100ML; MG/100ML
INJECTION, SOLUTION INTRAVENOUS CONTINUOUS PRN
Status: DISCONTINUED | OUTPATIENT
Start: 2021-04-08 | End: 2021-04-09

## 2021-04-08 RX ORDER — ONDANSETRON 4 MG/1
4 TABLET, ORALLY DISINTEGRATING ORAL
Status: DISCONTINUED | OUTPATIENT
Start: 2021-04-08 | End: 2021-04-09 | Stop reason: HOSPADM

## 2021-04-08 RX ORDER — OXYCODONE HYDROCHLORIDE 5 MG/1
5-10 TABLET ORAL EVERY 4 HOURS PRN
Qty: 24 TABLET | Refills: 0 | Status: SHIPPED | OUTPATIENT
Start: 2021-04-08 | End: 2022-02-22

## 2021-04-08 RX ORDER — KETAMINE HYDROCHLORIDE 10 MG/ML
INJECTION INTRAMUSCULAR; INTRAVENOUS PRN
Status: DISCONTINUED | OUTPATIENT
Start: 2021-04-08 | End: 2021-04-09

## 2021-04-08 RX ORDER — DEXAMETHASONE SODIUM PHOSPHATE 10 MG/ML
INJECTION, SOLUTION INTRAMUSCULAR; INTRAVENOUS PRN
Status: DISCONTINUED | OUTPATIENT
Start: 2021-04-08 | End: 2021-04-09

## 2021-04-08 RX ORDER — LABETALOL 20 MG/4 ML (5 MG/ML) INTRAVENOUS SYRINGE
10
Status: DISCONTINUED | OUTPATIENT
Start: 2021-04-08 | End: 2021-04-09 | Stop reason: HOSPADM

## 2021-04-08 RX ORDER — ONDANSETRON 4 MG/1
4 TABLET, ORALLY DISINTEGRATING ORAL EVERY 30 MIN PRN
Status: DISCONTINUED | OUTPATIENT
Start: 2021-04-08 | End: 2021-04-09 | Stop reason: HOSPADM

## 2021-04-08 RX ORDER — NALOXONE HYDROCHLORIDE 0.4 MG/ML
0.2 INJECTION, SOLUTION INTRAMUSCULAR; INTRAVENOUS; SUBCUTANEOUS
Status: DISCONTINUED | OUTPATIENT
Start: 2021-04-08 | End: 2021-04-09 | Stop reason: HOSPADM

## 2021-04-08 RX ORDER — IBUPROFEN 800 MG/1
800 TABLET, FILM COATED ORAL 3 TIMES DAILY
Qty: 42 TABLET | Refills: 0 | Status: SHIPPED | OUTPATIENT
Start: 2021-04-08 | End: 2021-04-22

## 2021-04-08 RX ORDER — ONDANSETRON 2 MG/ML
INJECTION INTRAMUSCULAR; INTRAVENOUS PRN
Status: DISCONTINUED | OUTPATIENT
Start: 2021-04-08 | End: 2021-04-09

## 2021-04-08 RX ORDER — OXYCODONE HYDROCHLORIDE 5 MG/1
5 TABLET ORAL
Status: COMPLETED | OUTPATIENT
Start: 2021-04-08 | End: 2021-04-08

## 2021-04-08 RX ORDER — FENTANYL CITRATE 50 UG/ML
25-50 INJECTION, SOLUTION INTRAMUSCULAR; INTRAVENOUS EVERY 5 MIN PRN
Status: DISCONTINUED | OUTPATIENT
Start: 2021-04-08 | End: 2021-04-09 | Stop reason: HOSPADM

## 2021-04-08 RX ORDER — PROPOFOL 10 MG/ML
INJECTION, EMULSION INTRAVENOUS PRN
Status: DISCONTINUED | OUTPATIENT
Start: 2021-04-08 | End: 2021-04-09

## 2021-04-08 RX ORDER — FENTANYL CITRATE 50 UG/ML
INJECTION, SOLUTION INTRAMUSCULAR; INTRAVENOUS PRN
Status: DISCONTINUED | OUTPATIENT
Start: 2021-04-08 | End: 2021-04-09

## 2021-04-08 RX ORDER — HYDROMORPHONE HYDROCHLORIDE 1 MG/ML
.3-.5 INJECTION, SOLUTION INTRAMUSCULAR; INTRAVENOUS; SUBCUTANEOUS
Status: DISCONTINUED | OUTPATIENT
Start: 2021-04-08 | End: 2021-04-09 | Stop reason: HOSPADM

## 2021-04-08 RX ORDER — SODIUM CHLORIDE, SODIUM LACTATE, POTASSIUM CHLORIDE, CALCIUM CHLORIDE 600; 310; 30; 20 MG/100ML; MG/100ML; MG/100ML; MG/100ML
INJECTION, SOLUTION INTRAVENOUS CONTINUOUS
Status: CANCELLED | OUTPATIENT
Start: 2021-04-08

## 2021-04-08 RX ADMIN — OXYCODONE HYDROCHLORIDE 5 MG: 5 TABLET ORAL at 22:15

## 2021-04-08 RX ADMIN — KETAMINE HYDROCHLORIDE 30 MG: 10 INJECTION, SOLUTION INTRAMUSCULAR; INTRAVENOUS at 21:04

## 2021-04-08 RX ADMIN — FENTANYL CITRATE 50 MCG: 50 INJECTION, SOLUTION INTRAMUSCULAR; INTRAVENOUS at 21:11

## 2021-04-08 RX ADMIN — PROPOFOL 200 MG: 10 INJECTION, EMULSION INTRAVENOUS at 21:03

## 2021-04-08 RX ADMIN — Medication 100 MG: at 21:04

## 2021-04-08 RX ADMIN — ONDANSETRON 4 MG: 2 INJECTION INTRAMUSCULAR; INTRAVENOUS at 21:28

## 2021-04-08 RX ADMIN — LIDOCAINE HYDROCHLORIDE 80 MG: 20 INJECTION, SOLUTION INFILTRATION; PERINEURAL at 21:03

## 2021-04-08 RX ADMIN — TAZOBACTAM SODIUM AND PIPERACILLIN SODIUM 3.38 G: 375; 3 INJECTION, SOLUTION INTRAVENOUS at 19:46

## 2021-04-08 RX ADMIN — DEXAMETHASONE SODIUM PHOSPHATE 10 MG: 10 INJECTION, SOLUTION INTRAMUSCULAR; INTRAVENOUS at 21:15

## 2021-04-08 RX ADMIN — FENTANYL CITRATE 50 MCG: 50 INJECTION, SOLUTION INTRAMUSCULAR; INTRAVENOUS at 21:20

## 2021-04-08 RX ADMIN — IOPAMIDOL 100 ML: 612 INJECTION, SOLUTION INTRAVENOUS at 19:40

## 2021-04-08 RX ADMIN — FENTANYL CITRATE 100 MCG: 50 INJECTION, SOLUTION INTRAMUSCULAR; INTRAVENOUS at 21:03

## 2021-04-08 RX ADMIN — SODIUM CHLORIDE, POTASSIUM CHLORIDE, SODIUM LACTATE AND CALCIUM CHLORIDE: 600; 310; 30; 20 INJECTION, SOLUTION INTRAVENOUS at 20:58

## 2021-04-08 ASSESSMENT — COPD QUESTIONNAIRES
COPD: 1
CAT_SEVERITY: MILD

## 2021-04-08 ASSESSMENT — ENCOUNTER SYMPTOMS
DIZZINESS: 1
SHORTNESS OF BREATH: 0
ROS GI COMMENTS: SEE HPI
LIGHT-HEADEDNESS: 0
PSYCHIATRIC NEGATIVE: 1
HEADACHES: 0
FEVER: 0
WEAKNESS: 0
APPETITE CHANGE: 0
BRUISES/BLEEDS EASILY: 0
PALPITATIONS: 0
PHOTOPHOBIA: 0
NECK PAIN: 0
ACTIVITY CHANGE: 0
FATIGUE: 0
CHILLS: 0
NECK STIFFNESS: 0

## 2021-04-08 ASSESSMENT — LIFESTYLE VARIABLES: TOBACCO_USE: 1

## 2021-04-08 NOTE — ED NOTES
Pt comes in to ED today due to 9/10 pain to anus. States that he has had a cyst there for a year that comes and goes away. States he pops it and it drains pus and blood every time. States he came into ED 4 times a year ago and he states he was never helped. Also states he touched the cyst today and 'felt the infection run through him' and started to slowly lose conscience. States he was holding his cigarette when he lost conscience and it was still in his hand so pt states he must not have been out long. States he is having pain in his RLQ. States he does not have his appendix. Monitors placed.

## 2021-04-08 NOTE — ED PROVIDER NOTES
"  History     Chief Complaint   Patient presents with     Cyst     c/o cyst on tailbone and fever.      Loss of Consciousness     notes bent over to touch the cyst and passed out due to \"something rushed thru my body\". notes hit his head on the radiatior and states \"slight h/a\" prior to falling. denies neck pain but notes has neck problems. states \"no injury\" from his fall     The history is provided by the patient.     Andrei Whitman is a 50 year old male who presented to the emergency department via EMS for evaluation of a syncopal episode and buttock pain.  Patient reports he has been experiencing a cyst on his buttock for the last several days if not weeks and was attempted to get into the clinic.  He reports that he was feeling the area and experience an initial \"rush\" and had a syncopal episode hitting his head on a heater.  Denies any neck pain.  Denies any profound headache.  Denies any chest pain or palpitations.  Denies any fevers or chills.  Denies any lower urinary tract symptoms.    Allergies:  No Known Allergies    Problem List:    Patient Active Problem List    Diagnosis Date Noted     Perianal abscess 04/08/2021     Priority: Medium     Added automatically from request for surgery [id number]       Suicidal behavior 08/12/2020     Priority: Medium     Dry mouth 08/12/2020     Priority: Medium     Obesity (BMI 35.0-39.9) with comorbidity (H) 09/23/2019     Priority: Medium     Chronic lung disease 09/23/2019     Priority: Medium     Lithium use 08/06/2018     Priority: Medium     DDD (degenerative disc disease), cervical 08/06/2018     Priority: Medium     Cervical stenosis of spinal canal 08/06/2018     Priority: Medium     Tobacco dependency 06/09/2018     Priority: Medium     Russell esophagus 06/07/2018     Priority: Medium     Benign essential hypertension 04/24/2018     Priority: Medium     Flatulence, eructation, and gas pain 08/15/2016     Priority: Medium     Bipolar disease, chronic (H) " 08/15/2016     Priority: Medium     ACP (advance care planning) 06/20/2016     Priority: Medium     Advance Care Planning 6/20/2016: ACP Review of Chart / Resources Provided:  Reviewed chart for advance care plan.  Andrei Whitman has no plan or code status on file. Discussed available resources and provided with information. Confirmed code status reflects current choices pending further ACP discussions.  Confirmed/documented legally designated decision makers.  Added by Monica Valerio               Attention deficit hyperactivity disorder (ADHD), predominantly inattentive type 02/19/2016     Priority: Medium     Abnormal weight gain 09/25/2015     Priority: Medium     Mood disorder (H) 07/15/2015     Priority: Medium     GERD (gastroesophageal reflux disease) 03/12/2015     Priority: Medium     Segmental and somatic dysfunction of lower extremity 11/26/2014     Priority: Medium     Lower back pain 10/24/2014     Priority: Medium     Cervicalgia 10/02/2014     Priority: Medium     Anxiety state 08/05/2013     Priority: Medium     IMO Update       Insomnia, unspecified 08/05/2013     Priority: Medium     Updated per 10/1/17 IMO import       Left hip pain 08/05/2013     Priority: Medium        Past Medical History:    Past Medical History:   Diagnosis Date     Russell's esophagus 07/12/2017     Bipolar disorder (H)      Chronic cervical pain      Colon polyp, hyperplastic 07/12/2017     Colon polyps 07/12/2017     DDD (degenerative disc disease), cervical      Depression, major      Dry mouth 8/12/2020     Pancolonic diverticulosis 07/12/2017       Past Surgical History:    Past Surgical History:   Procedure Laterality Date     APPENDECTOMY      age 14     COLONOSCOPY  07/12/2017    Left descending x1 tubular adenoma, sigmoid x1 tubular adenoma and rectal x1 hyperplastic polyp.  Pan diverticulosis     ENDOSCOPY UPPER, COLONOSCOPY, COMBINED N/A 7/12/2017    Procedure: COMBINED ENDOSCOPY UPPER, COLONOSCOPY;  UPPER  ENDOSCOPY WITH BIOPSIES  AND COLONOSCOPY WITH POLYPECTOMY;  Surgeon: Francis Valverde DO;  Location: [facility]     ENT SURGERY  age 18    tonsils     ESOPHAGOGASTRODUODENOSCOPY  07/12/2017    chemical gatropathy, GE junction with pancreatiuc metaplasia      ESOPHAGOSCOPY, GASTROSCOPY, DUODENOSCOPY (EGD), COMBINED N/A 6/20/2018    Procedure: COMBINED ESOPHAGOSCOPY, GASTROSCOPY, DUODENOSCOPY (EGD), BIOPSY SINGLE OR MULTIPLE;  UPPER ENDOSCOPY WITH BIOPSY;  Surgeon: Francis Valverde DO;  Location: [facility] OR     IR CONSULTATION FOR IR EXAM  2/10/2021     ORTHOPEDIC SURGERY  age 30     back and neck fusion, bone from hip removed to use on plates     ORTHOPEDIC SURGERY  age 30    L5 fusion, laminectomy of lumbar discs       Family History:    Family History   Problem Relation Age of Onset     Asthma Mother      Arthritis Mother      Prostate Cancer Father      Heart Disease Paternal Grandfather      Hypertension Paternal Grandfather      Alcohol/Drug Paternal Grandfather      Other - See Comments Brother         Nerve and degenerative disease mild     Alcohol/Drug Brother      Other - See Comments Sister 21        Hip replacements, nerve, degerative disease     Alcohol/Drug Maternal Grandfather      Unknown/Adopted Paternal Grandmother      Other Cancer Paternal Aunt         Cervical cancer       Social History:  Marital Status:   [4]  Social History     Tobacco Use     Smoking status: Current Every Day Smoker     Packs/day: 0.50     Years: 30.00     Pack years: 15.00     Start date: 1/1/1989     Smokeless tobacco: Never Used     Tobacco comment: quitplan referral declined 6/19/19 on chantax   Substance Use Topics     Alcohol use: No     Alcohol/week: 0.0 standard drinks     Drug use: Yes     Types: Marijuana        Medications:    No current outpatient medications on file.        Review of Systems   Constitutional: Negative for activity change, appetite change, chills, fatigue and fever.   HENT: Negative.    Eyes:  Negative for photophobia and visual disturbance.   Respiratory: Negative for shortness of breath.    Cardiovascular: Negative for chest pain and palpitations.   Gastrointestinal:        See HPI   Genitourinary: Negative.    Musculoskeletal: Negative for neck pain and neck stiffness.   Skin: Negative.    Allergic/Immunologic: Negative for immunocompromised state.   Neurological: Positive for dizziness and syncope. Negative for weakness, light-headedness and headaches.   Hematological: Does not bruise/bleed easily.   Psychiatric/Behavioral: Negative.        Physical Exam   BP: 126/77  Pulse: 96  Temp: 99.3  F (37.4  C)  Resp: 16  SpO2: 96 %      Physical Exam  Vitals signs reviewed.   Constitutional:       General: He is not in acute distress.     Appearance: Normal appearance. He is normal weight. He is not ill-appearing, toxic-appearing or diaphoretic.   HENT:      Head: Normocephalic and atraumatic.      Nose: Nose normal.      Mouth/Throat:      Mouth: Mucous membranes are moist.      Pharynx: Oropharynx is clear.   Eyes:      Extraocular Movements: Extraocular movements intact.      Conjunctiva/sclera: Conjunctivae normal.      Pupils: Pupils are equal, round, and reactive to light.   Neck:      Musculoskeletal: Normal range of motion and neck supple. No muscular tenderness.   Cardiovascular:      Rate and Rhythm: Normal rate and regular rhythm.   Pulmonary:      Effort: Pulmonary effort is normal.      Breath sounds: Normal breath sounds.   Abdominal:      Palpations: Abdomen is soft.      Tenderness: There is no abdominal tenderness.   Genitourinary:     Comments: Large right-sided perianal abscess.  Skin:     General: Skin is warm and dry.      Capillary Refill: Capillary refill takes less than 2 seconds.   Neurological:      General: No focal deficit present.      Mental Status: He is alert and oriented to person, place, and time.   Psychiatric:         Mood and Affect: Mood normal.         ED Course         Procedures  EKG shows a sinus rhythm with a first-degree AV block.  There is a single PAC.  Ventricular rate is 84.  UT interval is prolonged to 226 ms.  QRS duration is slightly prolonged at 112 ms.  The left axis deviation of -50 degrees.             Critical Care time:  none               Results for orders placed or performed during the hospital encounter of 04/08/21 (from the past 24 hour(s))   CBC with platelets differential   Result Value Ref [place]    WBC 14.9 (H) 4.0 - 11.0 10e9/L    RBC Count 4.64 4.4 - 5.9 10e12/L    Hemoglobin 13.5 13.3 - 17.7 g/dL    Hematocrit 40.8 40.0 - 53.0 %    MCV 88 78 - 100 fl    MCH 29.1 26.5 - 33.0 pg    MCHC 33.1 31.5 - 36.5 g/dL    RDW 16.2 (H) 10.0 - 15.0 %    Platelet Count 223 150 - 450 10e9/L    Diff Method Automated Method     % Neutrophils 81.7 %    % Lymphocytes 8.3 %    % Monocytes 8.6 %    % Eosinophils 0.5 %    % Basophils 0.3 %    % Immature Granulocytes 0.6 %    Nucleated RBCs 0 0 /100    Absolute Neutrophil 12.2 (H) 1.6 - 8.3 10e9/L    Absolute Lymphocytes 1.2 0.8 - 5.3 10e9/L    Absolute Monocytes 1.3 0.0 - 1.3 10e9/L    Absolute Eosinophils 0.1 0.0 - 0.7 10e9/L    Absolute Basophils 0.1 0.0 - 0.2 10e9/L    Abs Immature Granulocytes 0.1 0 - 0.4 10e9/L    Absolute Nucleated RBC 0.0    Basic metabolic panel   Result Value Ref [place]    Sodium 139 133 - 144 mmol/L    Potassium 3.1 (L) 3.4 - 5.3 mmol/L    Chloride 105 94 - 109 mmol/L    Carbon Dioxide 29 20 - 32 mmol/L    Anion Gap 5 3 - 14 mmol/L    Glucose 96 70 - 99 mg/dL    Urea Nitrogen 7 7 - 30 mg/dL    Creatinine 0.81 0.66 - 1.25 mg/dL    GFR Estimate >90 >60 mL/min/[1.73_m2]    GFR Estimate If Black >90 >60 mL/min/[1.73_m2]    Calcium 9.0 8.5 - 10.1 mg/dL   CRP inflammation   Result Value Ref [place]    CRP Inflammation 41.2 (H) 0.0 - 8.0 mg/L   Troponin I   Result Value Ref [place]    Troponin I ES <0.015 0.000 - 0.045 ug/L   UA reflex to Microscopic   Result Value Ref [place]    Color Urine Light Yellow      Appearance Urine Clear     Glucose Urine Negative NEG^Negative mg/dL    Bilirubin Urine Negative NEG^Negative    Ketones Urine Negative NEG^Negative mg/dL    Specific Gravity Urine 1.006 1.003 - 1.035    Blood Urine Negative NEG^Negative    pH Urine 7.0 4.7 - 8.0 pH    Protein Albumin Urine Negative NEG^Negative mg/dL    Urobilinogen mg/dL Normal 0.0 - 2.0 mg/dL    Nitrite Urine Negative NEG^Negative    Leukocyte Esterase Urine Negative NEG^Negative    Source Midstream Urine    Asymptomatic SARS-CoV-2 COVID-19 Virus (Coronavirus) by PCR    Specimen: Nasopharyngeal   Result Value Ref [place]    SARS-CoV-2 Virus Specimen Source Nasopharyngeal     SARS-CoV-2 PCR Result NEGATIVE     SARS-CoV-2 PCR Comment       Testing was performed using the Xpert Xpress SARS-CoV-2 Assay on the Cepheid Gene-Xpert   Instrument Systems. Additional information about this Emergency Use Authorization (EUA)   assay can be found via the Lab Guide.     CT Abdomen Pelvis w Contrast    Narrative    CT ABDOMEN PELVIS W CONTRAST    CLINICAL HISTORY: Male, age 50 years,  evaluate for perirectal  abscess.;    Comparison:  None.    TECHNIQUE:  CT was performed of the abdomen and pelvis with IV  contrast. Sagittal, coronal, axial and MIP reconstructions were  reviewed.     FINDINGS:  Visualized portions of the heart are unremarkable. Lungs demonstrate  mild dependent atelectasis.    The liver, gallbladder, spleen, pancreas and adrenal glands are normal  in appearance.    Kidneys demonstrate numerous low dense lesions. Larger lesions are  cystic in nature. Small lesions are too small to characterize.    Ureters and urinary bladder are normal.    Large and small bowel demonstrate no acute abnormality. The appendix  is not seen. There are no secondary signs of appendicitis.    Inguinal lymph nodes are normal.     There is a multiloculated complex fluid collection seen within the  skin and subcutis fat along the medial aspect of the left buttock  suggesting  a perianal abscess. In total, this complex fluid collection  measures approximately 3.3 cm in craniocaudal dimension by 2.5 cm in  AP dimension by 1.7 cm in transverse dimension and appears to extend  to the dermis. No distinct evidence of fistulous tract is readily  apparent.    Bony structures demonstrate postoperative changes in the lower lumbar  spine and no distinct evidence of acute abnormality. There is grade 1  spondylolytic spondylolisthesis at L4-5.      Impression    IMPRESSION:   3.3 x 2.5 x 1.7 cm mixed gas and fluid collection in the skin and  subcutaneous fat along the medial aspect of the left buttock  suggesting a perianal abscess without evidence of apparent fistulous  tract.     Chronic changes as described above.    EVARISTO PITTS MD   CT Head w/o Contrast    Narrative    PROCEDURE: CT HEAD W/O CONTRAST   4/8/2021 7:46 PM    HISTORY:Male, age,  50 years, fall, injury, ,    COMPARISON:None    TECHNIQUE: CT of the brain without contrast.    FINDINGS: Ventricles and sulci are normal in size and shape. Gray and  white matter demonstrate normal differentiation.    There is no evidence of mass, mass effect or midline shift. No  evidence of acute hemorrhage. Soft tissue swelling in the frontal  region.    The bones are unremarkable. No fracture.       Impression    IMPRESSION:   No evidence of acute intracranial hemorrhage or fracture. Mild soft  tissue swelling in the frontal region may be related to soft tissue  injury.    EVARISTO PITTS MD       Medications   bupivacaine liposome (EXPAREL) 1.3 % LA inj susp (20 mLs Infiltration Given 4/8/21 2120)   piperacillin-tazobactam (ZOSYN) infusion 3.375 g (0 g Intravenous Stopped 4/8/21 2023)   iopamidol (ISOVUE-300) IV solution 61% 100 mL (100 mLs Intravenous Given 4/8/21 1940)   sodium chloride (PF) 0.9% PF flush 60 mL (60 mLs Intravenous Given 4/8/21 1941)       Assessments & Plan (with Medical Decision Making)   Vasovagal syncope.  Mostly concerning  is the perianal abscess.  Discussed with and graciously accepted by Dr. Beal for definitive care.     This document was prepared using a combination of typing and voice generated software.  While every attempt was made for accuracy, spelling and grammatical errors may exist.    I have reviewed the nursing notes.    I have reviewed the findings, diagnosis, plan and need for follow up with the patient.       Current Discharge Medication List          Final diagnoses:   Perianal abscess   Syncope       4/8/2021   [facility]     Juhi Goldberg PA-C  04/08/21 3729

## 2021-04-09 NOTE — ANESTHESIA CARE TRANSFER NOTE
Patient: Andrei SILVA J Carlos    Procedure(s):  EXAM UNDER ANESTHESIA, incision and drainage perianal abscess    Diagnosis: Perianal abscess [K61.0]  Diagnosis Additional Information: No value filed.    Anesthesia Type:   General     Note:    Oropharynx: oropharynx clear of all foreign objects  Level of Consciousness: drowsy  Oxygen Supplementation: nasal cannula  Level of Supplemental Oxygen (L/min / FiO2): 3  Independent Airway: airway patency satisfactory and stable  Dentition: dentition unchanged  Vital Signs Stable: post-procedure vital signs reviewed and stable  Report to RN Given: handoff report given  Patient transferred to: PACU    Handoff Report: Identifed the Patient, Identified the Reponsible Provider, Reviewed the pertinent medical history, Discussed the surgical course, Reviewed Intra-OP anesthesia mangement and issues during anesthesia, Set expectations for post-procedure period and Allowed opportunity for questions and acknowledgement of understanding      Vitals: (Last set prior to Anesthesia Care Transfer)  CRNA VITALS  4/8/2021 2116 - 4/8/2021 2146      4/8/2021             Resp Rate (set):  8        Electronically Signed By: JOEY Mathews CRNA  April 8, 2021  9:46 PM

## 2021-04-09 NOTE — ANESTHESIA POSTPROCEDURE EVALUATION
Patient: Anderi Whitman    Procedure(s):  EXAM UNDER ANESTHESIA, incision and drainage perianal abscess    Diagnosis:Perianal abscess [K61.0]  Diagnosis Additional Information: No value filed.    Anesthesia Type:  General    Note:  Disposition: Outpatient   Postop Pain Control: Uneventful            Sign Out: Well controlled pain   PONV: No   Neuro/Psych: Uneventful            Sign Out: Acceptable/Baseline neuro status   Airway/Respiratory: Uneventful            Sign Out: Acceptable/Baseline resp. status   CV/Hemodynamics: Uneventful            Sign Out: Acceptable CV status   Other NRE: NONE   DID A NON-ROUTINE EVENT OCCUR? No         Last vitals:  Vitals:    04/08/21 2225 04/08/21 2230 04/08/21 2235   BP: 131/89 131/89 135/84   Pulse: 91 88    Resp: 16 18 16   Temp:   99.4  F (37.4  C)   SpO2: 92% 92% 92%       Last vitals prior to Anesthesia Care Transfer:      Electronically Signed By: JOEY Mathews CRNA  April 9, 2021  6:57 AM

## 2021-04-09 NOTE — ANESTHESIA CARE TRANSFER NOTE
Patient: Andrei SILVA J Carlos    Procedure(s):  EXAM UNDER ANESTHESIA, incision and drainage perianal abscess    Diagnosis: Perianal abscess [K61.0]  Diagnosis Additional Information: No value filed.    Anesthesia Type:   General     Note:  Anesthesia Care Transfer Notewriter  Vitals: (Last set prior to Anesthesia Care Transfer)  CRNA VITALS  4/8/2021 2117 - 4/8/2021 2147      4/8/2021             Resp Rate (set):  8        Electronically Signed By: JOEY Mathews CRNA  April 8, 2021  9:47 PM

## 2021-04-09 NOTE — DISCHARGE INSTRUCTIONS
Post-Anesthesia Patient Instructions    IMMEDIATELY FOLLOWING SURGERY:  Do not drive or operate machinery for the first twenty four hours after surgery.  Do not make any important decisions for twenty four hours after surgery or while taking narcotic pain medications or sedatives.  If you develop intractable nausea and vomiting or a severe headache please notify your doctor immediately.    FOLLOW-UP:  Please make an appointment with your surgeon as instructed. You do not need to follow up with anesthesia unless specifically instructed to do so.    WOUND CARE INSTRUCTIONS (if applicable):  Keep a dry clean dressing on the anesthesia/puncture wound site if there is drainage.  Once the wound has quit draining you may leave it open to air.  Generally you should leave the bandage intact for twenty four hours unless there is drainage.  If the epidural site drains for more than 36-48 hours please call the anesthesia department.    QUESTIONS?:  Please feel free to call your physician or the hospital  if you have any questions, and they will be happy to assist you.   What to expect when you have contrast    During your exam, we will inject  contrast  into your vein or artery. (Contrast is a clear liquid with iodine in it. It shows up on X-rays.)    You may feel warm or hot. You may have a metal taste in your mouth and a slight upset stomach. You may also feel pressure near the kidneys and bladder. These effects will last about 1 to 3 minutes.    Please tell us if you have:    Sneezing     Itching    Hives     Swelling in the face    A hoarse voice    Breathing problems    Other new symptoms    Serious problems are rare.  They may include:    Irregular heartbeat     Seizures    Kidney failure              Tissue damage    Shock      Death    If you have any problems during the exam, we  will treat them right away.    When you get home    Call your hospital if you have any new symptoms in the next 2 days, like  hives or swelling. (Phone numbers are at the bottom of this page.) Or call your family doctor.     If you have wheezing or trouble breathing, call 911.    Self-care  -Drink at least 4 extra glasses of water today.   This reduces the stress on your kidneys.  -Keep taking your regular medicines.    The contrast will pass out of your body in your  Urine(pee). This will happen in the next 24 hours. You  will not feel this. Your urine will not  change color.    If you have kidney problems or take metformin    Drink 4 to 8 large glasses of water for the next  2 days, if you are not on a fluid restriction.    ?If you take metformin (Glucophage or Glucovance) for diabetes, keep taking it.      ?Your kidney function tests are abnormal.  If you take Metformin, do not take it for 48 hours. Please go to your clinic for a blood test within 3 days after your exam before the restarting this medicine.     (Note to provider:please give patient prescription for lab tests.)    ?Special instructions:     I have read and understand the above information.    Patient Sign Here:______________________________________Date:________Time:______    Staff Sign Here:________________________________________Date:_______Time:______      Radiology Departments:     ?[facility]: [phone] ?[place]: [phone]     ?[place]: [phone] ?[facility]:[phone]      ?[place]: [phone]  ?[place]: [phone]  ?[place]:[phone]    ?[facility]:[phone]  ?[facility]:[phone]

## 2021-04-09 NOTE — ED NOTES
"Pt presents to ED with c/o rectal pain and LOC. Pt states he has had a cyst in his rectum for over a year.  Pt stated he touched this cyst earlier today, and became diaphoretic and dizzy.  Pt states he slowly fell to the ground due to pain.  States he was not \"out\" for long because his cigarette was still lit in his hand. Pt states this cyst has not drained in 4 days.  Reports pain radiated into left hip.   "

## 2021-04-09 NOTE — OP NOTE
"[facility]   Operative Note    Pre-operative diagnosis: Perianal abscess [K61.0]   Post-operative diagnosis Same    Procedure: Procedure(s):  EXAM UNDER ANESTHESIA, incision and drainage perianal abscess   Surgeon(s): Surgeon(s) and Role:     * Roland Beal MD - Primary   Estimated blood loss: * No values recorded between 4/8/2021  9:17 PM and 4/8/2021  9:35 PM *    Specimens: * No specimens in log *   Findings: There was a perianal abscess, no fistula tracts identified. Enlarged hemorrhoids.      Description of procedure:   After confirming consent the patient was brought to the operating room while supine general anesthesia was administered. He was then placed in the prone position ensuring to pad all pressure points and placed in monica knife position. The anus was prepped and draped. A timeout was performed. Then taking liposomal bupivacaine the anus was anesthetized. Using a prat bivalve an exam was performed, and no purulence was noted in the anal canal upon placing pressure on the abscess. Then taking cautery the area of induration was opened. There was purulence inside. The cavity was superficial. There was no tract identified with probing. The cavity was washed out with saline and packed with 1\" packing. He tolerated the procedure well without issue and minimal blood loss.       Roland Beal MD        "

## 2021-04-09 NOTE — ANESTHESIA PREPROCEDURE EVALUATION
Anesthesia Pre-Procedure Evaluation    Patient: Andrei Whitman   MRN: [id number] : 1970        Preoperative Diagnosis: Perianal abscess [K61.0]   Procedure : Procedure(s):  EXAM UNDER ANESTHESIA, incision and drainage perianal abscess, possible seton possible fistulectomy     Past Medical History:   Diagnosis Date     Russell's esophagus 2017    repeat EGD in one year.     Bipolar disorder (H)      Chronic cervical pain      Colon polyp, hyperplastic 2017    rectal     Colon polyps 2017    Descending colon x1 and sigmoid colon x1     DDD (degenerative disc disease), cervical      Depression, major      Dry mouth 2020     Pancolonic diverticulosis 2017      Past Surgical History:   Procedure Laterality Date     APPENDECTOMY      age 14     COLONOSCOPY  2017    Left descending x1 tubular adenoma, sigmoid x1 tubular adenoma and rectal x1 hyperplastic polyp.  Pan diverticulosis     ENDOSCOPY UPPER, COLONOSCOPY, COMBINED N/A 2017    Procedure: COMBINED ENDOSCOPY UPPER, COLONOSCOPY;  UPPER ENDOSCOPY WITH BIOPSIES  AND COLONOSCOPY WITH POLYPECTOMY;  Surgeon: Francis Valverde DO;  Location: [facility]     ENT SURGERY  age 18    tonsils     ESOPHAGOGASTRODUODENOSCOPY  2017    chemical gatropathy, GE junction with pancreatiuc metaplasia      ESOPHAGOSCOPY, GASTROSCOPY, DUODENOSCOPY (EGD), COMBINED N/A 2018    Procedure: COMBINED ESOPHAGOSCOPY, GASTROSCOPY, DUODENOSCOPY (EGD), BIOPSY SINGLE OR MULTIPLE;  UPPER ENDOSCOPY WITH BIOPSY;  Surgeon: Francis Valverde DO;  Location: [facility]     IR CONSULTATION FOR IR EXAM  2/10/2021     ORTHOPEDIC SURGERY  age 30     back and neck fusion, bone from hip removed to use on plates     ORTHOPEDIC SURGERY  age 30    L5 fusion, laminectomy of lumbar discs      No Known Allergies   Social History     Tobacco Use     Smoking status: Current Every Day Smoker     Packs/day: 0.50     Years: 30.00     Pack years: 15.00     Start date:  1/1/1989     Smokeless tobacco: Never Used     Tobacco comment: quitplan referral declined 6/19/19 on [employer]   Substance Use Topics     Alcohol use: No     Alcohol/week: 0.0 standard drinks      Wt Readings from Last 1 Encounters:   01/07/21 95.1 kg (209 lb 9.6 oz)        Anesthesia Evaluation   Pt has had prior anesthetic. Type: General.    No history of anesthetic complications       ROS/MED HX  ENT/Pulmonary: Comment: Chronic cough    (+) ROBERT risk factors, snores loudly, tobacco use, Current use, 1 packs/day, mild,  COPD,     Neurologic:  - neg neurologic ROS     Cardiovascular:     (+) hypertension-range: took verapamil yesterday but not today/ ----    METS/Exercise Tolerance: >4 METS    Hematologic:  - neg hematologic  ROS     Musculoskeletal: Comment: Perianal abscess      GI/Hepatic: Comment: Diverticulosis  Russell's esophagus    (+) GERD, Asymptomatic on medication,     Renal/Genitourinary:  - neg Renal ROS     Endo:     (+) Obesity,     Psychiatric/Substance Use:     (+) psychiatric history depression, other (comment), bipolar and anxiety (ADHD, Suicidal behavior, insomnia)     Infectious Disease:  - neg infectious disease ROS     Malignancy:  - neg malignancy ROS     Other:  - neg other ROS    (+) , H/O Chronic Pain (cervical DDD, LBP),        Physical Exam    Airway        Mallampati: II   TM distance: > 3 FB   Neck ROM: full   Mouth opening: > 3 cm    Respiratory Devices and Support         Dental       (+) chipped and missing    B=Bridge, C=Chipped, L=Loose, M=Missing    Cardiovascular   cardiovascular exam normal       Rhythm and rate: regular and normal     Pulmonary   pulmonary exam normal        breath sounds clear to auscultation           OUTSIDE LABS:  CBC:   Lab Results   Component Value Date    WBC 14.9 (H) 04/08/2021    WBC 9.0 02/09/2021    HGB 13.5 04/08/2021    HGB 14.3 02/09/2021    HCT 40.8 04/08/2021    HCT 44.3 02/09/2021     04/08/2021     02/09/2021     BMP:   Lab  Results   Component Value Date     04/08/2021     02/09/2021    POTASSIUM 3.1 (L) 04/08/2021    POTASSIUM 3.8 02/09/2021    CHLORIDE 105 04/08/2021    CHLORIDE 108 02/09/2021    CO2 29 04/08/2021    CO2 27 02/09/2021    BUN 7 04/08/2021    BUN 10 02/09/2021    CR 0.81 04/08/2021    CR 0.78 02/09/2021    GLC 96 04/08/2021     (H) 02/09/2021     COAGS: No results found for: PTT, INR, FIBR  POC: No results found for: BGM, HCG, HCGS  HEPATIC:   Lab Results   Component Value Date    ALBUMIN 4.1 02/09/2021    PROTTOTAL 8.5 02/09/2021    ALT 14 02/09/2021    AST 14 02/09/2021    ALKPHOS 148 02/09/2021    BILITOTAL 0.3 02/09/2021     OTHER:   Lab Results   Component Value Date    CLARIBEL 9.0 04/08/2021    TSH 1.42 11/07/2018    T4 0.85 04/05/2017    CRP 41.2 (H) 04/08/2021       Anesthesia Plan    ASA Status:  3, emergent    NPO Status:  ELEVATED Aspiration Risk/Unknown (1400 [employer]ris[employer] bar)    Anesthesia Type: General.     - Airway: ETT   Induction: Intravenous, Propofol, RSI.   Maintenance: Inhalation.        Consents    Anesthesia Plan(s) and associated risks, benefits, and realistic alternatives discussed. Questions answered and patient/representative(s) expressed understanding.     - Discussed with:  Patient      - Extended Intubation/Ventilatory Support Discussed: No.      - Patient is DNR/DNI Status: No    Use of blood products discussed: No .     Postoperative Care    Pain management: IV analgesics.   PONV prophylaxis: Ondansetron (or other 5HT-3), Dexamethasone or Solumedrol     Comments:    Discussed risks and benefits with patient for general anesthesia including sore throat, nausea, vomiting, aspiration, dental damage, loss of airway, CV complications, stroke, MI, death. Pt wishes to proceed.     Discussed increased risk of aspiration if we are to begin surgery before 8 hours NPO which would be 10 pm. This is an emergency surgery, and the patient understands the increased risk and we will  RSI to minimize risk as well.            JOEY Mathews CRNA

## 2021-04-09 NOTE — OR NURSING
Patient and responsible adult given discharge instructions with no questions regarding instructions. Bethanie score 19/20. Pain level 4/10.  Discharged from unit via wheelchair. Patient discharged to home.

## 2021-04-09 NOTE — ANESTHESIA PROCEDURE NOTES
Airway       Patient location during procedure: OR       Procedure Start/Stop Times: 4/8/2021 9:03 PM and 4/8/2021 9:06 PM  Staff -        CRNA: Jacob Portillo APRN CRNA       Performed By: CRNA  Consent for Airway        Urgency: elective  Indications and Patient Condition       Indications for airway management: sima-procedural and airway protection       Induction type:RSI       Mask difficulty assessment: 0 - not attempted    Final Airway Details       Final airway type: endotracheal airway       Successful airway: ETT - single  Endotracheal Airway Details        ETT size (mm): 7.5       Cuffed: yes       Cuff volume (mL): 9       Successful intubation technique: direct laryngoscopy       DL Blade Type: Kirby 2       Grade View of Cords: 1       Adjucts: stylet       Position: Right       Measured from: gums/teeth       Secured at (cm): 21       Bite block used: None    Post intubation assessment        Placement verified by: capnometry, equal breath sounds and chest rise        Number of attempts at approach: 1       Number of other approaches attempted: 0       Secured with: plastic tape       Ease of procedure: easy       Dentition: Intact and Unchanged    Medication(s) Administered   Medication Administration Time: 4/8/2021 9:06 PM

## 2021-04-09 NOTE — H&P
[facility] Surgery Consultation    CC:  Perianal pain     HPI:  This 50 year old year old male is seen at the request of Juhi Goldberg for evaluation of perianal pain and episode of syncope. He notes that he has had this cyst like lesion near his anus for over a year. He has popped it numerous times with purulent drainage. He was having severe pain today when he went to pop it the pan caused him to pass out. He did hit his head on a radiator. He is otherwise feeling well currently. He has history of cervical fusion, he is not having any neck pain. He has had a colonoscopy in past.     Past Medical History:   Diagnosis Date     Russell's esophagus 07/12/2017    repeat EGD in one year.     Bipolar disorder (H)      Chronic cervical pain      Colon polyp, hyperplastic 07/12/2017    rectal     Colon polyps 07/12/2017    Descending colon x1 and sigmoid colon x1     DDD (degenerative disc disease), cervical      Depression, major      Dry mouth 8/12/2020     Pancolonic diverticulosis 07/12/2017       Past Surgical History:   Procedure Laterality Date     APPENDECTOMY      age 14     COLONOSCOPY  07/12/2017    Left descending x1 tubular adenoma, sigmoid x1 tubular adenoma and rectal x1 hyperplastic polyp.  Pan diverticulosis     ENDOSCOPY UPPER, COLONOSCOPY, COMBINED N/A 7/12/2017    Procedure: COMBINED ENDOSCOPY UPPER, COLONOSCOPY;  UPPER ENDOSCOPY WITH BIOPSIES  AND COLONOSCOPY WITH POLYPECTOMY;  Surgeon: Francis Valverde DO;  Location: [facility]     ENT SURGERY  age 18    tonsils     ESOPHAGOGASTRODUODENOSCOPY  07/12/2017    chemical gatropathy, GE junction with pancreatiuc metaplasia      ESOPHAGOSCOPY, GASTROSCOPY, DUODENOSCOPY (EGD), COMBINED N/A 6/20/2018    Procedure: COMBINED ESOPHAGOSCOPY, GASTROSCOPY, DUODENOSCOPY (EGD), BIOPSY SINGLE OR MULTIPLE;  UPPER ENDOSCOPY WITH BIOPSY;  Surgeon: Francis Valverde DO;  Location: [facility]     IR CONSULTATION FOR IR EXAM  2/10/2021     ORTHOPEDIC SURGERY  age 30     back  and neck fusion, bone from hip removed to use on plates     ORTHOPEDIC SURGERY  age 30    L5 fusion, laminectomy of lumbar discs       No Known Allergies    Current Facility-Administered Medications   Medication     piperacillin-tazobactam (ZOSYN) infusion 3.375 g     Current Outpatient Medications   Medication     aspirin 81 MG tablet     [START ON 4/15/2021] carBAMazepine (TEGRETOL XR) 200 MG 12 hr tablet     FLOVENT  MCG/ACT inhaler     gabapentin (NEURONTIN) 300 MG capsule     hydrochlorothiazide (MICROZIDE) 12.5 MG capsule     montelukast (SINGULAIR) 10 MG tablet     omeprazole (PRILOSEC) 40 MG DR capsule     pilocarpine (SALAGEN) 5 MG tablet     verapamil ER (VERELAN) 240 MG 24 hr capsule     chlorhexidine (PERIDEX) 0.12 % solution     LORazepam (ATIVAN) 1 MG tablet       HABITS:    Social History     Tobacco Use     Smoking status: Current Every Day Smoker     Packs/day: 0.50     Years: 30.00     Pack years: 15.00     Start date: 1/1/1989     Smokeless tobacco: Never Used     Tobacco comment: quitplan referral declined 6/19/19 on chantax   Substance Use Topics     Alcohol use: No     Alcohol/week: 0.0 standard drinks     Drug use: Yes     Types: Marijuana       Family History   Problem Relation Age of Onset     Asthma Mother      Arthritis Mother      Prostate Cancer Father      Heart Disease Paternal Grandfather      Hypertension Paternal Grandfather      Alcohol/Drug Paternal Grandfather      Other - See Comments Brother         Nerve and degenerative disease mild     Alcohol/Drug Brother      Other - See Comments Sister 21        Hip replacements, nerve, degerative disease     Alcohol/Drug Maternal Grandfather      Unknown/Adopted Paternal Grandmother      Other Cancer Paternal Aunt         Cervical cancer       REVIEW OF SYSTEMS:  Ten point review of systems negative except those mentioned in the HPI.     OBJECTIVE:    BP (!) 118/41   Pulse 85   Temp 99.7  F (37.6  C) (Oral)   Resp 16   SpO2  97%     GENERAL: Generally appears well, in no distress with appropriate affect.  HEENT:   Sclerae anicteric - normocephalic, abrasion on forehead   Respiratory:  No acute distress, no splinting   Cardiovascular:  Regular Rate and Rhythm  Abdomen: left posterior fluid collection, tender to palpation, thickened skin.   :  deferred  Extremities:  Extremities normal. No deformities, edema, or skin discoloration.  Skin:  no suspicious lesions or rashes  Neurological: grossly intact  Psych:  Alert, oriented, affect appropriate with normal decision making ability.    IMPRESSION:    perianal abscess vs low pilonidal cyst. Discussed going to the OR with possible seton placement if fistula is found. It appears chronic in nature, very tender to palpation and do not think he would tolerate bedside drainage. With length of time he has had it would like to look for fistula. He denies any cardiac issues, he is able to extend neck with fusion. Discussed risks and benefits and possible need for additional surgery and he has agreed to proceed.     PLAN:    Exam under anesthesia  Incision and drainage of perianal abscess   Possible seton placement  Will discharge tonight pending safe disposition.     Roland Beal MD,     4/8/2021  7:42 PM

## 2021-04-14 ENCOUNTER — NURSE TRIAGE (OUTPATIENT)
Dept: FAMILY MEDICINE | Facility: OTHER | Age: 51
End: 2021-04-14

## 2021-04-14 NOTE — TELEPHONE ENCOUNTER
"Call from patient reporting multiple concerns.     Patient reporting he passed out last Thursday, 4/8/21 prior to going to the ED for a perianal abscess, underwent an incision and drainage on 4/8/21.     When patient passed out, he reports hitting his head on a radiator and feels he broke something in his left foot / leg, patient states he fell and his left leg went underneath him. Swelling from middle of left foot to ankle and up leg approximately 8\", ankle area is bruised. Redness up leg 8\". Patient is able to bear weight on left leg and is able to go up and down steps. Fall occurred when patient passed out at home prior to ambulance arrival on 4/8/21 before being transported to the ED. Patient reports the ankle, foot and ankle were not looked at or x-rayed during ED visit.     Abdominal pain on left lower quadrant starting 2 months ago. Pain does not radiate, stays localized in the LLQ. Upon review of imaging, patient had a CT of the Abdomen on 4/8/21.    Pt feels like he has a parasite in his stool. Appears of a giant wood tick. Patient reports noticing these bugs in his  stool for months and states he thinks he got something from his cat.     Patient reports chills over the weekend, unsure of a fever as he has not checked his temp since surgery on 4/8/21. Patient reports he feels the chills are from when he hit his head when he fell on 4/8/21 prior to going to the ED. Patient reports a CT Scan of his Head was completed during ED Visit.     Patient then reporting this is the best he has felt since the fall and surgery on 4/8/21.    Spoke with Dr. Paula, recommended patient to go to the ED/UC for any urgent concerns and patient may coordinate a follow up appt with Dr. Paula for ongoing concerns.     Patient has appt scheduled for 4/26/21 with Dr. Paula.     Patient reports he will go to the ED/UC for x-rays and/or evaluation of left ankle/leg if he feels he is in need of being seen.         "

## 2021-04-18 DIAGNOSIS — M54.12 CERVICAL RADICULOPATHY: ICD-10-CM

## 2021-04-19 RX ORDER — GABAPENTIN 300 MG/1
CAPSULE ORAL
Qty: 180 CAPSULE | Refills: 0 | Status: SHIPPED | OUTPATIENT
Start: 2021-04-19 | End: 2021-05-18

## 2021-04-19 NOTE — PROGRESS NOTES
Assessment & Plan     Closed fracture of left tibia and fibula, initial encounter  Proximal.  Did see any other fracture, including ankle.  Stable.    Can continue to weight bear as tolerated.    Peroneal nerve intact.  Concern for ankle ligament/tendon damage.    Fall details unclear.    MRI ankle ordered.    Pain of left lower leg  As above.  - XR Tibia & Fibula Left 2 Views; Future    Pain in joint involving ankle and foot, left  As above. MRI ordered.  - XR Ankle Left G/E 3 Views  - MR Ankle Left w/o Contrast    Perianal abscess  No secondary infection - but does not seem to be healing significantly by secondary intention.  Some confusion on follow up for patient.  New referral placed to get him scheduled with Dr Beal.  - GENERAL SURG ADULT REFERRAL      30 minutes spent on the date of the encounter doing chart review, history and exam, documentation and further activities per the note       Patient Instructions   Will call with notable findings on xray other than the fracture we talked about - fibula.  MRI ankle ordered to evaluate ligaments/tendons.    Referral for follow up with Dr Beal.      No follow-ups on file.    Sayda Solorzano MD  [facility] - ADALBERTO Forbes is a 50 year old who presents for the following health issues     HPI     Concern - F/U from surgery   Description: perianal abscess - incision and drainage under anesthesia - packing came out that following day.  Didn't get follow up with general surgeon?  Pain has improved.  Bowels are moving.  No fevers.    See phone note 4/14/21.  Multiple concerns.    Musculoskeletal problem/pain  Onset/Duration: 4/8/21  Description  Location: leg - left  Joint Swelling: YES  Redness: no  Pain: YES  Warmth: no  Intensity:  severe  Progression of Symptoms:  worsening  Accompanying signs and symptoms:   Fevers: no  Numbness/tingling/weakness: YES  History  Trauma to the area: YES  Recent illness:  no  Previous similar  problem: no  Previous evaluation:  no  Precipitating or alleviating factors:  Aggravating factors include: standing, walking, climbing stairs, lifting and overuse  Therapies tried and outcome: nothing  Pain is lateral below knee, but also lower leg.  Tingling lateral leg at times.      Review of Systems   Constitutional, HEENT, cardiovascular, pulmonary, gi and gu systems are negative, except as otherwise noted.      Objective    BP (!) 140/78 (BP Location: Right arm, Patient Position: Chair, Cuff Size: Adult Large)   Pulse 91   Temp 97.8  F (36.6  C) (Tympanic)   Resp 16   Wt 98.9 kg (218 lb)   SpO2 97%   BMI 29.57 kg/m    Body mass index is 29.57 kg/m .  Physical Exam   GENERAL: alert and no distress  NECK: no adenopathy, no asymmetry, masses, or scars and thyroid normal to palpation  RESP: lungs clear to auscultation - no rales, rhonchi or wheezes  CV: regular rate and rhythm, normal S1 S2, no S3 or S4, no murmur, click or rub, no peripheral edema and peripheral pulses strong  ABDOMEN: soft, nontender, no hepatosplenomegaly, no masses and bowel sounds normal  RECTAL (male): perirectal area - few cm open wind - healthy surrounding tissue; no active drainage or bleeding; no erythema  MS: normal muscle tone, peripheral pulses normal and no edema; left leg and ankle with diffuse tenderness - proximal fibular, distal tibia, and ankle diffusely; able to dorsiflex and plantar flex, as well as invert and kandace  SKIN: no suspicious lesions or rashes  NEURO: Normal strength and tone, mentation intact and speech normal  PSYCH: anxious and speech pressured    Xray - Reviewed and interpreted by me.  Left proximal fibular fracture.  Formal reads pending.

## 2021-04-19 NOTE — TELEPHONE ENCOUNTER
gabapentin (NEURONTIN) 300 MG capsule      Last Written Prescription Date:  03/15/2021  Last Fill Quantity: 180,   # refills: 0  Last Office Visit: 11/11/2020  Future Office visit:    Next 5 appointments (look out 90 days)    Apr 26, 2021  3:30 PM  (Arrive by 3:15 PM)  SHORT with Sayda Paula MD  [facility] ([facility] ) [address]  [place] [address]  [phone]           Routing refill request to provider for review/approval because:

## 2021-04-26 ENCOUNTER — OFFICE VISIT (OUTPATIENT)
Dept: FAMILY MEDICINE | Facility: OTHER | Age: 51
End: 2021-04-26
Attending: FAMILY MEDICINE
Payer: MEDICARE

## 2021-04-26 ENCOUNTER — ANCILLARY PROCEDURE (OUTPATIENT)
Dept: GENERAL RADIOLOGY | Facility: OTHER | Age: 51
End: 2021-04-26
Attending: FAMILY MEDICINE
Payer: MEDICARE

## 2021-04-26 VITALS
TEMPERATURE: 97.8 F | OXYGEN SATURATION: 97 % | HEART RATE: 91 BPM | DIASTOLIC BLOOD PRESSURE: 78 MMHG | SYSTOLIC BLOOD PRESSURE: 140 MMHG | RESPIRATION RATE: 16 BRPM | WEIGHT: 218 LBS | BODY MASS INDEX: 29.57 KG/M2

## 2021-04-26 DIAGNOSIS — M25.572 PAIN IN JOINT INVOLVING ANKLE AND FOOT, LEFT: ICD-10-CM

## 2021-04-26 DIAGNOSIS — M79.662 PAIN OF LEFT LOWER LEG: ICD-10-CM

## 2021-04-26 DIAGNOSIS — S82.832A CLOSED FRACTURE OF HEAD OF LEFT FIBULA, INITIAL ENCOUNTER: Primary | ICD-10-CM

## 2021-04-26 DIAGNOSIS — K61.0 PERIANAL ABSCESS: ICD-10-CM

## 2021-04-26 PROCEDURE — 73590 X-RAY EXAM OF LOWER LEG: CPT | Mod: TC,LT

## 2021-04-26 PROCEDURE — 73610 X-RAY EXAM OF ANKLE: CPT | Mod: TC,LT

## 2021-04-26 PROCEDURE — 99214 OFFICE O/P EST MOD 30 MIN: CPT | Performed by: FAMILY MEDICINE

## 2021-04-26 PROCEDURE — G0463 HOSPITAL OUTPT CLINIC VISIT: HCPCS

## 2021-04-26 ASSESSMENT — ANXIETY QUESTIONNAIRES
3. WORRYING TOO MUCH ABOUT DIFFERENT THINGS: MORE THAN HALF THE DAYS
2. NOT BEING ABLE TO STOP OR CONTROL WORRYING: MORE THAN HALF THE DAYS
GAD7 TOTAL SCORE: 13
7. FEELING AFRAID AS IF SOMETHING AWFUL MIGHT HAPPEN: SEVERAL DAYS
5. BEING SO RESTLESS THAT IT IS HARD TO SIT STILL: MORE THAN HALF THE DAYS
1. FEELING NERVOUS, ANXIOUS, OR ON EDGE: SEVERAL DAYS
6. BECOMING EASILY ANNOYED OR IRRITABLE: NEARLY EVERY DAY

## 2021-04-26 ASSESSMENT — PATIENT HEALTH QUESTIONNAIRE - PHQ9
5. POOR APPETITE OR OVEREATING: MORE THAN HALF THE DAYS
SUM OF ALL RESPONSES TO PHQ QUESTIONS 1-9: 11

## 2021-04-26 ASSESSMENT — PAIN SCALES - GENERAL: PAINLEVEL: SEVERE PAIN (6)

## 2021-04-26 NOTE — PATIENT INSTRUCTIONS
Will call with notable findings on xray other than the fracture we talked about - fibula.  MRI ankle ordered to evaluate ligaments/tendons.    Referral for follow up with Dr Beal.

## 2021-04-26 NOTE — NURSING NOTE
Chief Complaint   Patient presents with     Surgical Followup     Musculoskeletal Problem     left lower leg       Initial BP (!) 140/78 (BP Location: Right arm, Patient Position: Chair, Cuff Size: Adult Large)   Pulse 91   Temp 97.8  F (36.6  C) (Tympanic)   Resp 16   Wt 98.9 kg (218 lb)   SpO2 97%   BMI 29.57 kg/m   Estimated body mass index is 29.57 kg/m  as calculated from the following:    Height as of 11/11/20: 1.829 m (6').    Weight as of this encounter: 98.9 kg (218 lb).  Medication Reconciliation: complete  Ashley Mayer LPN

## 2021-04-27 ASSESSMENT — ANXIETY QUESTIONNAIRES: GAD7 TOTAL SCORE: 13

## 2021-05-03 ENCOUNTER — ALLIED HEALTH/NURSE VISIT (OUTPATIENT)
Dept: FAMILY MEDICINE | Facility: OTHER | Age: 51
End: 2021-05-03
Attending: FAMILY MEDICINE
Payer: MEDICARE

## 2021-05-03 DIAGNOSIS — R29.91 MUSCULOSKELETAL SYMPTOMS REFERABLE TO LIMBS: Primary | ICD-10-CM

## 2021-05-03 NOTE — PROGRESS NOTES
Patient came in for boot, denied taking boot was advised to go to ER for neck xrays declined going  Kristy Aguayo LPN

## 2021-05-04 ENCOUNTER — HOSPITAL ENCOUNTER (EMERGENCY)
Facility: HOSPITAL | Age: 51
Discharge: HOME | End: 2021-05-04
Attending: PHYSICIAN ASSISTANT | Admitting: PHYSICIAN ASSISTANT
Payer: MEDICARE

## 2021-05-04 ENCOUNTER — APPOINTMENT (OUTPATIENT)
Dept: CT IMAGING | Facility: HOSPITAL | Age: 51
End: 2021-05-04
Attending: PHYSICIAN ASSISTANT
Payer: MEDICARE

## 2021-05-04 VITALS
TEMPERATURE: 98.5 F | DIASTOLIC BLOOD PRESSURE: 99 MMHG | RESPIRATION RATE: 20 BRPM | SYSTOLIC BLOOD PRESSURE: 142 MMHG | HEART RATE: 96 BPM | OXYGEN SATURATION: 97 %

## 2021-05-04 DIAGNOSIS — M54.2 CERVICALGIA: ICD-10-CM

## 2021-05-04 PROCEDURE — 99284 EMERGENCY DEPT VISIT MOD MDM: CPT | Mod: 25

## 2021-05-04 PROCEDURE — 99284 EMERGENCY DEPT VISIT MOD MDM: CPT | Performed by: PHYSICIAN ASSISTANT

## 2021-05-04 PROCEDURE — 72125 CT NECK SPINE W/O DYE: CPT

## 2021-05-04 ASSESSMENT — ENCOUNTER SYMPTOMS
HEADACHES: 0
NECK PAIN: 1
NECK STIFFNESS: 1
VOMITING: 0
COUGH: 0
DIZZINESS: 0
ABDOMINAL PAIN: 0
SHORTNESS OF BREATH: 0
BRUISES/BLEEDS EASILY: 0
SORE THROAT: 1
CHEST TIGHTNESS: 0

## 2021-05-04 NOTE — DISCHARGE INSTRUCTIONS
The CT scan showed no evidence of fracture.  Please follow-up in the clinic.  Please return here for any worsening symptoms, new symptoms, or other concerns.

## 2021-05-04 NOTE — ED PROVIDER NOTES
"  History     Chief Complaint   Patient presents with     Neck Pain     states he had an accident (passed out) on April 8th and came to the ED and he had a broken leg and \"I think that I have a broken neck\" states he has trouble swallowing and can feel pain at the back of his throat. States he sometimes loses conttol of his hands     The history is provided by the patient.     Andrei Whitman is a 50 year old male who presented to the emergency department ambulatory for evaluation of neck discomfort.  The patient reports taking a fall on April 8 and being evaluated the emergency department.  At that time he denied any neck pain or other concerns.  I evaluated him on the eighth after a syncopal episode ended up having a perirectal abscess requiring surgical drainage.  Tells me has been having neck pain over the last several weeks.  He has a history of cervical fusion.  Denies any fevers or chills.  Smokes heavily.  He did end up having a proximal fibula fracture.    Allergies:  No Known Allergies    Problem List:    Patient Active Problem List    Diagnosis Date Noted     Perianal abscess 04/08/2021     Priority: Medium     Added automatically from request for surgery [id number]       Suicidal behavior 08/12/2020     Priority: Medium     Dry mouth 08/12/2020     Priority: Medium     Obesity (BMI 35.0-39.9) with comorbidity (H) 09/23/2019     Priority: Medium     Chronic lung disease 09/23/2019     Priority: Medium     Lithium use 08/06/2018     Priority: Medium     DDD (degenerative disc disease), cervical 08/06/2018     Priority: Medium     Cervical stenosis of spinal canal 08/06/2018     Priority: Medium     Tobacco dependency 06/09/2018     Priority: Medium     Russell esophagus 06/07/2018     Priority: Medium     Benign essential hypertension 04/24/2018     Priority: Medium     Flatulence, eructation, and gas pain 08/15/2016     Priority: Medium     Bipolar disease, chronic (H) 08/15/2016     Priority: Medium     " ACP (advance care planning) 06/20/2016     Priority: Medium     Advance Care Planning 6/20/2016: ACP Review of Chart / Resources Provided:  Reviewed chart for advance care plan.  Andrei Whitman has no plan or code status on file. Discussed available resources and provided with information. Confirmed code status reflects current choices pending further ACP discussions.  Confirmed/documented legally designated decision makers.  Added by Monica Valerio               Attention deficit hyperactivity disorder (ADHD), predominantly inattentive type 02/19/2016     Priority: Medium     Abnormal weight gain 09/25/2015     Priority: Medium     Mood disorder (H) 07/15/2015     Priority: Medium     GERD (gastroesophageal reflux disease) 03/12/2015     Priority: Medium     Segmental and somatic dysfunction of lower extremity 11/26/2014     Priority: Medium     Lower back pain 10/24/2014     Priority: Medium     Cervicalgia 10/02/2014     Priority: Medium     Anxiety state 08/05/2013     Priority: Medium     IMO Update       Insomnia, unspecified 08/05/2013     Priority: Medium     Updated per 10/1/17 IMO import       Left hip pain 08/05/2013     Priority: Medium        Past Medical History:    Past Medical History:   Diagnosis Date     Russell's esophagus 07/12/2017     Bipolar disorder (H)      Chronic cervical pain      Colon polyp, hyperplastic 07/12/2017     Colon polyps 07/12/2017     DDD (degenerative disc disease), cervical      Depression, major      Dry mouth 8/12/2020     Pancolonic diverticulosis 07/12/2017       Past Surgical History:    Past Surgical History:   Procedure Laterality Date     APPENDECTOMY      age 14     COLONOSCOPY  07/12/2017    Left descending x1 tubular adenoma, sigmoid x1 tubular adenoma and rectal x1 hyperplastic polyp.  Pan diverticulosis     ENDOSCOPY UPPER, COLONOSCOPY, COMBINED N/A 7/12/2017    Procedure: COMBINED ENDOSCOPY UPPER, COLONOSCOPY;  UPPER ENDOSCOPY WITH BIOPSIES  AND COLONOSCOPY  WITH POLYPECTOMY;  Surgeon: Francis Valverde DO;  Location: [facility]     ENT SURGERY  age 18    tonsils     ESOPHAGOGASTRODUODENOSCOPY  07/12/2017    chemical gatropathy, GE junction with pancreatiuc metaplasia      ESOPHAGOSCOPY, GASTROSCOPY, DUODENOSCOPY (EGD), COMBINED N/A 6/20/2018    Procedure: COMBINED ESOPHAGOSCOPY, GASTROSCOPY, DUODENOSCOPY (EGD), BIOPSY SINGLE OR MULTIPLE;  UPPER ENDOSCOPY WITH BIOPSY;  Surgeon: Francis Valverde DO;  Location: [facility]     EXAM UNDER ANESTHESIA ANUS N/A 4/8/2021    Procedure: EXAM UNDER ANESTHESIA, incision and drainage perianal abscess;  Surgeon: Roland Beal MD;  Location: [facility]     IR CONSULTATION FOR IR EXAM  2/10/2021     ORTHOPEDIC SURGERY  age 30     back and neck fusion, bone from hip removed to use on plates     ORTHOPEDIC SURGERY  age 30    L5 fusion, laminectomy of lumbar discs       Family History:    Family History   Problem Relation Age of Onset     Asthma Mother      Arthritis Mother      Prostate Cancer Father      Heart Disease Paternal Grandfather      Hypertension Paternal Grandfather      Alcohol/Drug Paternal Grandfather      Other - See Comments Brother         Nerve and degenerative disease mild     Alcohol/Drug Brother      Other - See Comments Sister 21        Hip replacements, nerve, degerative disease     Alcohol/Drug Maternal Grandfather      Unknown/Adopted Paternal Grandmother      Other Cancer Paternal Aunt         Cervical cancer       Social History:  Marital Status:   [4]  Social History     Tobacco Use     Smoking status: Current Every Day Smoker     Packs/day: 0.50     Years: 30.00     Pack years: 15.00     Start date: 1/1/1989     Smokeless tobacco: Never Used     Tobacco comment: quitplan referral declined 6/19/19 on chantax   Substance Use Topics     Alcohol use: No     Alcohol/week: 0.0 standard drinks     Drug use: Yes     Types: Marijuana        Medications:    aspirin 81 MG tablet  carBAMazepine (TEGRETOL XR) 200 MG  12 hr tablet  chlorhexidine (PERIDEX) 0.12 % solution  FLOVENT  MCG/ACT inhaler  gabapentin (NEURONTIN) 300 MG capsule  hydrochlorothiazide (MICROZIDE) 12.5 MG capsule  LORazepam (ATIVAN) 1 MG tablet  montelukast (SINGULAIR) 10 MG tablet  omeprazole (PRILOSEC) 40 MG DR capsule  oxyCODONE (ROXICODONE) 5 MG tablet  pilocarpine (SALAGEN) 5 MG tablet  psyllium (METAMUCIL/KONSYL) 58.6 % powder  verapamil ER (VERELAN) 240 MG 24 hr capsule          Review of Systems   HENT: Positive for sore throat.    Respiratory: Negative for cough, chest tightness and shortness of breath.    Cardiovascular: Negative for chest pain.   Gastrointestinal: Negative for abdominal pain and vomiting.   Musculoskeletal: Positive for neck pain and neck stiffness.        Chronic neck pain.   Skin: Negative.    Neurological: Negative for dizziness and headaches.   Hematological: Does not bruise/bleed easily.       Physical Exam   BP: (!) 164/103  Pulse: 97  Temp: 98.8  F (37.1  C)  Resp: 20  SpO2: 97 %      Physical Exam  Vitals signs and nursing note reviewed.   Constitutional:       General: He is not in acute distress.     Appearance: Normal appearance. He is not ill-appearing, toxic-appearing or diaphoretic.   HENT:      Mouth/Throat:      Mouth: Mucous membranes are moist.      Pharynx: Posterior oropharyngeal erythema present.      Comments: No petechiae, exudate, or swelling.  Neck:      Comments: He has chronic neck rigidity.  No evidence of crepitus or edema.  No ecchymosis or erythema.  Cardiovascular:      Rate and Rhythm: Normal rate and regular rhythm.   Skin:     General: Skin is warm and dry.      Capillary Refill: Capillary refill takes less than 2 seconds.   Neurological:      General: No focal deficit present.      Mental Status: He is alert and oriented to person, place, and time.         ED Course        Procedures               Critical Care time:  none               Results for orders placed or performed during the  hospital encounter of 05/04/21 (from the past 24 hour(s))   CT Cervical Spine w/o Contrast    Narrative    PROCEDURE: CT CERVICAL SPINE W/O CONTRAST     HISTORY: Fall, neck pain.    TECHNIQUE: Helical noncontrast CT images of the cervical spine.    COMPARISON: MR cervical spine 1/19/2021.    FINDINGS:     No acute fracture is identified. Postoperative changes of prior C4-C6  anterior cervical fusion are redemonstrated. Diffuse levels appears  solid.     The vertebral bodies are normal in height. The cervical lordosis is  straightened. The C1-2 articulation and the craniocervical junction  are intact.     Advanced degenerative changes are again seen, including severe facet  hypertrophy on the right at C2-3 and C3-4. Please see the prior MR for  further discussion.     The paravertebral soft tissues are unremarkable. The lung apices are  clear.      Impression    IMPRESSION: No evidence of acute cervical spine fracture.    AL SAHA MD       Medications - No data to display    Assessments & Plan (with Medical Decision Making)   50-year-old male who believes he may have fractured cervical spine after a fall approximately 1 month ago.  CT scan of the cervical spine shows no evidence of fracture.  He is happy and agreeable with the plan to follow-up.  Advised to quit smoking.  Return here for any other questions or concerns.    This document was prepared using a combination of typing and voice generated software.  While every attempt was made for accuracy, spelling and grammatical errors may exist.    I have reviewed the nursing notes.    I have reviewed the findings, diagnosis, plan and need for follow up with the patient.       New Prescriptions    No medications on file       Final diagnoses:   Cervicalgia       5/4/2021   [facility]     Juhi Goldberg PA-C  05/04/21 181

## 2021-05-04 NOTE — ED NOTES
Pt presents to ED with c/o neck pain.  Pt states he hurt his neck when he fell on April 8th, pt also states that he broke his leg at this time, nut neither issues were addressed with his PCP.  Pt ambulated without difficulty to room seven. Pt also reports sore throat intermittently when he lays down at night.     CMS intact and pt denies need of pain control.

## 2021-05-04 NOTE — ED NOTES
"Reviewed discontinue instructions. Suggested heat or ice for pain relief and perhaps a new pillow. Indicates he ang make an appointment with his PCP. \"I just wanted to know if it was broken.\" To home.  "

## 2021-05-06 ENCOUNTER — HOSPITAL ENCOUNTER (OUTPATIENT)
Dept: INTERVENTIONAL RADIOLOGY/VASCULAR | Facility: HOSPITAL | Age: 51
End: 2021-05-06
Attending: FAMILY MEDICINE
Payer: MEDICARE

## 2021-05-06 ENCOUNTER — HOSPITAL ENCOUNTER (OUTPATIENT)
Facility: HOSPITAL | Age: 51
Discharge: HOME | End: 2021-05-06
Attending: RADIOLOGY | Admitting: RADIOLOGY
Payer: MEDICARE

## 2021-05-06 DIAGNOSIS — M47.812 CERVICAL SPONDYLOSIS: ICD-10-CM

## 2021-05-06 PROCEDURE — 250N000011 HC RX IP 250 OP 636: Performed by: RADIOLOGY

## 2021-05-06 PROCEDURE — 62321 NJX INTERLAMINAR CRV/THRC: CPT

## 2021-05-06 RX ORDER — METHYLPREDNISOLONE ACETATE 80 MG/ML
80 INJECTION, SUSPENSION INTRA-ARTICULAR; INTRALESIONAL; INTRAMUSCULAR; SOFT TISSUE ONCE
Status: COMPLETED | OUTPATIENT
Start: 2021-05-06 | End: 2021-05-06

## 2021-05-06 RX ORDER — METHYLPREDNISOLONE ACETATE 80 MG/ML
INJECTION, SUSPENSION INTRA-ARTICULAR; INTRALESIONAL; INTRAMUSCULAR; SOFT TISSUE
Status: DISCONTINUED
Start: 2021-05-06 | End: 2021-05-07 | Stop reason: HOSPADM

## 2021-05-06 RX ORDER — IOPAMIDOL 612 MG/ML
15 INJECTION, SOLUTION INTRATHECAL ONCE
Status: COMPLETED | OUTPATIENT
Start: 2021-05-06 | End: 2021-05-06

## 2021-05-06 RX ADMIN — METHYLPREDNISOLONE ACETATE 80 MG: 80 INJECTION, SUSPENSION INTRA-ARTICULAR; INTRALESIONAL; INTRAMUSCULAR; SOFT TISSUE at 13:47

## 2021-05-06 RX ADMIN — IOPAMIDOL 3 ML: 612 INJECTION, SOLUTION INTRATHECAL at 13:47

## 2021-05-06 NOTE — DISCHARGE INSTRUCTIONS
Cell number on file:    Telephone Information:   Mobile [phone]     Is it ok to leave a message at this number(s)? Yes    Dr Peters completed your procedure on 5/6/2021.    Current Pain Level (0-10 Scale): 7/10  Post Pain Level (0-10):  7/10    Radiology Discharge instructions for Steroid Injection    Activity Level:     Do not do any heavy activity or exercise for 24 hours.   Do not drive for 4 hours after your injection.  Diet:   Return to your normal diet.  Medications:   If you have stopped taking your Aspirin, Coumadin/Warfarin, Ibuprofen, or any   other blood thinner for this procedure you may resume in the morning unless   your primary care provider has given you other instructions.    Diabetics may see an increase in blood sugar after steroid injections. If you are concerned about your blood sugar, please contact your family doctor.    Site Care:  Remove the bandage and bathe or shower the morning after the procedure.      This is a Pain Management procedure.  You will be contacted in two weeks for follow up.    Call your Primary Care Provider if you have the following (if your primary care provider is not available please seek emergency care):   Nausea with vomiting   Severe headache   Drowsiness or confusion   Redness or drainage at the injection or puncture site   Temperature over 101 degrees F   Other concerns   Worsening back pain   Stiff neck

## 2021-05-11 ENCOUNTER — HOSPITAL ENCOUNTER (OUTPATIENT)
Dept: MRI IMAGING | Facility: HOSPITAL | Age: 51
Discharge: HOME | End: 2021-05-11
Attending: FAMILY MEDICINE | Admitting: FAMILY MEDICINE
Payer: MEDICARE

## 2021-05-11 PROCEDURE — 73721 MRI JNT OF LWR EXTRE W/O DYE: CPT | Mod: LT,MG

## 2021-05-12 ENCOUNTER — TELEPHONE (OUTPATIENT)
Dept: FAMILY MEDICINE | Facility: OTHER | Age: 51
End: 2021-05-12

## 2021-05-13 ENCOUNTER — TRANSFERRED RECORDS (OUTPATIENT)
Dept: HEALTH INFORMATION MANAGEMENT | Facility: CLINIC | Age: 51
End: 2021-05-13

## 2021-05-14 NOTE — PROGRESS NOTES
"    {PROVIDER CHARTING PREFERENCE:827456}    Subjective   Andrei is a 50 year old who presents for the following health issues {ACCOMPANIED BY STATEMENT (Optional):086576}    HPI     Chronic Pain Follow-Up    Where in your body do you have pain? ***  How has your pain affected your ability to work? { :958262}  Which of these pain treatments have you tried since your last clinic visit? { :63062}  How well are you sleeping? { :735457}  How has your mood been since your last visit? { :673261}  Have you had a significant life event? { :68535}  Other aggravating factors: { :274688::\"none\"}  Taking medication as directed? { :356117::\"Yes\"}    PHQ-9 SCORE 1/29/2021 3/15/2021 4/26/2021   PHQ-9 Total Score - - -   PHQ-9 Total Score 15 14 11     SONDRA-7 SCORE 1/29/2021 3/15/2021 4/26/2021   Total Score 16 14 13     No flowsheet data found.  Encounter-Level CSA:    There are no encounter-level csa.     Patient-Level CSA:    There are no patient-level csa.         How many servings of fruits and vegetables do you eat daily?  { :317998}    On average, how many sweetened beverages do you drink each day (Examples: soda, juice, sweet tea, etc.  Do NOT count diet or artificially sweetened beverages)?   { 1-11:441355}    How many days per week do you exercise enough to make your heart beat faster? { :631173}    How many minutes a day do you exercise enough to make your heart beat faster? { :679581}    How many days per week do you miss taking your medication? {0-7 :792418}    {additonal problems for provider to add (Optional):963575}    Review of Systems   {ROS COMP (Optional):958980}      Objective    There were no vitals taken for this visit.  There is no height or weight on file to calculate BMI.  Physical Exam   {Exam List (Optional):118288}    {Diagnostic Test Results (Optional):857993}    {AMBULATORY ATTESTATION (Optional):407561}        "

## 2021-05-17 DIAGNOSIS — I10 ESSENTIAL HYPERTENSION: ICD-10-CM

## 2021-05-17 DIAGNOSIS — M54.12 CERVICAL RADICULOPATHY: ICD-10-CM

## 2021-05-17 DIAGNOSIS — K12.0 APHTHOUS ULCER OF MOUTH: ICD-10-CM

## 2021-05-18 RX ORDER — MONTELUKAST SODIUM 10 MG/1
10 TABLET ORAL AT BEDTIME
Qty: 90 TABLET | Refills: 0 | Status: SHIPPED | OUTPATIENT
Start: 2021-05-18 | End: 2021-09-08

## 2021-05-18 RX ORDER — VERAPAMIL HYDROCHLORIDE 240 MG/1
CAPSULE, EXTENDED RELEASE ORAL
Qty: 90 CAPSULE | Refills: 1 | Status: SHIPPED | OUTPATIENT
Start: 2021-05-18 | End: 2021-12-08

## 2021-05-18 RX ORDER — GABAPENTIN 300 MG/1
CAPSULE ORAL
Qty: 180 CAPSULE | Refills: 0 | Status: SHIPPED | OUTPATIENT
Start: 2021-05-18 | End: 2021-06-16

## 2021-05-18 NOTE — TELEPHONE ENCOUNTER
Singulair      Last Written Prescription Date:  1/29/21  Last Fill Quantity: 90,   # refills: 0  Last Office Visit: 4/26/21  Future Office visit:    Next 5 appointments (look out 90 days)    May 19, 2021 10:45 AM  (Arrive by 10:30 AM)  SHORT with Sayda Paula MD  [facility] [place] ([facility] [place] ) [address]  [place] [address]  [phone]           Routing refill request to provider for review/approval because:    Neurontin      Last Written Prescription Date:  4/19/21  Last Fill Quantity: 180,   # refills: 0  Last Office Visit: 4/26/21  Future Office visit:    Next 5 appointments (look out 90 days)    May 19, 2021 10:45 AM  (Arrive by 10:30 AM)  SHORT with Sayda Paula MD  [facility] [place] ([facility] [place] ) [address]  [place] [address]  [phone]           Routing refill request to provider for review/approval because:

## 2021-05-18 NOTE — TELEPHONE ENCOUNTER
verapamil ER (VERELAN) 240 MG 24 hr capsule      Last Written Prescription Date:  9/25/20  Last Fill Quantity: 90,   # refills: 1  Last Office Visit: 4/26/21  Future Office visit:    Next 5 appointments (look out 90 days)    May 19, 2021 10:45 AM  (Arrive by 10:30 AM)  SHORT with Sayda Paula MD  [facility] ([facility] [place] ) [address] [facility] [address]  [phone]           Routing refill request to provider for review/approval because:  Last signed by Dr Lamb. Please advise. Thank you!

## 2021-05-19 ENCOUNTER — OFFICE VISIT (OUTPATIENT)
Dept: FAMILY MEDICINE | Facility: OTHER | Age: 51
End: 2021-05-19
Attending: FAMILY MEDICINE
Payer: MEDICARE

## 2021-05-19 ENCOUNTER — OFFICE VISIT (OUTPATIENT)
Dept: SURGERY | Facility: OTHER | Age: 51
End: 2021-05-19
Attending: FAMILY MEDICINE
Payer: MEDICARE

## 2021-05-19 VITALS
SYSTOLIC BLOOD PRESSURE: 138 MMHG | DIASTOLIC BLOOD PRESSURE: 88 MMHG | TEMPERATURE: 98.7 F | OXYGEN SATURATION: 98 % | BODY MASS INDEX: 28.48 KG/M2 | WEIGHT: 210 LBS | HEART RATE: 91 BPM

## 2021-05-19 VITALS
HEART RATE: 87 BPM | SYSTOLIC BLOOD PRESSURE: 132 MMHG | RESPIRATION RATE: 16 BRPM | DIASTOLIC BLOOD PRESSURE: 78 MMHG | TEMPERATURE: 98.2 F | OXYGEN SATURATION: 97 %

## 2021-05-19 DIAGNOSIS — M50.30 DDD (DEGENERATIVE DISC DISEASE), CERVICAL: Primary | ICD-10-CM

## 2021-05-19 DIAGNOSIS — M47.812 CERVICAL SPONDYLOSIS: ICD-10-CM

## 2021-05-19 DIAGNOSIS — M54.12 CERVICAL RADICULOPATHY: ICD-10-CM

## 2021-05-19 DIAGNOSIS — K61.0 PERIANAL ABSCESS: ICD-10-CM

## 2021-05-19 DIAGNOSIS — M48.02 CERVICAL STENOSIS OF SPINAL CANAL: ICD-10-CM

## 2021-05-19 PROCEDURE — G0463 HOSPITAL OUTPT CLINIC VISIT: HCPCS | Mod: 27

## 2021-05-19 PROCEDURE — 99213 OFFICE O/P EST LOW 20 MIN: CPT | Performed by: FAMILY MEDICINE

## 2021-05-19 PROCEDURE — G0463 HOSPITAL OUTPT CLINIC VISIT: HCPCS

## 2021-05-19 PROCEDURE — 99212 OFFICE O/P EST SF 10 MIN: CPT | Performed by: SURGERY

## 2021-05-19 ASSESSMENT — PAIN SCALES - GENERAL
PAINLEVEL: SEVERE PAIN (7)
PAINLEVEL: SEVERE PAIN (7)

## 2021-05-19 NOTE — PROGRESS NOTES
Assessment & Plan     DDD (degenerative disc disease), cervical  Cervical radiculopathy  Cervical stenosis of spinal canal  Cervical spondylosis  Patient has had good relief from the DARRYL.  Desires to continue with conservative treatment.  Injections as appropriate, Neurontin TID.  Will update with any progression of symptoms - pain, weakness, numbness.       He will be 2 weeks s/p injection tomorrow.  Can then schedule covid vaccine.    Patient Instructions   Schedule COVID.          Sayda Solorzano MD  [facility] - ADALBERTO Forbes is a 50 year old who presents for the following health issues     HPI     ED/UC Followup:    Facility:  [facility] [place], [place]  Date of visit: 5-4-21  Reason for visit: neck pain   Current Status: improving      States he went to ER to be sure there was no fracture from prior fall before he had the DARRYL done.    Had CT C spine 5/4/2021.  No acute fracture.  Advanced arthritic changes.  Had MRI 1/2021.  Had DARRYL 5/6/2021 C7-T1.  Starting to show improvement.  Neck and arms.  Is on Neurontin 600 mg TID.  Helpful.  Intermittent numbness/tingling - varies laterality.    Prior was scheduled to cervical fusion.  Defers at this time.      Of note - did have ankle fracture.  Did see orthopedics.  Advised ankle boot for months and patient declined.  Follow up only as needed.  Is doing fine with that.      Review of Systems   Constitutional, HEENT, cardiovascular, pulmonary, gi and gu systems are negative, except as otherwise noted.      Objective    /88 (BP Location: Right arm, Patient Position: Chair, Cuff Size: Adult Large)   Pulse 91   Temp 98.7  F (37.1  C) (Tympanic)   Wt 95.3 kg (210 lb)   SpO2 98%   BMI 28.48 kg/m    Body mass index is 28.48 kg/m .  Physical Exam   GENERAL: healthy, alert and no distress  NECK: no adenopathy, no asymmetry, masses, or scars and thyroid normal to palpation  RESP: lungs clear to auscultation - no rales, rhonchi or wheezes  CV: regular  rate and rhythm, normal S1 S2, no S3 or S4, no murmur, click or rub, no peripheral edema and peripheral pulses strong  MS: normal muscle tone, peripheral pulses normal and neck with limited extension and right rotation, but no pain today; normal flexion and left rotation;  SKIN: no suspicious lesions or rashes  NEURO: Normal strength and tone, symmetric UE, mentation intact and speech normal  PSYCH: mentation appears normal, affect normal/bright

## 2021-05-19 NOTE — PATIENT INSTRUCTIONS
Thank you for allowing Dr. Beal and our surgical team to participate in your care. Please call our health unit coordinator at [phone] with scheduling questions or the nurse at [phone] with any other questions or concerns.

## 2021-05-19 NOTE — NURSING NOTE
Chief Complaint   Patient presents with     Musculoskeletal Problem     neck        Initial /88 (BP Location: Right arm, Patient Position: Chair, Cuff Size: Adult Large)   Pulse 91   Temp 98.7  F (37.1  C) (Tympanic)   Wt 95.3 kg (210 lb)   SpO2 98%   BMI 28.48 kg/m   Estimated body mass index is 28.48 kg/m  as calculated from the following:    Height as of 11/11/20: 1.829 m (6').    Weight as of this encounter: 95.3 kg (210 lb).  Medication Reconciliation: complete  Costa Figueroa LPN

## 2021-05-19 NOTE — NURSING NOTE
Chief Complaint   Patient presents with     Surgical Followup     exam under anesthesia, incision and drainage of perianal abscess 4/8/2021       Initial /78   Pulse 87   Temp 98.2  F (36.8  C) (Tympanic)   Resp 16   SpO2 97%  Estimated body mass index is 28.48 kg/m  as calculated from the following:    Height as of 11/11/20: 1.829 m (6').    Weight as of an earlier encounter on 5/19/21: 95.3 kg (210 lb).  Medication Reconciliation: complete  Ofelia Dobbins LPN

## 2021-05-20 ENCOUNTER — TELEPHONE (OUTPATIENT)
Dept: INTERVENTIONAL RADIOLOGY/VASCULAR | Facility: HOSPITAL | Age: 51
End: 2021-05-20

## 2021-05-20 NOTE — TELEPHONE ENCOUNTER
CONSULT PATIENT  PAIN INJECTION POST CALL    Procedure: Epidural TL C7-T1  Radiologist(s): Dr. Harvey Peters  Date of Procedure: 5-6-21    The patient was not available by telephone. Will attempt to call patient back tomorrow. A lady answered the phone and said she was going to get Andrei and then she was pretending to be andrei so I told her this is not andrei have him call us.        Liliam Cohen

## 2021-05-20 NOTE — TELEPHONE ENCOUNTER
Pt called back  And states he answered the phone and was hung up on?    Transferred to .      Gissel Thompson RN

## 2021-05-24 NOTE — PROGRESS NOTES
[facility] Progress Note    HPI: 50 y.o. male returns to clinic for follow up of incision and drainage of perianal abscess.       S: He is feeling great. He has no concerns. He has no pain. He is moving bowels without issue.     O:   Vitals:  /78   Pulse 87   Temp 98.2  F (36.8  C) (Tympanic)   Resp 16   SpO2 97%       Physical Exam:  G: alert oriented, no acute distress   ENT: sclera non-icteric   Pulm: no respiratory distress   CVS: RRR  ABD: There is a wound noted on the posterior left that is still open slightly.   Ext: WWP     Assessment/Plan:  Still has a wound that is healing, this does bring up the concern for possible fistula. He is doing great though and do not think if warrants further operative investigation at this point. Did discuss that if this wound is still open in say a month would likely benefit from an exam under anesthesia.     Roland Beal MD

## 2021-05-26 ENCOUNTER — TELEPHONE (OUTPATIENT)
Dept: INTERVENTIONAL RADIOLOGY/VASCULAR | Facility: HOSPITAL | Age: 51
End: 2021-05-26

## 2021-05-26 DIAGNOSIS — J98.4 CHRONIC LUNG DISEASE: ICD-10-CM

## 2021-05-26 RX ORDER — FLUTICASONE PROPIONATE 220 UG/1
AEROSOL, METERED RESPIRATORY (INHALATION)
Qty: 24 G | Refills: 1 | Status: SHIPPED | OUTPATIENT
Start: 2021-05-26 | End: 2021-12-10

## 2021-05-26 NOTE — TELEPHONE ENCOUNTER
FLOVENT  MCG/ACT inhaler      Last Written Prescription Date:  10/19/2020  Last Fill Quantity: 24g,   # refills: 1  Last Office Visit: 5/19/2021  Future Office visit:

## 2021-05-26 NOTE — TELEPHONE ENCOUNTER
CONSULT PATIENT  PAIN INJECTION POST CALL    Procedure: Epidural TL C7-T1  Radiologist(s): Dr. Harvey Peters  Date of Procedure: 5-6-21    The patient had no questions or concerns.    Relief of pain from this injection    A = 90%  A- = 85%  B = 80%  B- =75%  C = 70%  C- = 65%  D = 60%  D- = 50%  F < 50%       Would you say this injection has been beneficial? Yes  If yes, for how long? Currently experiencing 40% of pain being relieved.    Was there one injection that worked better than the other? no    Where is the pain? Bilateral neck discomfort, down to shoulders and arms. Worsens if lifting something  Can you describe the pain? Numbness and tingling  Does the pain radiate anywhere? yes  If yes, where does it radiate and where does the pain stop? Radiates from the neck to both shoulder to the forearms    Is this new pain? No    Patient would like to pursue another injection. Had first covid vaccine today and will have second dose in a month. Would still like order to be put in and scheduled out two weeks from 2nd covid vaccine.      Liliam Cohen

## 2021-06-16 DIAGNOSIS — M54.12 CERVICAL RADICULOPATHY: ICD-10-CM

## 2021-06-16 RX ORDER — GABAPENTIN 300 MG/1
CAPSULE ORAL
Qty: 180 CAPSULE | Refills: 0 | Status: SHIPPED | OUTPATIENT
Start: 2021-06-16 | End: 2021-07-16

## 2021-06-16 NOTE — TELEPHONE ENCOUNTER
gabapentin (NEURONTIN) 300 MG capsule      Last Written Prescription Date:  5/18/21  Last Fill Quantity: 180,   # refills: 0  Last Office Visit: 5/19/21  Future Office visit:       Routing refill request to provider for review/approval because:  Drug not on the [facility], [facility] or [facility] refill protocol or controlled substance

## 2021-07-16 DIAGNOSIS — M54.12 CERVICAL RADICULOPATHY: ICD-10-CM

## 2021-07-16 RX ORDER — GABAPENTIN 300 MG/1
CAPSULE ORAL
Qty: 180 CAPSULE | Refills: 0 | Status: SHIPPED | OUTPATIENT
Start: 2021-07-16 | End: 2021-08-18

## 2021-07-16 NOTE — TELEPHONE ENCOUNTER
gabapentin (NEURONTIN) 300 MG capsule      Last Written Prescription Date:  06/16/21  Last Fill Quantity: 180,   # refills: 0  Last Office Visit: 05/19/21  Future Office visit:       Routing refill request to provider for review/approval because:  Drug not on the [facility], [facility] or [facility] refill protocol or controlled substance

## 2021-07-19 ENCOUNTER — TELEPHONE (OUTPATIENT)
Dept: INTERVENTIONAL RADIOLOGY/VASCULAR | Facility: HOSPITAL | Age: 51
End: 2021-07-19

## 2021-07-27 RX ORDER — BUPIVACAINE HYDROCHLORIDE 5 MG/ML
10 INJECTION, SOLUTION EPIDURAL; INTRACAUDAL ONCE
Status: CANCELLED | OUTPATIENT
Start: 2021-07-27 | End: 2021-07-27

## 2021-07-27 RX ORDER — TRIAMCINOLONE ACETONIDE 40 MG/ML
40 INJECTION, SUSPENSION INTRA-ARTICULAR; INTRAMUSCULAR ONCE
Status: CANCELLED | OUTPATIENT
Start: 2021-07-27 | End: 2021-07-27

## 2021-07-27 RX ORDER — LIDOCAINE HYDROCHLORIDE 10 MG/ML
10 INJECTION, SOLUTION EPIDURAL; INFILTRATION; INTRACAUDAL; PERINEURAL ONCE
Status: CANCELLED | OUTPATIENT
Start: 2021-07-27 | End: 2021-07-27

## 2021-07-27 RX ORDER — IOPAMIDOL 612 MG/ML
15 INJECTION, SOLUTION INTRATHECAL ONCE
Status: CANCELLED | OUTPATIENT
Start: 2021-07-27 | End: 2021-07-27

## 2021-07-28 ENCOUNTER — DOCUMENTATION ONLY (OUTPATIENT)
Dept: INTERVENTIONAL RADIOLOGY/VASCULAR | Facility: HOSPITAL | Age: 51
End: 2021-07-28

## 2021-07-29 ENCOUNTER — HOSPITAL ENCOUNTER (OUTPATIENT)
Facility: HOSPITAL | Age: 51
Discharge: HOME | End: 2021-07-29
Attending: RADIOLOGY | Admitting: RADIOLOGY
Payer: MEDICARE

## 2021-08-02 ENCOUNTER — HOSPITAL ENCOUNTER (OUTPATIENT)
Facility: HOSPITAL | Age: 51
Discharge: HOME | End: 2021-08-02
Attending: RADIOLOGY | Admitting: RADIOLOGY
Payer: MEDICARE

## 2021-08-02 ENCOUNTER — HOSPITAL ENCOUNTER (OUTPATIENT)
Dept: INTERVENTIONAL RADIOLOGY/VASCULAR | Facility: HOSPITAL | Age: 51
End: 2021-07-29
Attending: FAMILY MEDICINE | Admitting: RADIOLOGY
Payer: MEDICARE

## 2021-08-02 DIAGNOSIS — M47.812 CERVICAL SPONDYLOSIS: ICD-10-CM

## 2021-08-02 PROCEDURE — 250N000011 HC RX IP 250 OP 636: Performed by: RADIOLOGY

## 2021-08-02 PROCEDURE — 64491 INJ PARAVERT F JNT C/T 2 LEV: CPT | Mod: LT

## 2021-08-02 PROCEDURE — 250N000009 HC RX 250: Performed by: RADIOLOGY

## 2021-08-02 RX ORDER — TRIAMCINOLONE ACETONIDE 40 MG/ML
INJECTION, SUSPENSION INTRA-ARTICULAR; INTRAMUSCULAR
Status: DISCONTINUED
Start: 2021-08-02 | End: 2021-08-02 | Stop reason: HOSPADM

## 2021-08-02 RX ORDER — LIDOCAINE HYDROCHLORIDE 10 MG/ML
INJECTION, SOLUTION EPIDURAL; INFILTRATION; INTRACAUDAL; PERINEURAL
Status: DISCONTINUED
Start: 2021-08-02 | End: 2021-08-02 | Stop reason: HOSPADM

## 2021-08-02 RX ORDER — LIDOCAINE HYDROCHLORIDE 10 MG/ML
10 INJECTION, SOLUTION EPIDURAL; INFILTRATION; INTRACAUDAL; PERINEURAL ONCE
Status: CANCELLED | OUTPATIENT
Start: 2021-08-02 | End: 2021-08-02

## 2021-08-02 RX ORDER — TRIAMCINOLONE ACETONIDE 40 MG/ML
40-80 INJECTION, SUSPENSION INTRA-ARTICULAR; INTRAMUSCULAR ONCE
Status: COMPLETED | OUTPATIENT
Start: 2021-08-02 | End: 2021-08-02

## 2021-08-02 RX ORDER — IOPAMIDOL 612 MG/ML
15 INJECTION, SOLUTION INTRATHECAL ONCE
Status: CANCELLED | OUTPATIENT
Start: 2021-08-02 | End: 2021-08-02

## 2021-08-02 RX ORDER — IOPAMIDOL 612 MG/ML
15 INJECTION, SOLUTION INTRATHECAL ONCE
Status: COMPLETED | OUTPATIENT
Start: 2021-08-02 | End: 2021-08-02

## 2021-08-02 RX ORDER — LIDOCAINE HYDROCHLORIDE 10 MG/ML
10 INJECTION, SOLUTION EPIDURAL; INFILTRATION; INTRACAUDAL; PERINEURAL ONCE
Status: COMPLETED | OUTPATIENT
Start: 2021-08-02 | End: 2021-08-02

## 2021-08-02 RX ORDER — TRIAMCINOLONE ACETONIDE 40 MG/ML
40-80 INJECTION, SUSPENSION INTRA-ARTICULAR; INTRAMUSCULAR ONCE
Status: CANCELLED | OUTPATIENT
Start: 2021-08-02 | End: 2021-08-02

## 2021-08-02 RX ADMIN — LIDOCAINE HYDROCHLORIDE 8 ML: 10 INJECTION, SOLUTION EPIDURAL; INFILTRATION; INTRACAUDAL; PERINEURAL at 09:50

## 2021-08-02 RX ADMIN — TRIAMCINOLONE ACETONIDE 40 MG: 40 INJECTION, SUSPENSION INTRA-ARTICULAR; INTRAMUSCULAR at 09:49

## 2021-08-02 RX ADMIN — IOPAMIDOL 2 ML: 612 INJECTION, SOLUTION INTRATHECAL at 09:49

## 2021-08-02 NOTE — DISCHARGE INSTRUCTIONS
Cell number on file:    Telephone Information:   Mobile [phone]     Is it ok to leave a message at this number(s)? Yes    Dr. Harp completed your procedure on 8/2/2021.    Current Pain Level (0-10 Scale): 5/10  Post Pain Level (0-10):  0/10    Radiology Discharge instructions for Steroid Injection    Activity Level:     Do not do any heavy activity or exercise for 24 hours.   Do not drive for 4 hours after your injection.  Diet:   Return to your normal diet.  Medications:   If you have stopped taking your Aspirin, Coumadin/Warfarin, Ibuprofen, or any   other blood thinner for this procedure you may resume in the morning unless   your primary care provider has given you other instructions.    Diabetics may see an increase in blood sugar after steroid injections. If you are concerned about your blood sugar, please contact your family doctor.    Site Care:  Remove the bandage and bathe or shower the morning after the procedure.      This is a Pain Management procedure.  You will be contacted in two weeks for follow up.    Call your Primary Care Provider if you have the following (if your primary care provider is not available please seek emergency care):   Nausea with vomiting   Severe headache   Drowsiness or confusion   Redness or drainage at the injection or puncture site   Temperature over 101 degrees F   Other concerns   Worsening back pain   Stiff neck

## 2021-08-16 ENCOUNTER — HOSPITAL ENCOUNTER (OUTPATIENT)
Dept: INTERVENTIONAL RADIOLOGY/VASCULAR | Facility: HOSPITAL | Age: 51
End: 2021-08-16
Attending: FAMILY MEDICINE
Payer: MEDICARE

## 2021-08-16 ENCOUNTER — HOSPITAL ENCOUNTER (OUTPATIENT)
Facility: HOSPITAL | Age: 51
Discharge: HOME | End: 2021-08-16
Attending: RADIOLOGY | Admitting: RADIOLOGY
Payer: MEDICARE

## 2021-08-16 ENCOUNTER — TELEPHONE (OUTPATIENT)
Dept: INTERVENTIONAL RADIOLOGY/VASCULAR | Facility: HOSPITAL | Age: 51
End: 2021-08-16

## 2021-08-16 DIAGNOSIS — M47.812 CERVICAL SPONDYLOSIS: ICD-10-CM

## 2021-08-16 PROCEDURE — 64491 INJ PARAVERT F JNT C/T 2 LEV: CPT | Mod: RT

## 2021-08-16 PROCEDURE — 250N000011 HC RX IP 250 OP 636: Performed by: RADIOLOGY

## 2021-08-16 PROCEDURE — 250N000009 HC RX 250: Performed by: RADIOLOGY

## 2021-08-16 RX ORDER — IOPAMIDOL 612 MG/ML
15 INJECTION, SOLUTION INTRATHECAL ONCE
Status: CANCELLED | OUTPATIENT
Start: 2021-08-16 | End: 2021-08-16

## 2021-08-16 RX ORDER — TRIAMCINOLONE ACETONIDE 40 MG/ML
40-80 INJECTION, SUSPENSION INTRA-ARTICULAR; INTRAMUSCULAR ONCE
Status: COMPLETED | OUTPATIENT
Start: 2021-08-16 | End: 2021-08-16

## 2021-08-16 RX ORDER — METHYLPREDNISOLONE ACETATE 80 MG/ML
80 INJECTION, SUSPENSION INTRA-ARTICULAR; INTRALESIONAL; INTRAMUSCULAR; SOFT TISSUE ONCE
Status: CANCELLED | OUTPATIENT
Start: 2021-08-16 | End: 2021-08-16

## 2021-08-16 RX ORDER — LIDOCAINE HYDROCHLORIDE 10 MG/ML
10 INJECTION, SOLUTION EPIDURAL; INFILTRATION; INTRACAUDAL; PERINEURAL ONCE
Status: COMPLETED | OUTPATIENT
Start: 2021-08-16 | End: 2021-08-16

## 2021-08-16 RX ORDER — IOPAMIDOL 612 MG/ML
50 INJECTION, SOLUTION INTRAVASCULAR ONCE
Status: DISCONTINUED | OUTPATIENT
Start: 2021-08-16 | End: 2021-08-16 | Stop reason: CLARIF

## 2021-08-16 RX ORDER — LIDOCAINE HYDROCHLORIDE 10 MG/ML
10 INJECTION, SOLUTION EPIDURAL; INFILTRATION; INTRACAUDAL; PERINEURAL ONCE
Status: CANCELLED | OUTPATIENT
Start: 2021-08-16 | End: 2021-08-16

## 2021-08-16 RX ORDER — IOPAMIDOL 612 MG/ML
15 INJECTION, SOLUTION INTRATHECAL ONCE
Status: COMPLETED | OUTPATIENT
Start: 2021-08-16 | End: 2021-08-16

## 2021-08-16 RX ORDER — IOPAMIDOL 612 MG/ML
50 INJECTION, SOLUTION INTRAVASCULAR ONCE
Status: CANCELLED | OUTPATIENT
Start: 2021-08-16 | End: 2021-08-16

## 2021-08-16 RX ORDER — TRIAMCINOLONE ACETONIDE 40 MG/ML
40-80 INJECTION, SUSPENSION INTRA-ARTICULAR; INTRAMUSCULAR ONCE
Status: CANCELLED | OUTPATIENT
Start: 2021-08-16 | End: 2021-08-16

## 2021-08-16 RX ORDER — LIDOCAINE HYDROCHLORIDE 10 MG/ML
5 INJECTION, SOLUTION EPIDURAL; INFILTRATION; INTRACAUDAL; PERINEURAL ONCE
Status: CANCELLED | OUTPATIENT
Start: 2021-08-16 | End: 2021-08-16

## 2021-08-16 RX ADMIN — IOPAMIDOL 15 ML: 612 INJECTION, SOLUTION INTRATHECAL at 15:04

## 2021-08-16 RX ADMIN — LIDOCAINE HYDROCHLORIDE 7 ML: 10 INJECTION, SOLUTION EPIDURAL; INFILTRATION; INTRACAUDAL; PERINEURAL at 15:02

## 2021-08-16 RX ADMIN — TRIAMCINOLONE ACETONIDE 40 MG: 40 INJECTION, SUSPENSION INTRA-ARTICULAR; INTRAMUSCULAR at 15:06

## 2021-08-16 NOTE — TELEPHONE ENCOUNTER
CONSULT PATIENT  PAIN INJECTION POST CALL    Procedure: Left C2-3, C3-4 Facet injection  Radiologist(s): Dr. Francis Harp  Date of Procedure: 8/2/21    The patient had no questions or concerns.    Relief of pain from this injection    A = 90%  A- = 85%  B = 80%  B- =75%  C = 70%  C- = 65%  D = 60%  D- = 50%  F = less than 50%       Would you say this injection has been beneficial? Yes   If yes, for how long? Currently relieving 75% of pain     Was there one injection that worked better than the other? no    Where is the pain? Small amount of discomfort in upper base of skull. Pain comes and goes, patient is happy with the results. Injection resolved the headaches.  Can you describe the pain? Slight ache  Does the pain radiate anywhere? no  If yes, where does it radiate and where does the pain stop?n/a    Is this new pain? No    Patient would not like to pursue another injection at this time.  The patient will contact IR at [phone] if that changes.      Liliam Cohen

## 2021-08-16 NOTE — DISCHARGE INSTRUCTIONS
Home number on file [phone] (home)  Is it ok to leave a message at this number(s)? Yes    Dr. Harp completed your procedure on 8/16/2021.    Current Pain Level (0-10 Scale): 5/10  Post Pain Level (0-10):  5/10    Radiology Discharge instructions for Steroid Injection    Activity Level:     Do not do any heavy activity or exercise for 24 hours.   Do not drive for 4 hours after your injection.  Diet:   Return to your normal diet.  Medications:   If you have stopped taking your Aspirin, Coumadin/Warfarin, Ibuprofen, or any   other blood thinner for this procedure you may resume in the morning unless   your primary care provider has given you other instructions.    Diabetics may see an increase in blood sugar after steroid injections. If you are concerned about your blood sugar, please contact your family doctor.    Site Care:  Remove the bandage and bathe or shower the morning after the procedure.      This is a Pain Management procedure.  You will be contacted in two weeks for follow up.    Call your Primary Care Provider if you have the following (if your primary care provider is not available please seek emergency care):   Nausea with vomiting   Severe headache   Drowsiness or confusion   Redness or drainage at the injection or puncture site   Temperature over 101 degrees F   Other concerns   Worsening back pain   Stiff neck

## 2021-08-18 DIAGNOSIS — M54.12 CERVICAL RADICULOPATHY: ICD-10-CM

## 2021-08-18 RX ORDER — GABAPENTIN 300 MG/1
CAPSULE ORAL
Qty: 180 CAPSULE | Refills: 0 | Status: SHIPPED | OUTPATIENT
Start: 2021-08-18 | End: 2021-09-15

## 2021-08-18 NOTE — TELEPHONE ENCOUNTER
gabapentin      Last Written Prescription Date:  7/16/21  Last Fill Quantity: 180,   # refills: 0  Last Office Visit: 5/19/21  Future Office visit:

## 2021-08-30 ENCOUNTER — TELEPHONE (OUTPATIENT)
Dept: INTERVENTIONAL RADIOLOGY/VASCULAR | Facility: HOSPITAL | Age: 51
End: 2021-08-30

## 2021-08-30 NOTE — TELEPHONE ENCOUNTER
CONSULT PATIENT  PAIN INJECTION POST CALL    Procedure: Right C2-3 and C3-4 facet injection  Radiologist(s): Dr. Francis Harp  Date of Procedure: 8/16/21    I responded to the patient's questions/concerns.    Relief of pain from this injection    A = 90%  A- = 85%  B = 80%  B- =75%  C = 70%  C- = 65%  D = 60%  D- = 50%  F = less than 50%       Would you say this injection has been beneficial? Yes  If yes, for how long? Currently relieving 40% of pain on the right side of neck and about 70% on left side    Was there one injection that worked better than the other? no    Where is the pain? Bilateral neck pain, mainly on the right. Depending on what he is doing such as heavy lifting, arms will become weak.  Can you describe the pain? Pressure feeling  Does the pain radiate anywhere? no  If yes, where does it radiate and where does the pain stop? n/a    Is this new pain? No    Patient would like to pursue another injection. Call with recommendation      Liliam Cohen

## 2021-09-07 DIAGNOSIS — K12.0 APHTHOUS ULCER OF MOUTH: ICD-10-CM

## 2021-09-08 RX ORDER — MONTELUKAST SODIUM 10 MG/1
10 TABLET ORAL AT BEDTIME
Qty: 90 TABLET | Refills: 0 | Status: SHIPPED | OUTPATIENT
Start: 2021-09-08 | End: 2021-12-10

## 2021-09-08 NOTE — TELEPHONE ENCOUNTER
singular      Last Written Prescription Date:  5/18/21  Last Fill Quantity: 90,   # refills: 0  Last Office Visit: 5/19/21  Future Office visit:

## 2021-09-13 DIAGNOSIS — M54.12 CERVICAL RADICULOPATHY: ICD-10-CM

## 2021-09-13 DIAGNOSIS — F31.9 BIPOLAR I DISORDER (H): ICD-10-CM

## 2021-09-13 DIAGNOSIS — K21.9 GASTROESOPHAGEAL REFLUX DISEASE, UNSPECIFIED WHETHER ESOPHAGITIS PRESENT: ICD-10-CM

## 2021-09-13 DIAGNOSIS — I10 ESSENTIAL HYPERTENSION: ICD-10-CM

## 2021-09-14 RX ORDER — CARBAMAZEPINE 200 MG/1
200 TABLET, EXTENDED RELEASE ORAL 2 TIMES DAILY
Qty: 60 TABLET | Refills: 3 | Status: SHIPPED | OUTPATIENT
Start: 2021-09-14 | End: 2022-01-16

## 2021-09-14 NOTE — TELEPHONE ENCOUNTER
Carbamazepine (Tegretol XR) 200 mg 12 hour tablet  Take 1 tablet (200 mg) by mouth 2 times daily.        Last Written Prescription Date:  4-  Last Fill Quantity: 60 tablet,   # refills: 4  Last Office Visit: 3-  Future Office visit:

## 2021-09-15 ENCOUNTER — TELEPHONE (OUTPATIENT)
Dept: INTERVENTIONAL RADIOLOGY/VASCULAR | Facility: HOSPITAL | Age: 51
End: 2021-09-15

## 2021-09-15 RX ORDER — GABAPENTIN 300 MG/1
CAPSULE ORAL
Qty: 180 CAPSULE | Refills: 0 | Status: SHIPPED | OUTPATIENT
Start: 2021-09-15 | End: 2021-10-18

## 2021-09-15 RX ORDER — OMEPRAZOLE 40 MG/1
40 CAPSULE, DELAYED RELEASE ORAL DAILY
Qty: 90 CAPSULE | Refills: 0 | Status: SHIPPED | OUTPATIENT
Start: 2021-09-15 | End: 2021-12-10

## 2021-09-15 RX ORDER — HYDROCHLOROTHIAZIDE 12.5 MG/1
CAPSULE ORAL
Qty: 90 CAPSULE | Refills: 0 | Status: SHIPPED | OUTPATIENT
Start: 2021-09-15 | End: 2021-12-10

## 2021-09-15 NOTE — TELEPHONE ENCOUNTER
Hydrochlorothiazide, Prilosec      Last Written Prescription Date:  6.14.21  Last Fill Quantity: #90, #90,   # refills: 0  Last Office Visit: 5.19.21  Future Office visit:       Routing refill request to provider for review/approval because:  Not sure why protocol is failing as you saw him in May

## 2021-09-15 NOTE — TELEPHONE ENCOUNTER
Gabapentin 300 MG      Last Written Prescription Date:  08/18/21  Last Fill Quantity: 180,   # refills: 0  Last Office Visit: 05/19/21  Future Office visit:       Routing refill request to provider for review/approval because:  Drug not on the G, P or [facility] refill protocol or controlled substance

## 2021-09-20 ENCOUNTER — TELEPHONE (OUTPATIENT)
Dept: INTERVENTIONAL RADIOLOGY/VASCULAR | Facility: HOSPITAL | Age: 51
End: 2021-09-20

## 2021-09-24 ENCOUNTER — TELEPHONE (OUTPATIENT)
Dept: INTERVENTIONAL RADIOLOGY/VASCULAR | Facility: HOSPITAL | Age: 51
End: 2021-09-24

## 2021-09-24 NOTE — TELEPHONE ENCOUNTER
Patient called back and was explained recommendation per radiologist. Patient would like to follow through with this. Will mail out a pamphlet and call him back with dates/times.

## 2021-09-27 ENCOUNTER — TELEPHONE (OUTPATIENT)
Dept: INTERVENTIONAL RADIOLOGY/VASCULAR | Facility: HOSPITAL | Age: 51
End: 2021-09-27

## 2021-09-27 NOTE — TELEPHONE ENCOUNTER
Called patient to give him dates/times for Cervical MBB/Rhizotomy. Patient has decided at this time to wait and think about it more and will call at a later time if deciding to move forward.

## 2021-12-07 DIAGNOSIS — K12.0 APHTHOUS ULCER OF MOUTH: ICD-10-CM

## 2021-12-07 DIAGNOSIS — I10 ESSENTIAL HYPERTENSION: ICD-10-CM

## 2021-12-07 DIAGNOSIS — K05.10 GINGIVITIS: ICD-10-CM

## 2021-12-07 DIAGNOSIS — K11.7 CLINICAL XEROSTOMIA: ICD-10-CM

## 2021-12-07 DIAGNOSIS — J98.4 CHRONIC LUNG DISEASE: ICD-10-CM

## 2021-12-07 DIAGNOSIS — K21.9 GASTROESOPHAGEAL REFLUX DISEASE, UNSPECIFIED WHETHER ESOPHAGITIS PRESENT: ICD-10-CM

## 2021-12-08 RX ORDER — VERAPAMIL HYDROCHLORIDE 240 MG/1
CAPSULE, EXTENDED RELEASE ORAL
Qty: 90 CAPSULE | Refills: 1 | Status: SHIPPED | OUTPATIENT
Start: 2021-12-08 | End: 2022-06-20

## 2021-12-09 RX ORDER — CHLORHEXIDINE GLUCONATE ORAL RINSE 1.2 MG/ML
15 SOLUTION DENTAL 2 TIMES DAILY
Qty: 1893 ML | Refills: 2 | Status: SHIPPED | OUTPATIENT
Start: 2021-12-09 | End: 2023-07-24

## 2021-12-09 RX ORDER — PILOCARPINE HYDROCHLORIDE 5 MG/1
5 TABLET, FILM COATED ORAL 4 TIMES DAILY
Qty: 360 TABLET | Refills: 0 | Status: SHIPPED | OUTPATIENT
Start: 2021-12-09 | End: 2022-03-22

## 2021-12-09 NOTE — TELEPHONE ENCOUNTER
omeprazole      Last Written Prescription Date:  9/15/21  Last Fill Quantity: 90,   # refills: 0  Last Office Visit: 5/19/21  Future Office visit:       singulair 9/8/21 #90  Hydrochlorothiazide 9/15/21 #90  flovent 5/26/21 #24 with 1

## 2021-12-10 RX ORDER — HYDROCHLOROTHIAZIDE 12.5 MG/1
CAPSULE ORAL
Qty: 90 CAPSULE | Refills: 0 | Status: SHIPPED | OUTPATIENT
Start: 2021-12-10 | End: 2022-03-22

## 2021-12-10 RX ORDER — MONTELUKAST SODIUM 10 MG/1
10 TABLET ORAL AT BEDTIME
Qty: 90 TABLET | Refills: 0 | Status: SHIPPED | OUTPATIENT
Start: 2021-12-10 | End: 2022-03-22

## 2021-12-10 RX ORDER — FLUTICASONE PROPIONATE 220 UG/1
AEROSOL, METERED RESPIRATORY (INHALATION)
Qty: 12 G | Refills: 0 | Status: SHIPPED | OUTPATIENT
Start: 2021-12-10 | End: 2022-03-16

## 2021-12-10 RX ORDER — OMEPRAZOLE 40 MG/1
40 CAPSULE, DELAYED RELEASE ORAL DAILY
Qty: 90 CAPSULE | Refills: 0 | Status: SHIPPED | OUTPATIENT
Start: 2021-12-10 | End: 2022-03-16

## 2022-01-14 DIAGNOSIS — F31.9 BIPOLAR I DISORDER (H): ICD-10-CM

## 2022-01-14 NOTE — TELEPHONE ENCOUNTER
carBAMazepine (TEGRETOL XR) 200 MG 12 hr tablet  Last filled 9/14/21 #60 R3  Last office visit 3/15/21    Component      Latest Ref Rng & Units 2/9/2021   Carbamazepine Level      4.0 - 12.0 mg/L 7.4

## 2022-01-16 RX ORDER — CARBAMAZEPINE 200 MG/1
200 TABLET, EXTENDED RELEASE ORAL 2 TIMES DAILY
Qty: 60 TABLET | Refills: 3 | Status: SHIPPED | OUTPATIENT
Start: 2022-01-16 | End: 2022-05-06

## 2022-02-14 DIAGNOSIS — M54.12 CERVICAL RADICULOPATHY: ICD-10-CM

## 2022-02-15 RX ORDER — GABAPENTIN 300 MG/1
CAPSULE ORAL
Qty: 180 CAPSULE | Refills: 2 | Status: SHIPPED | OUTPATIENT
Start: 2022-02-15 | End: 2022-05-16

## 2022-02-15 NOTE — TELEPHONE ENCOUNTER
Gabapentin 300mg      Last Written Prescription Date:  10/18/2021  Last Fill Quantity: 180,   # refills: 3  Last Office Visit: 5/19/2021  Future Office visit:    Next 5 appointments (look out 90 days)    Feb 22, 2022  8:00 AM  (Arrive by 7:45 AM)  Return Visit with Annette Hammond MD  [facility] ([facility] ) [address] [place] [id number] [phone]           Routing refill request to provider for review/approval because:

## 2022-02-22 ENCOUNTER — TELEPHONE (OUTPATIENT)
Dept: PSYCHIATRY | Facility: OTHER | Age: 52
End: 2022-02-22
Payer: MEDICARE

## 2022-02-22 ENCOUNTER — OFFICE VISIT (OUTPATIENT)
Dept: PSYCHIATRY | Facility: OTHER | Age: 52
End: 2022-02-22
Attending: PSYCHIATRY & NEUROLOGY
Payer: MEDICARE

## 2022-02-22 VITALS
TEMPERATURE: 96.4 F | WEIGHT: 192.4 LBS | SYSTOLIC BLOOD PRESSURE: 122 MMHG | BODY MASS INDEX: 26.09 KG/M2 | DIASTOLIC BLOOD PRESSURE: 80 MMHG | HEART RATE: 85 BPM | OXYGEN SATURATION: 96 %

## 2022-02-22 DIAGNOSIS — F31.73 BIPOLAR DISORDER, IN PARTIAL REMISSION, MOST RECENT EPISODE MANIC (H): ICD-10-CM

## 2022-02-22 DIAGNOSIS — Z79.899 ENCOUNTER FOR LONG-TERM (CURRENT) USE OF MEDICATIONS: Primary | ICD-10-CM

## 2022-02-22 LAB
ALBUMIN SERPL-MCNC: 4.2 G/DL (ref 3.4–5)
ALP SERPL-CCNC: 140 U/L (ref 40–150)
ALT SERPL W P-5'-P-CCNC: 15 U/L (ref 0–70)
ANION GAP SERPL CALCULATED.3IONS-SCNC: 2 MMOL/L (ref 3–14)
AST SERPL W P-5'-P-CCNC: 14 U/L (ref 0–45)
BILIRUB SERPL-MCNC: 0.4 MG/DL (ref 0.2–1.3)
BUN SERPL-MCNC: 7 MG/DL (ref 7–30)
CALCIUM SERPL-MCNC: 9.5 MG/DL (ref 8.5–10.1)
CHLORIDE BLD-SCNC: 107 MMOL/L (ref 94–109)
CO2 SERPL-SCNC: 28 MMOL/L (ref 20–32)
CREAT SERPL-MCNC: 0.78 MG/DL (ref 0.66–1.25)
ERYTHROCYTE [DISTWIDTH] IN BLOOD BY AUTOMATED COUNT: 13.3 % (ref 10–15)
GFR SERPL CREATININE-BSD FRML MDRD: >90 ML/MIN/1.73M2
GLUCOSE BLD-MCNC: 104 MG/DL (ref 70–99)
HCT VFR BLD AUTO: 46 % (ref 40–53)
HGB BLD-MCNC: 15.5 G/DL (ref 13.3–17.7)
MCH RBC QN AUTO: 30.8 PG (ref 26.5–33)
MCHC RBC AUTO-ENTMCNC: 33.7 G/DL (ref 31.5–36.5)
MCV RBC AUTO: 91 FL (ref 78–100)
PLATELET # BLD AUTO: 221 10E3/UL (ref 150–450)
POTASSIUM BLD-SCNC: 3.7 MMOL/L (ref 3.4–5.3)
PROT SERPL-MCNC: 8.2 G/DL (ref 6.8–8.8)
RBC # BLD AUTO: 5.04 10E6/UL (ref 4.4–5.9)
SODIUM SERPL-SCNC: 137 MMOL/L (ref 133–144)
WBC # BLD AUTO: 6.9 10E3/UL (ref 4–11)

## 2022-02-22 PROCEDURE — 85027 COMPLETE CBC AUTOMATED: CPT | Mod: ZL | Performed by: PSYCHIATRY & NEUROLOGY

## 2022-02-22 PROCEDURE — 99213 OFFICE O/P EST LOW 20 MIN: CPT | Performed by: PSYCHIATRY & NEUROLOGY

## 2022-02-22 PROCEDURE — 84295 ASSAY OF SERUM SODIUM: CPT | Mod: ZL | Performed by: PSYCHIATRY & NEUROLOGY

## 2022-02-22 PROCEDURE — G0463 HOSPITAL OUTPT CLINIC VISIT: HCPCS

## 2022-02-22 PROCEDURE — 80053 COMPREHEN METABOLIC PANEL: CPT | Mod: ZL | Performed by: PSYCHIATRY & NEUROLOGY

## 2022-02-22 PROCEDURE — 36415 COLL VENOUS BLD VENIPUNCTURE: CPT | Mod: ZL | Performed by: PSYCHIATRY & NEUROLOGY

## 2022-02-22 ASSESSMENT — ANXIETY QUESTIONNAIRES
GAD7 TOTAL SCORE: 12
IF YOU CHECKED OFF ANY PROBLEMS ON THIS QUESTIONNAIRE, HOW DIFFICULT HAVE THESE PROBLEMS MADE IT FOR YOU TO DO YOUR WORK, TAKE CARE OF THINGS AT HOME, OR GET ALONG WITH OTHER PEOPLE: VERY DIFFICULT
1. FEELING NERVOUS, ANXIOUS, OR ON EDGE: MORE THAN HALF THE DAYS
5. BEING SO RESTLESS THAT IT IS HARD TO SIT STILL: MORE THAN HALF THE DAYS
3. WORRYING TOO MUCH ABOUT DIFFERENT THINGS: MORE THAN HALF THE DAYS
7. FEELING AFRAID AS IF SOMETHING AWFUL MIGHT HAPPEN: NOT AT ALL
2. NOT BEING ABLE TO STOP OR CONTROL WORRYING: MORE THAN HALF THE DAYS
6. BECOMING EASILY ANNOYED OR IRRITABLE: MORE THAN HALF THE DAYS

## 2022-02-22 ASSESSMENT — PAIN SCALES - GENERAL: PAINLEVEL: MILD PAIN (2)

## 2022-02-22 NOTE — NURSING NOTE
Chief Complaint   Patient presents with     RECHECK     Bipolar disorder.       Initial /80 (BP Location: Left arm, Patient Position: Sitting, Cuff Size: Adult Regular)   Pulse 85   Temp (!) 96.4  F (35.8  C) (Tympanic)   Wt 87.3 kg (192 lb 6.4 oz)   SpO2 96%   BMI 26.09 kg/m   Estimated body mass index is 26.09 kg/m  as calculated from the following:    Height as of 11/11/20: 1.829 m (6').    Weight as of this encounter: 87.3 kg (192 lb 6.4 oz).  Medication Reconciliation: complete  DAV REYES LPN

## 2022-02-22 NOTE — PROGRESS NOTES
"      PSYCHIATRY CLINIC PROGRESS NOTE     SUBJECTIVE / INTERIM HISTORY                                                                          Last visit March 2021: Continue Tegretol  mg twice daily. He is not taking the Ativan    - his dog had puppies. Had 4 of them.  Kept one of them and \"she's irresistible!\"  - they have a cat too  - pain has been a little better  - son back in area. Son working at [employer] and getting more involved. Son thinking maybe stick around more. He was in [place]  - they get out to his parent's place in the country. Paulette likes to have coffee and visit with Andrei's mom  -[place]. For while bought it and he lived there with Brianna and when was with her then in 2010 when  Brianna and moved to [place] with Paulette when they started relationship  - Andrei notes \"I've been diagnosed with bipolar like 6 times.\" Also diagnosed with ADD in past. Notes everytime he ends up having to go back on his ADD meds because \"I can't get anything done\".   -  Finished [employer] with [employer] and was planning to go for radiology degree. 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  and notes dated Brianna Parkseanniki of [employer] and raised Ross who passed away form overdose. And Andrei's nephew Andrei murdered.  - Social/Spiritual Support- sister Caroline, dad Carlos, GF Paulette [facility]  - Also per discharge summary from April 8/13/20: \"Lithium was tapered and discontinued in march/begining of April. At that time his lamictal was increased. It appears that lithium was tapered due excessive thirst and dry mouth. Elavil was increased for insomnia in February.\"    MEDICAL ROS-  back injury and surgeries since 31 yo. He denies history of overt injury rather been diagnosed with degenerative disc disease.  MEDICAL / SURGICAL HISTORY                     Patient Active Problem List   Diagnosis     Cervicalgia     Lower back pain     Segmental and somatic dysfunction of lower extremity     GERD " (gastroesophageal reflux disease)     Mood disorder (H)     Abnormal weight gain     Attention deficit hyperactivity disorder (ADHD), predominantly inattentive type     ACP (advance care planning)     Flatulence, eructation, and gas pain     Bipolar disease, chronic (H)     Benign essential hypertension     Russell esophagus     Tobacco dependency     Lithium use     DDD (degenerative disc disease), cervical     Cervical stenosis of spinal canal     Obesity (BMI 35.0-39.9) with comorbidity (H)     Chronic lung disease     Suicidal behavior     Dry mouth     Anxiety state     Insomnia, unspecified     Left hip pain     Perianal abscess     ALLERGY   Patient has no known allergies.  MEDICATIONS                                                                                             Current Outpatient Medications   Medication Sig     aspirin 81 MG tablet Take 1 tablet (81 mg) by mouth daily     carBAMazepine (TEGRETOL XR) 200 MG 12 hr tablet TAKE 1 TABLET (200 MG) BY MOUTH 2 TIMES DAILY     chlorhexidine (PERIDEX) 0.12 % solution SWISH AND SPIT 15 MLS IN MOUTH 2 TIMES DAILY     FLOVENT  MCG/ACT inhaler INHALE 2 PUFFS INTO THE LUNGS 2 TIMES DAILY     gabapentin (NEURONTIN) 300 MG capsule TAKE 2 CAPSULES (600MG) BY MOUTH THREE TIMES DAILY     hydrochlorothiazide (MICROZIDE) 12.5 MG capsule TAKE 1 CAPSULE (12.5 MG) BYMOUTH EVERY MORNING     montelukast (SINGULAIR) 10 MG tablet TAKE 1 TABLET (10 MG) BY MOUTH AT BEDTIME     omeprazole (PRILOSEC) 40 MG DR capsule TAKE 1 CAPSULE (40 MG) BY MOUTH DAILY     pilocarpine (SALAGEN) 5 MG tablet TAKE 1 TABLET (5 MG) BY MOUTH 4 TIMES DAILY     verapamil ER (VERELAN) 240 MG 24 hr capsule TAKE 1 CAPSULE (240 MG) BY MOUTH AT BEDTIME     No current facility-administered medications for this visit.       VITALS   /80 (BP Location: Left arm, Patient Position: Sitting, Cuff Size: Adult Regular)   Pulse 85   Temp (!) 96.4  F (35.8  C) (Tympanic)   Wt 87.3 kg (192 lb 6.4  oz)   SpO2 96%   BMI 26.09 kg/m       PHQ9                     [unfilled]  LABS                                                                                                                         Last Comprehensive Metabolic Panel:  Sodium   Date Value Ref Range Status   04/08/2021 139 133 - 144 mmol/L Final     Potassium   Date Value Ref Range Status   04/08/2021 3.1 (L) 3.4 - 5.3 mmol/L Final     Chloride   Date Value Ref Range Status   04/08/2021 105 94 - 109 mmol/L Final     Carbon Dioxide   Date Value Ref Range Status   04/08/2021 29 20 - 32 mmol/L Final     Anion Gap   Date Value Ref Range Status   04/08/2021 5 3 - 14 mmol/L Final     Glucose   Date Value Ref Range Status   04/08/2021 96 70 - 99 mg/dL Final     Urea Nitrogen   Date Value Ref Range Status   04/08/2021 7 7 - 30 mg/dL Final     Creatinine   Date Value Ref Range Status   04/08/2021 0.81 0.66 - 1.25 mg/dL Final     GFR Estimate   Date Value Ref Range Status   04/08/2021 >90 >60 mL/min/[1.73_m2] Final     Comment:     Non  GFR Calc  Starting 12/18/2018, serum creatinine based estimated GFR (eGFR) will be   calculated using the Chronic Kidney Disease Epidemiology Collaboration   (CKD-EPI) equation.       Calcium   Date Value Ref Range Status   04/08/2021 9.0 8.5 - 10.1 mg/dL Final     Tegretol 7.4 as of 2/8/21    MENTAL STATUS EXAM                                                                                       No problems with speech. Mood euthymic and affect congruent to mood and speech content. Thought process, including associations, was unremarkable and thought content was devoid of suicidal and homicidal ideation and psychotic thought. No hallucinations. Insight was good. Judgment was intact and adequate for safety. Fund of knowledge was intact. Pt demonstrates no obvious problems with attention, concentration, language, recent or remote memory although these were not formally tested.     ASSESSMENT                                                                                                       HISTORICAL:  Initial psych note 12/28/20          NOTES:      Andrei Whitman is a 52 yo with bipolar disorder with psychiatric hospitalization August 2020. Was on amitriptyline, Adderall, Wellbutrin : all activating meds although Andrei notes he feels was the Lamictal that precipitated bibiana. Karly Juarez took care of Andrei hospital stay and noted Depakote for him past weight gain, lithium polyuria and thirst, uncertain whether he had been on neuroleptics (I saw Andra mentioned in [facility] notes). Andrei had sx bibiana along with paranoia and somatic delusions of August 2020. Sx cleared and he is still taking the Tegretol  mg twice daily.   Doing well in terms no sx bibiana or psychosis. IS still having depression, anxiety and he feels sx ADHD of which isn't getting anything done. Struggling in that no license and helps take care of his GF Paulette thus extra difficult in that they have no transportation.    Meds: we agreed to stick with Tegretol  mg twice daily and we discussed verapamil and Tegretol can interact in that TEgretol can decrease the concentration of verapamil and Verapamil can increase concentration of tegretol. We thus checked Tegretol level and is in normal range. Andrei is generally doing well. We will check few labs today in regard to Tegretol (can cause hyponatremia and blood dyscrasias) .    Today Andrei notes he questions if he needs to go back on med for ADHD- feels not getting things done including not getting important paperwork done and not making important appointments (dental).  unmotivated.. For today we agreed not make any changes given first time I've seen him person. He is agreeable to this.    TREATMENT RISK STATEMENT:  The risks, benefits, alternatives and potential adverse effects have been explained and are understood by the pt.  The pt agrees to the treatment plan with the ability to do so.   The pt  knows to call the clinic for any problems or access emergency care if needed.        DIAGNOSES                     Bipolar disorder, most recent episode manic (with psychosis), in partial remission    PLAN                                                                                                                    1)  MEDICATIONS:         -- Continue Tegretol  mg twice daily.   2)  THERAPY:  No Change    3)  LABS:  Tegretol level 2/8/21. Today check CMP, CBC    4)  PT MONITOR [call for probs]:  Worsening symptoms, SI/HI, SEs from meds    5)  REFERRALS [CD, medical, other]:  None    6)  RTC:    ~6 weeks

## 2022-02-22 NOTE — LETTER
2022      TO: [place] [employer]    and Vehicle Services  [address] [place] [address] [address]  [place] [address]  Phone [phone]       I work with Andrei Whitman ( 1970) in my psychiatry clinic at [facility]. I have no concerns with Andrei driving. I feel Andrei is safe to operate a motor vehicle on public streets and highways.     If you have questions and / or require additional information I can be contacted at [phone] and our fax is [phone].       Sincerely,      Annette Hammond MD

## 2022-02-23 ASSESSMENT — ANXIETY QUESTIONNAIRES: GAD7 TOTAL SCORE: 12

## 2022-03-15 NOTE — MR AVS SNAPSHOT
After Visit Summary   5/16/2017    Andrei Whitman    MRN: [id number]           Patient Information     Date Of Birth          1970        Visit Information        Provider Department      5/16/2017 8:40 AM Tommy Lamb DO [facility]        Today's Diagnoses     Epigastric pain    -  1    Protein in urine        Gastroesophageal reflux disease, esophagitis presence not specified        Cellulitis and abscess of leg, except foot           Follow-ups after your visit        Additional Services     GENERAL SURG ADULT REFERRAL       Your provider has referred you to: PREFERRED PROVIDERS:  [facility]      Please be aware that coverage of these services is subject to the terms and limitations of your health insurance plan.  Call member services at your health plan with any benefit or coverage questions.      Please bring the following with you to your appointment:    (1) Any X-Rays, CTs or MRIs which have been performed.  Contact the facility where they were done to arrange for  prior to your scheduled appointment.   (2) List of current medications   (3) This referral request   (4) Any documents/labs given to you for this referral                  Follow-up notes from your care team     Return in about 3 months (around 8/16/2017), or Anxiety and depression.      Your next 10 appointments already scheduled     Aug 18, 2017  9:20 AM CDT   (Arrive by 9:00 AM)   SHORT with DO Joseph Johnson ([facility])    [address] [place] [address]   [phone]              Who to contact     If you have questions or need follow up information about today's clinic visit or your schedule please contact [facility] directly at [phone].  Normal or non-critical lab and imaging results will be communicated to you by MyChart, letter or phone within 4 business days after the clinic has received the results. If  "you do not hear from us within 7 days, please contact the clinic through [employer] or phone. If you have a critical or abnormal lab result, we will notify you by phone as soon as possible.  Submit refill requests through [employer]ue or call your pharmacy and they will forward the refill request to us. Please allow 3 business days for your refill to be completed.          Additional Information About Your Visit        [employer] Information     [employer]ue lets you send messages to your doctor, view your test results, renew your prescriptions, schedule appointments and more. To sign up, go to [email or web address] . Click on \"Log in\" on the left side of the screen, which will take you to the Welcome page. Then click on \"Sign up Now\" on the right side of the page.     You will be asked to enter the access code listed below, as well as some personal information. Please follow the directions to create your username and password.     Your access code is: [email or web address]  Expires: 2017  8:56 AM     Your access code will  in 90 days. If you need help or a new code, please call your [facility] clinic or [phone].        Care EveryWhere ID     This is your Care EveryWhere ID. This could be used by other organizations to access your [facility] medical records  [id number]        Your Vitals Were     Pulse Temperature Pulse Oximetry             88 97.9  F (36.6  C) 98%          Blood Pressure from Last 3 Encounters:   17 130/90   17 130/90   17 122/80    Weight from Last 3 Encounters:   17 223 lb (101.2 kg)   17 215 lb (97.5 kg)   16 215 lb (97.5 kg)              We Performed the Following     CBC with platelets     GENERAL SURG ADULT REFERRAL     UA reflex to Microscopic          Today's Medication Changes          These changes are accurate as of: 17  9:03 AM.  If you have any questions, ask your nurse or doctor.               Start taking these medicines.        Dose/Directions    " sulfamethoxazole-trimethoprim 800-160 MG per tablet   Commonly known as:  BACTRIM DS/SEPTRA DS   Used for:  Cellulitis and abscess of leg, except foot   Started by:  Tommy Lamb DO        Dose:  1 tablet   Take 1 tablet by mouth 2 times daily for 7 days   Quantity:  14 tablet   Refills:  0            Where to get your medicines      These medications were sent to [id number] - [place], [place] - [address]  [address] [facility] [place] [address]     Phone:  [phone]     sulfamethoxazole-trimethoprim 800-160 MG per tablet         Some of these will need a paper prescription and others can be bought over the counter.  Ask your nurse if you have questions.     Bring a paper prescription for each of these medications     amphetamine-dextroamphetamine 30 MG per 24 hr capsule                Primary Care Provider Office Phone # Fax #    Tommy Farhan Lamb [phone]       [facility] [place] [address]  [place] [address]        Thank you!     Thank you for choosing [facility]  for your care. Our goal is always to provide you with excellent care. Hearing back from our patients is one way we can continue to improve our services. Please take a few minutes to complete the written survey that you may receive in the mail after your visit with us. Thank you!             Your Updated Medication List - Protect others around you: Learn how to safely use, store and throw away your medicines at www.disposemymeds.org.          This list is accurate as of: 5/16/17  9:03 AM.  Always use your most recent med list.                   Brand Name Dispense Instructions for use    amitriptyline 25 MG tablet    ELAVIL    30 tablet    TAKE 1 TABLET BY MOUTH AT B EDTIME       amphetamine-dextroamphetamine 30 MG per 24 hr capsule    ADDERALL XR    60 capsule    Take 1 capsule (30 mg) by mouth 2 times daily       buPROPion 300 MG 24 hr tablet    WELLBUTRIN XL    30 tablet     Take 1 tablet (300 mg) by mouth every morning       gabapentin 300 MG capsule    NEURONTIN    180 capsule    TAKE 2 CAPSULES BY MOUTH TH REE TIMES DAILY       lithium 450 MG CR tablet    ESKALITH    90 tablet    Take two tablets by mouth at 8 am and take one tablet at 4 pm.       sulfamethoxazole-trimethoprim 800-160 MG per tablet    BACTRIM DS/SEPTRA DS    14 tablet    Take 1 tablet by mouth 2 times daily for 7 days       Suvorexant 10 MG tablet    BELSOMRA    30 tablet    Take 1 tablet (10 mg) by mouth nightly as needed          no chest pain and no edema.

## 2022-03-16 DIAGNOSIS — K21.9 GASTROESOPHAGEAL REFLUX DISEASE, UNSPECIFIED WHETHER ESOPHAGITIS PRESENT: ICD-10-CM

## 2022-03-16 DIAGNOSIS — J98.4 CHRONIC LUNG DISEASE: ICD-10-CM

## 2022-03-16 RX ORDER — FLUTICASONE PROPIONATE 220 UG/1
AEROSOL, METERED RESPIRATORY (INHALATION)
Qty: 12 G | Refills: 0 | Status: SHIPPED | OUTPATIENT
Start: 2022-03-16 | End: 2022-05-10

## 2022-03-16 RX ORDER — OMEPRAZOLE 40 MG/1
40 CAPSULE, DELAYED RELEASE ORAL DAILY
Qty: 90 CAPSULE | Refills: 0 | Status: SHIPPED | OUTPATIENT
Start: 2022-03-16 | End: 2022-06-20

## 2022-03-16 NOTE — TELEPHONE ENCOUNTER
flovert      Last Written Prescription Date:  12/10/21  Last Fill Quantity: 12 g,   # refills: 0  Last Office Visit: 5/19/21  Future Office visit:    Next 5 appointments (look out 90 days)    Apr 05, 2022  9:00 AM  (Arrive by 8:45 AM)  Return Visit with Annette Hammond MD  [facility] ([facility] ) [address] [place], [place] [address] [place] [address]  [phone]         prilosec      Last Written Prescription Date:  12/10/21  Last Fill Quantity: 90,   # refills: 0  Last Office Visit: 5/19/21  Future Office visit:    Next 5 appointments (look out 90 days)    Apr 05, 2022  9:00 AM  (Arrive by 8:45 AM)  Return Visit with Annette Hammond MD  [facility] ([facility] ) [address] [place], [place] [address] [place] [address]  [phone]

## 2022-03-20 DIAGNOSIS — K11.7 CLINICAL XEROSTOMIA: ICD-10-CM

## 2022-03-20 DIAGNOSIS — K12.0 APHTHOUS ULCER OF MOUTH: ICD-10-CM

## 2022-03-20 DIAGNOSIS — I10 ESSENTIAL HYPERTENSION: ICD-10-CM

## 2022-03-22 RX ORDER — HYDROCHLOROTHIAZIDE 12.5 MG/1
CAPSULE ORAL
Qty: 90 CAPSULE | Refills: 0 | Status: SHIPPED | OUTPATIENT
Start: 2022-03-22 | End: 2022-06-16

## 2022-03-22 RX ORDER — PILOCARPINE HYDROCHLORIDE 5 MG/1
5 TABLET, FILM COATED ORAL 4 TIMES DAILY
Qty: 360 TABLET | Refills: 0 | Status: SHIPPED | OUTPATIENT
Start: 2022-03-22 | End: 2022-08-17

## 2022-03-22 RX ORDER — MONTELUKAST SODIUM 10 MG/1
10 TABLET ORAL AT BEDTIME
Qty: 90 TABLET | Refills: 0 | Status: SHIPPED | OUTPATIENT
Start: 2022-03-22 | End: 2022-06-16

## 2022-04-05 ENCOUNTER — OFFICE VISIT (OUTPATIENT)
Dept: PSYCHIATRY | Facility: OTHER | Age: 52
End: 2022-04-05
Attending: PSYCHIATRY & NEUROLOGY
Payer: MEDICARE

## 2022-04-05 VITALS
SYSTOLIC BLOOD PRESSURE: 128 MMHG | OXYGEN SATURATION: 95 % | WEIGHT: 192 LBS | TEMPERATURE: 97.5 F | HEART RATE: 86 BPM | BODY MASS INDEX: 26.04 KG/M2 | DIASTOLIC BLOOD PRESSURE: 82 MMHG

## 2022-04-05 DIAGNOSIS — F90.2 ADHD (ATTENTION DEFICIT HYPERACTIVITY DISORDER), COMBINED TYPE: Primary | ICD-10-CM

## 2022-04-05 PROCEDURE — 99214 OFFICE O/P EST MOD 30 MIN: CPT | Performed by: PSYCHIATRY & NEUROLOGY

## 2022-04-05 PROCEDURE — G0463 HOSPITAL OUTPT CLINIC VISIT: HCPCS

## 2022-04-05 RX ORDER — DEXTROAMPHETAMINE SACCHARATE, AMPHETAMINE ASPARTATE MONOHYDRATE, DEXTROAMPHETAMINE SULFATE AND AMPHETAMINE SULFATE 5; 5; 5; 5 MG/1; MG/1; MG/1; MG/1
20 CAPSULE, EXTENDED RELEASE ORAL DAILY
Qty: 30 CAPSULE | Refills: 0 | Status: SHIPPED | OUTPATIENT
Start: 2022-04-05 | End: 2022-06-08

## 2022-04-05 ASSESSMENT — ANXIETY QUESTIONNAIRES
7. FEELING AFRAID AS IF SOMETHING AWFUL MIGHT HAPPEN: NOT AT ALL
3. WORRYING TOO MUCH ABOUT DIFFERENT THINGS: MORE THAN HALF THE DAYS
2. NOT BEING ABLE TO STOP OR CONTROL WORRYING: SEVERAL DAYS
1. FEELING NERVOUS, ANXIOUS, OR ON EDGE: SEVERAL DAYS
6. BECOMING EASILY ANNOYED OR IRRITABLE: MORE THAN HALF THE DAYS
5. BEING SO RESTLESS THAT IT IS HARD TO SIT STILL: SEVERAL DAYS
GAD7 TOTAL SCORE: 9

## 2022-04-05 ASSESSMENT — PAIN SCALES - GENERAL: PAINLEVEL: SEVERE PAIN (6)

## 2022-04-05 ASSESSMENT — PATIENT HEALTH QUESTIONNAIRE - PHQ9
5. POOR APPETITE OR OVEREATING: MORE THAN HALF THE DAYS
SUM OF ALL RESPONSES TO PHQ QUESTIONS 1-9: 12

## 2022-04-05 NOTE — NURSING NOTE
Chief Complaint   Patient presents with     RECHECK     Bipolar disorder       Initial /82 (BP Location: Left arm, Patient Position: Sitting, Cuff Size: Adult Regular)   Pulse 86   Temp 97.5  F (36.4  C) (Tympanic)   Wt 87.1 kg (192 lb)   SpO2 95%   BMI 26.04 kg/m   Estimated body mass index is 26.04 kg/m  as calculated from the following:    Height as of 11/11/20: 1.829 m (6').    Weight as of this encounter: 87.1 kg (192 lb).  Medication Reconciliation: complete  DAV REYES LPN

## 2022-04-05 NOTE — PROGRESS NOTES
"      PSYCHIATRY CLINIC PROGRESS NOTE     SUBJECTIVE / INTERIM HISTORY                                                                          Last visit 2/22/22: Continue Tegretol  mg twice daily.     - his dog had puppies. Had 4 of them.  Kept one of them and \"she's irresistible!\"  - they have a cat too  - pain has been a little better  - son back in area. Son working at [employer] and getting more involved. Son thinking maybe stick around more. He was in [place]  - they get out to his parent's place in the country. Paulette likes to have coffee and visit with Andrei's mom  -[place] [address] [place]. For while bought it and he lived there with Brianna and when was with her then in 2010 when  Brianna and moved to [place] with Paulette when they started relationship  - Andrei notes \"I've been diagnosed with bipolar like 6 times.\" Also diagnosed with ADD in past. Notes everytime he ends up having to go back on his ADD meds because \"I can't get anything done\".   -  Finished HCC with [employer] and was planning to go for radiology degree. 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  and notes dated Brianna [employer] [facility] [employer] and raised Ross who passed away form overdose. And Andrei's nephew Andrei murdered.  - Social/Spiritual Support- sister Caroline, dad Carlos, GF Paulette Alfonzo   - Also per discharge summary from April 8/13/20: \"Lithium was tapered and discontinued in march/begining of April. At that time his lamictal was increased. It appears that lithium was tapered due excessive thirst and dry mouth. Elavil was increased for insomnia in February.\"    MEDICAL ROS-  back injury and surgeries since 31 yo. He denies history of overt injury rather been diagnosed with degenerative disc disease.  MEDICAL / SURGICAL HISTORY                     Patient Active Problem List   Diagnosis     Cervicalgia     Lower back pain     Segmental and somatic dysfunction of lower extremity     GERD (gastroesophageal reflux " disease)     Mood disorder (H)     Abnormal weight gain     Attention deficit hyperactivity disorder (ADHD), predominantly inattentive type     ACP (advance care planning)     Flatulence, eructation, and gas pain     Bipolar disease, chronic (H)     Benign essential hypertension     Russell esophagus     Tobacco dependency     Lithium use     DDD (degenerative disc disease), cervical     Cervical stenosis of spinal canal     Obesity (BMI 35.0-39.9) with comorbidity (H)     Chronic lung disease     Suicidal behavior     Dry mouth     Anxiety state     Insomnia, unspecified     Left hip pain     Perianal abscess     ALLERGY   Patient has no known allergies.  MEDICATIONS                                                                                             Current Outpatient Medications   Medication Sig     aspirin 81 MG tablet Take 1 tablet (81 mg) by mouth daily     carBAMazepine (TEGRETOL XR) 200 MG 12 hr tablet TAKE 1 TABLET (200 MG) BY MOUTH 2 TIMES DAILY     chlorhexidine (PERIDEX) 0.12 % solution SWISH AND SPIT 15 MLS IN MOUTH 2 TIMES DAILY     FLOVENT  MCG/ACT inhaler INHALE 2 PUFFS INTO THE LUNGS 2 TIMES DAILY     gabapentin (NEURONTIN) 300 MG capsule TAKE 2 CAPSULES (600MG) BY MOUTH THREE TIMES DAILY     hydrochlorothiazide (MICROZIDE) 12.5 MG capsule TAKE 1 CAPSULE (12.5 MG) BYMOUTH EVERY MORNING     montelukast (SINGULAIR) 10 MG tablet TAKE 1 TABLET (10 MG) BY MOUTH AT BEDTIME     omeprazole (PRILOSEC) 40 MG DR capsule TAKE 1 CAPSULE (40 MG) BY MOUTH DAILY     pilocarpine (SALAGEN) 5 MG tablet TAKE 1 TABLET (5 MG) BY MOUTH 4 TIMES DAILY     verapamil ER (VERELAN) 240 MG 24 hr capsule TAKE 1 CAPSULE (240 MG) BY MOUTH AT BEDTIME     No current facility-administered medications for this visit.       VITALS   /82 (BP Location: Left arm, Patient Position: Sitting, Cuff Size: Adult Regular)   Pulse 86   Temp 97.5  F (36.4  C) (Tympanic)   Wt 87.1 kg (192 lb)   SpO2 95%   BMI 26.04 kg/m        PHQ9                     [unfilled]  LABS                                                                                                                         Last Comprehensive Metabolic Panel:  Sodium   Date Value Ref Range Status   02/22/2022 137 133 - 144 mmol/L Final   04/08/2021 139 133 - 144 mmol/L Final     Potassium   Date Value Ref Range Status   02/22/2022 3.7 3.4 - 5.3 mmol/L Final   04/08/2021 3.1 (L) 3.4 - 5.3 mmol/L Final     Chloride   Date Value Ref Range Status   02/22/2022 107 94 - 109 mmol/L Final   04/08/2021 105 94 - 109 mmol/L Final     Carbon Dioxide   Date Value Ref Range Status   04/08/2021 29 20 - 32 mmol/L Final     Carbon Dioxide (CO2)   Date Value Ref Range Status   02/22/2022 28 20 - 32 mmol/L Final     Anion Gap   Date Value Ref Range Status   02/22/2022 2 (L) 3 - 14 mmol/L Final   04/08/2021 5 3 - 14 mmol/L Final     Glucose   Date Value Ref Range Status   02/22/2022 104 (H) 70 - 99 mg/dL Final   04/08/2021 96 70 - 99 mg/dL Final     Urea Nitrogen   Date Value Ref Range Status   02/22/2022 7 7 - 30 mg/dL Final   04/08/2021 7 7 - 30 mg/dL Final     Creatinine   Date Value Ref Range Status   02/22/2022 0.78 0.66 - 1.25 mg/dL Final   04/08/2021 0.81 0.66 - 1.25 mg/dL Final     GFR Estimate   Date Value Ref Range Status   02/22/2022 >90 >60 mL/min/1.73m2 Final     Comment:     Effective December 21, 2021 eGFRcr in adults is calculated using the 2021 CKD-EPI creatinine equation which includes age and gender (Jordi et al., NEJM, DOI: 10.1056/OHGIdv9380252)   04/08/2021 >90 >60 mL/min/[1.73_m2] Final     Comment:     Non  GFR Calc  Starting 12/18/2018, serum creatinine based estimated GFR (eGFR) will be   calculated using the Chronic Kidney Disease Epidemiology Collaboration   (CKD-EPI) equation.       Calcium   Date Value Ref Range Status   02/22/2022 9.5 8.5 - 10.1 mg/dL Final   04/08/2021 9.0 8.5 - 10.1 mg/dL Final     Tegretol 7.4 as of 2/8/21    MENTAL  STATUS EXAM                                                                                       No problems with speech. Mood euthymic and affect congruent to mood and speech content. Thought process, including associations, was unremarkable and thought content was devoid of suicidal and homicidal ideation and psychotic thought. No hallucinations. Insight was good. Judgment was intact and adequate for safety. Fund of knowledge was intact. Pt demonstrates no obvious problems with attention, concentration, language, recent or remote memory although these were not formally tested.     ASSESSMENT                                                                                                      HISTORICAL:  Initial psych note 12/28/20          NOTES:      Andrei Whitman is a 52 yo with bipolar disorder with psychiatric hospitalization August 2020. Was on amitriptyline, Adderall, Wellbutrin : all activating meds although Andrei notes he feels was the Lamictal that precipitated bibiana. April Larry took care of Andrei hospital stay and noted Depakote for him past weight gain, lithium polyuria and thirst, uncertain whether he had been on neuroleptics (I saw Latuda mentioned in [facility] notes). Andrei had sx bibiana along with paranoia and somatic delusions of August 2020. Sx cleared and he is still taking the Tegretol  mg twice daily.   Doing well in terms no sx bibiana or psychosis. IS still having depression, anxiety and he feels sx ADHD of which isn't getting anything done. Struggling in that no license and helps take care of his GF Paulette thus extra difficult in that they have no transportation.    Meds: we agreed to stick with Tegretol  mg twice daily and we discussed verapamil and Tegretol can interact in that TEgretol can decrease the concentration of verapamil and Verapamil can increase concentration of tegretol. We thus checked Tegretol level and is in normal range. Andrei is generally doing well. We check labs last  visit and they came back okay (can cause hyponatremia and blood dyscrasias) .    Today I asked Andrei where he's at with back surgery because I know he has ongoing pain and issues.. 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 Then his phone  and things kind of fell through the cracks. Andrei's headaches have improved so he hasn't been as urgent about working towards surgery... he notes some weakness at times can only turn a screwdriver 3 x before his arm goes numb and feels weak.     Today we agreed starting back on med for his ADHD - he notes historically 20 mg Adderall once daily is what helped. He notes his sx are interfering significantly with his life including not completing paperwork, can't keep on top small jobs.. not finishing things.      TREATMENT RISK STATEMENT:  The risks, benefits, alternatives and potential adverse effects have been explained and are understood by the pt.  The pt agrees to the treatment plan with the ability to do so.   The pt knows to call the clinic for any problems or access emergency care if needed.        DIAGNOSES                     Bipolar disorder, most recent episode manic (with psychosis), in partial remission  ADHD    PLAN                                                                                                                    1)  MEDICATIONS:         -- Continue Tegretol  mg twice daily. Start Adderall XR 20 mg daily send to [facility]  2)  THERAPY:  No Change    3)  LABS:  Tegretol level 21. Today check CMP, CBC    4)  PT MONITOR [call for probs]:  Worsening symptoms, SI/HI, SEs from meds    5)  REFERRALS [CD, medical, other]:  None    6)  RTC:   1 month

## 2022-04-06 ENCOUNTER — TELEPHONE (OUTPATIENT)
Dept: PSYCHIATRY | Facility: OTHER | Age: 52
End: 2022-04-06
Payer: MEDICARE

## 2022-04-06 ASSESSMENT — ANXIETY QUESTIONNAIRES: GAD7 TOTAL SCORE: 9

## 2022-04-06 NOTE — TELEPHONE ENCOUNTER
Received a PA from [employer] for Adderall XR 20MG er capsules. Submitted on CMM. Waiting for a response.

## 2022-04-06 NOTE — TELEPHONE ENCOUNTER
Received an APPROVAL from [facility] for Adderall XR 20mg er capsules. Effective 04/06/2022 to 12/31/2022. Forms scanned to [facility].

## 2022-05-06 ENCOUNTER — OFFICE VISIT (OUTPATIENT)
Dept: PSYCHIATRY | Facility: OTHER | Age: 52
End: 2022-05-06
Attending: PSYCHIATRY & NEUROLOGY
Payer: MEDICARE

## 2022-05-06 VITALS
HEART RATE: 79 BPM | WEIGHT: 192 LBS | OXYGEN SATURATION: 98 % | BODY MASS INDEX: 26.04 KG/M2 | SYSTOLIC BLOOD PRESSURE: 118 MMHG | DIASTOLIC BLOOD PRESSURE: 76 MMHG | TEMPERATURE: 97.6 F

## 2022-05-06 DIAGNOSIS — F90.2 ADHD (ATTENTION DEFICIT HYPERACTIVITY DISORDER), COMBINED TYPE: Primary | ICD-10-CM

## 2022-05-06 DIAGNOSIS — F31.9 BIPOLAR I DISORDER (H): ICD-10-CM

## 2022-05-06 PROCEDURE — G0463 HOSPITAL OUTPT CLINIC VISIT: HCPCS

## 2022-05-06 PROCEDURE — 99214 OFFICE O/P EST MOD 30 MIN: CPT | Performed by: PSYCHIATRY & NEUROLOGY

## 2022-05-06 RX ORDER — DEXTROAMPHETAMINE SACCHARATE, AMPHETAMINE ASPARTATE MONOHYDRATE, DEXTROAMPHETAMINE SULFATE AND AMPHETAMINE SULFATE 5; 5; 5; 5 MG/1; MG/1; MG/1; MG/1
20 CAPSULE, EXTENDED RELEASE ORAL DAILY
Qty: 30 CAPSULE | Refills: 0 | Status: SHIPPED | OUTPATIENT
Start: 2022-06-03 | End: 2022-06-30

## 2022-05-06 RX ORDER — CARBAMAZEPINE 200 MG/1
200 TABLET, EXTENDED RELEASE ORAL 2 TIMES DAILY
Qty: 60 TABLET | Refills: 3 | Status: SHIPPED | OUTPATIENT
Start: 2022-05-06 | End: 2022-08-17

## 2022-05-06 RX ORDER — DEXTROAMPHETAMINE SACCHARATE, AMPHETAMINE ASPARTATE MONOHYDRATE, DEXTROAMPHETAMINE SULFATE AND AMPHETAMINE SULFATE 5; 5; 5; 5 MG/1; MG/1; MG/1; MG/1
20 CAPSULE, EXTENDED RELEASE ORAL DAILY
Qty: 30 CAPSULE | Refills: 0 | Status: SHIPPED | OUTPATIENT
Start: 2022-05-06 | End: 2022-08-15

## 2022-05-06 ASSESSMENT — PATIENT HEALTH QUESTIONNAIRE - PHQ9
SUM OF ALL RESPONSES TO PHQ QUESTIONS 1-9: 7
5. POOR APPETITE OR OVEREATING: MORE THAN HALF THE DAYS

## 2022-05-06 ASSESSMENT — ANXIETY QUESTIONNAIRES
IF YOU CHECKED OFF ANY PROBLEMS ON THIS QUESTIONNAIRE, HOW DIFFICULT HAVE THESE PROBLEMS MADE IT FOR YOU TO DO YOUR WORK, TAKE CARE OF THINGS AT HOME, OR GET ALONG WITH OTHER PEOPLE: SOMEWHAT DIFFICULT
1. FEELING NERVOUS, ANXIOUS, OR ON EDGE: SEVERAL DAYS
7. FEELING AFRAID AS IF SOMETHING AWFUL MIGHT HAPPEN: SEVERAL DAYS
6. BECOMING EASILY ANNOYED OR IRRITABLE: SEVERAL DAYS
2. NOT BEING ABLE TO STOP OR CONTROL WORRYING: SEVERAL DAYS
5. BEING SO RESTLESS THAT IT IS HARD TO SIT STILL: SEVERAL DAYS
GAD7 TOTAL SCORE: 8
3. WORRYING TOO MUCH ABOUT DIFFERENT THINGS: SEVERAL DAYS

## 2022-05-06 ASSESSMENT — PAIN SCALES - GENERAL: PAINLEVEL: SEVERE PAIN (7)

## 2022-05-06 NOTE — PROGRESS NOTES
"      PSYCHIATRY CLINIC PROGRESS NOTE     SUBJECTIVE / INTERIM HISTORY                                                                          Last visit 4/5/22: Continue Tegretol  mg twice daily. Start Adderall XR 20 mg daily send to [facility]  - Andrei feels he is doing better with Adderall. Is able to focus more on thing at a time. \"It's been easier these past two weeks.\"  - has the puppy and the mom. his dog had puppies. Had 4 of them.  Kept one of them and \"she's irresistible!\"  - they have a cat too  - pain has been little worse in his neck -   - son back in area. Son working at [employer] and getting more involved. Son thinking maybe stick around more. He was in [place]  - they get out to his parent's place in the country. Paulette likes to have coffee and visit with Andrei's mom  -g[place] up [place] of [place] on [address] [place] creek. For while bought it and he lived there with Brianna and when was with her then in 2010 when  Brianna and moved to [place] with Paulette when they started relationship  - Andrei notes \"I've been diagnosed with bipolar like 6 times.\" Also diagnosed with ADD in past. Notes everytime he ends up having to go back on his ADD meds because \"I can't get anything done\".   -  Finished HCC with [employer] and was planning to go for radiology degree. 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  and notes dated Brianna [facility] of [employer] and raised Ross who passed away form overdose. And Andrei's nephew Andrei murdered.  - Social/Spiritual Support- sister Caroline, dad Carlos, GF Paulette Alfonzo   - Also per discharge summary from April 8/13/20: \"Lithium was tapered and discontinued in march/begining of April. At that time his lamictal was increased. It appears that lithium was tapered due excessive thirst and dry mouth. Elavil was increased for insomnia in February.\"    MEDICAL ROS-  back injury and surgeries since 31 yo. He denies history of overt injury rather been diagnosed with degenerative disc disease.  MEDICAL / " SURGICAL HISTORY                     Patient Active Problem List   Diagnosis     Cervicalgia     Lower back pain     Segmental and somatic dysfunction of lower extremity     GERD (gastroesophageal reflux disease)     Mood disorder (H)     Abnormal weight gain     Attention deficit hyperactivity disorder (ADHD), predominantly inattentive type     ACP (advance care planning)     Flatulence, eructation, and gas pain     Bipolar disease, chronic (H)     Benign essential hypertension     Russell esophagus     Tobacco dependency     Lithium use     DDD (degenerative disc disease), cervical     Cervical stenosis of spinal canal     Obesity (BMI 35.0-39.9) with comorbidity (H)     Chronic lung disease     Suicidal behavior     Dry mouth     Anxiety state     Insomnia, unspecified     Left hip pain     Perianal abscess     ALLERGY   Patient has no known allergies.  MEDICATIONS                                                                                             Current Outpatient Medications   Medication Sig     amphetamine-dextroamphetamine (ADDERALL XR) 20 MG 24 hr capsule Take 1 capsule (20 mg) by mouth daily     aspirin 81 MG tablet Take 1 tablet (81 mg) by mouth daily     carBAMazepine (TEGRETOL XR) 200 MG 12 hr tablet TAKE 1 TABLET (200 MG) BY MOUTH 2 TIMES DAILY     chlorhexidine (PERIDEX) 0.12 % solution SWISH AND SPIT 15 MLS IN MOUTH 2 TIMES DAILY     FLOVENT  MCG/ACT inhaler INHALE 2 PUFFS INTO THE LUNGS 2 TIMES DAILY     gabapentin (NEURONTIN) 300 MG capsule TAKE 2 CAPSULES (600MG) BY MOUTH THREE TIMES DAILY     hydrochlorothiazide (MICROZIDE) 12.5 MG capsule TAKE 1 CAPSULE (12.5 MG) BYMOUTH EVERY MORNING     montelukast (SINGULAIR) 10 MG tablet TAKE 1 TABLET (10 MG) BY MOUTH AT BEDTIME     omeprazole (PRILOSEC) 40 MG DR capsule TAKE 1 CAPSULE (40 MG) BY MOUTH DAILY     pilocarpine (SALAGEN) 5 MG tablet TAKE 1 TABLET (5 MG) BY MOUTH 4 TIMES DAILY     verapamil ER (VERELAN) 240 MG 24 hr capsule TAKE 1  CAPSULE (240 MG) BY MOUTH AT BEDTIME     No current facility-administered medications for this visit.       VITALS   /76 (BP Location: Left arm, Patient Position: Sitting, Cuff Size: Adult Regular)   Pulse 79   Temp 97.6  F (36.4  C) (Tympanic)   Wt 87.1 kg (192 lb)   SpO2 98%   BMI 26.04 kg/m       PHQ9                     [unfilled]  LABS                                                                                                                         Last Comprehensive Metabolic Panel:  Sodium   Date Value Ref Range Status   02/22/2022 137 133 - 144 mmol/L Final   04/08/2021 139 133 - 144 mmol/L Final     Potassium   Date Value Ref Range Status   02/22/2022 3.7 3.4 - 5.3 mmol/L Final   04/08/2021 3.1 (L) 3.4 - 5.3 mmol/L Final     Chloride   Date Value Ref Range Status   02/22/2022 107 94 - 109 mmol/L Final   04/08/2021 105 94 - 109 mmol/L Final     Carbon Dioxide   Date Value Ref Range Status   04/08/2021 29 20 - 32 mmol/L Final     Carbon Dioxide (CO2)   Date Value Ref Range Status   02/22/2022 28 20 - 32 mmol/L Final     Anion Gap   Date Value Ref Range Status   02/22/2022 2 (L) 3 - 14 mmol/L Final   04/08/2021 5 3 - 14 mmol/L Final     Glucose   Date Value Ref Range Status   02/22/2022 104 (H) 70 - 99 mg/dL Final   04/08/2021 96 70 - 99 mg/dL Final     Urea Nitrogen   Date Value Ref Range Status   02/22/2022 7 7 - 30 mg/dL Final   04/08/2021 7 7 - 30 mg/dL Final     Creatinine   Date Value Ref Range Status   02/22/2022 0.78 0.66 - 1.25 mg/dL Final   04/08/2021 0.81 0.66 - 1.25 mg/dL Final     GFR Estimate   Date Value Ref Range Status   02/22/2022 >90 >60 mL/min/1.73m2 Final     Comment:     Effective December 21, 2021 eGFRcr in adults is calculated using the 2021 CKD-EPI creatinine equation which includes age and gender (Jordi hyde al., NEJM, DOI: 10.1056/ZNFGyj7861405)   04/08/2021 >90 >60 mL/min/[1.73_m2] Final     Comment:     Non  GFR Calc  Starting 12/18/2018, serum  creatinine based estimated GFR (eGFR) will be   calculated using the Chronic Kidney Disease Epidemiology Collaboration   (CKD-EPI) equation.       Calcium   Date Value Ref Range Status   02/22/2022 9.5 8.5 - 10.1 mg/dL Final   04/08/2021 9.0 8.5 - 10.1 mg/dL Final     Tegretol 7.4 as of 2/8/21    MENTAL STATUS EXAM                                                                                       Awake, alert. No problems with speech. Mood euthymic and affect congruent to mood and speech content. Thought process, including associations, was unremarkable and thought content was devoid of suicidal and homicidal ideation and psychotic thought. No hallucinations. Insight was good. Judgment was intact and adequate for safety. Fund of knowledge was intact. Pt demonstrates no obvious problems with attention, concentration, language, recent or remote memory although these were not formally tested.     ASSESSMENT                                                                                                      HISTORICAL:  Initial psych note 12/28/20          NOTES:      Andrei Whitman is a 52 yo with bipolar disorder with psychiatric hospitalization August 2020. Was on amitriptyline, Adderall, Wellbutrin : all activating meds although Andrei notes he feels was the Lamictal that precipitated bibiana. April Larry took care of Andrei hospital stay and noted Depakote for him past weight gain, lithium polyuria and thirst, uncertain whether he had been on neuroleptics (I saw Latuda mentioned in [facility] notes). Andrei had sx bibiana along with paranoia and somatic delusions of August 2020. Sx cleared and he is still taking the Tegretol  mg twice daily.   Doing well in terms no sx bibiana or psychosis. IS still having depression, anxiety and he feels sx ADHD of which isn't getting anything done. Struggling in that no license and helps take care of his GF Paulette thus extra difficult in that they have no transportation.    Meds: we  agreed to stick with Tegretol  mg twice daily and we discussed verapamil and Tegretol can interact in that TEgretol can decrease the concentration of verapamil and Verapamil can increase concentration of tegretol. We thus checked Tegretol level and is in normal range. Andrei is generally doing well.     Today Andrei notes improvement with focus, attention and concentration since he started taking Adderall ER 20 mg daily. No SEs thus far and we agreed stick with current dose and formulation and check back in 2 months.       TREATMENT RISK STATEMENT:  The risks, benefits, alternatives and potential adverse effects have been explained and are understood by the pt.  The pt agrees to the treatment plan with the ability to do so.   The pt knows to call the clinic for any problems or access emergency care if needed.        DIAGNOSES                     Bipolar disorder, most recent episode manic (with psychosis), in partial remission  ADHD    PLAN                                                                                                                    1)  MEDICATIONS:         -- Continue Tegretol  mg twice daily, refilled today. Continue Adderall XR 20 mg daily filled script for today 5/6/22 and for 6/3/22  2)  THERAPY:  No Change    3)  LABS:  Tegretol level 2/8/21. Today check CMP, CBC    4)  PT MONITOR [call for probs]:  Worsening symptoms, SI/HI, SEs from meds    5)  REFERRALS [CD, medical, other]:  None    6)  RTC:  2 months

## 2022-05-06 NOTE — NURSING NOTE
Chief Complaint   Patient presents with     RECHECK     ADHD, Bipolar disorder.  Patient reports the Adderall is working well.       Initial /76 (BP Location: Left arm, Patient Position: Sitting, Cuff Size: Adult Regular)   Pulse 79   Temp 97.6  F (36.4  C) (Tympanic)   Wt 87.1 kg (192 lb)   SpO2 98%   BMI 26.04 kg/m   Estimated body mass index is 26.04 kg/m  as calculated from the following:    Height as of 11/11/20: 1.829 m (6').    Weight as of this encounter: 87.1 kg (192 lb).  Medication Reconciliation: complete  DAV REYES LPN

## 2022-05-07 ASSESSMENT — ANXIETY QUESTIONNAIRES: GAD7 TOTAL SCORE: 8

## 2022-05-10 DIAGNOSIS — J98.4 CHRONIC LUNG DISEASE: ICD-10-CM

## 2022-05-10 RX ORDER — FLUTICASONE PROPIONATE 220 UG/1
AEROSOL, METERED RESPIRATORY (INHALATION)
Qty: 12 G | Refills: 0 | Status: SHIPPED | OUTPATIENT
Start: 2022-05-10 | End: 2022-06-22

## 2022-06-07 DIAGNOSIS — F90.2 ADHD (ATTENTION DEFICIT HYPERACTIVITY DISORDER), COMBINED TYPE: ICD-10-CM

## 2022-06-08 RX ORDER — DEXTROAMPHETAMINE SACCHARATE, AMPHETAMINE ASPARTATE MONOHYDRATE, DEXTROAMPHETAMINE SULFATE AND AMPHETAMINE SULFATE 5; 5; 5; 5 MG/1; MG/1; MG/1; MG/1
20 CAPSULE, EXTENDED RELEASE ORAL DAILY
Qty: 30 CAPSULE | Refills: 0 | Status: SHIPPED | OUTPATIENT
Start: 2022-06-08 | End: 2022-06-30

## 2022-06-08 NOTE — TELEPHONE ENCOUNTER
adderall      Last Written Prescription Date:  6/3/22  Last Fill Quantity: 30,   # refills: 0  Last Office Visit: 5/6/22  Future Office visit:    Next 5 appointments (look out 90 days)    Jun 30, 2022  2:00 PM  (Arrive by 1:45 PM)  PHYSICAL with Sayda Paula MD  [facility] [place] ([facility] [place] ) [address]  [place] [address]  [phone]   Jul 06, 2022  8:00 AM  (Arrive by 7:45 AM)  Return Visit with Annette Hammond MD  [facility] [place] ([facility] [place] ) [address]  [place] [id number] [phone]

## 2022-06-16 DIAGNOSIS — K12.0 APHTHOUS ULCER OF MOUTH: ICD-10-CM

## 2022-06-16 DIAGNOSIS — I10 ESSENTIAL HYPERTENSION: ICD-10-CM

## 2022-06-16 DIAGNOSIS — K21.9 GASTROESOPHAGEAL REFLUX DISEASE, UNSPECIFIED WHETHER ESOPHAGITIS PRESENT: ICD-10-CM

## 2022-06-16 DIAGNOSIS — M54.12 CERVICAL RADICULOPATHY: ICD-10-CM

## 2022-06-16 RX ORDER — HYDROCHLOROTHIAZIDE 12.5 MG/1
CAPSULE ORAL
Qty: 90 CAPSULE | Refills: 0 | Status: SHIPPED | OUTPATIENT
Start: 2022-06-16 | End: 2022-09-02

## 2022-06-16 RX ORDER — MONTELUKAST SODIUM 10 MG/1
10 TABLET ORAL AT BEDTIME
Qty: 90 TABLET | Refills: 0 | Status: SHIPPED | OUTPATIENT
Start: 2022-06-16 | End: 2022-09-02

## 2022-06-16 NOTE — TELEPHONE ENCOUNTER
Hydrochlorothiazide      Last Written Prescription Date:  3/22/2022  Last Fill Quantity: 90,   # refills: 0  Last Office Visit: 5/19/2021  Future Office visit:    Next 5 appointments (look out 90 days)    Jun 30, 2022  2:00 PM  (Arrive by 1:45 PM)  PHYSICAL with Sayda Paula MD  [facility] [place] ([facility] [place] ) [address]  [place] [address]  [phone]   Jul 06, 2022  8:00 AM  (Arrive by 7:45 AM)  Return Visit with Annette Hammond MD  [facility] [place] ([facility] [place] ) [address]  [place] [address]  [phone]             Montelukast      Last Written Prescription Date:  3/22/2022  Last Fill Quantity: 90,   # refills: 0  Last Office Visit: 5/19/2021  Future Office visit:    Next 5 appointments (look out 90 days)    Jun 30, 2022  2:00 PM  (Arrive by 1:45 PM)  PHYSICAL with Sayda Paula MD  [facility] [place] ([facility] [place] ) [address]  [place] [address]  [phone]   Jul 06, 2022  8:00 AM  (Arrive by 7:45 AM)  Return Visit with Annette Hammond MD  [facility] [place] ([facility] [place] ) [address]  [place] [address]  [phone]

## 2022-06-20 DIAGNOSIS — J98.4 CHRONIC LUNG DISEASE: ICD-10-CM

## 2022-06-20 RX ORDER — GABAPENTIN 300 MG/1
CAPSULE ORAL
Qty: 180 CAPSULE | Refills: 0 | Status: SHIPPED | OUTPATIENT
Start: 2022-06-20 | End: 2022-07-18

## 2022-06-20 RX ORDER — VERAPAMIL HYDROCHLORIDE 240 MG/1
CAPSULE, EXTENDED RELEASE ORAL
Qty: 90 CAPSULE | Refills: 1 | Status: SHIPPED | OUTPATIENT
Start: 2022-06-20 | End: 2022-11-10

## 2022-06-20 RX ORDER — OMEPRAZOLE 40 MG/1
40 CAPSULE, DELAYED RELEASE ORAL DAILY
Qty: 90 CAPSULE | Refills: 0 | Status: SHIPPED | OUTPATIENT
Start: 2022-06-20 | End: 2022-09-07

## 2022-06-20 NOTE — TELEPHONE ENCOUNTER
gabapentin      Last Written Prescription Date:  5/16/22  Last Fill Quantity: 180,   # refills: 0  Last Office Visit: 5/19/21  Future Office visit:    Next 5 appointments (look out 90 days)    Jun 30, 2022  2:00 PM  (Arrive by 1:45 PM)  PHYSICAL with Sayda Paula MD  [facility] [place] ([facility] [place] ) [address] [facility] [facility]  [place] [address]  [phone]   Jul 06, 2022  8:00 AM  (Arrive by 7:45 AM)  Return Visit with Annette Hammond MD  [facility] [place] ([facility] [place] ) [address] [place]  [place] [address] [phone]           Omeprazole 3/16/22 #90  verapmil 12/8/21 #90 with 1

## 2022-06-22 RX ORDER — FLUTICASONE PROPIONATE 220 UG/1
AEROSOL, METERED RESPIRATORY (INHALATION)
Qty: 12 G | Refills: 0 | Status: SHIPPED | OUTPATIENT
Start: 2022-06-22 | End: 2022-06-30

## 2022-06-29 NOTE — PROGRESS NOTES
"SUBJECTIVE:   Andrei Whitman is a 51 year old male who presents for Preventive Visit.      Patient has been advised of split billing requirements and indicates understanding: Yes  Are you in the first 12 months of your Medicare coverage?  No    Healthy Habits:     In general, how would you rate your overall health?  Fair    Frequency of exercise:  None    Duration of exercise:  Other    Do you usually eat at least 4 servings of fruit and vegetables a day, include whole grains    & fiber and avoid regularly eating high fat or \"junk\" foods?  No    Taking medications regularly:  No    Barriers to taking medications:  None    Medication side effects:  None    Ability to successfully perform activities of daily living:  No assistance needed    Home Safety:  No safety concerns identified    Hearing Impairment:  No hearing concerns    In the past 6 months, have you been bothered by leaking of urine?  No    In general, how would you rate your overall mental or emotional health?  Fair      PHQ-2 Total Score: 0    Additional concerns today:  Yes     Weight down - 220-240 back in 2018/2019; past year down to 172.  Poor dentition - reduced eating.  No dental checks.  Needs to call [employer].  Stopped Lithium.  Start Adderall.  Has 2 dogs now (got in 10/2021) - mom and 1 puppy - past 6 months - lots more walking.  Paulette - significant other - arguments at times.  More stress past 6 months, which is improving now.  No fever, abnormal bleedings, night sweats.    No counseling.  Follows with DR Hammond.  Tegretol, Adderrall.    Do you feel safe in your environment? Yes    Have you ever done Advance Care Planning? (For example, a Health Directive, POLST, or a discussion with a medical provider or your loved ones about your wishes): No, advance care planning information given to patient to review.  Patient declined advance care planning discussion at this time.       Fall risk  Fallen 2 or more times in the past year?: No  Any fall with " injury in the past year?: No    Cognitive Screening   1) Repeat 3 items (Leader, Season, Table)    2) Clock draw: NORMAL  3) 3 item recall: Recalls 3 objects  Results: 3 items recalled: COGNITIVE IMPAIRMENT LESS LIKELY    Mini-CogTM Copyright KAYLEN Glass. Licensed by the author for use in [facility]; reprinted with permission ([email or web address]@[email or web address]). All rights reserved.      Do you have sleep apnea, excessive snoring or daytime drowsiness?: no    Reviewed and updated as needed this visit by clinical staff   Tobacco  Allergies  Meds   Med Hx  Surg Hx  Fam Hx            Reviewed and updated as needed this visit by Provider                   Social History     Tobacco Use     Smoking status: Current Every Day Smoker     Packs/day: 0.50     Years: 30.00     Pack years: 15.00     Start date: 1/1/1989     Smokeless tobacco: Never Used     Tobacco comment: quitplan referral declined 6/19/19 on chantax   Substance Use Topics     Alcohol use: No     Alcohol/week: 0.0 standard drinks     If you drink alcohol do you typically have >3 drinks per day or >7 drinks per week? No    No flowsheet data found.No flowsheet data found.    Colon screen - history of polyps.   History of pilonidal cyst. 2021    Hypertension Follow-up    Do you check your blood pressure regularly outside of the clinic? No     Are you following a low salt diet? No    Are your blood pressures ever more than 140 on the top number (systolic) OR more   than 90 on the bottom number (diastolic), for example 140/90? No    Depression and Anxiety Follow-Up, bipolar - Dr Hammond    How are you doing with your depression since your last visit? Improved     How are you doing with your anxiety since your last visit?  Improved     Are you having other symptoms that might be associated with depression or anxiety? No    Have you had a significant life event? No     Do you have any concerns with your use of alcohol or other drugs? No    Social History     Tobacco Use      Smoking status: Current Every Day Smoker     Packs/day: 0.50     Years: 30.00     Pack years: 15.00     Start date: 1/1/1989     Smokeless tobacco: Never Used     Tobacco comment: quitplan referral declined 6/19/19 on chantax   Substance Use Topics     Alcohol use: No     Alcohol/week: 0.0 standard drinks     Drug use: Yes     Types: Marijuana     PHQ 4/5/2022 5/6/2022 6/30/2022   PHQ-9 Total Score 12 7 0   Q9: Thoughts of better off dead/self-harm past 2 weeks Not at all Not at all Not at all   F/U: Thoughts of suicide or self-harm - - -   F/U: Safety concerns - - -     SONDRA-7 SCORE 4/5/2022 5/6/2022 6/30/2022   Total Score 9 8 3     Last PHQ-9 6/30/2022   1.  Little interest or pleasure in doing things 0   2.  Feeling down, depressed, or hopeless 0   3.  Trouble falling or staying asleep, or sleeping too much 0   4.  Feeling tired or having little energy 0   5.  Poor appetite or overeating 0   6.  Feeling bad about yourself 0   7.  Trouble concentrating 0   8.  Moving slowly or restless 0   Q9: Thoughts of better off dead/self-harm past 2 weeks 0   PHQ-9 Total Score 0   Difficulty at work, home, or with people -   In the past two weeks have you had thoughts of suicide or self harm? -   Do you have concerns about your personal safety or the safety of others? -     SONDRA-7  6/30/2022   1. Feeling nervous, anxious, or on edge 1   2. Not being able to stop or control worrying 0   3. Worrying too much about different things 0   4. Trouble relaxing 1   5. Being so restless that it is hard to sit still 0   6. Becoming easily annoyed or irritable 1   7. Feeling afraid, as if something awful might happen 0   SONDRA-7 Total Score 3   If you checked any problems, how difficult have they made it for you to do your work, take care of things at home, or get along with other people? -       Suicide Assessment Five-step Evaluation and Treatment (SAFE-T)     Pain History:  When did you first notice your pain? - Chronic Pain   Have  you seen this provider for your pain in the past?   Yes   Where in your body do you have pain? Neck and legs  Are you seeing anyone else for your pain? No  Inquiring on medical marijuana.    PHQ-9 SCORE 4/5/2022 5/6/2022 6/30/2022   PHQ-9 Total Score - - -   PHQ-9 Total Score 12 7 0       SONDRA-7 SCORE 4/5/2022 5/6/2022 6/30/2022   Total Score 9 8 3         PEG Score 6/30/2022   PEG Total Score 9.67       PDMP Review       Value Time User    State PDMP site checked  Yes 6/30/2022  2:15 PM Sayda Paula MD        Last CSA Agreement:   CSA -- Patient Level:    CSA: None found at the patient level.       Last UDS: 8/12/2020     Prior [facility] Spine consults.  MRI 2018.  Prior [facility].    Current providers sharing in care for this patient include:   Patient Care Team:  Sayda Paula MD as PCP - General (Family Medicine)  Sayda Paula MD as Assigned PCP  Annette Hammond MD as Assigned Behavioral Health Provider  Roland Beal MD as Assigned Surgical Provider    The following health maintenance items are reviewed in Epic and correct as of today:  Health Maintenance Due   Topic Date Due     HEPATITIS C SCREENING  Never done     Pneumococcal Vaccine: Pediatrics (0 to 5 Years) and At-Risk Patients (6 to 64 Years) (2 - PCV) 02/13/2021     COVID-19 Vaccine (3 - Booster for Moderna series) 11/23/2021     LIPID  12/15/2021     COLORECTAL CANCER SCREENING  07/12/2022     Current Outpatient Medications   Medication     amphetamine-dextroamphetamine (ADDERALL XR) 20 MG 24 hr capsule     aspirin 81 MG tablet     carBAMazepine (TEGRETOL XR) 200 MG 12 hr tablet     chlorhexidine (PERIDEX) 0.12 % solution     FLOVENT  MCG/ACT inhaler     gabapentin (NEURONTIN) 300 MG capsule     hydrochlorothiazide (MICROZIDE) 12.5 MG capsule     montelukast (SINGULAIR) 10 MG tablet     omeprazole (PRILOSEC) 40 MG DR capsule     pilocarpine (SALAGEN) 5 MG tablet     verapamil ER (VERELAN) 240 MG 24 hr capsule     No current  "facility-administered medications for this visit.       Labs reviewed in EPIC          Review of Systems  Constitutional, HEENT, cardiovascular, pulmonary, gi and gu systems are negative, except as otherwise noted.    OBJECTIVE:   /74 (BP Location: Right arm, Patient Position: Sitting, Cuff Size: Adult Regular)   Pulse 97   Temp 98  F (36.7  C) (Tympanic)   Ht 1.803 m (5' 11\")   Wt 78 kg (172 lb)   SpO2 98%   BMI 23.99 kg/m   Estimated body mass index is 23.99 kg/m  as calculated from the following:    Height as of this encounter: 1.803 m (5' 11\").    Weight as of this encounter: 78 kg (172 lb).  Physical Exam  GENERAL: alert, no distress and unhealthy  EYES: Eyes grossly normal to inspection, PERRL and conjunctivae and sclerae normal  HENT: ear canals and TM's normal, nose and mouth without ulcers or lesions  HENT: poor dentition  NECK: no adenopathy, no asymmetry, masses, or scars and thyroid normal to palpation  RESP: lungs clear to auscultation - no rales, rhonchi or wheezes  CV: regular rate and rhythm, normal S1 S2, no S3 or S4, no murmur, click or rub, no peripheral edema and peripheral pulses strong  ABDOMEN: soft, nontender, no hepatosplenomegaly, no masses and bowel sounds normal   (male): normal male genitalia without lesions or urethral discharge, no hernia  MS: no gross musculoskeletal defects noted, no edema  SKIN: no suspicious lesions or rashes  NEURO: Normal strength and tone, mentation intact and speech normal  PSYCH: mentation appears normal and anxious    Diagnostic Test Results:  Labs reviewed in [facility]  Lab pending    ASSESSMENT / PLAN:       ICD-10-CM    1. Routine general medical examination at a health care facility  Z00.00    2. Tobacco use disorder  F17.200    3. Encounter for tobacco use cessation counseling  Z71.6    4. Obesity (BMI 35.0-39.9) with comorbidity (H)  E66.01    5. Gastroesophageal reflux disease, unspecified whether esophagitis present  K21.9 Adult General Surg " "Referral   6. Russell's esophagus without dysplasia  K22.70 Adult General Surg Referral   7. Benign essential hypertension  I10    8. Bipolar disease, chronic (H)  F31.9    9. Chronic lung disease  J98.4 fluticasone (FLOVENT HFA) 220 MCG/ACT inhaler   10. Weight loss, unintentional  R63.4 Adult General Surg Referral     CBC with platelets and differential     Comprehensive metabolic panel (BMP + Alb, Alk Phos, ALT, AST, Total. Bili, TP)     TSH with free T4 reflex     Hepatitis C Screen Reflex to HCV RNA Quant and Genotype     HIV Antigen Antibody Combo     Prealbumin   11. History of colonic polyps  Z86.010 Adult General Surg Referral   12. Lipid screening  Z13.220 Lipid Profile (Chol, Trig, HDL, LDL calc)   13. Attention deficit hyperactivity disorder (ADHD), predominantly inattentive type  F90.0 Drug Confirmation Panel Urine with Creat   14. Other long term (current) drug therapy   Z79.899 Drug Confirmation Panel Urine with Creat       Follow up on Dental - call [facility].  Schedule follow up with Dr Hammond.    Medical marijuana assessment - [facility].  [phone].  Call to schedule.  Local evaluation Jacob Moeller.    Will call with lab results.    Follow up 1 month.    Patient has been advised of split billing requirements and indicates understanding: Yes    COUNSELING:  Reviewed preventive health counseling, as reflected in patient instructions       Regular exercise       Healthy diet/nutrition       Vision screening       Hearing screening       Dental care       Hepatitis C screening       HIV screening for high risk patient       Colon cancer screening    Estimated body mass index is 23.99 kg/m  as calculated from the following:    Height as of this encounter: 1.803 m (5' 11\").    Weight as of this encounter: 78 kg (172 lb).        He reports that he has been smoking. He started smoking about 33 years ago. He has a 15.00 pack-year smoking history. He has never used smokeless tobacco.  Tobacco " Cessation Action Plan:   Information offered: Patient not interested at this time      Appropriate preventive services were discussed with this patient, including applicable screening as appropriate for cardiovascular disease, diabetes, osteopenia/osteoporosis, and glaucoma.  As appropriate for age/gender, discussed screening for colorectal cancer, prostate cancer, breast cancer, and cervical cancer. Checklist reviewing preventive services available has been given to the patient.    Reviewed patients plan of care and provided an AVS. The Basic Care Plan (routine screening as documented in Health Maintenance) for Andrei meets the Care Plan requirement. This Care Plan has been established and reviewed with the Patient.    Counseling Resources:  ATP IV Guidelines  Pooled Cohorts Equation Calculator  Breast Cancer Risk Calculator  Breast Cancer: Medication to Reduce Risk  FRAX Risk Assessment  ICSI Preventive Guidelines  Dietary Guidelines for Americans, 2010  USDA's MyPlate  ASA Prophylaxis  Lung CA Screening    Sayda Solorzano MD  [facility]    Identified Health Risks:

## 2022-06-29 NOTE — PATIENT INSTRUCTIONS
Follow up on Dental - call [facility].  Schedule follow up with Dr Hammond.    Medical marijuana assessment - [facility].  [phone].  Call to schedule.  Local evaluation Jacob Moeller.    Will call with lab results.    Follow up 1 month.    Preventive Health Recommendations  Male Ages 40 to 49    Yearly exam:             See your health care provider every year in order to  o   Review health changes.   o   Discuss preventive care.    o   Review your medicines if your doctor has prescribed any.  You should be tested each year for STDs (sexually transmitted diseases) if you re at risk.   Have a cholesterol test every 5 years.   Have a colonoscopy (test for colon cancer) if someone in your family has had colon cancer or polyps before age 50.   After age 45, have a diabetes test (fasting glucose). If you are at risk for diabetes, you should have this test every 3 years.    Talk with your health care provider about whether or not a prostate cancer screening test (PSA) is right for you.    Shots: Get a flu shot each year. Get a tetanus shot every 10 years.     Nutrition:  Eat at least 5 servings of fruits and vegetables daily.   Eat whole-grain bread, whole-wheat pasta and brown rice instead of white grains and rice.   Get adequate Calcium and Vitamin D.     Lifestyle  Exercise for at least 150 minutes a week (30 minutes a day, 5 days a week). This will help you control your weight and prevent disease.   Limit alcohol to one drink per day.   No smoking.   Wear sunscreen to prevent skin cancer.   See your dentist every six months for an exam and cleaning.

## 2022-06-30 ENCOUNTER — OFFICE VISIT (OUTPATIENT)
Dept: FAMILY MEDICINE | Facility: OTHER | Age: 52
End: 2022-06-30
Attending: FAMILY MEDICINE
Payer: MEDICARE

## 2022-06-30 VITALS
BODY MASS INDEX: 24.08 KG/M2 | WEIGHT: 172 LBS | HEART RATE: 97 BPM | HEIGHT: 71 IN | OXYGEN SATURATION: 98 % | DIASTOLIC BLOOD PRESSURE: 74 MMHG | SYSTOLIC BLOOD PRESSURE: 120 MMHG | TEMPERATURE: 98 F

## 2022-06-30 DIAGNOSIS — Z13.220 LIPID SCREENING: ICD-10-CM

## 2022-06-30 DIAGNOSIS — Z86.0100 HISTORY OF COLONIC POLYPS: ICD-10-CM

## 2022-06-30 DIAGNOSIS — K22.70 BARRETT'S ESOPHAGUS WITHOUT DYSPLASIA: ICD-10-CM

## 2022-06-30 DIAGNOSIS — R63.4 WEIGHT LOSS, UNINTENTIONAL: ICD-10-CM

## 2022-06-30 DIAGNOSIS — K21.9 GASTROESOPHAGEAL REFLUX DISEASE, UNSPECIFIED WHETHER ESOPHAGITIS PRESENT: ICD-10-CM

## 2022-06-30 DIAGNOSIS — E66.01 MORBID OBESITY (H): ICD-10-CM

## 2022-06-30 DIAGNOSIS — F17.200 TOBACCO USE DISORDER: ICD-10-CM

## 2022-06-30 DIAGNOSIS — F90.0 ATTENTION DEFICIT HYPERACTIVITY DISORDER (ADHD), PREDOMINANTLY INATTENTIVE TYPE: ICD-10-CM

## 2022-06-30 DIAGNOSIS — Z79.899 OTHER LONG TERM (CURRENT) DRUG THERAPY: ICD-10-CM

## 2022-06-30 DIAGNOSIS — Z00.00 ROUTINE GENERAL MEDICAL EXAMINATION AT A HEALTH CARE FACILITY: Primary | ICD-10-CM

## 2022-06-30 DIAGNOSIS — F31.9 BIPOLAR DISEASE, CHRONIC (H): ICD-10-CM

## 2022-06-30 DIAGNOSIS — J98.4 CHRONIC LUNG DISEASE: ICD-10-CM

## 2022-06-30 DIAGNOSIS — Z71.6 ENCOUNTER FOR TOBACCO USE CESSATION COUNSELING: ICD-10-CM

## 2022-06-30 DIAGNOSIS — I10 BENIGN ESSENTIAL HYPERTENSION: ICD-10-CM

## 2022-06-30 LAB
ALBUMIN SERPL-MCNC: 4.1 G/DL (ref 3.4–5)
ALP SERPL-CCNC: 114 U/L (ref 40–150)
ALT SERPL W P-5'-P-CCNC: 13 U/L (ref 0–70)
ANION GAP SERPL CALCULATED.3IONS-SCNC: 5 MMOL/L (ref 3–14)
AST SERPL W P-5'-P-CCNC: 15 U/L (ref 0–45)
BASOPHILS # BLD AUTO: 0.1 10E3/UL (ref 0–0.2)
BASOPHILS NFR BLD AUTO: 1 %
BILIRUB SERPL-MCNC: 0.5 MG/DL (ref 0.2–1.3)
BUN SERPL-MCNC: 7 MG/DL (ref 7–30)
CALCIUM SERPL-MCNC: 9.1 MG/DL (ref 8.5–10.1)
CHLORIDE BLD-SCNC: 104 MMOL/L (ref 94–109)
CHOLEST SERPL-MCNC: 155 MG/DL
CO2 SERPL-SCNC: 31 MMOL/L (ref 20–32)
CREAT SERPL-MCNC: 0.78 MG/DL (ref 0.66–1.25)
CREAT UR-MCNC: 157 MG/DL
EOSINOPHIL # BLD AUTO: 0.1 10E3/UL (ref 0–0.7)
EOSINOPHIL NFR BLD AUTO: 1 %
ERYTHROCYTE [DISTWIDTH] IN BLOOD BY AUTOMATED COUNT: 13.2 % (ref 10–15)
FASTING STATUS PATIENT QL REPORTED: YES
GFR SERPL CREATININE-BSD FRML MDRD: >90 ML/MIN/1.73M2
GLUCOSE BLD-MCNC: 87 MG/DL (ref 70–99)
HCT VFR BLD AUTO: 45.3 % (ref 40–53)
HDLC SERPL-MCNC: 42 MG/DL
HGB BLD-MCNC: 15.1 G/DL (ref 13.3–17.7)
IMM GRANULOCYTES # BLD: 0 10E3/UL
IMM GRANULOCYTES NFR BLD: 0 %
LDLC SERPL CALC-MCNC: 86 MG/DL
LYMPHOCYTES # BLD AUTO: 2.3 10E3/UL (ref 0.8–5.3)
LYMPHOCYTES NFR BLD AUTO: 30 %
MCH RBC QN AUTO: 30.7 PG (ref 26.5–33)
MCHC RBC AUTO-ENTMCNC: 33.3 G/DL (ref 31.5–36.5)
MCV RBC AUTO: 92 FL (ref 78–100)
MONOCYTES # BLD AUTO: 0.4 10E3/UL (ref 0–1.3)
MONOCYTES NFR BLD AUTO: 6 %
NEUTROPHILS # BLD AUTO: 4.6 10E3/UL (ref 1.6–8.3)
NEUTROPHILS NFR BLD AUTO: 62 %
NONHDLC SERPL-MCNC: 113 MG/DL
NRBC # BLD AUTO: 0 10E3/UL
NRBC BLD AUTO-RTO: 0 /100
PLATELET # BLD AUTO: 216 10E3/UL (ref 150–450)
POTASSIUM BLD-SCNC: 3.5 MMOL/L (ref 3.4–5.3)
PREALB SERPL IA-MCNC: 26 MG/DL (ref 15–45)
PROT SERPL-MCNC: 8 G/DL (ref 6.8–8.8)
RBC # BLD AUTO: 4.92 10E6/UL (ref 4.4–5.9)
SODIUM SERPL-SCNC: 140 MMOL/L (ref 133–144)
TRIGL SERPL-MCNC: 136 MG/DL
TSH SERPL DL<=0.005 MIU/L-ACNC: 0.94 MU/L (ref 0.4–4)
WBC # BLD AUTO: 7.4 10E3/UL (ref 4–11)

## 2022-06-30 PROCEDURE — 80307 DRUG TEST PRSMV CHEM ANLYZR: CPT | Mod: ZL | Performed by: FAMILY MEDICINE

## 2022-06-30 PROCEDURE — G0438 PPPS, INITIAL VISIT: HCPCS | Performed by: FAMILY MEDICINE

## 2022-06-30 PROCEDURE — 87389 HIV-1 AG W/HIV-1&-2 AB AG IA: CPT | Mod: ZL | Performed by: FAMILY MEDICINE

## 2022-06-30 PROCEDURE — 80053 COMPREHEN METABOLIC PANEL: CPT | Mod: ZL | Performed by: FAMILY MEDICINE

## 2022-06-30 PROCEDURE — 86803 HEPATITIS C AB TEST: CPT | Mod: ZL | Performed by: FAMILY MEDICINE

## 2022-06-30 PROCEDURE — 84443 ASSAY THYROID STIM HORMONE: CPT | Mod: ZL | Performed by: FAMILY MEDICINE

## 2022-06-30 PROCEDURE — 85025 COMPLETE CBC W/AUTO DIFF WBC: CPT | Mod: ZL | Performed by: FAMILY MEDICINE

## 2022-06-30 PROCEDURE — 36415 COLL VENOUS BLD VENIPUNCTURE: CPT | Mod: ZL | Performed by: FAMILY MEDICINE

## 2022-06-30 PROCEDURE — 84134 ASSAY OF PREALBUMIN: CPT | Mod: ZL | Performed by: FAMILY MEDICINE

## 2022-06-30 PROCEDURE — 80061 LIPID PANEL: CPT | Mod: ZL | Performed by: FAMILY MEDICINE

## 2022-06-30 RX ORDER — FLUTICASONE PROPIONATE 220 UG/1
AEROSOL, METERED RESPIRATORY (INHALATION)
Qty: 12 G | Refills: 0 | Status: SHIPPED | OUTPATIENT
Start: 2022-06-30 | End: 2022-09-22

## 2022-06-30 ASSESSMENT — ANXIETY QUESTIONNAIRES
4. TROUBLE RELAXING: SEVERAL DAYS
GAD7 TOTAL SCORE: 3
2. NOT BEING ABLE TO STOP OR CONTROL WORRYING: NOT AT ALL
GAD7 TOTAL SCORE: 3
1. FEELING NERVOUS, ANXIOUS, OR ON EDGE: SEVERAL DAYS
3. WORRYING TOO MUCH ABOUT DIFFERENT THINGS: NOT AT ALL
5. BEING SO RESTLESS THAT IT IS HARD TO SIT STILL: NOT AT ALL
6. BECOMING EASILY ANNOYED OR IRRITABLE: SEVERAL DAYS
7. FEELING AFRAID AS IF SOMETHING AWFUL MIGHT HAPPEN: NOT AT ALL

## 2022-06-30 ASSESSMENT — ACTIVITIES OF DAILY LIVING (ADL): CURRENT_FUNCTION: NO ASSISTANCE NEEDED

## 2022-06-30 ASSESSMENT — PAIN SCALES - GENERAL: PAINLEVEL: MODERATE PAIN (4)

## 2022-06-30 ASSESSMENT — PATIENT HEALTH QUESTIONNAIRE - PHQ9: SUM OF ALL RESPONSES TO PHQ QUESTIONS 1-9: 0

## 2022-06-30 NOTE — NURSING NOTE
"Chief Complaint   Patient presents with     Physical       Initial /74 (BP Location: Right arm, Patient Position: Sitting, Cuff Size: Adult Regular)   Pulse 97   Temp 98  F (36.7  C) (Tympanic)   Ht 1.803 m (5' 11\")   Wt 78 kg (172 lb)   SpO2 98%   BMI 23.99 kg/m   Estimated body mass index is 23.99 kg/m  as calculated from the following:    Height as of this encounter: 1.803 m (5' 11\").    Weight as of this encounter: 78 kg (172 lb).  Medication Reconciliation: complete  Holly Alfredo LPN  "

## 2022-07-04 LAB
HCV AB SERPL QL IA: NONREACTIVE
HIV 1+2 AB+HIV1 P24 AG SERPL QL IA: NONREACTIVE

## 2022-07-06 LAB
AMPHET UR CFM-MCNC: 2040 NG/ML
AMPHET/CREAT UR: 1299 NG/MG {CREAT}
GABAPENTIN UR QL CFM: PRESENT

## 2022-07-18 DIAGNOSIS — M54.12 CERVICAL RADICULOPATHY: ICD-10-CM

## 2022-07-18 RX ORDER — GABAPENTIN 300 MG/1
CAPSULE ORAL
Qty: 180 CAPSULE | Refills: 0 | Status: SHIPPED | OUTPATIENT
Start: 2022-07-18 | End: 2022-08-04

## 2022-07-18 NOTE — TELEPHONE ENCOUNTER
Gabapentin      Last Written Prescription Date:  6.20.22  Last Fill Quantity: #180,   # refills: 0  Last Office Visit: 6.30.22  Future Office visit:    Next 5 appointments (look out 90 days)    Aug 15, 2022  3:30 PM  (Arrive by 3:15 PM)  [facility] [address]  [phone]   Oct 03, 2022  8:20 AM  (Arrive by 8:05 AM)  Return Visit with Annette Hammond MD  [facility] [address] [place] [id number] [phone]           Routing refill request to provider for review/approval because:  Drug not on the [facility], [facility] or [facility] refill protocol or controlled substance

## 2022-08-01 ENCOUNTER — HOSPITAL ENCOUNTER (EMERGENCY)
Facility: HOSPITAL | Age: 52
Discharge: HOME | End: 2022-08-01
Attending: INTERNAL MEDICINE | Admitting: INTERNAL MEDICINE
Payer: MEDICARE

## 2022-08-01 ENCOUNTER — APPOINTMENT (OUTPATIENT)
Dept: GENERAL RADIOLOGY | Facility: HOSPITAL | Age: 52
End: 2022-08-01
Attending: INTERNAL MEDICINE
Payer: MEDICARE

## 2022-08-01 VITALS
HEART RATE: 86 BPM | RESPIRATION RATE: 18 BRPM | TEMPERATURE: 98.1 F | SYSTOLIC BLOOD PRESSURE: 154 MMHG | OXYGEN SATURATION: 95 % | BODY MASS INDEX: 24.14 KG/M2 | DIASTOLIC BLOOD PRESSURE: 91 MMHG | WEIGHT: 173.1 LBS

## 2022-08-01 DIAGNOSIS — F22 PARANOID IDEATION (H): ICD-10-CM

## 2022-08-01 DIAGNOSIS — M16.0 PRIMARY OSTEOARTHRITIS OF BOTH HIPS: ICD-10-CM

## 2022-08-01 LAB
ALBUMIN SERPL-MCNC: 3.9 G/DL (ref 3.4–5)
ALP SERPL-CCNC: 104 U/L (ref 40–150)
ALT SERPL W P-5'-P-CCNC: 18 U/L (ref 0–70)
ANION GAP SERPL CALCULATED.3IONS-SCNC: 9 MMOL/L (ref 3–14)
AST SERPL W P-5'-P-CCNC: 21 U/L (ref 0–45)
BASOPHILS # BLD AUTO: 0.1 10E3/UL (ref 0–0.2)
BASOPHILS NFR BLD AUTO: 1 %
BILIRUB SERPL-MCNC: 0.3 MG/DL (ref 0.2–1.3)
BUN SERPL-MCNC: 8 MG/DL (ref 7–30)
CALCIUM SERPL-MCNC: 8.9 MG/DL (ref 8.5–10.1)
CHLORIDE BLD-SCNC: 104 MMOL/L (ref 94–109)
CO2 SERPL-SCNC: 26 MMOL/L (ref 20–32)
CREAT SERPL-MCNC: 0.81 MG/DL (ref 0.66–1.25)
EOSINOPHIL # BLD AUTO: 0.2 10E3/UL (ref 0–0.7)
EOSINOPHIL NFR BLD AUTO: 2 %
ERYTHROCYTE [DISTWIDTH] IN BLOOD BY AUTOMATED COUNT: 13.1 % (ref 10–15)
ETHANOL SERPL-MCNC: <0.01 G/DL
GFR SERPL CREATININE-BSD FRML MDRD: >90 ML/MIN/1.73M2
GLUCOSE BLD-MCNC: 129 MG/DL (ref 70–99)
HCT VFR BLD AUTO: 40.3 % (ref 40–53)
HGB BLD-MCNC: 13.8 G/DL (ref 13.3–17.7)
IMM GRANULOCYTES # BLD: 0 10E3/UL
IMM GRANULOCYTES NFR BLD: 0 %
LITHIUM SERPL-SCNC: <0.2 MMOL/L
LYMPHOCYTES # BLD AUTO: 2.5 10E3/UL (ref 0.8–5.3)
LYMPHOCYTES NFR BLD AUTO: 29 %
MCH RBC QN AUTO: 31.3 PG (ref 26.5–33)
MCHC RBC AUTO-ENTMCNC: 34.2 G/DL (ref 31.5–36.5)
MCV RBC AUTO: 91 FL (ref 78–100)
MONOCYTES # BLD AUTO: 0.7 10E3/UL (ref 0–1.3)
MONOCYTES NFR BLD AUTO: 8 %
NEUTROPHILS # BLD AUTO: 5.2 10E3/UL (ref 1.6–8.3)
NEUTROPHILS NFR BLD AUTO: 60 %
NRBC # BLD AUTO: 0 10E3/UL
NRBC BLD AUTO-RTO: 0 /100
PLATELET # BLD AUTO: 196 10E3/UL (ref 150–450)
POTASSIUM BLD-SCNC: 3.1 MMOL/L (ref 3.4–5.3)
PROT SERPL-MCNC: 7.5 G/DL (ref 6.8–8.8)
RBC # BLD AUTO: 4.41 10E6/UL (ref 4.4–5.9)
SODIUM SERPL-SCNC: 139 MMOL/L (ref 133–144)
WBC # BLD AUTO: 8.6 10E3/UL (ref 4–11)

## 2022-08-01 PROCEDURE — 73502 X-RAY EXAM HIP UNI 2-3 VIEWS: CPT

## 2022-08-01 PROCEDURE — 99284 EMERGENCY DEPT VISIT MOD MDM: CPT | Performed by: INTERNAL MEDICINE

## 2022-08-01 PROCEDURE — 73562 X-RAY EXAM OF KNEE 3: CPT | Mod: LT

## 2022-08-01 PROCEDURE — 80178 ASSAY OF LITHIUM: CPT | Performed by: INTERNAL MEDICINE

## 2022-08-01 PROCEDURE — 85025 COMPLETE CBC W/AUTO DIFF WBC: CPT | Performed by: INTERNAL MEDICINE

## 2022-08-01 PROCEDURE — 36415 COLL VENOUS BLD VENIPUNCTURE: CPT | Performed by: INTERNAL MEDICINE

## 2022-08-01 PROCEDURE — 99285 EMERGENCY DEPT VISIT HI MDM: CPT

## 2022-08-01 PROCEDURE — 82077 ASSAY SPEC XCP UR&BREATH IA: CPT | Performed by: INTERNAL MEDICINE

## 2022-08-01 PROCEDURE — 80053 COMPREHEN METABOLIC PANEL: CPT | Performed by: INTERNAL MEDICINE

## 2022-08-01 ASSESSMENT — ENCOUNTER SYMPTOMS
CONFUSION: 0
COUGH: 0
ABDOMINAL PAIN: 0
FLANK PAIN: 0
HIP PAIN: 1
BACK PAIN: 0
PALPITATIONS: 0
BLOOD IN STOOL: 0
FEVER: 0
LIGHT-HEADEDNESS: 0
DYSURIA: 0
NECK PAIN: 0
WEAKNESS: 0
ABDOMINAL DISTENTION: 0
DIZZINESS: 0
COLOR CHANGE: 0
SLEEP DISTURBANCE: 0
VOICE CHANGE: 0
DIAPHORESIS: 0
ANAL BLEEDING: 0
HEADACHES: 0
NUMBNESS: 0
SHORTNESS OF BREATH: 0
FREQUENCY: 0
WHEEZING: 0
VOMITING: 0
CHILLS: 0
CHEST TIGHTNESS: 0
NAUSEA: 0
MYALGIAS: 0
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
## 2022-08-01 NOTE — ED NOTES
Plan of care discussed with patient. Patient declines any questions/concerns. Patient verbalized understanding with plan of care. Patient will follow up with Dr. Hammond. Patient reports feeling safe. Patient ambulated out of the emergency department with a steady and balanced gait. Patient declined a discharge set of vitals. Patient was standing in room waiting to leave.

## 2022-08-01 NOTE — ED NOTES
DEC interview completed, patient will be discharged to resume psychiatric care with Dr. Hammond, as there is no detectable threat to self/others at this time. Dr. Grant is in agreement.    Discharge will be uploaded to AVS shortly, as well as a PURA faxed to ED for patient to sign before discharge for Dr. Hammond.
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
## 2022-08-01 NOTE — ED NOTES
Patient laying in bed and watching tv. Patient declines any needs. Patient will continue to be monitored. He was offered a blanket or lights dimmed and patient declined at this time.
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
## 2022-08-04 DIAGNOSIS — M54.12 CERVICAL RADICULOPATHY: ICD-10-CM

## 2022-08-04 RX ORDER — GABAPENTIN 300 MG/1
CAPSULE ORAL
Qty: 180 CAPSULE | Refills: 0 | Status: SHIPPED | OUTPATIENT
Start: 2022-08-04 | End: 2022-09-19

## 2022-08-04 NOTE — TELEPHONE ENCOUNTER
buPROPion (WELLBUTRIN) 100 MG      Last Written Prescription Date:  09/06/2017  Last Fill Quantity: 120,   # refills: 5  Last Office Visit: 01/26/2018  Future Office visit:    Next 5 appointments (look out 90 days)     Feb 16, 2018 10:00 AM CST   (Arrive by 9:40 AM)   SHORT with Tommy Lamb DO   [facility] ([facility] )    [address] [place] [address]   [phone]                   Routing refill request to provider for review/approval because:      
ADMIT

## 2022-08-04 NOTE — TELEPHONE ENCOUNTER
Gabapentin  Last Written Prescription Date: 7/18/22  Last Fill Quantity: 180 # of Refills: 0  Last Office Visit: 6/30/22
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
## 2022-08-15 ENCOUNTER — OFFICE VISIT (OUTPATIENT)
Dept: FAMILY MEDICINE | Facility: OTHER | Age: 52
End: 2022-08-15
Attending: FAMILY MEDICINE
Payer: MEDICARE

## 2022-08-15 VITALS
DIASTOLIC BLOOD PRESSURE: 76 MMHG | HEIGHT: 71 IN | OXYGEN SATURATION: 97 % | HEART RATE: 86 BPM | BODY MASS INDEX: 24.08 KG/M2 | SYSTOLIC BLOOD PRESSURE: 128 MMHG | TEMPERATURE: 98.8 F | WEIGHT: 172 LBS

## 2022-08-15 DIAGNOSIS — R63.4 WEIGHT LOSS: ICD-10-CM

## 2022-08-15 DIAGNOSIS — M25.552 HIP PAIN, LEFT: ICD-10-CM

## 2022-08-15 DIAGNOSIS — F31.9 BIPOLAR I DISORDER (H): ICD-10-CM

## 2022-08-15 DIAGNOSIS — K11.7 CLINICAL XEROSTOMIA: ICD-10-CM

## 2022-08-15 DIAGNOSIS — F90.2 ADHD (ATTENTION DEFICIT HYPERACTIVITY DISORDER), COMBINED TYPE: Primary | ICD-10-CM

## 2022-08-15 PROCEDURE — 99213 OFFICE O/P EST LOW 20 MIN: CPT | Performed by: FAMILY MEDICINE

## 2022-08-15 PROCEDURE — G0463 HOSPITAL OUTPT CLINIC VISIT: HCPCS

## 2022-08-15 RX ORDER — DEXTROAMPHETAMINE SACCHARATE, AMPHETAMINE ASPARTATE MONOHYDRATE, DEXTROAMPHETAMINE SULFATE AND AMPHETAMINE SULFATE 5; 5; 5; 5 MG/1; MG/1; MG/1; MG/1
20 CAPSULE, EXTENDED RELEASE ORAL DAILY
Qty: 30 CAPSULE | Refills: 0 | Status: SHIPPED | OUTPATIENT
Start: 2022-08-15 | End: 2022-09-14

## 2022-08-15 NOTE — NURSING NOTE
"Chief Complaint   Patient presents with     Weight Check       Initial /76 (BP Location: Left arm, Patient Position: Sitting, Cuff Size: Adult Regular)   Pulse 86   Temp 98.8  F (37.1  C) (Tympanic)   Ht 1.803 m (5' 11\")   Wt 78 kg (172 lb)   SpO2 97%   BMI 23.99 kg/m   Estimated body mass index is 23.99 kg/m  as calculated from the following:    Height as of this encounter: 1.803 m (5' 11\").    Weight as of this encounter: 78 kg (172 lb).  Medication Reconciliation: complete  Holly Alfredo LPN  "

## 2022-08-17 RX ORDER — CARBAMAZEPINE 200 MG/1
TABLET, EXTENDED RELEASE ORAL
Qty: 60 TABLET | Refills: 3 | Status: SHIPPED | OUTPATIENT
Start: 2022-08-17 | End: 2022-11-07

## 2022-08-17 RX ORDER — PILOCARPINE HYDROCHLORIDE 5 MG/1
5 TABLET, FILM COATED ORAL 4 TIMES DAILY
Qty: 360 TABLET | Refills: 0 | Status: SHIPPED | OUTPATIENT
Start: 2022-08-17 | End: 2022-12-28

## 2022-08-17 NOTE — TELEPHONE ENCOUNTER
tegretol      Last Written Prescription Date:  5/6/22  Last Fill Quantity: 60,   # refills: 3  Last Office Visit: 5/6/22  Future Office visit:    Next 5 appointments (look out 90 days)    Oct 03, 2022  8:20 AM  (Arrive by 8:05 AM)  Return Visit with Annette Hammond MD  [facility] ([facility] [place] [facility] ) [address] [place] [id number] [phone]

## 2022-08-17 NOTE — TELEPHONE ENCOUNTER
salagen      Last Written Prescription Date:  3/22/22  Last Fill Quantity: 360,   # refills: 0  Last Office Visit: 8/15/22  Future Office visit:    Next 5 appointments (look out 90 days)    Oct 03, 2022  8:20 AM  (Arrive by 8:05 AM)  Return Visit with Annette Hammond MD  [facility] ([facility] ) [address] [place] [id number] [phone]

## 2022-09-02 DIAGNOSIS — I10 ESSENTIAL HYPERTENSION: ICD-10-CM

## 2022-09-02 DIAGNOSIS — K12.0 APHTHOUS ULCER OF MOUTH: ICD-10-CM

## 2022-09-02 RX ORDER — MONTELUKAST SODIUM 10 MG/1
10 TABLET ORAL AT BEDTIME
Qty: 90 TABLET | Refills: 0 | Status: SHIPPED | OUTPATIENT
Start: 2022-09-02 | End: 2022-09-08

## 2022-09-02 RX ORDER — HYDROCHLOROTHIAZIDE 12.5 MG/1
CAPSULE ORAL
Qty: 90 CAPSULE | Refills: 0 | Status: SHIPPED | OUTPATIENT
Start: 2022-09-02 | End: 2022-11-10

## 2022-09-02 NOTE — TELEPHONE ENCOUNTER
HCTZ      Last Written Prescription Date:  6/16/22  Last Fill Quantity: 90,   # refills: 0  Last Office Visit: 8/15/22  Future Office visit:    Next 5 appointments (look out 90 days)    Oct 03, 2022  8:20 AM  (Arrive by 8:05 AM)  Return Visit with Annette Hammond MD  [facility] ) [address] [place] [id number]  [phone]           Routing refill request to provider for review/approval because:      Singulair      Last Written Prescription Date:  6/16/22  Last Fill Quantity: 90,   # refills: 0  Last Office Visit: 8/15/22  Future Office visit:    Next 5 appointments (look out 90 days)    Oct 03, 2022  8:20 AM  (Arrive by 8:05 AM)  Return Visit with Annette Hammond MD  [facility] ([facility] ) [address] [place], [place] [address] [address]  [phone]           Routing refill request to provider for review/approval because:

## 2022-09-03 DIAGNOSIS — K21.9 GASTROESOPHAGEAL REFLUX DISEASE, UNSPECIFIED WHETHER ESOPHAGITIS PRESENT: ICD-10-CM

## 2022-09-07 RX ORDER — OMEPRAZOLE 40 MG/1
40 CAPSULE, DELAYED RELEASE ORAL DAILY
Qty: 90 CAPSULE | Refills: 1 | Status: SHIPPED | OUTPATIENT
Start: 2022-09-07 | End: 2023-02-13

## 2022-09-08 DIAGNOSIS — K12.0 APHTHOUS ULCER OF MOUTH: ICD-10-CM

## 2022-09-08 RX ORDER — MONTELUKAST SODIUM 10 MG/1
10 TABLET ORAL AT BEDTIME
Qty: 90 TABLET | Refills: 0 | Status: SHIPPED | OUTPATIENT
Start: 2022-09-08 | End: 2023-02-02

## 2022-09-14 DIAGNOSIS — F90.2 ADHD (ATTENTION DEFICIT HYPERACTIVITY DISORDER), COMBINED TYPE: ICD-10-CM

## 2022-09-14 RX ORDER — DEXTROAMPHETAMINE SACCHARATE, AMPHETAMINE ASPARTATE MONOHYDRATE, DEXTROAMPHETAMINE SULFATE AND AMPHETAMINE SULFATE 5; 5; 5; 5 MG/1; MG/1; MG/1; MG/1
20 CAPSULE, EXTENDED RELEASE ORAL DAILY
Qty: 30 CAPSULE | Refills: 0 | Status: SHIPPED | OUTPATIENT
Start: 2022-09-14 | End: 2022-10-21

## 2022-09-14 NOTE — TELEPHONE ENCOUNTER
ADDERALL      Last Written Prescription Date:  8/15/22  Last Fill Quantity: 30,   # refills: 0  Last Office Visit: 8/15/22  Future Office visit:    Next 5 appointments (look out 90 days)    Oct 03, 2022  8:20 AM  (Arrive by 8:05 AM)  Return Visit with Annette Hammond MD  [facility] ([facility] ) [address] [place] [id number] [phone]           Routing refill request to provider for review/approval because:

## 2022-09-19 DIAGNOSIS — M54.12 CERVICAL RADICULOPATHY: ICD-10-CM

## 2022-09-19 RX ORDER — GABAPENTIN 300 MG/1
CAPSULE ORAL
Qty: 180 CAPSULE | Refills: 0 | Status: SHIPPED | OUTPATIENT
Start: 2022-09-19 | End: 2022-10-11

## 2022-09-19 NOTE — TELEPHONE ENCOUNTER
Gabapentin      Last Written Prescription Date:  8/4/22  Last Fill Quantity: 180,   # refills: 0  Last Office Visit: 8/15/22  Future Office visit:    Next 5 appointments (look out 90 days)    Oct 03, 2022  8:20 AM  (Arrive by 8:05 AM)  Return Visit with Annette Hammond MD  [facility] ([facility] ) [address] [place] [phone]           Routing refill request to provider for review/approval because:

## 2022-09-22 DIAGNOSIS — J98.4 CHRONIC LUNG DISEASE: ICD-10-CM

## 2022-09-22 RX ORDER — FLUTICASONE PROPIONATE 220 UG/1
AEROSOL, METERED RESPIRATORY (INHALATION)
Qty: 12 G | Refills: 4 | Status: SHIPPED | OUTPATIENT
Start: 2022-09-22 | End: 2023-05-09

## 2022-10-11 DIAGNOSIS — M54.12 CERVICAL RADICULOPATHY: ICD-10-CM

## 2022-10-11 RX ORDER — GABAPENTIN 300 MG/1
CAPSULE ORAL
Qty: 180 CAPSULE | Refills: 0 | Status: SHIPPED | OUTPATIENT
Start: 2022-10-11 | End: 2022-11-11

## 2022-10-11 NOTE — TELEPHONE ENCOUNTER
gabapentin      Last Written Prescription Date:  9/19/2022  Last Fill Quantity: 180,   # refills: 0  Last Office Visit: 8/15/22  Future Office visit:    Next 5 appointments (look out 90 days)    Nov 07, 2022 11:40 AM  (Arrive by 11:25 AM)  Return Visit with Annette Hammond MD  [facility] ([facility] ) [address] [place] [phone]           Routing refill request to provider for review/approval because:

## 2022-10-21 DIAGNOSIS — F90.2 ADHD (ATTENTION DEFICIT HYPERACTIVITY DISORDER), COMBINED TYPE: ICD-10-CM

## 2022-10-21 RX ORDER — DEXTROAMPHETAMINE SACCHARATE, AMPHETAMINE ASPARTATE MONOHYDRATE, DEXTROAMPHETAMINE SULFATE AND AMPHETAMINE SULFATE 5; 5; 5; 5 MG/1; MG/1; MG/1; MG/1
20 CAPSULE, EXTENDED RELEASE ORAL DAILY
Qty: 30 CAPSULE | Refills: 0 | Status: SHIPPED | OUTPATIENT
Start: 2022-10-21 | End: 2022-11-07 | Stop reason: DRUGHIGH

## 2022-10-21 NOTE — TELEPHONE ENCOUNTER
adderall      Last Written Prescription Date:  9/14/22  Last Fill Quantity: 30,   # refills: 0  Last Office Visit: 8/15/22  Future Office visit:    Next 5 appointments (look out 90 days)    Nov 07, 2022 11:40 AM  (Arrive by 11:25 AM)  Return Visit with Annette Hammond MD  [facility] [place] ([facility] [place] ) [address] [place] [address]  [place] [address] [phone]   Nov 10, 2022 11:15 AM  (Arrive by 11:00 AM)  SHORT with Sayda Paula MD  [facility] [place] ([facility] [place] ) [facility] [place]  [place] [address]  [phone]

## 2022-11-07 ENCOUNTER — OFFICE VISIT (OUTPATIENT)
Dept: PSYCHIATRY | Facility: OTHER | Age: 52
End: 2022-11-07
Attending: PSYCHIATRY & NEUROLOGY
Payer: MEDICARE

## 2022-11-07 VITALS
OXYGEN SATURATION: 99 % | SYSTOLIC BLOOD PRESSURE: 120 MMHG | HEART RATE: 77 BPM | BODY MASS INDEX: 23.99 KG/M2 | TEMPERATURE: 97.2 F | DIASTOLIC BLOOD PRESSURE: 74 MMHG | WEIGHT: 172 LBS

## 2022-11-07 DIAGNOSIS — F31.9 BIPOLAR I DISORDER (H): ICD-10-CM

## 2022-11-07 DIAGNOSIS — F90.0 ADHD (ATTENTION DEFICIT HYPERACTIVITY DISORDER), INATTENTIVE TYPE: Primary | ICD-10-CM

## 2022-11-07 PROCEDURE — 99214 OFFICE O/P EST MOD 30 MIN: CPT | Performed by: PSYCHIATRY & NEUROLOGY

## 2022-11-07 PROCEDURE — G0463 HOSPITAL OUTPT CLINIC VISIT: HCPCS

## 2022-11-07 RX ORDER — CARBAMAZEPINE 200 MG/1
200 TABLET, EXTENDED RELEASE ORAL 2 TIMES DAILY
Qty: 60 TABLET | Refills: 3 | Status: SHIPPED | OUTPATIENT
Start: 2022-11-07 | End: 2023-01-09

## 2022-11-07 RX ORDER — DEXTROAMPHETAMINE SACCHARATE, AMPHETAMINE ASPARTATE MONOHYDRATE, DEXTROAMPHETAMINE SULFATE AND AMPHETAMINE SULFATE 7.5; 7.5; 7.5; 7.5 MG/1; MG/1; MG/1; MG/1
30 CAPSULE, EXTENDED RELEASE ORAL DAILY
Qty: 30 CAPSULE | Refills: 0 | Status: SHIPPED | OUTPATIENT
Start: 2022-11-07 | End: 2022-12-07

## 2022-11-07 RX ORDER — DEXTROAMPHETAMINE SACCHARATE, AMPHETAMINE ASPARTATE MONOHYDRATE, DEXTROAMPHETAMINE SULFATE AND AMPHETAMINE SULFATE 7.5; 7.5; 7.5; 7.5 MG/1; MG/1; MG/1; MG/1
30 CAPSULE, EXTENDED RELEASE ORAL DAILY
Qty: 30 CAPSULE | Refills: 0 | Status: SHIPPED | OUTPATIENT
Start: 2022-12-05 | End: 2023-01-04

## 2022-11-07 ASSESSMENT — ANXIETY QUESTIONNAIRES
6. BECOMING EASILY ANNOYED OR IRRITABLE: MORE THAN HALF THE DAYS
7. FEELING AFRAID AS IF SOMETHING AWFUL MIGHT HAPPEN: NOT AT ALL
3. WORRYING TOO MUCH ABOUT DIFFERENT THINGS: SEVERAL DAYS
IF YOU CHECKED OFF ANY PROBLEMS ON THIS QUESTIONNAIRE, HOW DIFFICULT HAVE THESE PROBLEMS MADE IT FOR YOU TO DO YOUR WORK, TAKE CARE OF THINGS AT HOME, OR GET ALONG WITH OTHER PEOPLE: SOMEWHAT DIFFICULT
GAD7 TOTAL SCORE: 8
1. FEELING NERVOUS, ANXIOUS, OR ON EDGE: SEVERAL DAYS
2. NOT BEING ABLE TO STOP OR CONTROL WORRYING: SEVERAL DAYS
5. BEING SO RESTLESS THAT IT IS HARD TO SIT STILL: SEVERAL DAYS
GAD7 TOTAL SCORE: 8

## 2022-11-07 ASSESSMENT — PATIENT HEALTH QUESTIONNAIRE - PHQ9
5. POOR APPETITE OR OVEREATING: MORE THAN HALF THE DAYS
SUM OF ALL RESPONSES TO PHQ QUESTIONS 1-9: 9

## 2022-11-07 ASSESSMENT — PAIN SCALES - GENERAL: PAINLEVEL: MILD PAIN (2)

## 2022-11-07 NOTE — PROGRESS NOTES
"      PSYCHIATRY CLINIC PROGRESS NOTE     SUBJECTIVE / INTERIM HISTORY                                                                          Last visit 5/6/22: Continue Tegretol  mg twice daily, refilled today. Continue Adderall XR 20 mg daily filled script for today 5/6/22 and for 6/3/22  - Andrei took a fall shortly after we last saw each other. Already has issues and hurt his left knee and his lower back. He didn't go in -> waited it out.   - dogs are doing well. Andrei has the mom and the duaghter.   - they have a cat too  - feeling may need to increase Adderall to 30 mg. Afternoon Andrei notices starting to have issues with attention / concentration and focus. Procrastination starting to happen again and \"starting 3 or 4 things and then don't finish it\"  - son back in area. Son working at [employer]. Son is going to buy [employer]  - they get out to his parent's place in the country. Paulette likes to have coffee and visit with Andrei's mom  -[place]. For while bought it and he lived there with Brianna and when was with her then in 2010 when  Brianna and moved to [place] with Paulette when they started relationship  - Andrei notes \"I've been diagnosed with bipolar like 6 times.\" Also diagnosed with ADD in past. Notes everytime he ends up having to go back on his ADD meds because \"I can't get anything done\".   -  Finished [employer] with [employer] and was planning to go for radiology degree. 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  and notes dated Brianna Martinez of [employer] and raised Ross who passed away form overdose. And Andrei's nephew Andrei murdered.  - Social/Spiritual Support- sister Caroline, dad Carlos, GF Paulette Mejía   - Also per discharge summary from April 8/13/20: \"Lithium was tapered and discontinued in march/begining of April. At that time his lamictal was increased. It appears that lithium was tapered due excessive thirst and dry mouth. Elavil was increased for insomnia in " "February.\"    MEDICAL ROS-  back injury and surgeries since 31 yo. He denies history of overt injury rather been diagnosed with degenerative disc disease.  MEDICAL / SURGICAL HISTORY                     Patient Active Problem List   Diagnosis     Cervicalgia     Lower back pain     Segmental and somatic dysfunction of lower extremity     GERD (gastroesophageal reflux disease)     Mood disorder (H)     Abnormal weight gain     Attention deficit hyperactivity disorder (ADHD), predominantly inattentive type     ACP (advance care planning)     Flatulence, eructation, and gas pain     Bipolar disease, chronic (H)     Benign essential hypertension     Russell esophagus     Tobacco dependency     Lithium use     DDD (degenerative disc disease), cervical     Cervical stenosis of spinal canal     Obesity (BMI 35.0-39.9) with comorbidity (H)     Chronic lung disease     Suicidal behavior     Dry mouth     Anxiety state     Insomnia, unspecified     Left hip pain     Perianal abscess     ALLERGY   Patient has no known allergies.  MEDICATIONS                                                                                             Current Outpatient Medications   Medication Sig     amphetamine-dextroamphetamine (ADDERALL XR) 20 MG 24 hr capsule Take 1 capsule (20 mg) by mouth daily     aspirin 81 MG tablet Take 1 tablet (81 mg) by mouth daily     carBAMazepine (TEGRETOL XR) 200 MG 12 hr tablet TAKE 1 TABLET BY MOUTH TWICE DAILY     chlorhexidine (PERIDEX) 0.12 % solution SWISH AND SPIT 15 MLS IN MOUTH 2 TIMES DAILY     fluticasone (FLOVENT HFA) 220 MCG/ACT inhaler INHALE 2 PUFFS INTO THE LUNGS TWICE DAILY     gabapentin (NEURONTIN) 300 MG capsule TAKE 2 CAPSULES (600 MG) BYMOUTH THREE TIMES DAILY     hydrochlorothiazide (MICROZIDE) 12.5 MG capsule TAKE 1 CASPULE (12.5 MG) BYMOUTH EVERY MORNING     montelukast (SINGULAIR) 10 MG tablet TAKE 1 TABLET (10 MG) BY MOUTH AT BEDTIME     omeprazole (PRILOSEC) 40 MG DR capsule TAKE 1 " CAPSULE (40 MG) BY MOUTH DAILY     pilocarpine (SALAGEN) 5 MG tablet TAKE 1 TABLET (5 MG) BY MOUTH 4 TIMES DAILY     verapamil ER (VERELAN) 240 MG 24 hr capsule TAKE 1 CAPSULE (240 MG) BY MOUTH AT BEDTIME     No current facility-administered medications for this visit.       VITALS   /74 (BP Location: Left arm, Patient Position: Sitting, Cuff Size: Adult Regular)   Pulse 77   Temp 97.2  F (36.2  C) (Tympanic)   Wt 78 kg (172 lb)   SpO2 99%   BMI 23.99 kg/m       PHQ9                     [unfilled]  LABS                                                                                                                         Last Comprehensive Metabolic Panel:  Sodium   Date Value Ref Range Status   08/01/2022 139 133 - 144 mmol/L Final   04/08/2021 139 133 - 144 mmol/L Final     Potassium   Date Value Ref Range Status   08/01/2022 3.1 (L) 3.4 - 5.3 mmol/L Final   04/08/2021 3.1 (L) 3.4 - 5.3 mmol/L Final     Chloride   Date Value Ref Range Status   08/01/2022 104 94 - 109 mmol/L Final   04/08/2021 105 94 - 109 mmol/L Final     Carbon Dioxide   Date Value Ref Range Status   04/08/2021 29 20 - 32 mmol/L Final     Carbon Dioxide (CO2)   Date Value Ref Range Status   08/01/2022 26 20 - 32 mmol/L Final     Anion Gap   Date Value Ref Range Status   08/01/2022 9 3 - 14 mmol/L Final   04/08/2021 5 3 - 14 mmol/L Final     Glucose   Date Value Ref Range Status   08/01/2022 129 (H) 70 - 99 mg/dL Final   04/08/2021 96 70 - 99 mg/dL Final     Urea Nitrogen   Date Value Ref Range Status   08/01/2022 8 7 - 30 mg/dL Final   04/08/2021 7 7 - 30 mg/dL Final     Creatinine   Date Value Ref Range Status   08/01/2022 0.81 0.66 - 1.25 mg/dL Final   04/08/2021 0.81 0.66 - 1.25 mg/dL Final     GFR Estimate   Date Value Ref Range Status   08/01/2022 >90 >60 mL/min/1.73m2 Final     Comment:     Effective December 21, 2021 eGFRcr in adults is calculated using the 2021 CKD-EPI creatinine equation which includes age and gender  (Jordi hyde al., [facility], DOI: 10.1056/IIMDou7260542)   04/08/2021 >90 >60 mL/min/[1.73_m2] Final     Comment:     Non  GFR Calc  Starting 12/18/2018, serum creatinine based estimated GFR (eGFR) will be   calculated using the Chronic Kidney Disease Epidemiology Collaboration   (CKD-EPI) equation.       Calcium   Date Value Ref Range Status   08/01/2022 8.9 8.5 - 10.1 mg/dL Final   04/08/2021 9.0 8.5 - 10.1 mg/dL Final     Tegretol 7.4 as of 2/8/21    MENTAL STATUS EXAM                                                                                       Awake, alert. No problems with speech. Mood euthymic and affect congruent to mood and speech content. Thought process, including associations, was unremarkable and thought content was devoid of suicidal and homicidal ideation and psychotic thought. No hallucinations. Insight was good. Judgment was intact and adequate for safety. Fund of knowledge was intact. Pt demonstrates no obvious problems with attention, concentration, language, recent or remote memory although these were not formally tested.     ASSESSMENT                                                                                                      HISTORICAL:  Initial psych note 12/28/20          NOTES:      Andrei Whitman is a 50 yo with bipolar disorder with psychiatric hospitalization August 2020. Was on amitriptyline, Adderall, Wellbutrin : all activating meds although Andrei notes he feels was the Lamictal that precipitated bibiana. April Larry took care of Andrei hospital stay and noted Depakote for him past weight gain, lithium polyuria and thirst, uncertain whether he had been on neuroleptics (I saw Latuda mentioned in [facility] notes). Andrei had sx bibiana along with paranoia and somatic delusions of August 2020. Sx cleared and he is still taking the Tegretol  mg twice daily.   Doing well in terms no sx bibiana or psychosis. IS still having depression, anxiety and he feels sx ADHD of which  isn't getting anything done. Struggling in that no license and helps take care of his GF Paulette thus extra difficult in that they have no transportation.    Meds: we agreed to stick with Tegretol  mg twice daily and we discussed verapamil and Tegretol can interact in that TEgretol can decrease the concentration of verapamil and Verapamil can increase concentration of tegretol. We thus checked Tegretol level and is in normal range. Andrei is generally doing well aside from feeling Adderall we need to increase dose a bit.       TREATMENT RISK STATEMENT:  The risks, benefits, alternatives and potential adverse effects have been explained and are understood by the pt.  The pt agrees to the treatment plan with the ability to do so.   The pt knows to call the clinic for any problems or access emergency care if needed.        DIAGNOSES                     Bipolar disorder, most recent episode manic (with psychosis), in partial remission  ADHD    PLAN                                                                                                                    1)  MEDICATIONS:         -- Continue Tegretol  mg twice daily, refilled today. Increase Adderall XR 20 mg daily to Adderall 30 XR mg daily and filled 11/7/22 and 12/5/22  2)  THERAPY:  No Change    3)  LABS:  Tegretol level 2/8/21. Today check CMP, CBC    4)  PT MONITOR [call for probs]:  Worsening symptoms, SI/HI, SEs from meds    5)  REFERRALS [CD, medical, other]:  None    6)  RTC:  2 months

## 2022-11-07 NOTE — PROGRESS NOTES
Assessment & Plan     Essential hypertension  Stable.  Continue regimen.  Update BMP.  - Basic metabolic panel; Future  - hydrochlorothiazide (MICROZIDE) 12.5 MG capsule; TAKE 1 CASPULE (12.5 MG) BYMOUTH EVERY MORNING  - verapamil ER (VERELAN) 240 MG 24 hr capsule; Take 1 capsule (240 mg) by mouth At Bedtime  - Basic metabolic panel    Tobacco use disorder  Cessation advised.    Encounter for tobacco use cessation counseling  As above.    Hypokalemia  Lab today.  - Basic metabolic panel; Future  - Basic metabolic panel    Pain of right hand  Prior boxer's fracture.  Suspect arthritis.  xrays updated.  Referral to OA.  Also - pinky - appears to have middle phalanx fracture - unknown duration.    Finger splint advised.  - Orthopedic  Referral; Future  - XR Hand Right G/E 3 Views; Future    Chronic left-sided low back pain without sciatica  Significant degenerative changes noted on exam - more than expected for age.  Await radiology results.  - XR Lumbar Spine 2/3 Views; Future       Patient Instructions   Will call with lab results.  Referral to orthopedics.      No follow-ups on file.    Sayda Solorzano MD  [facility] - ADALBERTO Forbes is a 51 year old, presenting for the following health issues:  Hypertension and right hand pain      HPI     Pneumonia - decline  COVID - decline  Flu - decline  Shingrix -decline    Colon screen - decline    Weight stabilized - since June.    Mental health - Dr Hammond - 1/2023 follow up scheduled.  Saw yesterday as well.    Hypertension Follow-up - hydrochlorothiazide 12.5 mg, verapamil 240 mg    Do you check your blood pressure regularly outside of the clinic? Yes     Are you following a low salt diet? No    Are your blood pressures ever more than 140 on the top number (systolic) OR more   than 90 on the bottom number (diastolic), for example 140/90? No   Taking medications regularly.  No side effects.      Pain History:  When did you first  "notice your pain? - Chronic Pain   Have you seen this provider for your pain in the past?   No   Where in your body do your have pain? Right hand,broke it 25 years ago  Are you seeing anyone else for your pain? No  What makes your pain better? none  What makes your pain worse? everything  How has pain affected your ability to work? Not currently working - unrelated to pain  Who lives in your household? Lives by himself    Right hand - 25 years ago - fracture - boxer's fracture - hit wall.  Deformed ever since.  Now pain 4th finger - DIP, PIP joints.  Stiff 4th finger.    Fell of beatriz while dog was chasing chicken.  Hit left knee on tree root.  \"I shattered my knee bad.\"  \"It took 2 months to heal.\"  Prior Xray 8/1/22 - seen in ER for this - xray negative.    Left paralumbar spine.  Injured at same time - same incident.          PHQ-9 SCORE 5/6/2022 6/30/2022 11/7/2022   PHQ-9 Total Score - - -   PHQ-9 Total Score 7 0 9       SONDRA-7 SCORE 5/6/2022 6/30/2022 11/7/2022   Total Score 8 3 8         Review of Systems   Constitutional, HEENT, cardiovascular, pulmonary, gi and gu systems are negative, except as otherwise noted.      Objective    /62 (BP Location: Right arm, Patient Position: Sitting, Cuff Size: Adult Regular)   Pulse 94   Temp 97.6  F (36.4  C) (Tympanic)   Ht 1.803 m (5' 11\")   Wt 78 kg (172 lb)   SpO2 98%   BMI 23.99 kg/m    Body mass index is 23.99 kg/m .  Physical Exam   GENERAL: alert and no distress  HENT: oropharynx clear, oral mucous membranes moist and poor dentition - multiple caries and cracked teeth  NECK: cervical adenopathy left anterior  RESP: lungs clear to auscultation - no rales, rhonchi or wheezes  CV: regular rate and rhythm, normal S1 S2, no S3 or S4, no murmur, click or rub, no peripheral edema and peripheral pulses strong  MS: no edema; normal gait  Left knee - full AROM, non-tender, stable, no effusion  Right hand - deformity of 2nd MCP - prominent; normal AROM " otherwise  Back - non-tender spine; tender left para spinal lumbar; able to toe/heel raise; negative SLR bilaterally  SKIN: no suspicious lesions or rashes  NEURO: Normal strength and tone, mentation intact and speech normal  PSYCH: mentation appears normal and anxious    Lab and xray pending.

## 2022-11-07 NOTE — NURSING NOTE
"Chief Complaint   Patient presents with     RECHECK     Bipolar disorder, ADHD.       Initial /74 (BP Location: Left arm, Patient Position: Sitting, Cuff Size: Adult Regular)   Pulse 77   Temp 97.2  F (36.2  C) (Tympanic)   Wt 78 kg (172 lb)   SpO2 99%   BMI 23.99 kg/m   Estimated body mass index is 23.99 kg/m  as calculated from the following:    Height as of 8/15/22: 1.803 m (5' 11\").    Weight as of this encounter: 78 kg (172 lb).  Medication Reconciliation: complete  DAV REYES LPN    "

## 2022-11-10 ENCOUNTER — ANCILLARY PROCEDURE (OUTPATIENT)
Dept: GENERAL RADIOLOGY | Facility: OTHER | Age: 52
End: 2022-11-10
Attending: FAMILY MEDICINE
Payer: MEDICARE

## 2022-11-10 ENCOUNTER — OFFICE VISIT (OUTPATIENT)
Dept: FAMILY MEDICINE | Facility: OTHER | Age: 52
End: 2022-11-10
Attending: FAMILY MEDICINE
Payer: MEDICARE

## 2022-11-10 VITALS
HEART RATE: 94 BPM | TEMPERATURE: 97.6 F | SYSTOLIC BLOOD PRESSURE: 118 MMHG | OXYGEN SATURATION: 98 % | HEIGHT: 71 IN | DIASTOLIC BLOOD PRESSURE: 62 MMHG | BODY MASS INDEX: 24.08 KG/M2 | WEIGHT: 172 LBS

## 2022-11-10 DIAGNOSIS — M54.12 CERVICAL RADICULOPATHY: ICD-10-CM

## 2022-11-10 DIAGNOSIS — G89.29 CHRONIC LEFT-SIDED LOW BACK PAIN WITHOUT SCIATICA: ICD-10-CM

## 2022-11-10 DIAGNOSIS — I10 ESSENTIAL HYPERTENSION: Primary | ICD-10-CM

## 2022-11-10 DIAGNOSIS — M54.50 CHRONIC LEFT-SIDED LOW BACK PAIN WITHOUT SCIATICA: ICD-10-CM

## 2022-11-10 DIAGNOSIS — Z71.6 ENCOUNTER FOR TOBACCO USE CESSATION COUNSELING: ICD-10-CM

## 2022-11-10 DIAGNOSIS — M79.641 PAIN OF RIGHT HAND: ICD-10-CM

## 2022-11-10 DIAGNOSIS — E87.6 HYPOKALEMIA: ICD-10-CM

## 2022-11-10 DIAGNOSIS — F17.200 TOBACCO USE DISORDER: ICD-10-CM

## 2022-11-10 LAB
ANION GAP SERPL CALCULATED.3IONS-SCNC: 8 MMOL/L (ref 7–15)
BUN SERPL-MCNC: 11 MG/DL (ref 6–20)
CALCIUM SERPL-MCNC: 9.1 MG/DL (ref 8.6–10)
CHLORIDE SERPL-SCNC: 100 MMOL/L (ref 98–107)
CREAT SERPL-MCNC: 0.85 MG/DL (ref 0.67–1.17)
DEPRECATED HCO3 PLAS-SCNC: 29 MMOL/L (ref 22–29)
GFR SERPL CREATININE-BSD FRML MDRD: >90 ML/MIN/1.73M2
GLUCOSE SERPL-MCNC: 95 MG/DL (ref 70–99)
POTASSIUM SERPL-SCNC: 3.7 MMOL/L (ref 3.4–5.3)
SODIUM SERPL-SCNC: 137 MMOL/L (ref 136–145)

## 2022-11-10 PROCEDURE — 73130 X-RAY EXAM OF HAND: CPT | Mod: TC,RT

## 2022-11-10 PROCEDURE — 36415 COLL VENOUS BLD VENIPUNCTURE: CPT | Mod: ZL | Performed by: FAMILY MEDICINE

## 2022-11-10 PROCEDURE — 99214 OFFICE O/P EST MOD 30 MIN: CPT | Performed by: FAMILY MEDICINE

## 2022-11-10 PROCEDURE — 80048 BASIC METABOLIC PNL TOTAL CA: CPT | Mod: ZL | Performed by: FAMILY MEDICINE

## 2022-11-10 PROCEDURE — G0463 HOSPITAL OUTPT CLINIC VISIT: HCPCS

## 2022-11-10 PROCEDURE — 72100 X-RAY EXAM L-S SPINE 2/3 VWS: CPT | Mod: TC

## 2022-11-10 RX ORDER — VERAPAMIL HYDROCHLORIDE 240 MG/1
240 CAPSULE, EXTENDED RELEASE ORAL AT BEDTIME
Qty: 90 CAPSULE | Refills: 1 | Status: SHIPPED | OUTPATIENT
Start: 2022-11-10 | End: 2023-06-13

## 2022-11-10 RX ORDER — HYDROCHLOROTHIAZIDE 12.5 MG/1
CAPSULE ORAL
Qty: 90 CAPSULE | Refills: 1 | Status: SHIPPED | OUTPATIENT
Start: 2022-11-10 | End: 2023-05-31

## 2022-11-10 ASSESSMENT — PAIN SCALES - GENERAL: PAINLEVEL: EXTREME PAIN (9)

## 2022-11-10 NOTE — NURSING NOTE
"Chief Complaint   Patient presents with     Hypertension     right hand pain       Initial /62 (BP Location: Right arm, Patient Position: Sitting, Cuff Size: Adult Regular)   Pulse 94   Temp 97.6  F (36.4  C) (Tympanic)   Ht 1.803 m (5' 11\")   Wt 78 kg (172 lb)   SpO2 98%   BMI 23.99 kg/m   Estimated body mass index is 23.99 kg/m  as calculated from the following:    Height as of this encounter: 1.803 m (5' 11\").    Weight as of this encounter: 78 kg (172 lb).  Medication Reconciliation: complete  Holly Alfredo LPN  "

## 2022-11-11 RX ORDER — GABAPENTIN 300 MG/1
CAPSULE ORAL
Qty: 180 CAPSULE | Refills: 0 | Status: SHIPPED | OUTPATIENT
Start: 2022-11-11 | End: 2022-12-12

## 2022-11-14 ENCOUNTER — TELEPHONE (OUTPATIENT)
Dept: FAMILY MEDICINE | Facility: OTHER | Age: 52
End: 2022-11-14

## 2022-11-21 ENCOUNTER — HOSPITAL ENCOUNTER (EMERGENCY)
Facility: HOSPITAL | Age: 52
Discharge: HOME | End: 2022-11-21
Attending: EMERGENCY MEDICINE | Admitting: EMERGENCY MEDICINE
Payer: MEDICARE

## 2022-11-21 ENCOUNTER — APPOINTMENT (OUTPATIENT)
Dept: CT IMAGING | Facility: HOSPITAL | Age: 52
End: 2022-11-21
Attending: EMERGENCY MEDICINE
Payer: MEDICARE

## 2022-11-21 VITALS
RESPIRATION RATE: 16 BRPM | OXYGEN SATURATION: 98 % | TEMPERATURE: 96.6 F | DIASTOLIC BLOOD PRESSURE: 98 MMHG | SYSTOLIC BLOOD PRESSURE: 160 MMHG | HEART RATE: 82 BPM

## 2022-11-21 DIAGNOSIS — K11.8 MASS OF LEFT PAROTID GLAND: ICD-10-CM

## 2022-11-21 LAB
ANION GAP SERPL CALCULATED.3IONS-SCNC: 10 MMOL/L (ref 7–15)
BASOPHILS # BLD AUTO: 0.1 10E3/UL (ref 0–0.2)
BASOPHILS NFR BLD AUTO: 0 %
BUN SERPL-MCNC: 3.7 MG/DL (ref 6–20)
CALCIUM SERPL-MCNC: 9.4 MG/DL (ref 8.6–10)
CHLORIDE SERPL-SCNC: 99 MMOL/L (ref 98–107)
CREAT SERPL-MCNC: 0.62 MG/DL (ref 0.67–1.17)
DEPRECATED HCO3 PLAS-SCNC: 27 MMOL/L (ref 22–29)
EOSINOPHIL # BLD AUTO: 0.2 10E3/UL (ref 0–0.7)
EOSINOPHIL NFR BLD AUTO: 1 %
ERYTHROCYTE [DISTWIDTH] IN BLOOD BY AUTOMATED COUNT: 13 % (ref 10–15)
GFR SERPL CREATININE-BSD FRML MDRD: >90 ML/MIN/1.73M2
GLUCOSE SERPL-MCNC: 107 MG/DL (ref 70–99)
HCT VFR BLD AUTO: 39.6 % (ref 40–53)
HGB BLD-MCNC: 13.4 G/DL (ref 13.3–17.7)
HOLD SPECIMEN: NORMAL
IMM GRANULOCYTES # BLD: 0 10E3/UL
IMM GRANULOCYTES NFR BLD: 0 %
LYMPHOCYTES # BLD AUTO: 1.8 10E3/UL (ref 0.8–5.3)
LYMPHOCYTES NFR BLD AUTO: 14 %
MCH RBC QN AUTO: 31.2 PG (ref 26.5–33)
MCHC RBC AUTO-ENTMCNC: 33.8 G/DL (ref 31.5–36.5)
MCV RBC AUTO: 92 FL (ref 78–100)
MONOCYTES # BLD AUTO: 0.9 10E3/UL (ref 0–1.3)
MONOCYTES NFR BLD AUTO: 7 %
NEUTROPHILS # BLD AUTO: 9.6 10E3/UL (ref 1.6–8.3)
NEUTROPHILS NFR BLD AUTO: 78 %
NRBC # BLD AUTO: 0 10E3/UL
NRBC BLD AUTO-RTO: 0 /100
PLATELET # BLD AUTO: 252 10E3/UL (ref 150–450)
POTASSIUM SERPL-SCNC: 3.2 MMOL/L (ref 3.4–5.3)
RBC # BLD AUTO: 4.3 10E6/UL (ref 4.4–5.9)
SODIUM SERPL-SCNC: 136 MMOL/L (ref 136–145)
WBC # BLD AUTO: 12.5 10E3/UL (ref 4–11)

## 2022-11-21 PROCEDURE — 99283 EMERGENCY DEPT VISIT LOW MDM: CPT | Performed by: EMERGENCY MEDICINE

## 2022-11-21 PROCEDURE — 36415 COLL VENOUS BLD VENIPUNCTURE: CPT | Performed by: EMERGENCY MEDICINE

## 2022-11-21 PROCEDURE — 250N000011 HC RX IP 250 OP 636: Performed by: EMERGENCY MEDICINE

## 2022-11-21 PROCEDURE — 70491 CT SOFT TISSUE NECK W/DYE: CPT | Mod: MA

## 2022-11-21 PROCEDURE — 99285 EMERGENCY DEPT VISIT HI MDM: CPT | Mod: 25 | Performed by: EMERGENCY MEDICINE

## 2022-11-21 PROCEDURE — 80048 BASIC METABOLIC PNL TOTAL CA: CPT | Performed by: EMERGENCY MEDICINE

## 2022-11-21 PROCEDURE — 85004 AUTOMATED DIFF WBC COUNT: CPT | Performed by: EMERGENCY MEDICINE

## 2022-11-21 PROCEDURE — 70486 CT MAXILLOFACIAL W/O DYE: CPT | Mod: MA

## 2022-11-21 RX ORDER — IOPAMIDOL 755 MG/ML
70 INJECTION, SOLUTION INTRAVASCULAR ONCE
Status: COMPLETED | OUTPATIENT
Start: 2022-11-21 | End: 2022-11-21

## 2022-11-21 RX ADMIN — IOPAMIDOL 70 ML: 755 INJECTION, SOLUTION INTRAVENOUS at 20:15

## 2022-11-21 ASSESSMENT — ACTIVITIES OF DAILY LIVING (ADL): ADLS_ACUITY_SCORE: 35

## 2022-11-21 NOTE — ED TRIAGE NOTES
Reports to having swollen gland in throat on left side. Noticed it about 2.5 weeks ago, has gotten bigger. Denies any difficulty swallowing. On day 4 Dr. Paula did see it.  No tooth ache. Feels pain going up to back on left neck and down throat.  Has been on abx for it and hasnt changed.

## 2022-11-21 NOTE — LETTER
[facility]  [address] [address]  Phone: [phone]    Harshad Thomas MD  Interim Director    Ava Ayoub RN  Nursing Manager November 22, 2022     {enter consultant's name}  {enter consultant's address}    Patient: Andrei Whitman   MR Number: [id number]   YOB: 1970   Date of Visit: 11/21/2022       Dear ***:    I am referring my patient, Andrei Whitman, to you for evaluation of ***.    He  has a past medical history of Russell's esophagus (07/12/2017), Bipolar disorder (H), Chronic cervical pain, Colon polyp, hyperplastic (07/12/2017), Colon polyps (07/12/2017), DDD (degenerative disc disease), cervical, Depression, major, Dry mouth (8/12/2020), and Pancolonic diverticulosis (07/12/2017). His  has a past surgical history that includes appendectomy; ENT surgery (age 18); orthopedic surgery (age 30); orthopedic surgery (age 30); examegd (07/12/2017); colonoscopy (07/12/2017); Endoscopy upper, colonoscopy, combined (N/A, 7/12/2017); Esophagoscopy, gastroscopy, duodenoscopy (EGD), combined (N/A, 6/20/2018); IR Consultation for IR Exam (2/10/2021); and Exam under anesthesia anus (N/A, 4/8/2021). He  reports that he has been smoking cigarettes. He started smoking about 33 years ago. He has a 15.00 pack-year smoking history. He has never used smokeless tobacco. He reports current drug use. Drug: Marijuana. He reports that he does not drink alcohol.    He has a current medication list which includes the following prescription(s): amphetamine-dextroamphetamine, [START ON 12/5/2022] amphetamine-dextroamphetamine, aspirin, carbamazepine, chlorhexidine, fluticasone, gabapentin, hydrochlorothiazide, montelukast, omeprazole, pilocarpine, and verapamil er. He has No Known Allergies.    I appreciate your assistance in his care and look forward to your findings and recommendations.    Sincerely,                           No name on file

## 2022-11-22 ASSESSMENT — ENCOUNTER SYMPTOMS
UNEXPECTED WEIGHT CHANGE: 1
SHORTNESS OF BREATH: 0
CHILLS: 0
FEVER: 0

## 2022-11-22 NOTE — ED NOTES
"Patient approached admitting desk yelling that he wanted to be discharged. Admitting staff informed patient he can leave if he chooses. Patient stated \"I'm not leaving because I have something to say to them back there.\" Night shift ER provider and charge nurse informed.   "

## 2022-11-22 NOTE — TELEPHONE ENCOUNTER
Patient called clinic back. Nurse informed patient of Normal BMP. Patient verbalized understanding.     Patient wanted message to be sent to provider to inform his was seen in UC/ED last night due to swollen gland. Referral was put in for patient to have biopsy done today at [facility] in [place].

## 2022-11-22 NOTE — ED PROVIDER NOTES
History     Chief Complaint   Patient presents with     Mass     HPI  Andrei Whitman is a 52 year old male who is here with swelling to left jaw.  Onset 2 and half weeks ago.  Has seen his primary, no steps taken from his perspective.  No difficulty swallowing.  Painless.  No recent fever or chills.  Has been told he needs to see a dentist, however by last appointment was told he needs to have multiple teeth removed dentures fitted and does not have the financial means to pay 80% of the bill because insurance only cover 20% of it.  Denies any history of trauma to this area of his face.    Allergies:  No Known Allergies    Problem List:    Patient Active Problem List    Diagnosis Date Noted     Perianal abscess 04/08/2021     Priority: Medium     Added automatically from request for surgery [id number]       Suicidal behavior 08/12/2020     Priority: Medium     Dry mouth 08/12/2020     Priority: Medium     Obesity (BMI 35.0-39.9) with comorbidity (H) 09/23/2019     Priority: Medium     Chronic lung disease 09/23/2019     Priority: Medium     Lithium use 08/06/2018     Priority: Medium     DDD (degenerative disc disease), cervical 08/06/2018     Priority: Medium     Cervical stenosis of spinal canal 08/06/2018     Priority: Medium     Tobacco dependency 06/09/2018     Priority: Medium     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  Andrei Whitman has no plan or code status on file. Discussed available resources and provided with information. Confirmed code status reflects current choices pending further ACP discussions.   Confirmed/documented legally designated decision makers.  Added by Monica Valerio               Attention deficit hyperactivity disorder (ADHD), predominantly inattentive type 02/19/2016     Priority: Medium     Abnormal weight gain 09/25/2015     Priority: Medium     Mood disorder (H) 07/15/2015     Priority: Medium     GERD (gastroesophageal reflux disease) 03/12/2015     Priority: Medium     Segmental and somatic dysfunction of lower extremity 11/26/2014     Priority: Medium     Lower back pain 10/24/2014     Priority: Medium     Cervicalgia 10/02/2014     Priority: Medium     Anxiety state 08/05/2013     Priority: Medium     IMO Update       Insomnia, unspecified 08/05/2013     Priority: Medium     Updated per 10/1/17 IMO import       Left hip pain 08/05/2013     Priority: Medium        Past Medical History:    Past Medical History:   Diagnosis Date     Russell's esophagus 07/12/2017     Bipolar disorder (H)      Chronic cervical pain      Colon polyp, hyperplastic 07/12/2017     Colon polyps 07/12/2017     DDD (degenerative disc disease), cervical      Depression, major      Dry mouth 8/12/2020     Pancolonic diverticulosis 07/12/2017       Past Surgical History:    Past Surgical History:   Procedure Laterality Date     APPENDECTOMY      age 14     COLONOSCOPY  07/12/2017    Left descending x1 tubular adenoma, sigmoid x1 tubular adenoma and rectal x1 hyperplastic polyp.  Pan diverticulosis     ENDOSCOPY UPPER, COLONOSCOPY, COMBINED N/A 7/12/2017    Procedure: COMBINED ENDOSCOPY UPPER, COLONOSCOPY;  UPPER ENDOSCOPY WITH BIOPSIES  AND COLONOSCOPY WITH POLYPECTOMY;  Surgeon: Francis Valverde DO;  Location: [facility]     ENT SURGERY  age 18    tonsils     ESOPHAGOGASTRODUODENOSCOPY  07/12/2017    chemical gatropathy, GE junction with pancreatiuc metaplasia      ESOPHAGOSCOPY, GASTROSCOPY, DUODENOSCOPY (EGD), COMBINED N/A 6/20/2018    Procedure: COMBINED ESOPHAGOSCOPY, GASTROSCOPY, DUODENOSCOPY (EGD), BIOPSY SINGLE  OR MULTIPLE;  UPPER ENDOSCOPY WITH BIOPSY;  Surgeon: Francis Valverde DO;  Location: [facility]     EXAM UNDER ANESTHESIA ANUS N/A 4/8/2021    Procedure: EXAM UNDER ANESTHESIA, incision and drainage perianal abscess;  Surgeon: Roland Beal MD;  Location: [facility]     IR CONSULTATION FOR IR EXAM  2/10/2021     ORTHOPEDIC SURGERY  age 30     back and neck fusion, bone from hip removed to use on plates     ORTHOPEDIC SURGERY  age 30    L5 fusion, laminectomy of lumbar discs       Family History:    Family History   Problem Relation Age of Onset     Asthma Mother      Arthritis Mother      Prostate Cancer Father      Heart Disease Paternal Grandfather      Hypertension Paternal Grandfather      Alcohol/Drug Paternal Grandfather      Other - See Comments Brother         Nerve and degenerative disease mild     Alcohol/Drug Brother      Other - See Comments Sister 21        Hip replacements, nerve, degerative disease     Alcohol/Drug Maternal Grandfather      Unknown/Adopted Paternal Grandmother      Other Cancer Paternal Aunt         Cervical cancer       Social History:  Marital Status:   [4]  Social History     Tobacco Use     Smoking status: Every Day     Packs/day: 0.50     Years: 30.00     Pack years: 15.00     Types: Cigarettes     Start date: 1/1/1989     Smokeless tobacco: Never     Tobacco comments:     quitplan referral declined 6/19/19 on chantax   Substance Use Topics     Alcohol use: No     Alcohol/week: 0.0 standard drinks     Drug use: Yes     Types: Marijuana        Medications:    amphetamine-dextroamphetamine (ADDERALL XR) 30 MG 24 hr capsule  [START ON 12/5/2022] amphetamine-dextroamphetamine (ADDERALL XR) 30 MG 24 hr capsule  aspirin 81 MG tablet  carBAMazepine (TEGRETOL XR) 200 MG 12 hr tablet  chlorhexidine (PERIDEX) 0.12 % solution  fluticasone (FLOVENT HFA) 220 MCG/ACT inhaler  gabapentin (NEURONTIN) 300 MG capsule  hydrochlorothiazide (MICROZIDE) 12.5 MG capsule  montelukast (SINGULAIR) 10  MG tablet  omeprazole (PRILOSEC) 40 MG DR capsule  pilocarpine (SALAGEN) 5 MG tablet  verapamil ER (VERELAN) 240 MG 24 hr capsule          Review of Systems   Constitutional: Positive for unexpected weight change. Negative for chills and fever.   Respiratory: Negative for shortness of breath.    Cardiovascular: Negative for chest pain.   All other systems reviewed and are negative.      Physical Exam   BP: 137/86  Pulse: 97  Temp: (!) 96.6  F (35.9  C)  Resp: 16  SpO2: 99 %      Physical Exam  Constitutional:       General: He is not in acute distress.     Appearance: Normal appearance.   HENT:      Head: Atraumatic.      Comments: Golf ball sized mass to the left mandibular ramus without any increase in temperature or erythema, no obvious mass or dental abscess noted inside mouth     Right Ear: External ear normal.      Left Ear: External ear normal.      Nose: Nose normal. No rhinorrhea.   Eyes:      Conjunctiva/sclera: Conjunctivae normal.   Cardiovascular:      Rate and Rhythm: Normal rate and regular rhythm.      Pulses: Normal pulses.   Pulmonary:      Effort: Pulmonary effort is normal. No respiratory distress.      Breath sounds: Normal breath sounds.   Abdominal:      General: There is no distension.      Palpations: Abdomen is soft.      Tenderness: There is no abdominal tenderness.   Musculoskeletal:         General: No deformity or signs of injury.   Skin:     General: Skin is warm and dry.      Capillary Refill: Capillary refill takes less than 2 seconds.   Neurological:      General: No focal deficit present.      Mental Status: He is alert. Mental status is at baseline.   Psychiatric:         Mood and Affect: Mood normal.         Behavior: Behavior normal.         ED Course              ED Course as of 11/22/22 0209   Mon Nov 21, 2022 1938 Patient is hemodynamically stable. Protecting his airway. Vital signs all stable. I warned him that if he has any more behavioral outburst at staff, he will be  discharged immediately. Security aware. Patient agrees to be nice.   [phone] ENT Clinic Number for [facility]  Fax number to fax referral to is: [phone]       Procedures             Critical Care time:               Results for orders placed or performed during the hospital encounter of 11/21/22 (from the past 24 hour(s))   CBC with platelets differential    Narrative    The following orders were created for panel order CBC with platelets differential.  Procedure                               Abnormality         Status                     ---------                               -----------         ------                     CBC with platelets and d...[[id number]]  Abnormal            Final result                 Please view results for these tests on the individual orders.   Basic metabolic panel   Result Value Ref Range    Sodium 136 136 - 145 mmol/L    Potassium 3.2 (L) 3.4 - 5.3 mmol/L    Chloride 99 98 - 107 mmol/L    Carbon Dioxide (CO2) 27 22 - 29 mmol/L    Anion Gap 10 7 - 15 mmol/L    Urea Nitrogen 3.7 (L) 6.0 - 20.0 mg/dL    Creatinine 0.62 (L) 0.67 - 1.17 mg/dL    Calcium 9.4 8.6 - 10.0 mg/dL    Glucose 107 (H) 70 - 99 mg/dL    GFR Estimate >90 >60 mL/min/1.73m2   CBC with platelets and differential   Result Value Ref Range    WBC Count 12.5 (H) 4.0 - 11.0 10e3/uL    RBC Count 4.30 (L) 4.40 - 5.90 10e6/uL    Hemoglobin 13.4 13.3 - 17.7 g/dL    Hematocrit 39.6 (L) 40.0 - 53.0 %    MCV 92 78 - 100 fL    MCH 31.2 26.5 - 33.0 pg    MCHC 33.8 31.5 - 36.5 g/dL    RDW 13.0 10.0 - 15.0 %    Platelet Count 252 150 - 450 10e3/uL    % Neutrophils 78 %    % Lymphocytes 14 %    % Monocytes 7 %    % Eosinophils 1 %    % Basophils 0 %    % Immature Granulocytes 0 %    NRBCs per 100 WBC 0 <1 /100    Absolute Neutrophils 9.6 (H) 1.6 - 8.3 10e3/uL    Absolute Lymphocytes 1.8 0.8 - 5.3 10e3/uL    Absolute Monocytes 0.9 0.0 - 1.3 10e3/uL    Absolute Eosinophils 0.2 0.0 - 0.7 10e3/uL    Absolute Basophils 0.1 0.0 -  0.2 10e3/uL    Absolute Immature Granulocytes 0.0 <=0.4 10e3/uL    Absolute NRBCs 0.0 10e3/uL   [place] Draw    Narrative    The following orders were created for panel order [place] Draw.  Procedure                               Abnormality         Status                     ---------                               -----------         ------                     Extra Green Top (Lithium...[[id number]]                      Final result                 Please view results for these tests on the individual orders.   Extra Green Top (Lithium Heparin) Tube   Result Value Ref Range    Hold Specimen JIC    CT Facial Bones without Contrast    Narrative    CT FACIAL BONES WITHOUT CONTRAST, 11/21/2022 8:28 PM    History: Male, age 51 years; mass to L sima-mandibular area, on abx x  2.5 weeks no improvement, poor dentition but outside of mass not  red/warm    Comparison: CT scan neck with IV contrast 11/21/2022.    Technique: CT scan was performed of the face without contrast.  Sagittal, coronal and axial reconstructions were reviewed .    FINDINGS: Bones of the face are intact. Temporomandibular joints are  congruent.    There is heterogeneously dense thickening of the left parotid gland.  There is also thickening of the overlying and adjacent portions of the  left sternocleidomastoid mastoid muscle. These findings are much  better seen on the dedicated CT scan of the neck. A number of  normal-sized and mildly enlarged lymph nodes are present throughout  the visualized portions of the neck. Postoperative changes are also  seen within the cervical spine.      Impression    IMPRESSION:   Lack of IV contrast limits evaluation.    Left parotid gland abscess with reactive lymphadenopathy is much  better seen on the contrast-enhanced CT scan of the neck. Differential  diagnosis includes centrally necrotic left parotid neoplasm.    EVARISTO PITTS MD         SYSTEM ID:  [id number]   Soft tissue neck CT w contrast    Narrative    CT  SOFT TISSUE NECK W CONTRAST, 11/21/2022 8:31 PM    History: Male, age 51 years; mass to L sima-mandibular area, on abx x  2.5 weeks no improvement, poor dentition but outside of mass not  red/warm    Comparison: No relevant prior imaging.    Technique: CT scan was performed of the neck  after the administration  of intravenous contrast. Axial; sagittal and coronal MIP images were  reviewed.    FINDINGS:   The visualized portions of the brain are unremarkable.    There is a heterogeneously enhancing, centrally low dense mass in the  left parotid gland measuring approximately 3.5 cm in AP dimension by  2.5 cm in transverse dimension by 4.5 cm in craniocaudal dimension.    Salivary glands are otherwise normal. There are number of normal-sized  and mildly enlarged lymph nodes seen throughout the neck,  asymmetrically more numerous on the left.    The oral and pharyngeal mucosa is normal in appearance. Thyroid gland  is mildly enlarged. Larynx is unremarkable.    Visualized portions of the lungs demonstrate mild emphysema.  Postoperative changes are seen within the lower cervical spine.  Paranasal sinuses and ethmoid air cells are grossly normal.      Impression    IMPRESSION:   4.5 x 3.5 x 2.5 cm centrally necrotic, heterogeneously enhancing mass  of the left parotid gland with cervical lymphadenopathy and thickening  of the adjacent portions of the left sternocleidomastoid mastoid  muscle. Differential diagnosis favors parotid gland abscess however  also includes including centrally necrotic neoplasm.    This facility minimizes radiation dose by adjusting the mA and/or kV  according to each patient size.      This CT scan was performed using one or more the following dose  reduction techniques:    -Automated exposure control,  -Adjustment of the mA and/or kV according to patient's size, and/or,  -Use of iterative reconstruction technique.    EVARISTO PITTS MD         SYSTEM ID:  [id number]       Medications   iopamidol  (ISOVUE-370) solution 70 mL (70 mLs Intravenous Given 11/21/22 2015)   sodium chloride (PF) 0.9% PF flush 60 mL (50 mLs Intravenous Given 11/21/22 2015)       Assessments & Plan (with Medical Decision Making)     I have reviewed the nursing notes.    I have reviewed the findings, diagnosis, plan and need for follow up with the patient.  52-year-old male here with mass to left mandible.  CT with contrast consistent with abscess versus necrotic parotid malignancy.  Discussed case with ENT at [facility], recommends emergent clinic appointment for fine-needle biopsy.  I am unsure if my ENT referral will make it to [facility] so I have instructed the patient to call the ENT appointment number.  Dr. Maier who I spoke with says his clinic will call the patient to arrange appointment.  He will need fine-needle aspiration biopsy at this appointment.  Goal is to have the appointment within a week, preferably before the holiday.    Discharge Medication List as of 11/21/2022 10:09 PM          Final diagnoses:   Mass of left parotid gland       11/21/2022   [facility]     Doroteo Medrano MD  11/22/22 1155

## 2022-11-22 NOTE — ED NOTES
"Pt to ED 01 and was pleasant to nurse. Informed pt that there isn't a note/documentation from Dr. Paula about the mass on his neck.  He stated \"Ya she thought it just was dental\"  "

## 2022-11-22 NOTE — ED NOTES
"Pt put on light to talk to me. Pt is wanting to be seen in UC, explained to pt rational of why he will be seen in the ER vs UC.  Pt upset and walked away, shortly after came back to triage and said again he doesn't want to be seen in ER and is wanting to go to UC.  Expained rational again to why he needs to be seen in ED.  As patient was walking away \" I dont want to be fucken seen by anyone here, your all fucken retarded\" and walked away.  Updated charge nurse.   "

## 2022-11-22 NOTE — DISCHARGE INSTRUCTIONS
Call this number to schedule an emergent ENT appointment with Dr. Maier. [phone]. You are supposed to be scheduled for an ultrasound guided biopsy during the clinic appointment, this is necessary to distinguish between having cancer or an infection. This needs to happen in the next seven days, hopefully before Thanksgiving. If you can't get in, come back here during the day time so we can help you with this referral or drive to [place] in person. They should try calling you tomorrow but if they don't call you by noon, call them.    What to expect when you have contrast    During your exam, we will inject  contrast  into your vein or artery. (Contrast is a clear liquid with iodine in it. It shows up on X-rays.)    You may feel warm or hot. You may have a metal taste in your mouth and a slight upset stomach. You may also feel pressure near the kidneys and bladder. These effects will last about 1 to 3 minutes.    Please tell us if you have:   Sneezing    Itching   Hives    Swelling in the face   A hoarse voice   Breathing problems   Other new symptoms    Serious problems are rare.  They may include:   Irregular heartbeat    Seizures   Kidney failure             Tissue damage   Shock     Death    If you have any problems during the exam, we  will treat them right away.    When you get home    Call your hospital if you have any new symptoms in the next 2 days, like hives or swelling. (Phone numbers are at the bottom of this page.) Or call your family doctor.     If you have wheezing or trouble breathing, call 911.    Self-care  -Drink at least 4 extra glasses of water today.   This reduces the stress on your kidneys.  -Keep taking your regular medicines.    The contrast will pass out of your body in your  Urine(pee). This will happen in the next 24 hours. You  will not feel this. Your urine will not  change color.    If you have kidney problems or take metformin    Drink 4 to 8 large glasses of water for the next  2  days, if you are not on a fluid restriction.    ?If you take metformin (Glucophage or Glucovance) for diabetes, keep taking it.      ?Your kidney function tests are abnormal.  If you take Metformin, do not take it for 48 hours. Please go to your clinic for a blood test within 3 days after your exam before the restarting this medicine.     (Note to provider:please give patient prescription for lab tests.)    ?Special instructions:     I have read and understand the above information.    Patient Sign Here:______________________________________Date:________Time:______    Staff Sign Here:________________________________________Date:_______Time:______      Radiology Departments:     ?[facility]: [phone] ?[place]: [phone]     ?[place]: [phone] ?[facility]:[phone]      ?[place]: [phone]  ?[facility]: [phone]  ?[facility]:[phone]    ?[facility]:[phone]  ?[facility]:[phone]

## 2022-12-10 DIAGNOSIS — F90.0 ADHD (ATTENTION DEFICIT HYPERACTIVITY DISORDER), INATTENTIVE TYPE: ICD-10-CM

## 2022-12-10 DIAGNOSIS — M54.12 CERVICAL RADICULOPATHY: ICD-10-CM

## 2022-12-12 RX ORDER — GABAPENTIN 300 MG/1
CAPSULE ORAL
Qty: 180 CAPSULE | Refills: 0 | Status: SHIPPED | OUTPATIENT
Start: 2022-12-12 | End: 2022-12-16

## 2022-12-12 NOTE — TELEPHONE ENCOUNTER
Gabapentin       Last Written Prescription Date:  11/11/22  Last Fill Quantity: 180,   # refills: 0  Last Office Visit: 11/10/22  Future Office visit:    Next 5 appointments (look out 90 days)    Jan 09, 2023  8:20 AM  (Arrive by 8:05 AM)  Return Visit with Annette Hammond MD  [facility] ([facility] ) [address] [place] [id number] [phone]

## 2022-12-14 RX ORDER — DEXTROAMPHETAMINE SACCHARATE, AMPHETAMINE ASPARTATE MONOHYDRATE, DEXTROAMPHETAMINE SULFATE AND AMPHETAMINE SULFATE 7.5; 7.5; 7.5; 7.5 MG/1; MG/1; MG/1; MG/1
30 CAPSULE, EXTENDED RELEASE ORAL DAILY
Qty: 30 CAPSULE | Refills: 0 | OUTPATIENT
Start: 2022-12-14 | End: 2023-01-13

## 2022-12-16 DIAGNOSIS — M54.12 CERVICAL RADICULOPATHY: ICD-10-CM

## 2022-12-16 RX ORDER — GABAPENTIN 300 MG/1
CAPSULE ORAL
Qty: 180 CAPSULE | Refills: 0 | Status: SHIPPED | OUTPATIENT
Start: 2022-12-16 | End: 2023-02-10

## 2022-12-16 NOTE — TELEPHONE ENCOUNTER
Gabapentin      Last Written Prescription Date:  12/12/22  Last Fill Quantity: 180,   # refills: 0  Last Office Visit: 11/10/22  Future Office visit:    Next 5 appointments (look out 90 days)    Jan 09, 2023  8:20 AM  (Arrive by 8:05 AM)  Return Visit with Annette Hammond MD  [facility] ([facility] ) [address] [place] [id number] [phone]

## 2022-12-27 DIAGNOSIS — K11.7 CLINICAL XEROSTOMIA: ICD-10-CM

## 2022-12-28 RX ORDER — PILOCARPINE HYDROCHLORIDE 5 MG/1
5 TABLET, FILM COATED ORAL 4 TIMES DAILY
Qty: 360 TABLET | Refills: 0 | Status: SHIPPED | OUTPATIENT
Start: 2022-12-28 | End: 2023-04-17

## 2022-12-28 NOTE — TELEPHONE ENCOUNTER
Pilocarpine 5mg      Last Written Prescription Date:  8/17/22  Last Fill Quantity: 360,   # refills: 0  Last Office Visit: 11/10/22  Future Office visit:    Next 5 appointments (look out 90 days)    Jan 09, 2023  8:20 AM  (Arrive by 8:05 AM)  Return Visit with Annette Hammond MD  [facility] ([facility] ) [address] [place] [id number] [phone]           Routing refill request to provider for review/approval because:

## 2023-01-09 ENCOUNTER — TELEPHONE (OUTPATIENT)
Dept: PSYCHIATRY | Facility: OTHER | Age: 53
End: 2023-01-09

## 2023-01-09 ENCOUNTER — OFFICE VISIT (OUTPATIENT)
Dept: PSYCHIATRY | Facility: OTHER | Age: 53
End: 2023-01-09
Attending: PSYCHIATRY & NEUROLOGY
Payer: MEDICARE

## 2023-01-09 VITALS
WEIGHT: 172 LBS | TEMPERATURE: 97.5 F | BODY MASS INDEX: 23.99 KG/M2 | HEART RATE: 78 BPM | OXYGEN SATURATION: 97 % | DIASTOLIC BLOOD PRESSURE: 76 MMHG | SYSTOLIC BLOOD PRESSURE: 122 MMHG

## 2023-01-09 DIAGNOSIS — F31.9 BIPOLAR I DISORDER (H): ICD-10-CM

## 2023-01-09 DIAGNOSIS — F90.0 ADHD (ATTENTION DEFICIT HYPERACTIVITY DISORDER), INATTENTIVE TYPE: Primary | ICD-10-CM

## 2023-01-09 PROCEDURE — 99214 OFFICE O/P EST MOD 30 MIN: CPT | Performed by: PSYCHIATRY & NEUROLOGY

## 2023-01-09 PROCEDURE — G0463 HOSPITAL OUTPT CLINIC VISIT: HCPCS

## 2023-01-09 RX ORDER — CARBAMAZEPINE 200 MG/1
200 TABLET, EXTENDED RELEASE ORAL 2 TIMES DAILY
Qty: 60 TABLET | Refills: 3 | Status: SHIPPED | OUTPATIENT
Start: 2023-01-09 | End: 2023-04-04

## 2023-01-09 RX ORDER — DEXTROAMPHETAMINE SACCHARATE, AMPHETAMINE ASPARTATE, DEXTROAMPHETAMINE SULFATE AND AMPHETAMINE SULFATE 5; 5; 5; 5 MG/1; MG/1; MG/1; MG/1
20 TABLET ORAL DAILY
Qty: 30 TABLET | Refills: 0 | Status: SHIPPED | OUTPATIENT
Start: 2023-01-09 | End: 2023-03-10

## 2023-01-09 RX ORDER — DEXTROAMPHETAMINE SACCHARATE, AMPHETAMINE ASPARTATE, DEXTROAMPHETAMINE SULFATE AND AMPHETAMINE SULFATE 5; 5; 5; 5 MG/1; MG/1; MG/1; MG/1
20 TABLET ORAL DAILY
Qty: 30 TABLET | Refills: 0 | Status: SHIPPED | OUTPATIENT
Start: 2023-02-06 | End: 2023-04-04

## 2023-01-09 ASSESSMENT — ANXIETY QUESTIONNAIRES
7. FEELING AFRAID AS IF SOMETHING AWFUL MIGHT HAPPEN: SEVERAL DAYS
2. NOT BEING ABLE TO STOP OR CONTROL WORRYING: MORE THAN HALF THE DAYS
5. BEING SO RESTLESS THAT IT IS HARD TO SIT STILL: SEVERAL DAYS
IF YOU CHECKED OFF ANY PROBLEMS ON THIS QUESTIONNAIRE, HOW DIFFICULT HAVE THESE PROBLEMS MADE IT FOR YOU TO DO YOUR WORK, TAKE CARE OF THINGS AT HOME, OR GET ALONG WITH OTHER PEOPLE: SOMEWHAT DIFFICULT
6. BECOMING EASILY ANNOYED OR IRRITABLE: MORE THAN HALF THE DAYS
GAD7 TOTAL SCORE: 12
3. WORRYING TOO MUCH ABOUT DIFFERENT THINGS: MORE THAN HALF THE DAYS
1. FEELING NERVOUS, ANXIOUS, OR ON EDGE: MORE THAN HALF THE DAYS
GAD7 TOTAL SCORE: 12

## 2023-01-09 ASSESSMENT — PATIENT HEALTH QUESTIONNAIRE - PHQ9
SUM OF ALL RESPONSES TO PHQ QUESTIONS 1-9: 11
5. POOR APPETITE OR OVEREATING: MORE THAN HALF THE DAYS

## 2023-01-09 ASSESSMENT — PAIN SCALES - GENERAL: PAINLEVEL: MILD PAIN (2)

## 2023-01-09 NOTE — PROGRESS NOTES
"      PSYCHIATRY CLINIC PROGRESS NOTE     SUBJECTIVE / INTERIM HISTORY                                                                          Last visit 11/7/22: Continue Tegretol  mg twice daily, refilled today. Increase Adderall XR 20 mg daily to Adderall 30 XR mg daily and filled 11/7/22 and 12/5/22  - called  recently and said thought someone was coming into his apartment. Notes not going to say anything anymore because no one believes me.   - \"doing all right\"  - had left mass in parotid. CT and biopsy and Andrei notes wasn't cancerous  - stopped the Adderall XR. Andrei notes some paranoia of which happened in past as well with the XR. He inquires if we can lower dose and go to IR.. When he stopped wasn't getting anything done, sleep gets out of whack, anxiety worsens, \"I start five things at once\"  - son back in area. Son working at [employer]. Son is going to buy [employer]  - they get out to his parent's place [place] the country. Paulette likes to have coffee and visit with Andrei's mom  -[place] of [place] [address] [place]. For while bought it and he lived there with Brianna and when was with her then in 2010 when  Brianna and moved to [place] with Paulette when they started relationship  -  Finished HCC with [employer] and was planning to go for radiology degree. 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  and notes [facility] [employer] and raised Ross who passed away form overdose. And Andrei's nephew Andrei murdered.  - Social/Spiritual Support- sister Caroline, dad Carlos, GF Paulette Mejía   - Also per   MEDICAL ROS-  back injury and surgeries since 31 yo. He denies history of overt injury rather been diagnosed with degenerative disc disease.  MEDICAL / SURGICAL HISTORY                     Patient Active Problem List   Diagnosis     Cervicalgia     Lower back pain     Segmental and somatic dysfunction of lower extremity     GERD (gastroesophageal reflux disease)     Mood disorder (H)     " Abnormal weight gain     Attention deficit hyperactivity disorder (ADHD), predominantly inattentive type     ACP (advance care planning)     Flatulence, eructation, and gas pain     Bipolar disease, chronic (H)     Benign essential hypertension     Russell esophagus     Tobacco dependency     Lithium use     DDD (degenerative disc disease), cervical     Cervical stenosis of spinal canal     Obesity (BMI 35.0-39.9) with comorbidity (H)     Chronic lung disease     Suicidal behavior     Dry mouth     Anxiety state     Insomnia, unspecified     Left hip pain     Perianal abscess     ALLERGY   Patient has no known allergies.  MEDICATIONS                                                                                             Current Outpatient Medications   Medication Sig     aspirin 81 MG tablet Take 1 tablet (81 mg) by mouth daily     carBAMazepine (TEGRETOL XR) 200 MG 12 hr tablet Take 1 tablet (200 mg) by mouth 2 times daily     chlorhexidine (PERIDEX) 0.12 % solution SWISH AND SPIT 15 MLS IN MOUTH 2 TIMES DAILY     fluticasone (FLOVENT HFA) 220 MCG/ACT inhaler INHALE 2 PUFFS INTO THE LUNGS TWICE DAILY     gabapentin (NEURONTIN) 300 MG capsule TAKE 2 CAPSULES (600MG) BY MOUTH THREE TIMES DAILY     hydrochlorothiazide (MICROZIDE) 12.5 MG capsule TAKE 1 CASPULE (12.5 MG) BYMOUTH EVERY MORNING     montelukast (SINGULAIR) 10 MG tablet TAKE 1 TABLET (10 MG) BY MOUTH AT BEDTIME     omeprazole (PRILOSEC) 40 MG DR capsule TAKE 1 CAPSULE (40 MG) BY MOUTH DAILY     pilocarpine (SALAGEN) 5 MG tablet Take 1 tablet (5 mg) by mouth 4 times daily     verapamil ER (VERELAN) 240 MG 24 hr capsule Take 1 capsule (240 mg) by mouth At Bedtime     No current facility-administered medications for this visit.       VITALS   /76 (BP Location: Left arm, Patient Position: Sitting, Cuff Size: Adult Regular)   Pulse 78   Temp 97.5  F (36.4  C) (Tympanic)   SpO2 97%      PHQ9                     [unfilled]  LABS                                                                                                                          Last Comprehensive Metabolic Panel:  Sodium   Date Value Ref Range Status   11/21/2022 136 136 - 145 mmol/L Final   04/08/2021 139 133 - 144 mmol/L Final     Potassium   Date Value Ref Range Status   11/21/2022 3.2 (L) 3.4 - 5.3 mmol/L Final   08/01/2022 3.1 (L) 3.4 - 5.3 mmol/L Final   04/08/2021 3.1 (L) 3.4 - 5.3 mmol/L Final     Chloride   Date Value Ref Range Status   11/21/2022 99 98 - 107 mmol/L Final   08/01/2022 104 94 - 109 mmol/L Final   04/08/2021 105 94 - 109 mmol/L Final     Carbon Dioxide   Date Value Ref Range Status   04/08/2021 29 20 - 32 mmol/L Final     Carbon Dioxide (CO2)   Date Value Ref Range Status   11/21/2022 27 22 - 29 mmol/L Final   08/01/2022 26 20 - 32 mmol/L Final     Anion Gap   Date Value Ref Range Status   11/21/2022 10 7 - 15 mmol/L Final   08/01/2022 9 3 - 14 mmol/L Final   04/08/2021 5 3 - 14 mmol/L Final     Glucose   Date Value Ref Range Status   11/21/2022 107 (H) 70 - 99 mg/dL Final   08/01/2022 129 (H) 70 - 99 mg/dL Final   04/08/2021 96 70 - 99 mg/dL Final     Urea Nitrogen   Date Value Ref Range Status   11/21/2022 3.7 (L) 6.0 - 20.0 mg/dL Final   08/01/2022 8 7 - 30 mg/dL Final   04/08/2021 7 7 - 30 mg/dL Final     Creatinine   Date Value Ref [place] Status   11/21/2022 0.62 (L) 0.67 - 1.17 mg/dL Final   04/08/2021 0.81 0.66 - 1.25 mg/dL Final     GFR Estimate   Date Value Ref Range Status   11/21/2022 >90 >60 mL/min/1.73m2 Final     Comment:     Effective December 21, 2021 eGFRcr in adults is calculated using the 2021 CKD-EPI creatinine equation which includes age and gender (Jordi et al., NEJM, DOI: 10.1056/JNOEav9757625)   04/08/2021 >90 >60 mL/min/[1.73_m2] Final     Comment:     Non  GFR Calc  Starting 12/18/2018, serum creatinine based estimated GFR (eGFR) will be   calculated using the Chronic Kidney Disease Epidemiology Collaboration   (CKD-EPI)  "equation.       Calcium   Date Value Ref Range Status   11/21/2022 9.4 8.6 - 10.0 mg/dL Final   04/08/2021 9.0 8.5 - 10.1 mg/dL Final     Tegretol 7.4 as of 2/8/21    MENTAL STATUS EXAM                                                                                       Awake, alert. No problems with speech. Mood euthymic and affect congruent to mood and speech content. Thought process, including associations, was unremarkable and thought content was devoid of suicidal and homicidal ideation and psychotic thought. No hallucinations. Insight was good. Judgment was intact and adequate for safety. Fund of knowledge was intact. Pt demonstrates no obvious problems with attention, concentration, language, recent or remote memory although these were not formally tested.     ASSESSMENT                                                                                                      HISTORICAL:  Initial psych note 12/28/20          NOTES:  discharge summary from April 8/13/20: \"Lithium was tapered and discontinued in march/begining of April. At that time his lamictal was increased. It appears that lithium was tapered due excessive thirst and dry mouth. Elavil was increased for insomnia in February.\"    Andrei Whitman is a 51 yo with bipolar disorder and ADHD with psychiatric hospitalization August 2020. Was on amitriptyline, Adderall, Wellbutrin : all activating meds although Andrei notes he feels was the Lamictal that precipitated bibiana. Was noted Depakote for him past weight gain, lithium polyuria and thirst, uncertain whether he had been on neuroleptics (I saw Latuda [facility]). Andrei had sx bibiana along with paranoia and somatic delusions of August 2020. Sx cleared and he is still taking the Tegretol  mg twice daily. Today is the first I've observed paranoia with Andrei. He does have insight into this and notes this has happened several times in the past when he takes extended version of Adderall. He " "stopped taking it several weeks ago, now struggling with ADHD sx: not getting anything done, insomnia, anxiety worsens, \"I start five things at once\". He inquires if we can change to IR and lower dose. Given he has insight into his illness and reports paranoia was not an issue historically with IR, I am comfortable continuing Adderall for now.      TREATMENT RISK STATEMENT:  The risks, benefits, alternatives and potential adverse effects have been explained and are understood by the pt.  The pt agrees to the treatment plan with the ability to do so.   The pt knows to call the clinic for any problems or access emergency care if needed.        DIAGNOSES                     Bipolar disorder, most recent episode manic (with psychosis), in partial remission  ADHD    PLAN                                                                                                                    1)  MEDICATIONS:         -- Continue Tegretol  mg twice daily, refilled today. He discontinued the Adderall XR 30 mg daily. We will change to Adderall IR 20 mg daily filled script todays 1/9/23 and for 2/6/23    2)  THERAPY:  No Change    3)  LABS:  Tegretol level 2/8/21. CBC 11/2022    4)  PT MONITOR [call for probs]:  Worsening symptoms, SI/HI, SEs from meds    5)  REFERRALS [CD, medical, other]:  None    6)  RTC:  2 months                                                                                                      "

## 2023-01-16 DIAGNOSIS — F31.9 BIPOLAR I DISORDER (H): ICD-10-CM

## 2023-01-16 RX ORDER — CARBAMAZEPINE 200 MG/1
200 TABLET, EXTENDED RELEASE ORAL 2 TIMES DAILY
Qty: 60 TABLET | Refills: 3 | OUTPATIENT
Start: 2023-01-16

## 2023-02-01 DIAGNOSIS — K12.0 APHTHOUS ULCER OF MOUTH: ICD-10-CM

## 2023-02-02 RX ORDER — MONTELUKAST SODIUM 10 MG/1
10 TABLET ORAL AT BEDTIME
Qty: 90 TABLET | Refills: 0 | Status: SHIPPED | OUTPATIENT
Start: 2023-02-02 | End: 2023-07-07

## 2023-02-02 NOTE — TELEPHONE ENCOUNTER
MONTELUKAST 10MG TABLET  Last Written Prescription Date:  9/8/2022  Last Fill Quantity: 90,   # refills: 0  Last Office Visit: 11/10/2022  Future Office visit:       Routing refill request to provider for review/approval because:

## 2023-02-08 DIAGNOSIS — K12.0 APHTHOUS ULCER OF MOUTH: ICD-10-CM

## 2023-02-08 DIAGNOSIS — M54.12 CERVICAL RADICULOPATHY: ICD-10-CM

## 2023-02-10 RX ORDER — GABAPENTIN 300 MG/1
CAPSULE ORAL
Qty: 180 CAPSULE | Refills: 0 | Status: SHIPPED | OUTPATIENT
Start: 2023-02-10 | End: 2023-02-15

## 2023-02-10 RX ORDER — MONTELUKAST SODIUM 10 MG/1
10 TABLET ORAL AT BEDTIME
Qty: 90 TABLET | Refills: 0 | OUTPATIENT
Start: 2023-02-10

## 2023-02-10 NOTE — TELEPHONE ENCOUNTER
montelukast (SINGULAIR) 10 MG tablet    Last Written Prescription Date:  2-2-23  Last Fill Quantity: 90,   # refills: 0  Last Office Visit: 11-10-22  Future Office visit:       Routing refill request to provider for review/approval because:    Should have medicaiton     [phone]

## 2023-02-10 NOTE — TELEPHONE ENCOUNTER
gabapentin (NEURONTIN) 300 MG capsule 2 capsule tid  Last Written Prescription Date:  12-16-22  Last Fill Quantity: 180,   # refills: 0  Last Office Visit: 11-10-22  Future Office visit:       Routing refill request to provider for review/approval because:

## 2023-02-11 DIAGNOSIS — K21.9 GASTROESOPHAGEAL REFLUX DISEASE, UNSPECIFIED WHETHER ESOPHAGITIS PRESENT: ICD-10-CM

## 2023-02-13 RX ORDER — OMEPRAZOLE 40 MG/1
CAPSULE, DELAYED RELEASE ORAL
Qty: 90 CAPSULE | Refills: 1 | Status: SHIPPED | OUTPATIENT
Start: 2023-02-13 | End: 2023-09-07

## 2023-02-13 NOTE — TELEPHONE ENCOUNTER
Omeprazole (Prilosec) 40 MG DR Capsule    Last Written Prescription Date:  09/07/2022  Last Fill Quantity: 90,   # refills: 1  Last Office Visit: 11/10/2022  Future Office visit:

## 2023-02-15 DIAGNOSIS — M54.12 CERVICAL RADICULOPATHY: ICD-10-CM

## 2023-02-15 RX ORDER — GABAPENTIN 300 MG/1
CAPSULE ORAL
Qty: 180 CAPSULE | Refills: 0 | Status: SHIPPED | OUTPATIENT
Start: 2023-02-15 | End: 2023-04-10

## 2023-02-15 NOTE — TELEPHONE ENCOUNTER
Gabapentin 300mg      Last Written Prescription Date:  2/10/23  Last Fill Quantity: 180,   # refills: 0  Last Office Visit: 1/9/23  Future Office visit:       Routing refill request to provider for review/approval because:

## 2023-03-10 DIAGNOSIS — F90.0 ADHD (ATTENTION DEFICIT HYPERACTIVITY DISORDER), INATTENTIVE TYPE: ICD-10-CM

## 2023-03-10 RX ORDER — DEXTROAMPHETAMINE SACCHARATE, AMPHETAMINE ASPARTATE, DEXTROAMPHETAMINE SULFATE AND AMPHETAMINE SULFATE 5; 5; 5; 5 MG/1; MG/1; MG/1; MG/1
20 TABLET ORAL DAILY
Qty: 30 TABLET | Refills: 0 | Status: SHIPPED | OUTPATIENT
Start: 2023-03-10 | End: 2023-04-04

## 2023-03-10 NOTE — TELEPHONE ENCOUNTER
Patient is requesting a refill of Adderall 20 mg tablet.  Patient has enough for about a week.  If it could be filled on 3-17-23.  Next f/u appt is on 4-4-2023.  Thank you, medication has been pended.

## 2023-03-13 DIAGNOSIS — F90.0 ADHD (ATTENTION DEFICIT HYPERACTIVITY DISORDER), INATTENTIVE TYPE: ICD-10-CM

## 2023-03-15 RX ORDER — DEXTROAMPHETAMINE SACCHARATE, AMPHETAMINE ASPARTATE, DEXTROAMPHETAMINE SULFATE AND AMPHETAMINE SULFATE 5; 5; 5; 5 MG/1; MG/1; MG/1; MG/1
20 TABLET ORAL DAILY
Qty: 30 TABLET | Refills: 0 | OUTPATIENT
Start: 2023-03-15

## 2023-03-21 ENCOUNTER — APPOINTMENT (OUTPATIENT)
Dept: CT IMAGING | Facility: HOSPITAL | Age: 53
End: 2023-03-21
Attending: PHYSICIAN ASSISTANT
Payer: MEDICARE

## 2023-03-21 ENCOUNTER — APPOINTMENT (OUTPATIENT)
Dept: GENERAL RADIOLOGY | Facility: HOSPITAL | Age: 53
End: 2023-03-21
Attending: PHYSICIAN ASSISTANT
Payer: MEDICARE

## 2023-03-21 ENCOUNTER — HOSPITAL ENCOUNTER (EMERGENCY)
Facility: HOSPITAL | Age: 53
Discharge: HOME | End: 2023-03-21
Attending: PHYSICIAN ASSISTANT | Admitting: PHYSICIAN ASSISTANT
Payer: MEDICARE

## 2023-03-21 VITALS
SYSTOLIC BLOOD PRESSURE: 133 MMHG | HEART RATE: 84 BPM | TEMPERATURE: 98.1 F | DIASTOLIC BLOOD PRESSURE: 105 MMHG | RESPIRATION RATE: 20 BRPM | OXYGEN SATURATION: 95 %

## 2023-03-21 DIAGNOSIS — S42.209A PROXIMAL HUMERUS FRACTURE: ICD-10-CM

## 2023-03-21 PROCEDURE — 73030 X-RAY EXAM OF SHOULDER: CPT | Mod: LT

## 2023-03-21 PROCEDURE — 73060 X-RAY EXAM OF HUMERUS: CPT | Mod: LT

## 2023-03-21 PROCEDURE — 96374 THER/PROPH/DIAG INJ IV PUSH: CPT

## 2023-03-21 PROCEDURE — 99285 EMERGENCY DEPT VISIT HI MDM: CPT | Mod: 25

## 2023-03-21 PROCEDURE — 99284 EMERGENCY DEPT VISIT MOD MDM: CPT | Performed by: PHYSICIAN ASSISTANT

## 2023-03-21 PROCEDURE — 96375 TX/PRO/DX INJ NEW DRUG ADDON: CPT

## 2023-03-21 PROCEDURE — 71045 X-RAY EXAM CHEST 1 VIEW: CPT

## 2023-03-21 PROCEDURE — 250N000011 HC RX IP 250 OP 636: Performed by: PHYSICIAN ASSISTANT

## 2023-03-21 PROCEDURE — 72125 CT NECK SPINE W/O DYE: CPT | Mod: MA

## 2023-03-21 RX ORDER — KETOROLAC TROMETHAMINE 30 MG/ML
30 INJECTION, SOLUTION INTRAMUSCULAR; INTRAVENOUS ONCE
Status: COMPLETED | OUTPATIENT
Start: 2023-03-21 | End: 2023-03-21

## 2023-03-21 RX ORDER — OXYCODONE AND ACETAMINOPHEN 5; 325 MG/1; MG/1
1 TABLET ORAL EVERY 6 HOURS PRN
Qty: 12 TABLET | Refills: 0 | Status: SHIPPED | OUTPATIENT
Start: 2023-03-21 | End: 2023-03-24

## 2023-03-21 RX ORDER — DIAZEPAM 10 MG/2ML
10 INJECTION, SOLUTION INTRAMUSCULAR; INTRAVENOUS ONCE
Status: COMPLETED | OUTPATIENT
Start: 2023-03-21 | End: 2023-03-21

## 2023-03-21 RX ADMIN — DIAZEPAM 10 MG: 5 INJECTION, SOLUTION INTRAMUSCULAR; INTRAVENOUS at 14:43

## 2023-03-21 RX ADMIN — KETOROLAC TROMETHAMINE 30 MG: 30 INJECTION, SOLUTION INTRAMUSCULAR; INTRAVENOUS at 14:42

## 2023-03-21 ASSESSMENT — ENCOUNTER SYMPTOMS
NECK PAIN: 1
WHEEZING: 0
HEADACHES: 0
SHORTNESS OF BREATH: 0
FEVER: 0
ABDOMINAL PAIN: 0
BRUISES/BLEEDS EASILY: 0
DIZZINESS: 0

## 2023-03-21 ASSESSMENT — ACTIVITIES OF DAILY LIVING (ADL): ADLS_ACUITY_SCORE: 35

## 2023-03-21 NOTE — ED PROVIDER NOTES
History     Chief Complaint   Patient presents with     Arm Injury     The history is provided by the patient.     Andrei Whitman is a 52 year old male who presented to the emergency department via EMS for evaluation of left shoulder discomfort.  Patient had a mechanical fall on the ice landing on outstretched arm on the left.  No head injury.  Complains of some mild neck pain.  Also complains of left upper chest discomfort.  No LOC.  No head injury.  Some mild low back pain.    Allergies:  No Known Allergies    Problem List:    Patient Active Problem List    Diagnosis Date Noted     Perianal abscess 04/08/2021     Priority: Medium     Added automatically from request for surgery [id number]       Suicidal behavior 08/12/2020     Priority: Medium     Dry mouth 08/12/2020     Priority: Medium     Obesity (BMI 35.0-39.9) with comorbidity (H) 09/23/2019     Priority: Medium     Chronic lung disease 09/23/2019     Priority: Medium     Lithium use 08/06/2018     Priority: Medium     DDD (degenerative disc disease), cervical 08/06/2018     Priority: Medium     Cervical stenosis of spinal canal 08/06/2018     Priority: Medium     Tobacco dependency 06/09/2018     Priority: Medium     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  Andrei Whitman has no plan or code status on file. Discussed available resources and provided with information. Confirmed code status reflects current choices pending further ACP discussions.  Confirmed/documented legally designated decision makers.  Added by Monica Valerio               Attention deficit  hyperactivity disorder (ADHD), predominantly inattentive type 02/19/2016     Priority: Medium     Abnormal weight gain 09/25/2015     Priority: Medium     Mood disorder (H) 07/15/2015     Priority: Medium     GERD (gastroesophageal reflux disease) 03/12/2015     Priority: Medium     Segmental and somatic dysfunction of lower extremity 11/26/2014     Priority: Medium     Lower back pain 10/24/2014     Priority: Medium     Cervicalgia 10/02/2014     Priority: Medium     Anxiety state 08/05/2013     Priority: Medium     IMO Update       Insomnia, unspecified 08/05/2013     Priority: Medium     Updated per 10/1/17 IMO import       Left hip pain 08/05/2013     Priority: Medium        Past Medical History:    Past Medical History:   Diagnosis Date     Russell's esophagus 07/12/2017     Bipolar disorder (H)      Chronic cervical pain      Colon polyp, hyperplastic 07/12/2017     Colon polyps 07/12/2017     DDD (degenerative disc disease), cervical      Depression, major      Dry mouth 8/12/2020     Pancolonic diverticulosis 07/12/2017       Past Surgical History:    Past Surgical History:   Procedure Laterality Date     APPENDECTOMY      age 14     COLONOSCOPY  07/12/2017    Left descending x1 tubular adenoma, sigmoid x1 tubular adenoma and rectal x1 hyperplastic polyp.  Pan diverticulosis     ENDOSCOPY UPPER, COLONOSCOPY, COMBINED N/A 7/12/2017    Procedure: COMBINED ENDOSCOPY UPPER, COLONOSCOPY;  UPPER ENDOSCOPY WITH BIOPSIES  AND COLONOSCOPY WITH POLYPECTOMY;  Surgeon: Francis Valverde DO;  Location: [facility]     ENT SURGERY  age 18    tonsils     ESOPHAGOGASTRODUODENOSCOPY  07/12/2017    chemical gatropathy, GE junction with pancreatiuc metaplasia      ESOPHAGOSCOPY, GASTROSCOPY, DUODENOSCOPY (EGD), COMBINED N/A 6/20/2018    Procedure: COMBINED ESOPHAGOSCOPY, GASTROSCOPY, DUODENOSCOPY (EGD), BIOPSY SINGLE OR MULTIPLE;  UPPER ENDOSCOPY WITH BIOPSY;  Surgeon: Francis Valverde DO;  Location: [facility]     EXAM UNDER  ANESTHESIA ANUS N/A 4/8/2021    Procedure: EXAM UNDER ANESTHESIA, incision and drainage perianal abscess;  Surgeon: Roland Beal MD;  Location: [facility]     IR CONSULTATION FOR IR EXAM  2/10/2021     ORTHOPEDIC SURGERY  age 30     back and neck fusion, bone from hip removed to use on plates     ORTHOPEDIC SURGERY  age 30    L5 fusion, laminectomy of lumbar discs       Family History:    Family History   Problem Relation Age of Onset     Asthma Mother      Arthritis Mother      Prostate Cancer Father      Heart Disease Paternal Grandfather      Hypertension Paternal Grandfather      Alcohol/Drug Paternal Grandfather      Other - See Comments Brother         Nerve and degenerative disease mild     Alcohol/Drug Brother      Other - See Comments Sister 21        Hip replacements, nerve, degerative disease     Alcohol/Drug Maternal Grandfather      Unknown/Adopted Paternal Grandmother      Other Cancer Paternal Aunt         Cervical cancer       Social History:  Marital Status:   [4]  Social History     Tobacco Use     Smoking status: Every Day     Packs/day: 0.50     Years: 30.00     Pack years: 15.00     Types: Cigarettes     Start date: 1/1/1989     Smokeless tobacco: Never     Tobacco comments:     quitplan referral declined 6/19/19 on chantax   Substance Use Topics     Alcohol use: No     Alcohol/week: 0.0 standard drinks     Drug use: Yes     Types: Marijuana        Medications:    amphetamine-dextroamphetamine (ADDERALL) 20 MG tablet  aspirin 81 MG tablet  carBAMazepine (TEGRETOL XR) 200 MG 12 hr tablet  gabapentin (NEURONTIN) 300 MG capsule  hydrochlorothiazide (MICROZIDE) 12.5 MG capsule  montelukast (SINGULAIR) 10 MG tablet  omeprazole (PRILOSEC) 40 MG DR capsule  oxyCODONE-acetaminophen (PERCOCET) 5-325 MG tablet  pilocarpine (SALAGEN) 5 MG tablet  verapamil ER (VERELAN) 240 MG 24 hr capsule  amphetamine-dextroamphetamine (ADDERALL) 20 MG tablet  chlorhexidine (PERIDEX) 0.12 % solution  fluticasone  (FLOVENT HFA) 220 MCG/ACT inhaler          Review of Systems   Constitutional: Negative for fever.   Respiratory: Negative for shortness of breath and wheezing.    Gastrointestinal: Negative for abdominal pain.   Musculoskeletal: Positive for neck pain.        Left shoulder pain   Skin: Negative.    Neurological: Negative for dizziness and headaches.   Hematological: Does not bruise/bleed easily.       Physical Exam   BP: 92/72  Pulse: 84  Temp: 97  F (36.1  C)  Resp: (!) 28  SpO2: 97 %      Physical Exam  Vitals and nursing note reviewed.   Constitutional:       General: He is not in acute distress.     Appearance: Normal appearance. He is normal weight. He is not ill-appearing, toxic-appearing or diaphoretic.   Cardiovascular:      Rate and Rhythm: Normal rate and regular rhythm.   Pulmonary:      Effort: Pulmonary effort is normal.      Breath sounds: Normal breath sounds.   Chest:      Chest wall: Tenderness present.       Musculoskeletal:      Comments: Swelling to the left proximal humerus.  Elbow seems unremarkable.  Wrist seems unremarkable.  No evidence of deformity.  Scapula is negative.  Clavicle is negative.    No midline thoracic or lumbar tenderness, step-off, or crepitus.  Does have some mild increasing pain upon palpation of the right paravertebral area on the lumbar spine.  There is no edema or ecchymosis.   Skin:     General: Skin is warm and dry.      Capillary Refill: Capillary refill takes less than 2 seconds.   Neurological:      General: No focal deficit present.      Mental Status: He is alert and oriented to person, place, and time.   Psychiatric:         Mood and Affect: Mood normal.         ED Course              ED Course as of 03/21/23 1545   Tue Mar 21, 2023   1504 My independent review of the portable chest x-ray shows no evidence of focal consolidation, pneumothorax, or widened mediastinum.  There is also no evidence of gross rib deformity.   1504 My independent review of the shoulder  x-ray shows a proximal humerus fracture on the left   1504 My independent review of the humeral x-ray shows proximal humerus fracture on the left.     Procedures              Critical Care time:  none               Results for orders placed or performed during the hospital encounter of 03/21/23 (from the past 24 hour(s))   CT Cervical Spine w/o Contrast    Narrative    EXAM: CT CERVICAL SPINE W/O CONTRAST 3/21/2023 2:53 PM    PROVIDED HISTORY: Fall with neck pain    COMPARISON: Cervical CT 5/4/2021    TECHNIQUE:   Imaging protocol: Using multidetector thin collimation helical  acquisition technique, axial, coronal and sagittal CT images through  the cervical spine were obtained without intravenous contrast.   Acquisition: This CT exam was performed using one or more the  following dose reduction techniques: automated exposure control,  adjustment of the mA and/or kV according to patient size, and/or  iterative reconstruction technique.    FINDINGS:  Postsurgical changes of anterior cervical discectomy and fusion C4-C6.  The cervical vertebrae are normally aligned. Straightening of the  normal cervical lordotic curvature. No findings of acute fracture or  traumatic subluxation. Chronic right C3/4 fractured facet osteophyte.  No prevertebral edema. There is mild disc space loss C3-4 and moderate  disc space loss at C6-7. The findings on a level by level basis are as  follows:    C2-3: Uncinate and facet hypertrophy contributing to severe right  foraminal narrowing with compression of the exiting right C3 nerve.  Mild left foraminal narrowing. No significant spinal canal narrowing    C3-4:  Uncinate and facet hypertrophy contributing to severe right and  moderate left foraminal narrowing. Compression of the exiting right C4  nerve. Mild spinal canal narrowing    C4-5:  Fusion level. Uncinate and facet hypertrophy associated with  moderate right foraminal narrowing. No spinal canal or left foraminal  narrowing.    C5-6:   Fusion level. Uncinate and facet hypertrophy associated with  mild left foraminal narrowing. No spinal canal or right foraminal  narrowing.    C6-7:  Uncinate and facet hypertrophy contributing to mild bilateral  foraminal narrowing. Mild spinal canal narrowing    C7-T1:  Uncinate and facet hypertrophy contribute to moderate left  foraminal narrowing. Mild right foraminal narrowing. No significant  spinal canal narrowing.     No acute abnormality of the paraspinous soft tissues.      Impression    IMPRESSION:   1.  No acute fracture or traumatic subluxation.  2.  Multilevel cervical spondylosis without high-grade spinal canal  narrowing.   3.  Multilevel foraminal narrowing and nerve impingement described  above.    UMBERTO MORE MD         SYSTEM ID:  [id number]   XR Shoulder Left 2 Views    Narrative    Exam: XR SHOULDER LEFT 2 VIEWS     History:Male, age 52 years, Fall with pain    Comparison:  No relevant prior imaging.    Technique: Two views are submitted    Findings: Bones are normally mineralized. There is a mildly displaced,  comminuted fractures involving the surgical neck of the left humerus.  Shoulder girdle is intact. No apparent rib fracture. Postoperative  changes are seen in the lower cervical spine. No evidence of  dislocation.           Impression    Impression:  Mildly displaced, comminuted fracture involving the surgical neck of  the left humerus.    EVARISTO PITTS MD         SYSTEM ID:  [id number]   XR Chest Port 1 View    Narrative    Procedure:XR CHEST PORT 1 VIEW    Clinical history:Male, 52 years, Follow-up with left-sided chest pain    Technique: Single view was obtained.    Comparison: 11/7/2018    Findings: The cardiac silhouette is normal. The pulmonary vasculature  is normal.    The lungs are clear. Bony structures are unremarkable.      Impression    Impression:   No acute abnormality.     No evidence of acute or active cardiopulmonary disease.    EVARISTO PITTS MD         SYSTEM ID:   [id number]   Humerus XR,  G/E 2 views, left    Narrative    Exam: XR HUMERUS LEFT G/E 2 VIEWS     History:Male, age 52 years, Fall with pain    Comparison:  No relevant prior imaging.    Technique: Two views are submitted    Findings: Bones are normally mineralized. There is a comminuted,  mildly displaced fracture involving the surgical neck of the left  humerus. No evidence of additional fracture. No dislocation visualized  portions of the shoulder girdle are unremarkable. No distinct evidence  of an acute rib fracture           Impression    Impression:  Mildly displaced, comminuted proximal left humeral fracture.    EVARISTO PITTS MD         SYSTEM ID:  [id number]       Medications   ketorolac (TORADOL) injection 30 mg (30 mg Intravenous $Given 3/21/23 1442)   diazepam (VALIUM) injection 10 mg (10 mg Intravenous $Given 3/21/23 1443)       Assessments & Plan (with Medical Decision Making)   52-year-old male with a mechanical fall on the ice sustaining a left mildly displaced proximal humerus fracture.  No other evidence of injury or fracture.  CT cervical spine was negative for fracture.  Chest x-ray was negative.  Pain medications and sling.  See discharge instructions.  Return here for any new or worsening symptoms.    This document was prepared using a combination of typing and voice generated software.  While every attempt was made for accuracy, spelling and grammatical errors may exist.    I have reviewed the nursing notes.    I have reviewed the findings, diagnosis, plan and need for follow up with the patient.           Medical Decision Making  The patient's presentation was of low complexity (an acute and uncomplicated illness or injury).    The patient's evaluation involved:  ordering and/or review of 3+ test(s) in this encounter (Several images)    The patient's management necessitated moderate risk (prescription drug management including medications given in the ED).        New Prescriptions     OXYCODONE-ACETAMINOPHEN (PERCOCET) 5-325 MG TABLET    Take 1 tablet by mouth every 6 hours as needed for pain       Final diagnoses:   Proximal humerus fracture       3/21/2023   [facility]     Juhi Goldberg PA-C  03/21/23 2271

## 2023-03-21 NOTE — ED NOTES
Patient discharged home at this time. Patient given written and verbal discharge instructions regarding home care, f/u and medications. Patient verbalized understanding of all discharge instructions.     Follow-up with OA, call and schedule an appointment.     RX for percocet sent to [facility].

## 2023-03-21 NOTE — ED NOTES
"Pt presents via [facility] after falling. Pt reports he slipped on the ice and fell from standing, landing on his left side. Pt states \"I'm seeing white rainbows\", complains of neck, left shoulder, left chest, left wrist, and back pain. Pt is anxious and restless. Pt need redirection to complete evaluation. C-collar applied. No obvious deformities noted, CMS intact in left upper extremity.   "

## 2023-03-23 ENCOUNTER — HOSPITAL ENCOUNTER (EMERGENCY)
Facility: HOSPITAL | Age: 53
Discharge: HOME | End: 2023-03-23
Attending: EMERGENCY MEDICINE | Admitting: EMERGENCY MEDICINE
Payer: MEDICARE

## 2023-03-23 ENCOUNTER — APPOINTMENT (OUTPATIENT)
Dept: ULTRASOUND IMAGING | Facility: HOSPITAL | Age: 53
End: 2023-03-23
Attending: EMERGENCY MEDICINE
Payer: MEDICARE

## 2023-03-23 ENCOUNTER — APPOINTMENT (OUTPATIENT)
Dept: GENERAL RADIOLOGY | Facility: HOSPITAL | Age: 53
End: 2023-03-23
Attending: EMERGENCY MEDICINE
Payer: MEDICARE

## 2023-03-23 ENCOUNTER — APPOINTMENT (OUTPATIENT)
Dept: CT IMAGING | Facility: HOSPITAL | Age: 53
End: 2023-03-23
Attending: EMERGENCY MEDICINE
Payer: MEDICARE

## 2023-03-23 VITALS
OXYGEN SATURATION: 98 % | TEMPERATURE: 97.5 F | SYSTOLIC BLOOD PRESSURE: 147 MMHG | HEART RATE: 76 BPM | RESPIRATION RATE: 16 BRPM | DIASTOLIC BLOOD PRESSURE: 98 MMHG

## 2023-03-23 DIAGNOSIS — S32.10XA CLOSED FRACTURE OF SACRUM, UNSPECIFIED PORTION OF SACRUM, INITIAL ENCOUNTER (H): ICD-10-CM

## 2023-03-23 PROCEDURE — 250N000013 HC RX MED GY IP 250 OP 250 PS 637: Performed by: EMERGENCY MEDICINE

## 2023-03-23 PROCEDURE — 72192 CT PELVIS W/O DYE: CPT | Mod: MG

## 2023-03-23 PROCEDURE — 76705 ECHO EXAM OF ABDOMEN: CPT | Mod: 26 | Performed by: EMERGENCY MEDICINE

## 2023-03-23 PROCEDURE — 72220 X-RAY EXAM SACRUM TAILBONE: CPT

## 2023-03-23 PROCEDURE — 76705 ECHO EXAM OF ABDOMEN: CPT | Mod: TC | Performed by: EMERGENCY MEDICINE

## 2023-03-23 PROCEDURE — 99284 EMERGENCY DEPT VISIT MOD MDM: CPT | Mod: 25 | Performed by: EMERGENCY MEDICINE

## 2023-03-23 PROCEDURE — 72170 X-RAY EXAM OF PELVIS: CPT

## 2023-03-23 PROCEDURE — 99285 EMERGENCY DEPT VISIT HI MDM: CPT | Mod: 25

## 2023-03-23 RX ORDER — ACETAMINOPHEN 325 MG/1
975 TABLET ORAL ONCE
Status: COMPLETED | OUTPATIENT
Start: 2023-03-23 | End: 2023-03-23

## 2023-03-23 RX ORDER — AMOXICILLIN 250 MG
1 CAPSULE ORAL DAILY
Qty: 30 TABLET | Refills: 0 | Status: SHIPPED | OUTPATIENT
Start: 2023-03-23 | End: 2023-10-23

## 2023-03-23 RX ORDER — IBUPROFEN 600 MG/1
600 TABLET, FILM COATED ORAL ONCE
Status: COMPLETED | OUTPATIENT
Start: 2023-03-23 | End: 2023-03-23

## 2023-03-23 RX ORDER — LIDOCAINE 4 G/G
1 PATCH TOPICAL ONCE
Status: DISCONTINUED | OUTPATIENT
Start: 2023-03-23 | End: 2023-03-23 | Stop reason: HOSPADM

## 2023-03-23 RX ADMIN — ACETAMINOPHEN 975 MG: 325 TABLET ORAL at 15:01

## 2023-03-23 RX ADMIN — Medication 1 PATCH: at 15:01

## 2023-03-23 RX ADMIN — IBUPROFEN 600 MG: 600 TABLET ORAL at 17:40

## 2023-03-23 ASSESSMENT — ACTIVITIES OF DAILY LIVING (ADL)
ADLS_ACUITY_SCORE: 35
ADLS_ACUITY_SCORE: 35

## 2023-03-23 NOTE — ED TRIAGE NOTES
Reports fall 2 days ago. Fx left arm. Reports since then having severe low back pain to tailbone and abdominal discomfort. Denies N/V/D.      Triage Assessment     Row Name 03/23/23 1843       Triage Assessment (Adult)    Airway WDL WDL

## 2023-03-23 NOTE — ED PROVIDER NOTES
History     Chief Complaint   Patient presents with     Abdominal Pain     Tailbone Pain     HPI  Andrei Whitman is a 52 year old male who presents with coccyx pain. He slipped and fell on ice 2 days ago, landed on L shoulder. Has humerus fracture. Was seen in ED.  Noted some L flank pain immediately, and then on going home had pain in his coccyx. Had 4 episodes of vomiting after going home, thinks was due to IV pain medications.  Did not try the oxycodone as these cause upset stomach as well. Has not taken anything for pain.  Has had some abdominal cramping as well. Reports cramping has improved today as he was able to have a bowel movement. Reports paresthesia in coccygeal area. No numbness in legs. Pain with standing, but no weakness in legs.  No fever, no dyspnea.     Allergies:  No Known Allergies    Problem List:    Patient Active Problem List    Diagnosis Date Noted     Perianal abscess 04/08/2021     Priority: Medium     Added automatically from request for surgery [id number]       Suicidal behavior 08/12/2020     Priority: Medium     Dry mouth 08/12/2020     Priority: Medium     Obesity (BMI 35.0-39.9) with comorbidity (H) 09/23/2019     Priority: Medium     Chronic lung disease 09/23/2019     Priority: Medium     Lithium use 08/06/2018     Priority: Medium     DDD (degenerative disc disease), cervical 08/06/2018     Priority: Medium     Cervical stenosis of spinal canal 08/06/2018     Priority: Medium     Tobacco dependency 06/09/2018     Priority: Medium     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  Andrei Whitman  has no plan or code status on file. Discussed available resources and provided with information. Confirmed code status reflects current choices pending further ACP discussions.  Confirmed/documented legally designated decision makers.  Added by Monica Valerio               Attention deficit hyperactivity disorder (ADHD), predominantly inattentive type 02/19/2016     Priority: Medium     Abnormal weight gain 09/25/2015     Priority: Medium     Mood disorder (H) 07/15/2015     Priority: Medium     GERD (gastroesophageal reflux disease) 03/12/2015     Priority: Medium     Segmental and somatic dysfunction of lower extremity 11/26/2014     Priority: Medium     Lower back pain 10/24/2014     Priority: Medium     Cervicalgia 10/02/2014     Priority: Medium     Anxiety state 08/05/2013     Priority: Medium     IMO Update       Insomnia, unspecified 08/05/2013     Priority: Medium     Updated per 10/1/17 IMO import       Left hip pain 08/05/2013     Priority: Medium        Past Medical History:    Past Medical History:   Diagnosis Date     Russell's esophagus 07/12/2017     Bipolar disorder (H)      Chronic cervical pain      Colon polyp, hyperplastic 07/12/2017     Colon polyps 07/12/2017     DDD (degenerative disc disease), cervical      Depression, major      Dry mouth 8/12/2020     Pancolonic diverticulosis 07/12/2017       Past Surgical History:    Past Surgical History:   Procedure Laterality Date     APPENDECTOMY      age 14     COLONOSCOPY  07/12/2017    Left descending x1 tubular adenoma, sigmoid x1 tubular adenoma and rectal x1 hyperplastic polyp.  Pan diverticulosis     ENDOSCOPY UPPER, COLONOSCOPY, COMBINED N/A 7/12/2017    Procedure: COMBINED ENDOSCOPY UPPER, COLONOSCOPY;  UPPER ENDOSCOPY WITH BIOPSIES  AND COLONOSCOPY WITH POLYPECTOMY;  Surgeon: Francis Valverde DO;  Location: [facility]     ENT SURGERY  age 18    tonsils     ESOPHAGOGASTRODUODENOSCOPY  07/12/2017    chemical gatropathy, GE junction with  pancreatiuc metaplasia      ESOPHAGOSCOPY, GASTROSCOPY, DUODENOSCOPY (EGD), COMBINED N/A 6/20/2018    Procedure: COMBINED ESOPHAGOSCOPY, GASTROSCOPY, DUODENOSCOPY (EGD), BIOPSY SINGLE OR MULTIPLE;  UPPER ENDOSCOPY WITH BIOPSY;  Surgeon: Francis Valverde DO;  Location: [facility]     EXAM UNDER ANESTHESIA ANUS N/A 4/8/2021    Procedure: EXAM UNDER ANESTHESIA, incision and drainage perianal abscess;  Surgeon: Roland Beal MD;  Location: [facility]     IR CONSULTATION FOR IR EXAM  2/10/2021     ORTHOPEDIC SURGERY  age 30     back and neck fusion, bone from hip removed to use on plates     ORTHOPEDIC SURGERY  age 30    L5 fusion, laminectomy of lumbar discs       Family History:    Family History   Problem Relation Age of Onset     Asthma Mother      Arthritis Mother      Prostate Cancer Father      Heart Disease Paternal Grandfather      Hypertension Paternal Grandfather      Alcohol/Drug Paternal Grandfather      Other - See Comments Brother         Nerve and degenerative disease mild     Alcohol/Drug Brother      Other - See Comments Sister 21        Hip replacements, nerve, degerative disease     Alcohol/Drug Maternal Grandfather      Unknown/Adopted Paternal Grandmother      Other Cancer Paternal Aunt         Cervical cancer       Social History:  Marital Status:   [4]  Social History     Tobacco Use     Smoking status: Every Day     Packs/day: 0.50     Years: 30.00     Pack years: 15.00     Types: Cigarettes     Start date: 1/1/1989     Smokeless tobacco: Never     Tobacco comments:     quitplan referral declined 6/19/19 on chantax   Substance Use Topics     Alcohol use: No     Alcohol/week: 0.0 standard drinks     Drug use: Yes     Types: Marijuana        Medications:    senna-docusate (SENOKOT-S/PERICOLACE) 8.6-50 MG tablet  amphetamine-dextroamphetamine (ADDERALL) 20 MG tablet  amphetamine-dextroamphetamine (ADDERALL) 20 MG tablet  aspirin 81 MG tablet  carBAMazepine (TEGRETOL XR) 200 MG 12 hr  tablet  chlorhexidine (PERIDEX) 0.12 % solution  fluticasone (FLOVENT HFA) 220 MCG/ACT inhaler  gabapentin (NEURONTIN) 300 MG capsule  hydrochlorothiazide (MICROZIDE) 12.5 MG capsule  montelukast (SINGULAIR) 10 MG tablet  omeprazole (PRILOSEC) 40 MG DR capsule  oxyCODONE-acetaminophen (PERCOCET) 5-325 MG tablet  pilocarpine (SALAGEN) 5 MG tablet  verapamil ER (VERELAN) 240 MG 24 hr capsule          Review of Systems  Please seen HPI for pertinent positives and negatives. All other systems reviewed and found to be negative.   Physical Exam   BP: 141/86  Pulse: 89  Temp: 98.7  F (37.1  C)  Resp: 16  SpO2: 98 %      Physical Exam  Constitutional:       General: He is in acute distress.   HENT:      Head: Normocephalic and atraumatic.      Nose: Nose normal.      Mouth/Throat:      Mouth: Mucous membranes are moist.      Pharynx: Oropharynx is clear.   Eyes:      Extraocular Movements: Extraocular movements intact.   Cardiovascular:      Rate and Rhythm: Normal rate and regular rhythm.   Pulmonary:      Effort: Pulmonary effort is normal.      Breath sounds: Normal breath sounds.   Abdominal:      Palpations: Abdomen is soft.      Tenderness: There is no abdominal tenderness.   Musculoskeletal:      Cervical back: Normal range of motion.      Right lower leg: No edema.      Left lower leg: No edema.      Comments: Tender over sacral area and right lumbar paraspinals.  Palpable muscle spasm.  No hip tenderness.   Skin:     General: Skin is warm and dry.   Neurological:      Mental Status: He is alert and oriented to person, place, and time.      Comments: Strength and sensation intact to all extremities.  Ambulating well.         ED Course              ED Course as of 03/23/23 1751   Thu Mar 23, 2023   [facility]: consider CT, crutches, pain management, donut pillow, stool softeners   8631 IMPRESSION:   Acute, minimally displaced fracture involving the ventral aspect of  the fifth sacral  segment.     Nondisplaced fracture involving the fourth segment of the sacrum.      1748 Dr [facility]: stable fracture, can follow up at same time as shoulder (April 5), will be nonoperative. To use donut pillow, ibuprofen, tylenol, has oxycodone but makes him nauseated, ice, rest. Return precautions discussed as detailed in AVS. Patient expressed understanding.        Procedures    Results for orders placed during the hospital encounter of 03/23/23    POC US ABDOMEN LIMITED    Impression  Bedside FAST (Focused Assessment with Sonography in Trauma), performed and interpreted by me.  Indication: Abdominal pain    With the patient in Trendelenburg, the RUQ, LUQ and subxiphoid views were examined for intraabdominal and thoracic free fluid and pericardial effusion. With the patient in reverse Trendelenburg, the suprapubic view was examined for intraabdominal free fluid. Image quality was satisfactory..    Findings: There is no evidence of free fluid above or below bilateral diaphragms, in the splenorenal or hepatorenal space, or in bilateral paracolic gutters. There was no free fluid seen in the pelvis adjacent to the urinary bladder. There is no free fluid within the pericardium.  Extended FAST exam (eFAST):  Indication: Trauma  The chest wall was evaluated for evidence of pneumothorax.      IMPRESSION:  Negative FAST            Critical Care time:  none               Results for orders placed or performed during the hospital encounter of 03/23/23 (from the past 24 hour(s))   XR Sacrum and Coccyx 2 Views    Narrative    XR SACRUM AND COCCYX 2 VIEWS, XR PELVIS 1/2 VIEWS    HISTORY: fall, pain .    COMPARISON: 8/1/2022.    TECHNIQUE: 3 views of the sacrum and coccyx, one view of the pelvis.    FINDINGS:    There is offset of the distal fifth sacral segment on the lateral  sacral view, new when compared to the prior CT, suggesting an acute  fracture. No other pelvic ring fracture is identified.  Advanced  degenerative changes are again seen in the lumbar spine. Mild to  moderate hip osteoarthritic changes are seen.      Impression    IMPRESSION:     Findings most compatible with acute fracture of the distal sacrum.  Recommend follow-up.      AL SAHA MD         SYSTEM ID:  [id number]   XR Pelvis 1/2 Views    Narrative    XR SACRUM AND COCCYX 2 VIEWS, XR PELVIS 1/2 VIEWS    HISTORY: fall, pain .    COMPARISON: 8/1/2022.    TECHNIQUE: 3 views of the sacrum and coccyx, one view of the pelvis.    FINDINGS:    There is offset of the distal fifth sacral segment on the lateral  sacral view, new when compared to the prior CT, suggesting an acute  fracture. No other pelvic ring fracture is identified. Advanced  degenerative changes are again seen in the lumbar spine. Mild to  moderate hip osteoarthritic changes are seen.      Impression    IMPRESSION:     Findings most compatible with acute fracture of the distal sacrum.  Recommend follow-up.      AL SAHA MD         SYSTEM ID:  [id number]   POC US ABDOMEN LIMITED    Impression    Bedside FAST (Focused Assessment with Sonography in Trauma), performed and interpreted by me.   Indication: Abdominal pain    With the patient in Trendelenburg, the RUQ, LUQ and subxiphoid views were examined for intraabdominal and thoracic free fluid and pericardial effusion. With the patient in reverse Trendelenburg, the suprapubic view was examined for intraabdominal free fluid. Image quality was satisfactory..     Findings: There is no evidence of free fluid above or below bilateral diaphragms, in the splenorenal or hepatorenal space, or in bilateral paracolic gutters. There was no free fluid seen in the pelvis adjacent to the urinary bladder. There is no free fluid within the pericardium.   Extended FAST exam (eFAST):   Indication: Trauma   The chest wall was evaluated for evidence of pneumothorax.       IMPRESSION:  Negative FAST     CT Pelvis Bone wo Contrast     Narrative    CT PELVIS BONE WO CONTRAST, 3/23/2023 5:09 PM    History: Male, age 52 years; sacral fracture on xray eval for  retropulsion. Fall with sacral pain    Comparison: X-rays of the sacrum, coccyx and pelvis 3/23/2023    Technique: CT scan was performed of the bony pelvis without contrast.  Sagittal, coronal and axial reconstructions were reviewed .    FINDINGS:   There is a minimally displaced fracture involving the ventral margin  of the fifth sacral segments. Nondisplaced fracture seen in the fourth  sacral segment. Severe degenerative changes are seen within the lower  lumbar spine with bilateral pars defects at L5.      Impression    IMPRESSION:   Acute, minimally displaced fracture involving the ventral aspect of  the fifth sacral segment.    Nondisplaced fracture involving the fourth segment of the sacrum.    EVARISTO PITTS MD         SYSTEM ID:  [id number]       Medications   Lidocaine (LIDOCARE) 4 % Patch 1 patch (1 patch Transdermal $Patch/Med Applied 3/23/23 1501)   acetaminophen (TYLENOL) tablet 975 mg (975 mg Oral $Given 3/23/23 1501)   ibuprofen (ADVIL/MOTRIN) tablet 600 mg (600 mg Oral $Given 3/23/23 1740)       Assessments & Plan (with Medical Decision Making)     I have reviewed the nursing notes.    I have reviewed the findings, diagnosis, plan and need for follow up with the patient.   Mr. Whitman is a 52-year-old man who presents with sacral and low back pain after a fall 2 days ago.  Nonsyncopal fall during which he broke his left humerus.  He is in a sling for this.  We will obtain an x-ray to look for sacral/coccygeal fracture.  Will give Tylenol and Lidoderm as he has not taken anything for pain today.  No numbness or weakness in the legs, has had some difficulty with bowel movements however this seems due to pain, was able to have a bowel movement today.        Medical Decision Making  The patient's presentation was of low complexity (an acute and uncomplicated illness or  injury).    The patient's evaluation involved:  review of external note(s) from 1 sources (previous ED note)  ordering and/or review of 3+ test(s) in this encounter (see separate area of note for details)  discussion of management or test interpretation with another health professional (see separate area of note for details)    The patient's management necessitated only low risk treatment.        New Prescriptions    SENNA-DOCUSATE (SENOKOT-S/PERICOLACE) 8.6-50 MG TABLET    Take 1 tablet by mouth daily       Final diagnoses:   Closed fracture of sacrum, unspecified portion of sacrum, initial encounter (H)       3/23/2023   [facility]     Harriet Marion MD  03/23/23 3911

## 2023-03-23 NOTE — DISCHARGE INSTRUCTIONS
Remove lidocaine patch 12 hours after application (220 am or before bed)  Wash hands after handling and dispose of out of reach of children  After your skin has been patch free for 12 hours you can apply a new patch.  These are available over-the-counter as 4% lidocaine patches, brand name Salonpas  Remove immediately if you develop tingling particularly of the face, palpitations, skin irritation, or other concerns.   Tylenol 500-1000 mg every 6 hours as needed for pain.  Do not take more than 4000 mg per day   Ibuprofen 600 mg every 6 hours as needed for pain with food and plenty of water. Discontinue use after 5 days.   Tylenol 650 mg every 6 hours as needed for pain.  Do not take more than 3250 mg per day  Apply ice wrapped in a towel to painful areas for 10 minutes 5-6 times/day  Follow up with orthopedics in 1 week for persistent pain  Return to the emergency department for worsening pain, leg weakness/numbness, loss of bowel/bladder control, fever, or if you have any new or changing symptoms/concerns

## 2023-03-23 NOTE — ED NOTES
Patient reports falling earlier in the week, FX arm and now c/o lower back/tail bone pain and some RUQ abd that has been present since fall.

## 2023-04-01 NOTE — IP AVS SNAPSHOT
[facility]  [address] [address]  Phone: [phone]  Fax: [phone]                                    After Visit Summary   5/6/2021    Andrei Whitman    MRN: [id number]           After Visit Summary Signature Page    I have received my discharge instructions, and my questions have been answered. I have discussed any challenges I see with this plan with the nurse or doctor.    ..........................................................................................................................................  Patient/Patient Representative Signature      ..........................................................................................................................................  Patient Representative Print Name and Relationship to Patient    ..................................................               ................................................  Date                                   Time    ..........................................................................................................................................  Reviewed by Signature/Title    ...................................................              ..............................................  Date                                               Time          22EPIC Rev 08/18       
[FreeTextEntry1] : 42 year-old F presented to the office after having a splinter get stuck in her left middle finger at the level of the middle phalanx.  She went to an outside ER where attempted foreign body removal occurred.  She was told that they were unsure if the foreign body was completely removed nad that she should follow-up with a hand surgeon.  She then presented to Dr. Wells's office.  She left that appointment prior to any intervention and then established care with my practice.  She reports difficulty flexing at DIPJ secondary to pain from the foreign body.  No evidence of infection

## 2023-04-04 ENCOUNTER — OFFICE VISIT (OUTPATIENT)
Dept: PSYCHIATRY | Facility: OTHER | Age: 53
End: 2023-04-04
Attending: PSYCHIATRY & NEUROLOGY
Payer: MEDICARE

## 2023-04-04 VITALS
WEIGHT: 181 LBS | BODY MASS INDEX: 25.24 KG/M2 | OXYGEN SATURATION: 98 % | DIASTOLIC BLOOD PRESSURE: 68 MMHG | HEART RATE: 94 BPM | SYSTOLIC BLOOD PRESSURE: 136 MMHG | TEMPERATURE: 96.8 F

## 2023-04-04 DIAGNOSIS — F90.0 ADHD (ATTENTION DEFICIT HYPERACTIVITY DISORDER), INATTENTIVE TYPE: ICD-10-CM

## 2023-04-04 DIAGNOSIS — F31.9 BIPOLAR I DISORDER (H): ICD-10-CM

## 2023-04-04 PROCEDURE — 99213 OFFICE O/P EST LOW 20 MIN: CPT | Performed by: PSYCHIATRY & NEUROLOGY

## 2023-04-04 PROCEDURE — G0463 HOSPITAL OUTPT CLINIC VISIT: HCPCS

## 2023-04-04 RX ORDER — CARBAMAZEPINE 200 MG/1
200 TABLET, EXTENDED RELEASE ORAL 2 TIMES DAILY
Qty: 60 TABLET | Refills: 3 | Status: SHIPPED | OUTPATIENT
Start: 2023-04-04 | End: 2023-07-10

## 2023-04-04 RX ORDER — DEXTROAMPHETAMINE SACCHARATE, AMPHETAMINE ASPARTATE, DEXTROAMPHETAMINE SULFATE AND AMPHETAMINE SULFATE 5; 5; 5; 5 MG/1; MG/1; MG/1; MG/1
20 TABLET ORAL DAILY
Qty: 30 TABLET | Refills: 0 | Status: SHIPPED | OUTPATIENT
Start: 2023-04-07 | End: 2023-07-10

## 2023-04-04 ASSESSMENT — ANXIETY QUESTIONNAIRES
6. BECOMING EASILY ANNOYED OR IRRITABLE: MORE THAN HALF THE DAYS
8. IF YOU CHECKED OFF ANY PROBLEMS, HOW DIFFICULT HAVE THESE MADE IT FOR YOU TO DO YOUR WORK, TAKE CARE OF THINGS AT HOME, OR GET ALONG WITH OTHER PEOPLE?: VERY DIFFICULT
GAD7 TOTAL SCORE: 9
7. FEELING AFRAID AS IF SOMETHING AWFUL MIGHT HAPPEN: NOT AT ALL
IF YOU CHECKED OFF ANY PROBLEMS ON THIS QUESTIONNAIRE, HOW DIFFICULT HAVE THESE PROBLEMS MADE IT FOR YOU TO DO YOUR WORK, TAKE CARE OF THINGS AT HOME, OR GET ALONG WITH OTHER PEOPLE: VERY DIFFICULT
GAD7 TOTAL SCORE: 9
1. FEELING NERVOUS, ANXIOUS, OR ON EDGE: SEVERAL DAYS
2. NOT BEING ABLE TO STOP OR CONTROL WORRYING: SEVERAL DAYS
GAD7 TOTAL SCORE: 9
3. WORRYING TOO MUCH ABOUT DIFFERENT THINGS: SEVERAL DAYS
4. TROUBLE RELAXING: MORE THAN HALF THE DAYS
5. BEING SO RESTLESS THAT IT IS HARD TO SIT STILL: MORE THAN HALF THE DAYS
7. FEELING AFRAID AS IF SOMETHING AWFUL MIGHT HAPPEN: NOT AT ALL

## 2023-04-04 ASSESSMENT — COLUMBIA-SUICIDE SEVERITY RATING SCALE - C-SSRS
1. IN THE PAST MONTH, HAVE YOU WISHED YOU WERE DEAD OR WISHED YOU COULD GO TO SLEEP AND NOT WAKE UP?: NO
2. HAVE YOU ACTUALLY HAD ANY THOUGHTS OF KILLING YOURSELF?: NO
6. IN YOUR LIFETIME, HAVE YOU EVER DONE ANYTHING, STARTED TO DO ANYTHING, OR PREPARED TO DO ANYTHING TO END YOUR LIFE?: NO

## 2023-04-04 ASSESSMENT — PATIENT HEALTH QUESTIONNAIRE - PHQ9
SUM OF ALL RESPONSES TO PHQ QUESTIONS 1-9: 12
SUM OF ALL RESPONSES TO PHQ QUESTIONS 1-9: 12
10. IF YOU CHECKED OFF ANY PROBLEMS, HOW DIFFICULT HAVE THESE PROBLEMS MADE IT FOR YOU TO DO YOUR WORK, TAKE CARE OF THINGS AT HOME, OR GET ALONG WITH OTHER PEOPLE: VERY DIFFICULT

## 2023-04-04 ASSESSMENT — PAIN SCALES - GENERAL: PAINLEVEL: EXTREME PAIN (8)
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
## 2023-04-09 DIAGNOSIS — M54.12 CERVICAL RADICULOPATHY: ICD-10-CM

## 2023-04-10 RX ORDER — GABAPENTIN 300 MG/1
CAPSULE ORAL
Qty: 180 CAPSULE | Refills: 0 | Status: SHIPPED | OUTPATIENT
Start: 2023-04-10 | End: 2023-05-26

## 2023-04-10 NOTE — TELEPHONE ENCOUNTER
Gabapentin       Last Written Prescription Date:  2/15/23  Last Fill Quantity: 180,   # refills: 0  Last Office Visit: 11/10/22  Future Office visit:    Next 5 appointments (look out 90 days)    May 09, 2023 10:20 AM  (Arrive by 10:05 AM)  Return Visit with Annette Hammond MD  [facility] ([facility] ) [facility] [address] [place] [id number] [phone]

## 2023-04-16 DIAGNOSIS — K11.7 CLINICAL XEROSTOMIA: ICD-10-CM

## 2023-04-17 RX ORDER — PILOCARPINE HYDROCHLORIDE 5 MG/1
5 TABLET, FILM COATED ORAL 4 TIMES DAILY
Qty: 360 TABLET | Refills: 0 | Status: SHIPPED | OUTPATIENT
Start: 2023-04-17 | End: 2023-08-14

## 2023-04-17 NOTE — TELEPHONE ENCOUNTER
pilocarpine      Last Written Prescription Date:  12/28/22  Last Fill Quantity: 360,   # refills: 0  Last Office Visit: 11/10/22  Future Office visit:    Next 5 appointments (look out 90 days)    May 09, 2023 10:20 AM  (Arrive by 10:05 AM)  Return Visit with Annette Hammond MD  [facility] ([facility] ) [address] [place] [id number] [phone]

## 2023-04-26 ENCOUNTER — TRANSFERRED RECORDS (OUTPATIENT)
Dept: HEALTH INFORMATION MANAGEMENT | Facility: CLINIC | Age: 53
End: 2023-04-26

## 2023-05-09 ENCOUNTER — OFFICE VISIT (OUTPATIENT)
Dept: PSYCHIATRY | Facility: OTHER | Age: 53
End: 2023-05-09
Attending: PSYCHIATRY & NEUROLOGY
Payer: MEDICARE

## 2023-05-09 VITALS
HEART RATE: 83 BPM | OXYGEN SATURATION: 96 % | SYSTOLIC BLOOD PRESSURE: 116 MMHG | BODY MASS INDEX: 25.24 KG/M2 | WEIGHT: 181 LBS | DIASTOLIC BLOOD PRESSURE: 80 MMHG | TEMPERATURE: 97.6 F

## 2023-05-09 DIAGNOSIS — F90.0 ADHD (ATTENTION DEFICIT HYPERACTIVITY DISORDER), INATTENTIVE TYPE: Primary | ICD-10-CM

## 2023-05-09 PROCEDURE — 99213 OFFICE O/P EST LOW 20 MIN: CPT | Performed by: PSYCHIATRY & NEUROLOGY

## 2023-05-09 PROCEDURE — G0463 HOSPITAL OUTPT CLINIC VISIT: HCPCS

## 2023-05-09 RX ORDER — DEXTROAMPHETAMINE SACCHARATE, AMPHETAMINE ASPARTATE, DEXTROAMPHETAMINE SULFATE AND AMPHETAMINE SULFATE 5; 5; 5; 5 MG/1; MG/1; MG/1; MG/1
20 TABLET ORAL DAILY
Qty: 30 TABLET | Refills: 0 | Status: SHIPPED | OUTPATIENT
Start: 2023-05-15 | End: 2023-06-22

## 2023-05-09 RX ORDER — DEXTROAMPHETAMINE SACCHARATE, AMPHETAMINE ASPARTATE, DEXTROAMPHETAMINE SULFATE AND AMPHETAMINE SULFATE 5; 5; 5; 5 MG/1; MG/1; MG/1; MG/1
20 TABLET ORAL DAILY
Qty: 30 TABLET | Refills: 0 | Status: SHIPPED | OUTPATIENT
Start: 2023-06-12 | End: 2023-07-10

## 2023-05-09 ASSESSMENT — ANXIETY QUESTIONNAIRES
GAD7 TOTAL SCORE: 9
2. NOT BEING ABLE TO STOP OR CONTROL WORRYING: SEVERAL DAYS
6. BECOMING EASILY ANNOYED OR IRRITABLE: MORE THAN HALF THE DAYS
IF YOU CHECKED OFF ANY PROBLEMS ON THIS QUESTIONNAIRE, HOW DIFFICULT HAVE THESE PROBLEMS MADE IT FOR YOU TO DO YOUR WORK, TAKE CARE OF THINGS AT HOME, OR GET ALONG WITH OTHER PEOPLE: SOMEWHAT DIFFICULT
7. FEELING AFRAID AS IF SOMETHING AWFUL MIGHT HAPPEN: NOT AT ALL
3. WORRYING TOO MUCH ABOUT DIFFERENT THINGS: SEVERAL DAYS
1. FEELING NERVOUS, ANXIOUS, OR ON EDGE: MORE THAN HALF THE DAYS
7. FEELING AFRAID AS IF SOMETHING AWFUL MIGHT HAPPEN: NOT AT ALL
GAD7 TOTAL SCORE: 9
5. BEING SO RESTLESS THAT IT IS HARD TO SIT STILL: SEVERAL DAYS
GAD7 TOTAL SCORE: 9
4. TROUBLE RELAXING: MORE THAN HALF THE DAYS
8. IF YOU CHECKED OFF ANY PROBLEMS, HOW DIFFICULT HAVE THESE MADE IT FOR YOU TO DO YOUR WORK, TAKE CARE OF THINGS AT HOME, OR GET ALONG WITH OTHER PEOPLE?: SOMEWHAT DIFFICULT

## 2023-05-09 ASSESSMENT — PAIN SCALES - GENERAL: PAINLEVEL: SEVERE PAIN (6)

## 2023-05-09 ASSESSMENT — PATIENT HEALTH QUESTIONNAIRE - PHQ9
SUM OF ALL RESPONSES TO PHQ QUESTIONS 1-9: 9
SUM OF ALL RESPONSES TO PHQ QUESTIONS 1-9: 9
10. IF YOU CHECKED OFF ANY PROBLEMS, HOW DIFFICULT HAVE THESE PROBLEMS MADE IT FOR YOU TO DO YOUR WORK, TAKE CARE OF THINGS AT HOME, OR GET ALONG WITH OTHER PEOPLE: SOMEWHAT DIFFICULT

## 2023-05-09 ASSESSMENT — COLUMBIA-SUICIDE SEVERITY RATING SCALE - C-SSRS
1. IN THE PAST MONTH, HAVE YOU WISHED YOU WERE DEAD OR WISHED YOU COULD GO TO SLEEP AND NOT WAKE UP?: NO
2. HAVE YOU ACTUALLY HAD ANY THOUGHTS OF KILLING YOURSELF?: NO

## 2023-05-09 NOTE — PROGRESS NOTES
"      PSYCHIATRY CLINIC PROGRESS NOTE     SUBJECTIVE / INTERIM HISTORY                                                                          Last visit 4/4/23:    - I asked Andrei how he is doing with paranoia. Today he notes \"no, everything is good now\". I asked if anyone has been in his apartment as he said last time and reported no.  - Andrei ended up with fall on ice while he had the dogs (leashes in hand) and sacrum fracture and left proximal humerus fracture. Notes he did see orthopedic associates.  - he and Paulette been doing well.   - son back in area. Son working at [employer]. Son is going to buy [employer]'s  - they get out to his parent's place [place] the country. Paulette likes to have coffee and visit with Andrei's mom  -[place] on [address] [place] creek. For while bought it and he lived there with Brianna and when was with her then in 2010 when  Brianna and moved to [place] with Paulette when they started relationship  -  Finished HCC with AA and was planning to go for radiology degree. Then started in [place] for [employer].  and notes dated Brianna Vlatkovich of [employer] and raised Ross who passed away form overdose. And Andrei's nephew Andrei murdered.  - Social/Spiritual Support- sister Caroline, dad Carlos, GF Paulette [place]     MEDICAL ROS- sacrum fracture and left proximal humerus fracture. back injury and surgeries since 29 yo. He denies history of overt injury rather been diagnosed with degenerative disc disease.  MEDICAL / SURGICAL HISTORY                     Patient Active Problem List   Diagnosis     Cervicalgia     Lower back pain     Segmental and somatic dysfunction of lower extremity     GERD (gastroesophageal reflux disease)     Mood disorder (H)     Abnormal weight gain     Attention deficit hyperactivity disorder (ADHD), predominantly inattentive type     ACP (advance care planning)     Flatulence, eructation, and gas pain     Bipolar disease, chronic (H)     Benign essential hypertension "     Russell esophagus     Tobacco dependency     Lithium use     DDD (degenerative disc disease), cervical     Cervical stenosis of spinal canal     Obesity (BMI 35.0-39.9) with comorbidity (H)     Chronic lung disease     Suicidal behavior     Dry mouth     Anxiety state     Insomnia, unspecified     Left hip pain     Perianal abscess     ALLERGY   Patient has no known allergies.  MEDICATIONS                                                                                             Current Outpatient Medications   Medication Sig     amphetamine-dextroamphetamine (ADDERALL) 20 MG tablet Take 1 tablet (20 mg) by mouth daily     aspirin 81 MG tablet Take 1 tablet (81 mg) by mouth daily     carBAMazepine (TEGRETOL XR) 200 MG 12 hr tablet Take 1 tablet (200 mg) by mouth 2 times daily     chlorhexidine (PERIDEX) 0.12 % solution SWISH AND SPIT 15 MLS IN MOUTH 2 TIMES DAILY     gabapentin (NEURONTIN) 300 MG capsule TAKE 2 CAPSULES (600MG) BY MOUTH THREE TIMES DAILY     hydrochlorothiazide (MICROZIDE) 12.5 MG capsule TAKE 1 CASPULE (12.5 MG) BYMOUTH EVERY MORNING     montelukast (SINGULAIR) 10 MG tablet TAKE 1 TABLET (10 MG) BY MOUTH AT BEDTIME     omeprazole (PRILOSEC) 40 MG DR capsule TAKE 1 CAPSULE BY MOUTH DAILY     pilocarpine (SALAGEN) 5 MG tablet TAKE 1 TABLET (5 MG) BY MOUTH 4 TIMES DAILY     senna-docusate (SENOKOT-S/PERICOLACE) 8.6-50 MG tablet Take 1 tablet by mouth daily     verapamil ER (VERELAN) 240 MG 24 hr capsule Take 1 capsule (240 mg) by mouth At Bedtime     No current facility-administered medications for this visit.       VITALS   /80 (BP Location: Left arm, Patient Position: Sitting, Cuff Size: Adult Regular)   Pulse 83   Temp 97.6  F (36.4  C) (Tympanic)   Wt 82.1 kg (181 lb)   SpO2 96%   BMI 25.24 kg/m       PHQ9                     [unfilled]  LABS                                                                                                                         Last  Comprehensive Metabolic Panel:  Sodium   Date Value Ref Range Status   11/21/2022 136 136 - 145 mmol/L Final   04/08/2021 139 133 - 144 mmol/L Final     Potassium   Date Value Ref Range Status   11/21/2022 3.2 (L) 3.4 - 5.3 mmol/L Final   08/01/2022 3.1 (L) 3.4 - 5.3 mmol/L Final   04/08/2021 3.1 (L) 3.4 - 5.3 mmol/L Final     Chloride   Date Value Ref Range Status   11/21/2022 99 98 - 107 mmol/L Final   08/01/2022 104 94 - 109 mmol/L Final   04/08/2021 105 94 - 109 mmol/L Final     Carbon Dioxide   Date Value Ref Range Status   04/08/2021 29 20 - 32 mmol/L Final     Carbon Dioxide (CO2)   Date Value Ref Range Status   11/21/2022 27 22 - 29 mmol/L Final   08/01/2022 26 20 - 32 mmol/L Final     Anion Gap   Date Value Ref Range Status   11/21/2022 10 7 - 15 mmol/L Final   08/01/2022 9 3 - 14 mmol/L Final   04/08/2021 5 3 - 14 mmol/L Final     Glucose   Date Value Ref Range Status   11/21/2022 107 (H) 70 - 99 mg/dL Final   08/01/2022 129 (H) 70 - 99 mg/dL Final   04/08/2021 96 70 - 99 mg/dL Final     Urea Nitrogen   Date Value Ref Range Status   11/21/2022 3.7 (L) 6.0 - 20.0 mg/dL Final   08/01/2022 8 7 - 30 mg/dL Final   04/08/2021 7 7 - 30 mg/dL Final     Creatinine   Date Value Ref Range Status   11/21/2022 0.62 (L) 0.67 - 1.17 mg/dL Final   04/08/2021 0.81 0.66 - 1.25 mg/dL Final     GFR Estimate   Date Value Ref Range Status   11/21/2022 >90 >60 mL/min/1.73m2 Final     Comment:     Effective December 21, 2021 eGFRcr in adults is calculated using the 2021 CKD-EPI creatinine equation which includes age and gender (Jordi et al., NEJ, DOI: 10.1056/ENLGya8519329)   04/08/2021 >90 >60 mL/min/[1.73_m2] Final     Comment:     Non  GFR Calc  Starting 12/18/2018, serum creatinine based estimated GFR (eGFR) will be   calculated using the Chronic Kidney Disease Epidemiology Collaboration   (CKD-EPI) equation.       Calcium   Date Value Ref Range Status   11/21/2022 9.4 8.6 - 10.0 mg/dL Final   04/08/2021 9.0  "8.5 - 10.1 mg/dL Final     CBC RESULTS: Recent Labs   Lab Test 11/21/22 1944   WBC 12.5*   RBC 4.30*   HGB 13.4   HCT 39.6*   MCV 92   MCH 31.2   MCHC 33.8   RDW 13.0            MENTAL STATUS EXAM                                                                                       Awake, alert. No problems with speech. Psychomotor: unremarkable whereas last visit he appeared to be in pain.  Mood euthymic and affect congruent to mood and speech content. Thought process, including associations, was unremarkable and thought content was devoid of suicidal and homicidal ideation. He denies any paranoia today.  Judgment was adequate for safety. Fund of knowledge was intact. Pt demonstrates no obvious problems with attention, concentration, language, recent or remote memory although these were not formally tested.     ASSESSMENT                                                                                                      HISTORICAL:  Initial psych note 12/28/20          NOTES:  discharge summary from April 8/13/20: \"Lithium was tapered and discontinued in march/begining of April. At that time his lamictal was increased. It appears that lithium was tapered due excessive thirst and dry mouth. Elavil was increased for insomnia in February.\" Seroquel \" weight gain, didn't like how made him feel.     Andrei Whitman is a 53 yo with bipolar disorder and ADHD with psychiatric hospitalization August 2020. Was on amitriptyline, Adderall, Wellbutrin : all activating meds although Andrei notes he feels was the Lamictal that precipitated bibiana. Was noted Depakote for him past weight gain, lithium polyuria and thirst, uncertain whether he had been on neuroleptics (I saw Latuda mentioned in [facility] notes). Last visit I asked about antipsychotics and his thought of taking them which can help with paranoia. I brought up Seroquel and he recalls taking this in the past. He declined and noted the things he was experiencing were not " paranoia..    Today he seems to be doing better - both mentally and physically. He reports he did see ortho. No med changes today and we agreed on having him RTC in 2 months.      TREATMENT RISK STATEMENT:  The risks, benefits, alternatives and potential adverse effects have been explained and are understood by the pt.  The pt agrees to the treatment plan with the ability to do so.   The pt knows to call the clinic for any problems or access emergency care if needed.        DIAGNOSES                     Bipolar disorder, most recent episode manic (with psychosis), in partial remission  ADHD    PLAN                                                                                                                    1)  MEDICATIONS:         -- Continue Tegretol  mg twice daily, refilled today. Continue Adderall IR 20 mg daily filled next for 5/15 and for 6/12.     2)  THERAPY:  No Change    3)  LABS:  Tegretol level 2/8/21. CBC 11/2022 and BMP    4)  PT MONITOR [call for probs]:  Worsening symptoms, SI/HI, SEs from meds    5)  REFERRALS [CD, medical, other]:  None    6)  RTC:  2 months                                                                                                      Answers for HPI/ROS submitted by the patient on 4/4/2023  If you checked off any problems, how difficult have these problems made it for you to do your work, take care of things at home, or get along with other people?: Very difficult  PHQ9 TOTAL SCORE: 12  SONDRA 7 TOTAL SCORE: 9

## 2023-05-17 DIAGNOSIS — F90.0 ADHD (ATTENTION DEFICIT HYPERACTIVITY DISORDER), INATTENTIVE TYPE: ICD-10-CM

## 2023-05-17 RX ORDER — DEXTROAMPHETAMINE SACCHARATE, AMPHETAMINE ASPARTATE, DEXTROAMPHETAMINE SULFATE AND AMPHETAMINE SULFATE 5; 5; 5; 5 MG/1; MG/1; MG/1; MG/1
20 TABLET ORAL DAILY
Qty: 30 TABLET | Refills: 0 | OUTPATIENT
Start: 2023-05-17

## 2023-05-25 DIAGNOSIS — M54.12 CERVICAL RADICULOPATHY: ICD-10-CM

## 2023-05-25 NOTE — TELEPHONE ENCOUNTER
Gabapentin      Last Written Prescription Date:  04/10/2023  Last Fill Quantity: 180,   # refills: 0  Last Office Visit: 11/10/2022

## 2023-05-26 RX ORDER — GABAPENTIN 300 MG/1
CAPSULE ORAL
Qty: 180 CAPSULE | Refills: 0 | Status: SHIPPED | OUTPATIENT
Start: 2023-05-26 | End: 2023-06-29

## 2023-05-28 DIAGNOSIS — I10 ESSENTIAL HYPERTENSION: ICD-10-CM

## 2023-05-31 RX ORDER — HYDROCHLOROTHIAZIDE 12.5 MG/1
CAPSULE ORAL
Qty: 90 CAPSULE | Refills: 1 | Status: SHIPPED | OUTPATIENT
Start: 2023-05-31 | End: 2023-06-01

## 2023-05-31 NOTE — TELEPHONE ENCOUNTER
Hydrochlorothiazide      Last Written Prescription Date:  11/10/22  Last Fill Quantity: 90,   # refills: 0  Last Office Visit: 1  Future Office visit:  11/10/22  Next 5 appointments (look out 90 days)    Jul 10, 2023 10:20 AM  (Arrive by 10:05 AM)  Return Visit with Annette Hammond MD  [facility] ([facility] ) [address] [place] [id number] [phone]

## 2023-06-12 DIAGNOSIS — K21.9 GASTROESOPHAGEAL REFLUX DISEASE, UNSPECIFIED WHETHER ESOPHAGITIS PRESENT: ICD-10-CM

## 2023-06-12 DIAGNOSIS — I10 ESSENTIAL HYPERTENSION: ICD-10-CM

## 2023-06-13 RX ORDER — OMEPRAZOLE 40 MG/1
CAPSULE, DELAYED RELEASE ORAL
Qty: 90 CAPSULE | Refills: 1 | OUTPATIENT
Start: 2023-06-13

## 2023-06-13 RX ORDER — VERAPAMIL HYDROCHLORIDE 240 MG/1
240 CAPSULE, EXTENDED RELEASE ORAL AT BEDTIME
Qty: 90 CAPSULE | Refills: 0 | Status: SHIPPED | OUTPATIENT
Start: 2023-06-13 | End: 2023-09-05

## 2023-06-13 RX ORDER — HYDROCHLOROTHIAZIDE 12.5 MG/1
CAPSULE ORAL
Qty: 90 CAPSULE | Refills: 0 | OUTPATIENT
Start: 2023-06-13

## 2023-06-13 NOTE — TELEPHONE ENCOUNTER
Verapamil       Last Written Prescription Date:  11/10/2022  Last Fill Quantity: 90,   # refills: 1  Last Office Visit: 0  Future Office visit:    Next 5 appointments (look out 90 days)    Jul 10, 2023 10:20 AM  (Arrive by 10:05 AM)  Return Visit with Annette Hammond MD  [facility] ([facility] ) [address] [place] [id number] [phone]

## 2023-06-19 NOTE — TELEPHONE ENCOUNTER
Prescription picked up by Luisa Simmons ID Verified    
Pt notified that the written RX is ready at the [facility]  registration to be picked up.     
adderall      Last Written Prescription Date: 2/22/17  Last Fill Quantity: 60,  # refills: 0   Last Office Visit with [facility], [facility] or [facility] prescribing provider: 2/22/17                                         Next 5 appointments (look out 90 days)     Apr 05, 2017  9:20 AM CDT   (Arrive by 9:00 AM)   SHORT with Tommy Lamb DO   [facility] ([facility])    [facility] [place] [address]   [phone]                      
Bed in lowest position, wheels locked, appropriate side rails in place/Call bell, personal items and telephone in reach/Instruct patient to call for assistance before getting out of bed or chair/Non-slip footwear when patient is out of bed/[place] to call system/Physically safe environment - no spills, clutter or unnecessary equipment/Purposeful Proactive Rounding/Room/bathroom lighting operational, light cord in reach

## 2023-06-20 DIAGNOSIS — F90.0 ADHD (ATTENTION DEFICIT HYPERACTIVITY DISORDER), INATTENTIVE TYPE: ICD-10-CM

## 2023-06-22 RX ORDER — DEXTROAMPHETAMINE SACCHARATE, AMPHETAMINE ASPARTATE, DEXTROAMPHETAMINE SULFATE AND AMPHETAMINE SULFATE 5; 5; 5; 5 MG/1; MG/1; MG/1; MG/1
TABLET ORAL
Qty: 30 TABLET | Refills: 0 | Status: SHIPPED | OUTPATIENT
Start: 2023-06-22 | End: 2023-09-20

## 2023-06-22 NOTE — TELEPHONE ENCOUNTER
Amphetamine-dextroamphetamine (Adderall) 20 mg tablet  Last Written Prescription Date:  6-  Last Fill Quantity: 30 tablet,   # refills: 0  Last Office Visit: 5-9-2023  Future Office visit:    Next 5 appointments (look out 90 days)    Jul 10, 2023 10:20 AM  (Arrive by 10:05 AM)  Return Visit with Annette Hammond MD  [facility] ([facility] ) [address] [place] [id number] [phone]

## 2023-06-29 DIAGNOSIS — M54.12 CERVICAL RADICULOPATHY: ICD-10-CM

## 2023-06-29 RX ORDER — GABAPENTIN 300 MG/1
CAPSULE ORAL
Qty: 180 CAPSULE | Refills: 0 | Status: SHIPPED | OUTPATIENT
Start: 2023-06-29 | End: 2023-08-01

## 2023-06-29 NOTE — TELEPHONE ENCOUNTER
Gabapentin      Last Written Prescription Date:  5.26.23  Last Fill Quantity: #180,   # refills: 0  Last Office Visit: 11.10.22  Future Office visit:    Next 5 appointments (look out 90 days)    Jul 10, 2023 10:20 AM  (Arrive by 10:05 AM)  Return Visit with Annette Hammond MD  [facility] ([facility] ) [address] [place] [id number]  [phone]           Routing refill request to provider for review/approval because:    Drug not on the [facility], [facility] or [facility] refill protocol or controlled substance

## 2023-07-07 DIAGNOSIS — K12.0 APHTHOUS ULCER OF MOUTH: ICD-10-CM

## 2023-07-07 RX ORDER — MONTELUKAST SODIUM 10 MG/1
10 TABLET ORAL AT BEDTIME
Qty: 90 TABLET | Refills: 0 | Status: SHIPPED | OUTPATIENT
Start: 2023-07-07 | End: 2023-07-10

## 2023-07-10 ENCOUNTER — OFFICE VISIT (OUTPATIENT)
Dept: PSYCHIATRY | Facility: OTHER | Age: 53
End: 2023-07-10
Attending: PSYCHIATRY & NEUROLOGY
Payer: MEDICARE

## 2023-07-10 VITALS
OXYGEN SATURATION: 98 % | TEMPERATURE: 97.9 F | BODY MASS INDEX: 24.66 KG/M2 | HEART RATE: 82 BPM | DIASTOLIC BLOOD PRESSURE: 64 MMHG | SYSTOLIC BLOOD PRESSURE: 114 MMHG | WEIGHT: 176.8 LBS

## 2023-07-10 DIAGNOSIS — F31.9 BIPOLAR I DISORDER (H): ICD-10-CM

## 2023-07-10 PROCEDURE — G0463 HOSPITAL OUTPT CLINIC VISIT: HCPCS

## 2023-07-10 PROCEDURE — 99213 OFFICE O/P EST LOW 20 MIN: CPT | Performed by: PSYCHIATRY & NEUROLOGY

## 2023-07-10 RX ORDER — CARBAMAZEPINE 200 MG/1
200 TABLET, EXTENDED RELEASE ORAL 2 TIMES DAILY
Qty: 60 TABLET | Refills: 3 | Status: SHIPPED | OUTPATIENT
Start: 2023-07-10 | End: 2023-10-17

## 2023-07-10 ASSESSMENT — PATIENT HEALTH QUESTIONNAIRE - PHQ9
SUM OF ALL RESPONSES TO PHQ QUESTIONS 1-9: 10
SUM OF ALL RESPONSES TO PHQ QUESTIONS 1-9: 10
10. IF YOU CHECKED OFF ANY PROBLEMS, HOW DIFFICULT HAVE THESE PROBLEMS MADE IT FOR YOU TO DO YOUR WORK, TAKE CARE OF THINGS AT HOME, OR GET ALONG WITH OTHER PEOPLE: VERY DIFFICULT

## 2023-07-10 ASSESSMENT — ANXIETY QUESTIONNAIRES
6. BECOMING EASILY ANNOYED OR IRRITABLE: MORE THAN HALF THE DAYS
4. TROUBLE RELAXING: MORE THAN HALF THE DAYS
GAD7 TOTAL SCORE: 9
1. FEELING NERVOUS, ANXIOUS, OR ON EDGE: MORE THAN HALF THE DAYS
7. FEELING AFRAID AS IF SOMETHING AWFUL MIGHT HAPPEN: NOT AT ALL
5. BEING SO RESTLESS THAT IT IS HARD TO SIT STILL: SEVERAL DAYS
3. WORRYING TOO MUCH ABOUT DIFFERENT THINGS: SEVERAL DAYS
2. NOT BEING ABLE TO STOP OR CONTROL WORRYING: SEVERAL DAYS
IF YOU CHECKED OFF ANY PROBLEMS ON THIS QUESTIONNAIRE, HOW DIFFICULT HAVE THESE PROBLEMS MADE IT FOR YOU TO DO YOUR WORK, TAKE CARE OF THINGS AT HOME, OR GET ALONG WITH OTHER PEOPLE: VERY DIFFICULT
GAD7 TOTAL SCORE: 9

## 2023-07-10 ASSESSMENT — PAIN SCALES - GENERAL: PAINLEVEL: MILD PAIN (2)
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
## 2023-07-21 DIAGNOSIS — K05.10 GINGIVITIS: ICD-10-CM

## 2023-07-24 RX ORDER — CHLORHEXIDINE GLUCONATE ORAL RINSE 1.2 MG/ML
15 SOLUTION DENTAL 2 TIMES DAILY
Qty: 473 ML | Refills: 2 | Status: SHIPPED | OUTPATIENT
Start: 2023-07-24 | End: 2024-03-04

## 2023-07-24 NOTE — TELEPHONE ENCOUNTER
Peridex      Last Written Prescription Date:  12/9/21  Last Fill Quantity: 1893mL,   # refills: 2  Last Office Visit: 11/10/22  Future Office visit:    Next 5 appointments (look out 90 days)      Aug 15, 2023  9:40 AM  (Arrive by 9:25 AM)  Return Visit with Annette Hammond MD  [facility] ([facility] ) [address] [place] [id number] [phone]             Routing refill request to provider for review/approval because:

## 2023-07-28 DIAGNOSIS — M54.12 CERVICAL RADICULOPATHY: ICD-10-CM

## 2023-07-28 RX ORDER — GABAPENTIN 300 MG/1
CAPSULE ORAL
Qty: 180 CAPSULE | Refills: 0 | OUTPATIENT
Start: 2023-07-28

## 2023-07-28 NOTE — TELEPHONE ENCOUNTER
Gabapentin      Last Written Prescription Date:  6.29.23  Last Fill Quantity: #180,   # refills: 0  Last Office Visit: 7.10.23.  Future Office visit:    Next 5 appointments (look out 90 days)      Aug 15, 2023  9:40 AM  (Arrive by 9:25 AM)  Return Visit with Annette Hammond MD  [facility] ([facility] ) [address] [place] [id number]  [phone]             Routing refill request to provider for review/approval because:  Drug not on the [facility], [facility] or [facility] refill protocol or controlled substance

## 2023-08-01 RX ORDER — GABAPENTIN 300 MG/1
600 CAPSULE ORAL 3 TIMES DAILY
Qty: 180 CAPSULE | Refills: 0 | Status: SHIPPED | OUTPATIENT
Start: 2023-08-01 | End: 2023-08-28

## 2023-08-13 DIAGNOSIS — K11.7 CLINICAL XEROSTOMIA: ICD-10-CM

## 2023-08-14 RX ORDER — PILOCARPINE HYDROCHLORIDE 5 MG/1
5 TABLET, FILM COATED ORAL 4 TIMES DAILY
Qty: 360 TABLET | Refills: 0 | Status: SHIPPED | OUTPATIENT
Start: 2023-08-14 | End: 2023-12-19

## 2023-08-14 NOTE — TELEPHONE ENCOUNTER
Salagen      Last Written Prescription Date:  4.18.23  Last Fill Quantity: #360,   # refills: 0  Last Office Visit: 11.10.22  Future Office visit:    Next 5 appointments (look out 90 days)      Aug 15, 2023  9:40 AM  (Arrive by 9:25 AM)  Return Visit with Annette Hammond MD  [facility] ([facility] ) [address] [place] [id number]  [phone]             Routing refill request to provider for review/approval because:  Drug not on the [facility], [facility] or [facility] refill protocol or controlled substance

## 2023-08-15 ENCOUNTER — OFFICE VISIT (OUTPATIENT)
Dept: PSYCHIATRY | Facility: OTHER | Age: 53
End: 2023-08-15
Attending: PSYCHIATRY & NEUROLOGY
Payer: MEDICARE

## 2023-08-15 VITALS
DIASTOLIC BLOOD PRESSURE: 70 MMHG | OXYGEN SATURATION: 97 % | BODY MASS INDEX: 24.41 KG/M2 | TEMPERATURE: 99.1 F | WEIGHT: 175 LBS | SYSTOLIC BLOOD PRESSURE: 108 MMHG | HEART RATE: 83 BPM

## 2023-08-15 DIAGNOSIS — F31.9 BIPOLAR I DISORDER (H): Primary | ICD-10-CM

## 2023-08-15 PROCEDURE — 99214 OFFICE O/P EST MOD 30 MIN: CPT | Performed by: PSYCHIATRY & NEUROLOGY

## 2023-08-15 PROCEDURE — G0463 HOSPITAL OUTPT CLINIC VISIT: HCPCS

## 2023-08-15 RX ORDER — OLANZAPINE 5 MG/1
TABLET ORAL
Qty: 30 TABLET | Refills: 3 | Status: SHIPPED | OUTPATIENT
Start: 2023-08-15 | End: 2023-10-23

## 2023-08-15 ASSESSMENT — PATIENT HEALTH QUESTIONNAIRE - PHQ9
SUM OF ALL RESPONSES TO PHQ QUESTIONS 1-9: 8
SUM OF ALL RESPONSES TO PHQ QUESTIONS 1-9: 8
10. IF YOU CHECKED OFF ANY PROBLEMS, HOW DIFFICULT HAVE THESE PROBLEMS MADE IT FOR YOU TO DO YOUR WORK, TAKE CARE OF THINGS AT HOME, OR GET ALONG WITH OTHER PEOPLE: SOMEWHAT DIFFICULT

## 2023-08-15 ASSESSMENT — ANXIETY QUESTIONNAIRES
2. NOT BEING ABLE TO STOP OR CONTROL WORRYING: SEVERAL DAYS
3. WORRYING TOO MUCH ABOUT DIFFERENT THINGS: SEVERAL DAYS
7. FEELING AFRAID AS IF SOMETHING AWFUL MIGHT HAPPEN: NOT AT ALL
5. BEING SO RESTLESS THAT IT IS HARD TO SIT STILL: SEVERAL DAYS
GAD7 TOTAL SCORE: 8
GAD7 TOTAL SCORE: 8
4. TROUBLE RELAXING: SEVERAL DAYS
6. BECOMING EASILY ANNOYED OR IRRITABLE: MORE THAN HALF THE DAYS
IF YOU CHECKED OFF ANY PROBLEMS ON THIS QUESTIONNAIRE, HOW DIFFICULT HAVE THESE PROBLEMS MADE IT FOR YOU TO DO YOUR WORK, TAKE CARE OF THINGS AT HOME, OR GET ALONG WITH OTHER PEOPLE: SOMEWHAT DIFFICULT
1. FEELING NERVOUS, ANXIOUS, OR ON EDGE: MORE THAN HALF THE DAYS

## 2023-08-15 ASSESSMENT — PAIN SCALES - GENERAL: PAINLEVEL: MILD PAIN (2)
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
## 2023-08-21 ENCOUNTER — TELEPHONE (OUTPATIENT)
Dept: PSYCHIATRY | Facility: OTHER | Age: 53
End: 2023-08-21

## 2023-08-21 DIAGNOSIS — F90.0 ADHD (ATTENTION DEFICIT HYPERACTIVITY DISORDER), INATTENTIVE TYPE: ICD-10-CM

## 2023-08-21 RX ORDER — DEXTROAMPHETAMINE SACCHARATE, AMPHETAMINE ASPARTATE, DEXTROAMPHETAMINE SULFATE AND AMPHETAMINE SULFATE 5; 5; 5; 5 MG/1; MG/1; MG/1; MG/1
20 TABLET ORAL DAILY
Qty: 30 TABLET | Refills: 0 | Status: CANCELLED | OUTPATIENT
Start: 2023-08-21

## 2023-08-21 NOTE — TELEPHONE ENCOUNTER
Received incoming VM from patient stating he decided not to start the Olanzapine until after he moves, so he did not pick it up.  He is taking the Adderall and is requesting a refill.  Next follow up appt is on 9-20-23.  I have pended the medication.  Thank you

## 2023-08-22 RX ORDER — DEXTROAMPHETAMINE SACCHARATE, AMPHETAMINE ASPARTATE, DEXTROAMPHETAMINE SULFATE AND AMPHETAMINE SULFATE 1.25; 1.25; 1.25; 1.25 MG/1; MG/1; MG/1; MG/1
5 TABLET ORAL DAILY
Qty: 30 TABLET | Refills: 0 | Status: SHIPPED | OUTPATIENT
Start: 2023-08-22 | End: 2023-09-21

## 2023-08-22 NOTE — TELEPHONE ENCOUNTER
Patient contacted and notified Adderall was refilled and to please  the Olanzapine. Patient will  both Rxs.  Thank you

## 2023-08-22 NOTE — TELEPHONE ENCOUNTER
Please let him know I filled Adderall 5 mg daily (he told me at his last visit he is taking this dose) and I would really like him to at least  the olanzapine I ordered!

## 2023-08-27 DIAGNOSIS — M54.12 CERVICAL RADICULOPATHY: ICD-10-CM

## 2023-08-28 RX ORDER — GABAPENTIN 300 MG/1
CAPSULE ORAL
Qty: 180 CAPSULE | Refills: 3 | Status: SHIPPED | OUTPATIENT
Start: 2023-08-28 | End: 2024-01-02

## 2023-08-28 NOTE — TELEPHONE ENCOUNTER
Gabapentin      Last Written Prescription Date:  8.1.23  Last Fill Quantity: #180,   # refills: 0  Last Office Visit: 8.15.23  Future Office visit:    Next 5 appointments (look out 90 days)      Sep 20, 2023  8:40 AM  (Arrive by 8:25 AM)  Return Visit with Annette Hammond MD  [facility] ([facility] ) [facility] [address] [place] [id number]  [phone]             Routing refill request to provider for review/approval because:  Drug not on the [facility], [facility] or [facility] refill protocol or controlled substance

## 2023-09-05 DIAGNOSIS — I10 ESSENTIAL HYPERTENSION: ICD-10-CM

## 2023-09-05 RX ORDER — VERAPAMIL HYDROCHLORIDE 240 MG/1
240 CAPSULE, EXTENDED RELEASE ORAL AT BEDTIME
Qty: 90 CAPSULE | Refills: 0 | Status: SHIPPED | OUTPATIENT
Start: 2023-09-05 | End: 2023-09-14

## 2023-09-05 NOTE — TELEPHONE ENCOUNTER
verapamil ER (VERELAN) 240 MG 24 hr capsule   Last Written Prescription Date:  6/13/23  Last Fill Quantity: 90,   # refills: 0  Last Office Visit: 11/10/22  Future Office visit:    Next 5 appointments (look out 90 days)      Sep 20, 2023  8:40 AM  (Arrive by 8:25 AM)  Return Visit with Annette Hammond MD  [facility] ([facility] ) [address] [place] [id number] [phone]             Routing refill request to provider for review/approval because:

## 2023-09-12 DIAGNOSIS — I10 ESSENTIAL HYPERTENSION: ICD-10-CM

## 2023-09-14 RX ORDER — VERAPAMIL HYDROCHLORIDE 240 MG/1
240 CAPSULE, EXTENDED RELEASE ORAL AT BEDTIME
Qty: 90 CAPSULE | Refills: 0 | Status: SHIPPED | OUTPATIENT
Start: 2023-09-14 | End: 2023-10-23

## 2023-09-14 NOTE — TELEPHONE ENCOUNTER
Marlys      Last Written Prescription Date:  9/5/23  Last Fill Quantity: 90,   # refills: 0  Last Office Visit: 11/10/22  Future Office visit:    Next 5 appointments (look out 90 days)      Sep 20, 2023  8:40 AM  (Arrive by 8:25 AM)  Return Visit with Annette Hammond MD  [facility] ([facility] ) [address] [place] [id number] [phone]     Oct 23, 2023 11:15 AM  (Arrive by 11:00 AM)  SHORT with Sayda Paula MD  [facility] ([facility] ) [address] [facility] [address]  [phone]             Routing refill request to provider for review/approval because:

## 2023-09-20 ENCOUNTER — OFFICE VISIT (OUTPATIENT)
Dept: PSYCHIATRY | Facility: OTHER | Age: 53
End: 2023-09-20
Attending: PSYCHIATRY & NEUROLOGY
Payer: MEDICARE

## 2023-09-20 VITALS
WEIGHT: 173 LBS | HEART RATE: 80 BPM | BODY MASS INDEX: 24.13 KG/M2 | SYSTOLIC BLOOD PRESSURE: 120 MMHG | TEMPERATURE: 97.7 F | DIASTOLIC BLOOD PRESSURE: 80 MMHG | OXYGEN SATURATION: 96 %

## 2023-09-20 DIAGNOSIS — F90.0 ADHD (ATTENTION DEFICIT HYPERACTIVITY DISORDER), INATTENTIVE TYPE: ICD-10-CM

## 2023-09-20 PROCEDURE — G0463 HOSPITAL OUTPT CLINIC VISIT: HCPCS

## 2023-09-20 PROCEDURE — 99213 OFFICE O/P EST LOW 20 MIN: CPT | Performed by: PSYCHIATRY & NEUROLOGY

## 2023-09-20 RX ORDER — DEXTROAMPHETAMINE SACCHARATE, AMPHETAMINE ASPARTATE, DEXTROAMPHETAMINE SULFATE AND AMPHETAMINE SULFATE 1.25; 1.25; 1.25; 1.25 MG/1; MG/1; MG/1; MG/1
5 TABLET ORAL DAILY
Qty: 30 TABLET | Refills: 0 | Status: SHIPPED | OUTPATIENT
Start: 2023-10-20 | End: 2023-11-19

## 2023-09-20 RX ORDER — DEXTROAMPHETAMINE SACCHARATE, AMPHETAMINE ASPARTATE, DEXTROAMPHETAMINE SULFATE AND AMPHETAMINE SULFATE 1.25; 1.25; 1.25; 1.25 MG/1; MG/1; MG/1; MG/1
5 TABLET ORAL DAILY
Qty: 30 TABLET | Refills: 0 | Status: SHIPPED | OUTPATIENT
Start: 2023-09-20 | End: 2023-10-20

## 2023-09-20 ASSESSMENT — PATIENT HEALTH QUESTIONNAIRE - PHQ9
SUM OF ALL RESPONSES TO PHQ QUESTIONS 1-9: 10
10. IF YOU CHECKED OFF ANY PROBLEMS, HOW DIFFICULT HAVE THESE PROBLEMS MADE IT FOR YOU TO DO YOUR WORK, TAKE CARE OF THINGS AT HOME, OR GET ALONG WITH OTHER PEOPLE: SOMEWHAT DIFFICULT
SUM OF ALL RESPONSES TO PHQ QUESTIONS 1-9: 10

## 2023-09-20 ASSESSMENT — ANXIETY QUESTIONNAIRES
GAD7 TOTAL SCORE: 9
GAD7 TOTAL SCORE: 9
1. FEELING NERVOUS, ANXIOUS, OR ON EDGE: SEVERAL DAYS
5. BEING SO RESTLESS THAT IT IS HARD TO SIT STILL: SEVERAL DAYS
IF YOU CHECKED OFF ANY PROBLEMS ON THIS QUESTIONNAIRE, HOW DIFFICULT HAVE THESE PROBLEMS MADE IT FOR YOU TO DO YOUR WORK, TAKE CARE OF THINGS AT HOME, OR GET ALONG WITH OTHER PEOPLE: SOMEWHAT DIFFICULT
3. WORRYING TOO MUCH ABOUT DIFFERENT THINGS: SEVERAL DAYS
2. NOT BEING ABLE TO STOP OR CONTROL WORRYING: SEVERAL DAYS
6. BECOMING EASILY ANNOYED OR IRRITABLE: MORE THAN HALF THE DAYS
4. TROUBLE RELAXING: MORE THAN HALF THE DAYS
7. FEELING AFRAID AS IF SOMETHING AWFUL MIGHT HAPPEN: SEVERAL DAYS

## 2023-09-20 ASSESSMENT — PAIN SCALES - GENERAL: PAINLEVEL: MILD PAIN (2)

## 2023-09-20 NOTE — PROGRESS NOTES
"      PSYCHIATRY CLINIC PROGRESS NOTE     SUBJECTIVE / INTERIM HISTORY                                                                          Last visit 8/15/23: Continue Tegretol  mg twice daily. Continue Adderall 5 mg daily. Start Zyprexa 5 mg daily prn severe anxiety / agitation.   - Andrei moved to [place]. Likes it. Enjoys having woods around   - other than getting \"yelled at\" for walking Janneth Baby off leash but otherwise everything is going well  - has no problems since he left his old apartment. NO longer any issues with people ever since he moved to his new apartment  - son is now 20% owner of [employer]  -[place] of [place]. Dad still lives out there and Andrei's aunt lives in house next to mirella. For while bought it and he lived there with Brianna and when was with her then in 2010 when  Brianna and moved to [place] with Paulette when they started relationship.   -  Finished [employer] with [employer] and was planning to go for radiology degree. 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  and notes dated Brianna Michelle of [employer] and raised Ross who passed away form overdose. And Andrei's nephew Andrei murdered.  - Social/Spiritual Support- sister Craoline, dad Carlos, GF Paulette Mejía       MEDICAL / SURGICAL HISTORY                     Patient Active Problem List   Diagnosis    Cervicalgia    Lower back pain    Segmental and somatic dysfunction of lower extremity    GERD (gastroesophageal reflux disease)    Mood disorder (H)    Abnormal weight gain    Attention deficit hyperactivity disorder (ADHD), predominantly inattentive type    ACP (advance care planning)    Flatulence, eructation, and gas pain    Bipolar disease, chronic (H)    Benign essential hypertension    Russell esophagus    Tobacco dependency    Lithium use    DDD (degenerative disc disease), cervical    Cervical stenosis of spinal canal    Obesity (BMI 35.0-39.9) with comorbidity (H)    Chronic lung disease    Suicidal " behavior    Dry mouth    Anxiety state    Insomnia, unspecified    Left hip pain    Perianal abscess     ALLERGY   Patient has no known allergies.  MEDICATIONS                                                                                             Current Outpatient Medications   Medication Sig    amphetamine-dextroamphetamine (ADDERALL) 20 MG tablet TAKE 1 TABLET BY MOUTH EVERY DAY    amphetamine-dextroamphetamine (ADDERALL) 5 MG tablet Take 1 tablet (5 mg) by mouth daily for 30 days    aspirin 81 MG tablet Take 1 tablet (81 mg) by mouth daily    carBAMazepine (TEGRETOL XR) 200 MG 12 hr tablet Take 1 tablet (200 mg) by mouth 2 times daily    chlorhexidine (PERIDEX) 0.12 % solution SWISH AND SPIT 15 MLS IN MOUTH 2 TIMES DAILY    gabapentin (NEURONTIN) 300 MG capsule TAKE 2 CAPSULES (600 MG) BYMOUTH 3 TIMES DAILY    hydrochlorothiazide (MICROZIDE) 12.5 MG capsule TAKE 1 CAPSULE (12.5 MG) BYMOUTH EVERY MORNING    omeprazole (PRILOSEC) 40 MG DR capsule TAKE 1 CAPSULE BY MOUTH DAILY    pilocarpine (SALAGEN) 5 MG tablet TAKE 1 TABLET BY MOUTH FOUR TIMES DAILY    senna-docusate (SENOKOT-S/PERICOLACE) 8.6-50 MG tablet Take 1 tablet by mouth daily    verapamil ER (VERELAN) 240 MG 24 hr capsule TAKE 1 CAPSULE (240 MG) BY MOUTH AT BEDTIME    OLANZapine (ZYPREXA) 5 MG tablet Take 1/2 to 1 tablet daily as needed for severe anxiety and / or agitation (Patient not taking: Reported on 9/20/2023)     No current facility-administered medications for this visit.       VITALS   /80   Pulse 80   Temp 97.7  F (36.5  C) (Tympanic)   Wt 78.5 kg (173 lb)   SpO2 96%   BMI 24.13 kg/m       PHQ9                     [unfilled]  LABS                                                                                                                         Last Comprehensive Metabolic Panel:  Sodium   Date Value Ref Range Status   11/21/2022 136 136 - 145 mmol/L Final   04/08/2021 139 133 - 144 mmol/L Final     Potassium   Date  Value Ref Range Status   11/21/2022 3.2 (L) 3.4 - 5.3 mmol/L Final   08/01/2022 3.1 (L) 3.4 - 5.3 mmol/L Final   04/08/2021 3.1 (L) 3.4 - 5.3 mmol/L Final     Chloride   Date Value Ref Range Status   11/21/2022 99 98 - 107 mmol/L Final   08/01/2022 104 94 - 109 mmol/L Final   04/08/2021 105 94 - 109 mmol/L Final     Carbon Dioxide   Date Value Ref Range Status   04/08/2021 29 20 - 32 mmol/L Final     Carbon Dioxide (CO2)   Date Value Ref Range Status   11/21/2022 27 22 - 29 mmol/L Final   08/01/2022 26 20 - 32 mmol/L Final     Anion Gap   Date Value Ref Range Status   11/21/2022 10 7 - 15 mmol/L Final   08/01/2022 9 3 - 14 mmol/L Final   04/08/2021 5 3 - 14 mmol/L Final     Glucose   Date Value Ref Range Status   11/21/2022 107 (H) 70 - 99 mg/dL Final   08/01/2022 129 (H) 70 - 99 mg/dL Final   04/08/2021 96 70 - 99 mg/dL Final     Urea Nitrogen   Date Value Ref Range Status   11/21/2022 3.7 (L) 6.0 - 20.0 mg/dL Final   08/01/2022 8 7 - 30 mg/dL Final   04/08/2021 7 7 - 30 mg/dL Final     Creatinine   Date Value Ref Range Status   11/21/2022 0.62 (L) 0.67 - 1.17 mg/dL Final   04/08/2021 0.81 0.66 - 1.25 mg/dL Final     GFR Estimate   Date Value Ref Range Status   11/21/2022 >90 >60 mL/min/1.73m2 Final     Comment:     Effective December 21, 2021 eGFRcr in adults is calculated using the 2021 CKD-EPI creatinine equation which includes age and gender (Jordi hyde al., NEJM, DOI: 10.1056/UUWQyy0396084)   04/08/2021 >90 >60 mL/min/[1.73_m2] Final     Comment:     Non  GFR Calc  Starting 12/18/2018, serum creatinine based estimated GFR (eGFR) will be   calculated using the Chronic Kidney Disease Epidemiology Collaboration   (CKD-EPI) equation.       Calcium   Date Value Ref Range Status   11/21/2022 9.4 8.6 - 10.0 mg/dL Final   04/08/2021 9.0 8.5 - 10.1 mg/dL Final     CBC RESULTS: Recent Labs   Lab Test 11/21/22 1944   WBC 12.5*   RBC 4.30*   HGB 13.4   HCT 39.6*   MCV 92   MCH 31.2   MCHC 33.8   RDW 13.0  "           MENTAL STATUS EXAM                                                                                       Awake, alert. No problems with speech. Psychomotor: unremarkable.  Mood today euthymic and affect congruent to mood and speech content. Thought process, including associations, was unremarkable and thought content was devoid of suicidal and homicidal ideation.  No psychosis today -> no paranoia.  Judgment was adequate for safety. Fund of knowledge was intact. Pt demonstrates no obvious problems with attention, concentration, language, recent or remote memory although these were not formally tested.     ASSESSMENT                                                                                                      HISTORICAL:  Initial psych note 12/28/20          NOTES:  discharge summary from April 8/13/20: \"Lithium was tapered and discontinued in march/begining of April. At that time his lamictal was increased. It appears that lithium was tapered due excessive thirst and dry mouth. Elavil was increased for insomnia in February.\" Seroquel \" weight gain, didn't like how made him feel. Zyprexa ? Liang Whitman is a 53 yo with bipolar disorder and ADHD with psychiatric hospitalization August 2020. Was on amitriptyline, Adderall, Wellbutrin : all activating meds although Andrei notes he feels was the Lamictal that precipitated bibiana. Was noted Depakote for him past weight gain, lithium polyuria and thirst, uncertain whether he had been on neuroleptics (I saw Latuda mentioned in [facility] notes).     Today Andrei appears to be doing much better. He seems calm, not stressed out (and paranoid) as he has the prior couple visits. He is still taking Tegretol. He is taking small dose of Adderall (5 mg). He did NOT  the Zyprexa I prescribed last visit. I stopped the stimulant couple months back and he was agreeable to this as I noted stimulants can -> psychosis. Andrei did not at that time recognize " paranoia and therefore didn't see harm in taking Adderall. He resumed albeit a small dose of 5 mg. Given he is doing well at this time I'm comfortable filling the 5 mg of Adderall.       TREATMENT RISK STATEMENT:  The risks, benefits, alternatives and potential adverse effects have been explained and are understood by the pt.  The pt agrees to the treatment plan with the ability to do so.   The pt knows to call the clinic for any problems or access emergency care if needed.        DIAGNOSES                     Bipolar disorder, most recent episode manic (with psychosis), in partial remission  ADHD    PLAN                                                                                                                    1)  MEDICATIONS:         -- Continue Tegretol  mg twice daily. Continue Adderall 5 mg daily filled for today 9/20 and for 10/20    2)  THERAPY:  No Change    3)  LABS:  Tegretol level 2/8/21. CBC 11/2022 and BMP    4)  PT MONITOR [call for probs]:  Worsening symptoms, SI/HI, SEs from meds    5)  REFERRALS [CD, medical, other]:  None    6)  RTC:  2 months        SONDRA 7 TOTAL SCORE: 8Answers submitted by the patient for this visit:  Patient Health Questionnaire (Submitted on 9/20/2023)  If you checked off any problems, how difficult have these problems made it for you to do your work, take care of things at home, or get along with other people?: Somewhat difficult  PHQ9 TOTAL SCORE: 10  SONDRA-7 (Submitted on 9/20/2023)  SONDRA 7 TOTAL SCORE: 9

## 2023-10-14 DIAGNOSIS — F31.9 BIPOLAR I DISORDER (H): ICD-10-CM

## 2023-10-16 NOTE — TELEPHONE ENCOUNTER
Tegretol      Last Written Prescription Date:  7/10/23  Last Fill Quantity: 60,   # refills: 3  Last Office Visit: 11/10/22  Future Office visit:    Next 5 appointments (look out 90 days)      Oct 23, 2023 11:15 AM  (Arrive by 11:00 AM)  SHORT with Sayda Paula MD  [facility] ([facility] ) [address] [facility] [address]  [phone]     Nov 21, 2023  8:20 AM  (Arrive by 8:05 AM)  Return Visit with Annette Hammond MD  [facility] ([facility] ) [address] [place] [id number] [phone]             Routing refill request to provider for review/approval because:

## 2023-10-17 RX ORDER — CARBAMAZEPINE 200 MG/1
200 TABLET, EXTENDED RELEASE ORAL 2 TIMES DAILY
Qty: 60 TABLET | Refills: 3 | Status: SHIPPED | OUTPATIENT
Start: 2023-10-17 | End: 2024-02-20

## 2023-10-20 NOTE — PROGRESS NOTES
Assessment & Plan     Benign essential hypertension  Stable.   Continue hydrochlorothiazide and Verapamil.   - CBC with platelets and differential; Future  - Comprehensive metabolic panel (BMP + Alb, Alk Phos, ALT, AST, Total. Bili, TP); Future  - verapamil ER (VERELAN) 240 MG 24 hr capsule; Take 1 capsule (240 mg) by mouth at bedtime    Tobacco use disorder  Declines cessation.  Has cut back.    Special screening for malignant neoplasms, colon  Overdue - 5 year interval.  Referral placed.  - Colonoscopy Screening  Referral; Future    History of colonic polyps  - Colonoscopy Screening  Referral; Future    Need for prophylactic vaccination against hepatitis B virus    Need for vaccine for Td (tetanus-diphtheria)    Lipid screening  Fasting lab today  - Lipid Profile (Chol, Trig, HDL, LDL calc); Future       Nicotine/Tobacco Cessation:  He reports that he has been smoking cigarettes. He started smoking about 34 years ago. He has a 15.00 pack-year smoking history. He has never used smokeless tobacco.  Nicotine/Tobacco Cessation Plan:   Information offered: Patient not interested at this time      See Patient Instructions    Return for next available physical.    Sayda Solorzano MD  [facility] - ADALBERTO Forbes is a 52 year old, presenting for the following health issues:  Hypertension      HPI     Tobacco - declined on quitting to smoke; has cut back; doesn't smoke in apartment    Vaccines - Td, Hep b, covid, prenvar 20    Colon screen - agreeable; last scope 2017; every 5 years   Pilonidal cyst prior as well.    Moved to [place].  Likes it much better.    Hypertension Follow-up - hydrochlorothiazide and Verapamil.   Do you check your blood pressure regularly outside of the clinic? No   Are you following a low salt diet? No  Are your blood pressures ever more than 140 on the top number (systolic) OR more   than 90 on the bottom number (diastolic), for example 140/90?  "No    Not fasting - had soda only.    Follows with psychiatry - Dr Hammond.      Review of Systems   Constitutional, HEENT, cardiovascular, pulmonary, gi and gu systems are negative, except as otherwise noted.      Objective    /82 (BP Location: Right arm, Patient Position: Sitting, Cuff Size: Adult Regular)   Pulse 74   Temp 97  F (36.1  C) (Tympanic)   Ht 1.803 m (5' 11\")   Wt 81 kg (178 lb 8 oz)   SpO2 98%   BMI 24.90 kg/m    Body mass index is 24.9 kg/m .  Physical Exam   GENERAL: healthy, alert and no distress  EYES: Eyes grossly normal to inspection, PERRL and conjunctivae and sclerae normal  HENT: poor dentition  NECK: no adenopathy, no asymmetry, masses, or scars and thyroid normal to palpation  RESP: lungs clear to auscultation - no rales, rhonchi or wheezes  CV: regular rate and rhythm, normal S1 S2, no S3 or S4, no murmur, click or rub, no peripheral edema and peripheral pulses strong  ABDOMEN: soft, nontender, no hepatosplenomegaly, no masses and bowel sounds normal  MS: no gross musculoskeletal defects noted, no edema  PSYCH: mentation appears normal, affect normal/bright    Labs pending                "

## 2023-10-23 ENCOUNTER — OFFICE VISIT (OUTPATIENT)
Dept: FAMILY MEDICINE | Facility: OTHER | Age: 53
End: 2023-10-23
Attending: FAMILY MEDICINE
Payer: MEDICARE

## 2023-10-23 VITALS
HEIGHT: 71 IN | BODY MASS INDEX: 24.99 KG/M2 | HEART RATE: 74 BPM | TEMPERATURE: 97 F | DIASTOLIC BLOOD PRESSURE: 82 MMHG | OXYGEN SATURATION: 98 % | WEIGHT: 178.5 LBS | SYSTOLIC BLOOD PRESSURE: 120 MMHG

## 2023-10-23 DIAGNOSIS — I10 BENIGN ESSENTIAL HYPERTENSION: Primary | ICD-10-CM

## 2023-10-23 DIAGNOSIS — Z86.0100 HISTORY OF COLONIC POLYPS: ICD-10-CM

## 2023-10-23 DIAGNOSIS — Z23 NEED FOR VACCINE FOR TD (TETANUS-DIPHTHERIA): ICD-10-CM

## 2023-10-23 DIAGNOSIS — Z13.220 LIPID SCREENING: ICD-10-CM

## 2023-10-23 DIAGNOSIS — Z12.11 SPECIAL SCREENING FOR MALIGNANT NEOPLASMS, COLON: ICD-10-CM

## 2023-10-23 DIAGNOSIS — F17.200 TOBACCO USE DISORDER: ICD-10-CM

## 2023-10-23 DIAGNOSIS — I10 ESSENTIAL HYPERTENSION: ICD-10-CM

## 2023-10-23 DIAGNOSIS — Z23 NEED FOR PROPHYLACTIC VACCINATION AGAINST HEPATITIS B VIRUS: ICD-10-CM

## 2023-10-23 PROBLEM — K11.8 MASS OF LEFT PAROTID GLAND: Status: ACTIVE | Noted: 2023-02-01

## 2023-10-23 LAB
ALBUMIN SERPL BCG-MCNC: 4.5 G/DL (ref 3.5–5.2)
ALP SERPL-CCNC: 135 U/L (ref 40–129)
ALT SERPL W P-5'-P-CCNC: 11 U/L (ref 0–70)
ANION GAP SERPL CALCULATED.3IONS-SCNC: 9 MMOL/L (ref 7–15)
AST SERPL W P-5'-P-CCNC: 22 U/L (ref 0–45)
BASOPHILS # BLD AUTO: 0.1 10E3/UL (ref 0–0.2)
BASOPHILS NFR BLD AUTO: 1 %
BILIRUB SERPL-MCNC: 0.4 MG/DL
BUN SERPL-MCNC: 8.4 MG/DL (ref 6–20)
CALCIUM SERPL-MCNC: 9.5 MG/DL (ref 8.6–10)
CHLORIDE SERPL-SCNC: 101 MMOL/L (ref 98–107)
CHOLEST SERPL-MCNC: 202 MG/DL
CREAT SERPL-MCNC: 0.91 MG/DL (ref 0.67–1.17)
DEPRECATED HCO3 PLAS-SCNC: 27 MMOL/L (ref 22–29)
EGFRCR SERPLBLD CKD-EPI 2021: >90 ML/MIN/1.73M2
EOSINOPHIL # BLD AUTO: 0.1 10E3/UL (ref 0–0.7)
EOSINOPHIL NFR BLD AUTO: 2 %
ERYTHROCYTE [DISTWIDTH] IN BLOOD BY AUTOMATED COUNT: 13.1 % (ref 10–15)
GLUCOSE SERPL-MCNC: 86 MG/DL (ref 70–99)
HCT VFR BLD AUTO: 43.2 % (ref 40–53)
HDLC SERPL-MCNC: 50 MG/DL
HGB BLD-MCNC: 14.3 G/DL (ref 13.3–17.7)
IMM GRANULOCYTES # BLD: 0 10E3/UL
IMM GRANULOCYTES NFR BLD: 0 %
LDLC SERPL CALC-MCNC: 124 MG/DL
LYMPHOCYTES # BLD AUTO: 2 10E3/UL (ref 0.8–5.3)
LYMPHOCYTES NFR BLD AUTO: 28 %
MCH RBC QN AUTO: 30.6 PG (ref 26.5–33)
MCHC RBC AUTO-ENTMCNC: 33.1 G/DL (ref 31.5–36.5)
MCV RBC AUTO: 92 FL (ref 78–100)
MONOCYTES # BLD AUTO: 0.5 10E3/UL (ref 0–1.3)
MONOCYTES NFR BLD AUTO: 7 %
NEUTROPHILS # BLD AUTO: 4.5 10E3/UL (ref 1.6–8.3)
NEUTROPHILS NFR BLD AUTO: 62 %
NONHDLC SERPL-MCNC: 152 MG/DL
NRBC # BLD AUTO: 0 10E3/UL
NRBC BLD AUTO-RTO: 0 /100
PLATELET # BLD AUTO: 189 10E3/UL (ref 150–450)
POTASSIUM SERPL-SCNC: 4.1 MMOL/L (ref 3.4–5.3)
PROT SERPL-MCNC: 7.5 G/DL (ref 6.4–8.3)
RBC # BLD AUTO: 4.68 10E6/UL (ref 4.4–5.9)
SODIUM SERPL-SCNC: 137 MMOL/L (ref 135–145)
TRIGL SERPL-MCNC: 140 MG/DL
WBC # BLD AUTO: 7.3 10E3/UL (ref 4–11)

## 2023-10-23 PROCEDURE — 80061 LIPID PANEL: CPT | Mod: ZL | Performed by: FAMILY MEDICINE

## 2023-10-23 PROCEDURE — G0010 ADMIN HEPATITIS B VACCINE: HCPCS

## 2023-10-23 PROCEDURE — 99213 OFFICE O/P EST LOW 20 MIN: CPT | Performed by: FAMILY MEDICINE

## 2023-10-23 PROCEDURE — 90714 TD VACC NO PRESV 7 YRS+ IM: CPT

## 2023-10-23 PROCEDURE — 91320 SARSCV2 VAC 30MCG TRS-SUC IM: CPT

## 2023-10-23 PROCEDURE — 90472 IMMUNIZATION ADMIN EACH ADD: CPT

## 2023-10-23 PROCEDURE — 36415 COLL VENOUS BLD VENIPUNCTURE: CPT | Mod: ZL | Performed by: FAMILY MEDICINE

## 2023-10-23 PROCEDURE — 90677 PCV20 VACCINE IM: CPT

## 2023-10-23 PROCEDURE — 80053 COMPREHEN METABOLIC PANEL: CPT | Mod: ZL | Performed by: FAMILY MEDICINE

## 2023-10-23 PROCEDURE — 85025 COMPLETE CBC W/AUTO DIFF WBC: CPT | Mod: ZL | Performed by: FAMILY MEDICINE

## 2023-10-23 PROCEDURE — 90746 HEPB VACCINE 3 DOSE ADULT IM: CPT

## 2023-10-23 PROCEDURE — G0463 HOSPITAL OUTPT CLINIC VISIT: HCPCS | Mod: 25

## 2023-10-23 RX ORDER — VERAPAMIL HYDROCHLORIDE 240 MG/1
240 CAPSULE, EXTENDED RELEASE ORAL AT BEDTIME
COMMUNITY
Start: 2023-10-23 | End: 2024-01-03

## 2023-10-23 ASSESSMENT — PAIN SCALES - GENERAL: PAINLEVEL: MODERATE PAIN (5)

## 2023-10-23 NOTE — LETTER
October 30, 2023      Andrei SHIRA Whitman  [address] [place] [address] [place] [address]        Dear ,    We are writing to inform you of your test results.    Lipids up slightly.  Not at threshold for medication.  Recheck 6 months.  Other labs stable.  The 10-year ASCVD risk score (Shawn HARRIS, et al., 2019) is: 8.1%    Values used to calculate the score:      Age: 52 years      Sex: Male      Is Non- : No      Diabetic: No      Tobacco smoker: Yes      Systolic Blood Pressure: 120 mmHg      Is BP treated: Yes      HDL Cholesterol: 50 mg/dL      Total Cholesterol: 202 mg/dL      Resulted Orders   Lipid Profile (Chol, Trig, HDL, LDL calc)   Result Value Ref Range    Cholesterol 202 (H) <200 mg/dL    Triglycerides 140 <150 mg/dL    Direct Measure HDL 50 >=40 mg/dL    LDL Cholesterol Calculated 124 (H) <=100 mg/dL    Non HDL Cholesterol 152 (H) <130 mg/dL    Narrative    Cholesterol  Desirable:  <200 mg/dL    Triglycerides  Normal:  Less than 150 mg/dL  Borderline High:  150-199 mg/dL  High:  200-499 mg/dL  Very High:  Greater than or equal to 500 mg/dL    Direct Measure HDL  Female:  Greater than or equal to 50 mg/dL   Male:  Greater than or equal to 40 mg/dL    LDL Cholesterol  Desirable:  <100mg/dL  Above Desirable:  100-129 mg/dL   Borderline High:  130-159 mg/dL   High:  160-189 mg/dL   Very High:  >= 190 mg/dL    Non HDL Cholesterol  Desirable:  130 mg/dL  Above Desirable:  130-159 mg/dL  Borderline High:  160-189 mg/dL  High:  190-219 mg/dL  Very High:  Greater than or equal to 220 mg/dL   Comprehensive metabolic panel (BMP + Alb, Alk Phos, ALT, AST, Total. Bili, TP)   Result Value Ref Range    Sodium 137 135 - 145 mmol/L      Comment:      Reference intervals for this test were updated on 09/26/2023 to more accurately reflect our healthy population. There may be differences in the flagging of prior results with similar values performed with this method. Interpretation of those prior  results can be made in the context of the updated reference intervals.     Potassium 4.1 3.4 - 5.3 mmol/L    Carbon Dioxide (CO2) 27 22 - 29 mmol/L    Anion Gap 9 7 - 15 mmol/L    Urea Nitrogen 8.4 6.0 - 20.0 mg/dL    Creatinine 0.91 0.67 - 1.17 mg/dL    GFR Estimate >90 >60 mL/min/1.73m2    Calcium 9.5 8.6 - 10.0 mg/dL    Chloride 101 98 - 107 mmol/L    Glucose 86 70 - 99 mg/dL    Alkaline Phosphatase 135 (H) 40 - 129 U/L    AST 22 0 - 45 U/L      Comment:      Reference intervals for this test were updated on 6/12/2023 to more accurately reflect our healthy population. There may be differences in the flagging of prior results with similar values performed with this method. Interpretation of those prior results can be made in the context of the updated reference intervals.    ALT 11 0 - 70 U/L      Comment:      Reference intervals for this test were updated on 6/12/2023 to more accurately reflect our healthy population. There may be differences in the flagging of prior results with similar values performed with this method. Interpretation of those prior results can be made in the context of the updated reference intervals.      Protein Total 7.5 6.4 - 8.3 g/dL    Albumin 4.5 3.5 - 5.2 g/dL    Bilirubin Total 0.4 <=1.2 mg/dL   CBC with platelets and differential   Result Value Ref Range    WBC Count 7.3 4.0 - 11.0 10e3/uL    RBC Count 4.68 4.40 - 5.90 10e6/uL    Hemoglobin 14.3 13.3 - 17.7 g/dL    Hematocrit 43.2 40.0 - 53.0 %    MCV 92 78 - 100 fL    MCH 30.6 26.5 - 33.0 pg    MCHC 33.1 31.5 - 36.5 g/dL    RDW 13.1 10.0 - 15.0 %    Platelet Count 189 150 - 450 10e3/uL    % Neutrophils 62 %    % Lymphocytes 28 %    % Monocytes 7 %    % Eosinophils 2 %    % Basophils 1 %    % Immature Granulocytes 0 %    NRBCs per 100 WBC 0 <1 /100    Absolute Neutrophils 4.5 1.6 - 8.3 10e3/uL    Absolute Lymphocytes 2.0 0.8 - 5.3 10e3/uL    Absolute Monocytes 0.5 0.0 - 1.3 10e3/uL    Absolute Eosinophils 0.1 0.0 - 0.7 10e3/uL     Absolute Basophils 0.1 0.0 - 0.2 10e3/uL    Absolute Immature Granulocytes 0.0 <=0.4 10e3/uL    Absolute NRBCs 0.0 10e3/uL       If you have any questions or concerns, please call the clinic at the number listed above.       Sincerely,      Sayda Paula MD

## 2023-10-23 NOTE — COMMUNITY RESOURCES LIST (ENGLISH)
10/23/2023   [facility]  N/A  For questions about this resource list or additional care needs, please contact your primary care clinic or care manager.  Phone: [phone]   Email: N/A   Address: [address] [place] [id number]   Hours: N/A        Food and Nutrition       Food pantry  1  [facility] and Service [place] Distance: 1.06 miles      Pickup   [address] [place] [address]  Language: English  Hours: Mon 9:00 AM - 11:00 AM , Tue 1:00 PM - 3:00 PM , Wed - Thu 9:00 AM - 11:00 AM  Fees: Free, Self Pay   Phone: (868) 135-3021 Email: [email or web address] Website: [email or web address]     2  [facility] [employer] [facility] Distance: 19.03 miles      Pickup   [address] [place] [address]  Language: English  Hours: Mon - Sun Open 24 Hours  Fees: Free   Phone: (121) 220-5954 Email: mukesh@[employer].org Website: http://www.[employer].org     SNAP application assistance  3  [facility] & Human Services  Economic Services & [facility] Distance: 0.2 miles      In-Person, Phone/Virtual   [address] [place] [address]  Language: English  Hours: Mon - Fri 8:00 AM - 4:30 PM  Fees: Free   Phone: (998) 646-5894 Website: https://www.[facility].gov/mvbmnvrpdio-h-s/public-health-human-services/economic-services-supports     4  [facility] & Human Services - Economic Services & [facility] Distance: 19.11 miles      In-Person, Phone/Virtual   [address] [place] [address]  Language: English  Hours: Mon - Fri 8:00 AM - 4:30 PM  Fees: Free   Phone: (771) 966-9675 Email: financialassistance@[facility].gov Website: https://www.[place].gov/ohwtsjdhzca-f-e/public-health-human-services/economic-services-supports     Soup kitchen or free meals  5  [facility]  [facility] and [facility] Distance: 1.06 miles      Pickup   [address] [place] [address]  Language: English  Hours: Mon - Fri 4:00 PM - 4:45 PM  Fees: Free   Phone: (296) 213-2675 Email: [email or web address] Website: [email or web address]          Important Numbers & Websites       Emergency Services   911  [facility] Services   311  Poison Control   (198) 176-6010  Suicide Prevention Lifeline   (628) 594-2726 (TALK)  Child Abuse Hotline   (268) 282-2763 (4-A-Child)  Sexual Assault Hotline   (217) 997-7121 (HOPE)  National Runaway Safeline   (905) 413-5023 (RUNAWAY)  All-Options Talkline   (271) 105-5796  Substance Abuse Referral   (536) 227-6828 (HELP)

## 2023-10-23 NOTE — PATIENT INSTRUCTIONS
Will notify of lab results.  Vaccines updated.  Colonoscopy referral placed.  Smoking cessation advised.

## 2023-10-24 ENCOUNTER — TELEPHONE (OUTPATIENT)
Dept: FAMILY MEDICINE | Facility: OTHER | Age: 53
End: 2023-10-24

## 2023-10-29 DIAGNOSIS — F90.0 ADHD (ATTENTION DEFICIT HYPERACTIVITY DISORDER), INATTENTIVE TYPE: ICD-10-CM

## 2023-10-30 RX ORDER — DEXTROAMPHETAMINE SACCHARATE, AMPHETAMINE ASPARTATE, DEXTROAMPHETAMINE SULFATE AND AMPHETAMINE SULFATE 1.25; 1.25; 1.25; 1.25 MG/1; MG/1; MG/1; MG/1
TABLET ORAL
Qty: 30 TABLET | Refills: 0 | OUTPATIENT
Start: 2023-10-30

## 2023-11-15 ENCOUNTER — PATIENT OUTREACH (OUTPATIENT)
Dept: GASTROENTEROLOGY | Facility: CLINIC | Age: 53
End: 2023-11-15

## 2023-11-21 ENCOUNTER — OFFICE VISIT (OUTPATIENT)
Dept: PSYCHIATRY | Facility: OTHER | Age: 53
End: 2023-11-21
Attending: PSYCHIATRY & NEUROLOGY
Payer: MEDICARE

## 2023-11-21 VITALS
DIASTOLIC BLOOD PRESSURE: 72 MMHG | OXYGEN SATURATION: 96 % | BODY MASS INDEX: 24.41 KG/M2 | SYSTOLIC BLOOD PRESSURE: 120 MMHG | TEMPERATURE: 97.1 F | WEIGHT: 175 LBS | HEART RATE: 73 BPM

## 2023-11-21 DIAGNOSIS — F90.0 ADHD (ATTENTION DEFICIT HYPERACTIVITY DISORDER), INATTENTIVE TYPE: Primary | ICD-10-CM

## 2023-11-21 PROCEDURE — G0463 HOSPITAL OUTPT CLINIC VISIT: HCPCS

## 2023-11-21 PROCEDURE — 99213 OFFICE O/P EST LOW 20 MIN: CPT | Performed by: PSYCHIATRY & NEUROLOGY

## 2023-11-21 RX ORDER — DEXTROAMPHETAMINE SACCHARATE, AMPHETAMINE ASPARTATE, DEXTROAMPHETAMINE SULFATE AND AMPHETAMINE SULFATE 1.25; 1.25; 1.25; 1.25 MG/1; MG/1; MG/1; MG/1
5 TABLET ORAL DAILY
Qty: 30 TABLET | Refills: 0 | Status: SHIPPED | OUTPATIENT
Start: 2023-12-19 | End: 2024-06-19

## 2023-11-21 RX ORDER — DEXTROAMPHETAMINE SACCHARATE, AMPHETAMINE ASPARTATE, DEXTROAMPHETAMINE SULFATE AND AMPHETAMINE SULFATE 1.25; 1.25; 1.25; 1.25 MG/1; MG/1; MG/1; MG/1
5 TABLET ORAL DAILY
Qty: 30 TABLET | Refills: 0 | Status: SHIPPED | OUTPATIENT
Start: 2023-11-21 | End: 2024-01-02

## 2023-11-21 RX ORDER — DEXTROAMPHETAMINE SACCHARATE, AMPHETAMINE ASPARTATE, DEXTROAMPHETAMINE SULFATE AND AMPHETAMINE SULFATE 1.25; 1.25; 1.25; 1.25 MG/1; MG/1; MG/1; MG/1
5 TABLET ORAL DAILY
Qty: 30 TABLET | Refills: 0 | Status: SHIPPED | OUTPATIENT
Start: 2024-01-16 | End: 2024-02-15

## 2023-11-21 ASSESSMENT — ANXIETY QUESTIONNAIRES
IF YOU CHECKED OFF ANY PROBLEMS ON THIS QUESTIONNAIRE, HOW DIFFICULT HAVE THESE PROBLEMS MADE IT FOR YOU TO DO YOUR WORK, TAKE CARE OF THINGS AT HOME, OR GET ALONG WITH OTHER PEOPLE: SOMEWHAT DIFFICULT
5. BEING SO RESTLESS THAT IT IS HARD TO SIT STILL: SEVERAL DAYS
6. BECOMING EASILY ANNOYED OR IRRITABLE: SEVERAL DAYS
GAD7 TOTAL SCORE: 7
1. FEELING NERVOUS, ANXIOUS, OR ON EDGE: SEVERAL DAYS
3. WORRYING TOO MUCH ABOUT DIFFERENT THINGS: SEVERAL DAYS
4. TROUBLE RELAXING: SEVERAL DAYS
GAD7 TOTAL SCORE: 7
7. FEELING AFRAID AS IF SOMETHING AWFUL MIGHT HAPPEN: SEVERAL DAYS
2. NOT BEING ABLE TO STOP OR CONTROL WORRYING: SEVERAL DAYS

## 2023-11-21 ASSESSMENT — PAIN SCALES - GENERAL: PAINLEVEL: MILD PAIN (2)

## 2023-11-21 NOTE — PROGRESS NOTES
"      PSYCHIATRY CLINIC PROGRESS NOTE     SUBJECTIVE / INTERIM HISTORY                                                                          Last visit Continue Tegretol  mg twice daily. Continue Adderall 5 mg daily filled for today 9/20 and for 10/20  - margarita Darling  - \"I may have been tripping out\" in regards to when thought saw mountain lion in [place]  -  Thanksgiving at mom's and it's a big feast.   - son is now 20% owner of [employer]  -[place] [address] [place]. Dad still lives out there and Andrei's aunt lives in house next to dad. For while bought it and he lived there with Brianna and when was with her then in 2010 when  Brianna and moved to [place] with Paulette when they started relationship.   -  Finished HCC with [employer] and was planning to go for radiology degree. Then started in [place] for [employer].  and [facility] and raised Ross who passed away form overdose. And Andrei's nephew Andrei murdered.  - Social/Spiritual Support- sister Caroline, dad Carlos, GF Paulette [place]       MEDICAL / SURGICAL HISTORY                     Patient Active Problem List   Diagnosis    Cervicalgia    Lower back pain    Segmental and somatic dysfunction of lower extremity    GERD (gastroesophageal reflux disease)    Mood disorder (H24)    Abnormal weight gain    Attention deficit hyperactivity disorder (ADHD), predominantly inattentive type    ACP (advance care planning)    Flatulence, eructation, and gas pain    Bipolar disease, chronic (H)    Benign essential hypertension    Russell esophagus    Tobacco dependency    Lithium use    DDD (degenerative disc disease), cervical    Cervical stenosis of spinal canal    Obesity (BMI 35.0-39.9) with comorbidity (H)    Chronic lung disease    Suicidal behavior    Dry mouth    Anxiety state    Insomnia, unspecified    Left hip pain    Perianal abscess    Mass of left parotid gland     ALLERGY   Patient has no known " allergies.  MEDICATIONS                                                                                             Current Outpatient Medications   Medication Sig    aspirin 81 MG tablet Take 1 tablet (81 mg) by mouth daily    carBAMazepine (TEGRETOL XR) 200 MG 12 hr tablet TAKE 1 TABLET (200 MG) BY MOUTH 2 TIMES DAILY    chlorhexidine (PERIDEX) 0.12 % solution SWISH AND SPIT 15 MLS IN MOUTH 2 TIMES DAILY    gabapentin (NEURONTIN) 300 MG capsule TAKE 2 CAPSULES (600 MG) BYMOUTH 3 TIMES DAILY    hydrochlorothiazide (MICROZIDE) 12.5 MG capsule TAKE 1 CAPSULE (12.5 MG) BYMOUTH EVERY MORNING    omeprazole (PRILOSEC) 40 MG DR capsule TAKE 1 CAPSULE BY MOUTH DAILY    pilocarpine (SALAGEN) 5 MG tablet TAKE 1 TABLET BY MOUTH FOUR TIMES DAILY    verapamil ER (VERELAN) 240 MG 24 hr capsule Take 1 capsule (240 mg) by mouth at bedtime     No current facility-administered medications for this visit.       VITALS   /72   Pulse 73   Temp 97.1  F (36.2  C) (Tympanic)   Wt 79.4 kg (175 lb)   SpO2 96%   BMI 24.41 kg/m       PHQ9                     [unfilled]  LABS                                                                                                                         Last Comprehensive Metabolic Panel:  Sodium   Date Value Ref Range Status   10/23/2023 137 135 - 145 mmol/L Final     Comment:     Reference intervals for this test were updated on 09/26/2023 to more accurately reflect our healthy population. There may be differences in the flagging of prior results with similar values performed with this method. Interpretation of those prior results can be made in the context of the updated reference intervals.    04/08/2021 139 133 - 144 mmol/L Final     Potassium   Date Value Ref Range Status   10/23/2023 4.1 3.4 - 5.3 mmol/L Final   08/01/2022 3.1 (L) 3.4 - 5.3 mmol/L Final   04/08/2021 3.1 (L) 3.4 - 5.3 mmol/L Final     Chloride   Date Value Ref Range Status   10/23/2023 101 98 - 107 mmol/L Final    08/01/2022 104 94 - 109 mmol/L Final   04/08/2021 105 94 - 109 mmol/L Final     Carbon Dioxide   Date Value Ref Range Status   04/08/2021 29 20 - 32 mmol/L Final     Carbon Dioxide (CO2)   Date Value Ref Range Status   10/23/2023 27 22 - 29 mmol/L Final   08/01/2022 26 20 - 32 mmol/L Final     Anion Gap   Date Value Ref Range Status   10/23/2023 9 7 - 15 mmol/L Final   08/01/2022 9 3 - 14 mmol/L Final   04/08/2021 5 3 - 14 mmol/L Final     Glucose   Date Value Ref Range Status   10/23/2023 86 70 - 99 mg/dL Final   08/01/2022 129 (H) 70 - 99 mg/dL Final   04/08/2021 96 70 - 99 mg/dL Final     Urea Nitrogen   Date Value Ref Range Status   10/23/2023 8.4 6.0 - 20.0 mg/dL Final   08/01/2022 8 7 - 30 mg/dL Final   04/08/2021 7 7 - 30 mg/dL Final     Creatinine   Date Value Ref Range Status   10/23/2023 0.91 0.67 - 1.17 mg/dL Final   04/08/2021 0.81 0.66 - 1.25 mg/dL Final     GFR Estimate   Date Value Ref Range Status   10/23/2023 >90 >60 mL/min/1.73m2 Final   04/08/2021 >90 >60 mL/min/[1.73_m2] Final     Comment:     Non  GFR Calc  Starting 12/18/2018, serum creatinine based estimated GFR (eGFR) will be   calculated using the Chronic Kidney Disease Epidemiology Collaboration   (CKD-EPI) equation.       Calcium   Date Value Ref Range Status   10/23/2023 9.5 8.6 - 10.0 mg/dL Final   04/08/2021 9.0 8.5 - 10.1 mg/dL Final     CBC RESULTS:   Recent Labs   Lab Test 10/23/23  1128   WBC 7.3   RBC 4.68   HGB 14.3   HCT 43.2   MCV 92   MCH 30.6   MCHC 33.1   RDW 13.1           MENTAL STATUS EXAM                                                                                       Awake, alert. No problems with speech. Psychomotor: unremarkable.  Mood euthymic and affect congruent to mood and speech content. Thought process, including associations, was unremarkable and thought content was devoid of suicidal and homicidal ideation.  No psychosis today -> no paranoia.  Judgment was adequate for safety. Fund  "of knowledge was intact. Pt demonstrates no obvious problems with attention, concentration, language, recent or remote memory although these were not formally tested.     ASSESSMENT                                                                                                      HISTORICAL:  Initial psych note 12/28/20          NOTES:  discharge summary from April 8/13/20: \"Lithium was tapered and discontinued in march/begining of April. At that time his lamictal was increased. It appears that lithium was tapered due excessive thirst and dry mouth. Elavil was increased for insomnia in February.\" Seroquel \" weight gain, didn't like how made him feel. Zyprexa ? Liang Whitman is a 53 yo with bipolar disorder and ADHD with psychiatric hospitalization August 2020. Was on amitriptyline, Adderall, Wellbutrin : all activating meds although Andrei notes he feels was the Lamictal that precipitated bibiana. Was noted Depakote for him past weight gain, lithium polyuria and thirst, uncertain whether he had been on neuroleptics (I saw Latuda mentioned in [facility] notes).     Andrei is doing well. He expresses insight into last year and paranoia he was experiencing. He even joked \"I may have been tripping out\"       TREATMENT RISK STATEMENT:  The risks, benefits, alternatives and potential adverse effects have been explained and are understood by the pt.  The pt agrees to the treatment plan with the ability to do so.   The pt knows to call the clinic for any problems or access emergency care if needed.        DIAGNOSES                     Bipolar disorder, most recent episode manic (with psychosis), in partial remission  ADHD    PLAN                                                                                                                    1)  MEDICATIONS:         -- Continue Tegretol  mg twice daily. Continue Adderall 5 mg daily filled for today 11/20, 12/9, and 1/16/24    2)  THERAPY:  No Change    3)  LABS:  " lipid, CBC CMP 10/2023    4)  PT MONITOR [call for probs]:  Worsening symptoms, SI/HI, SEs from meds    5)  REFERRALS [CD, medical, other]:  None    6)  RTC: 3 months          Answers submitted by the patient for this visit:  SONDRA-7 (Submitted on 11/21/2023)  SONDRA 7 TOTAL SCORE: 7

## 2023-11-29 DIAGNOSIS — K21.9 GASTROESOPHAGEAL REFLUX DISEASE, UNSPECIFIED WHETHER ESOPHAGITIS PRESENT: ICD-10-CM

## 2023-11-29 DIAGNOSIS — I10 ESSENTIAL HYPERTENSION: ICD-10-CM

## 2023-11-30 NOTE — TELEPHONE ENCOUNTER
Prilosec       Last Written Prescription Date:  09*/07/2023  Last Fill Quantity: 90,   # refills: 0  Last Office Visit: 10/23/2023    HCTZ      Last Written Prescription Date:  09/07/2023  Last Fill Quantity: 90,   # refills: 0  Last Office Visit: 10/23/2023  Future Office visit:    Next 5 appointments (look out 90 days)      Feb 20, 2024  9:00 AM  (Arrive by 8:45 AM)  Return Visit with Annette Hammond MD  [facility] ([facility] ) [address] [place] [id number] [phone]

## 2023-12-01 RX ORDER — OMEPRAZOLE 40 MG/1
CAPSULE, DELAYED RELEASE ORAL
Qty: 90 CAPSULE | Refills: 0 | Status: SHIPPED | OUTPATIENT
Start: 2023-12-01 | End: 2023-12-04

## 2023-12-01 RX ORDER — HYDROCHLOROTHIAZIDE 12.5 MG/1
CAPSULE ORAL
Qty: 90 CAPSULE | Refills: 0 | Status: SHIPPED | OUTPATIENT
Start: 2023-12-01 | End: 2023-12-04

## 2023-12-04 DIAGNOSIS — K21.9 GASTROESOPHAGEAL REFLUX DISEASE, UNSPECIFIED WHETHER ESOPHAGITIS PRESENT: ICD-10-CM

## 2023-12-04 DIAGNOSIS — I10 ESSENTIAL HYPERTENSION: ICD-10-CM

## 2023-12-04 RX ORDER — OMEPRAZOLE 40 MG/1
CAPSULE, DELAYED RELEASE ORAL
Qty: 90 CAPSULE | Refills: 0 | Status: SHIPPED | OUTPATIENT
Start: 2023-12-04 | End: 2024-02-27

## 2023-12-04 RX ORDER — HYDROCHLOROTHIAZIDE 12.5 MG/1
CAPSULE ORAL
Qty: 90 CAPSULE | Refills: 0 | Status: SHIPPED | OUTPATIENT
Start: 2023-12-04 | End: 2024-03-05

## 2023-12-04 NOTE — TELEPHONE ENCOUNTER
Omeprazole      Last Written Prescription Date:  12/1/23  Last Fill Quantity: 90,   # refills: 0  Last Office Visit: 10/23/23  Future Office visit:    Next 5 appointments (look out 90 days)      Feb 20, 2024  9:00 AM  (Arrive by 8:45 AM)  Return Visit [facility]  [facility] ) [address] [place] [phone]  [phone]             Routing refill request to provider for review/approval because:        HCTZ      Last Written Prescription Date:  12/1/23  Last Fill Quantity: 90,   # refills: 0  Last Office Visit: 10/23/23  Future Office visit:    Next 5 appointments (look out 90 days)      Feb 20, 2024  9:00 AM  (Arrive by 8:45 AM)  Return Visit with Annette Hammond MD  [facility] ([facility] ) [address] [place] [address] [address]  [phone]             Routing refill request to provider for review/approval because:

## 2023-12-17 DIAGNOSIS — K11.7 CLINICAL XEROSTOMIA: ICD-10-CM

## 2023-12-18 NOTE — TELEPHONE ENCOUNTER
Salegen 5mg      Last Written Prescription Date:  8-14-23  Last Fill Quantity: 360,   # refills: 0  Last Office Visit: 10-23-23  Future Office visit:    Next 5 appointments (look out 90 days)      Feb 20, 2024  9:00 AM  (Arrive by 8:45 AM)  Return Visit with Annette Hammond MD  [facility] ([facility] ) [address] [place] [id number] [phone]             Routing refill request to provider for review/approval because:  Drug not on the [facility], [facility] or [facility] refill protocol or controlled substance

## 2023-12-19 RX ORDER — PILOCARPINE HYDROCHLORIDE 5 MG/1
TABLET, FILM COATED ORAL
Qty: 360 TABLET | Refills: 0 | Status: SHIPPED | OUTPATIENT
Start: 2023-12-19 | End: 2024-04-02

## 2023-12-31 DIAGNOSIS — F90.0 ADHD (ATTENTION DEFICIT HYPERACTIVITY DISORDER), INATTENTIVE TYPE: ICD-10-CM

## 2023-12-31 DIAGNOSIS — M54.12 CERVICAL RADICULOPATHY: ICD-10-CM

## 2024-01-02 RX ORDER — DEXTROAMPHETAMINE SACCHARATE, AMPHETAMINE ASPARTATE, DEXTROAMPHETAMINE SULFATE AND AMPHETAMINE SULFATE 1.25; 1.25; 1.25; 1.25 MG/1; MG/1; MG/1; MG/1
5 TABLET ORAL DAILY
Qty: 30 TABLET | Refills: 0 | Status: SHIPPED | OUTPATIENT
Start: 2024-01-02 | End: 2024-02-04

## 2024-01-02 RX ORDER — GABAPENTIN 300 MG/1
600 CAPSULE ORAL 3 TIMES DAILY
Qty: 180 CAPSULE | Refills: 3 | Status: SHIPPED | OUTPATIENT
Start: 2024-01-02 | End: 2024-05-06

## 2024-01-02 NOTE — TELEPHONE ENCOUNTER
Adderall, Gabapentin      Last Written Prescription Date:  12.5.23  Last Fill Quantity: #30, #180,   # refills: 0  Last Office Visit: 11.21.23  Future Office visit:    Next 5 appointments (look out 90 days)      Feb 20, 2024  9:00 AM  (Arrive by 8:45 AM)  Return Visit with Annette Hammond MD  [facility] ([facility] ) [facility] [address] [place] [phone]  [phone]             Routing refill request to provider for review/approval because:  Drug not on the [facility], [facility] or  Health refill protocol or controlled substance     INCREASE Invokana 300 mg- Take 1 tablet by mouth once daily.   Continue metformin 1000 mg- Take 1 tablet by mouth once daily.   Call insurance to find out where to get testing supplies. Let Medication Management Pharmacist know so we can send orders  [phone]   Consider immunizations: COVID booster, influenza, Hepatitis B, Tetanus (Tdap), pneumonia (PCV20), and shingles (Shingrix)  Diet  Consider sugar free options: jam, juice, sugar (Splenda, Sweet and Low, Equal, Stevia)

## 2024-01-03 ENCOUNTER — TELEPHONE (OUTPATIENT)
Dept: FAMILY MEDICINE | Facility: OTHER | Age: 54
End: 2024-01-03

## 2024-01-03 DIAGNOSIS — I10 ESSENTIAL HYPERTENSION: ICD-10-CM

## 2024-01-03 RX ORDER — VERAPAMIL HYDROCHLORIDE 240 MG/1
240 CAPSULE, EXTENDED RELEASE ORAL AT BEDTIME
Qty: 90 CAPSULE | Refills: 0 | Status: SHIPPED | OUTPATIENT
Start: 2024-01-03 | End: 2024-04-02

## 2024-01-03 NOTE — TELEPHONE ENCOUNTER
"8:31 AM    Reason for Call: Phone Call    Description: Patient requesting to speak with Dr. Xiong nurse regarding his BP meds. He says Dr. Paula \"cut me off of my BP meds and I don't know why\" Please call him regarding this     Was an appointment offered for this call? No  If yes : Appointment type              Date    Preferred method for responding to this message: Telephone Call  What is your phone number ?[phone]     If we cannot reach you directly, may we leave a detailed response at the number you provided? Yes    Can this message wait until your PCP/provider returns, if available today? Not applicable    Alihsa Hernandez  "

## 2024-02-01 DIAGNOSIS — F90.0 ADHD (ATTENTION DEFICIT HYPERACTIVITY DISORDER), INATTENTIVE TYPE: ICD-10-CM

## 2024-02-01 NOTE — TELEPHONE ENCOUNTER
Adderall      Last Written Prescription Date:  1/16/24  Last Fill Quantity: 30,   # refills: 0  Last Office Visit: 11/21/23  Future Office visit:    Next 5 appointments (look out 90 days)      Feb 20, 2024  9:00 AM  (Arrive by 8:45 AM)  Return Visit with Annette Hammond MD  [facility] ([facility] ) [address] [place] [phone]             Routing refill request to provider for review/approval because:

## 2024-02-04 RX ORDER — DEXTROAMPHETAMINE SACCHARATE, AMPHETAMINE ASPARTATE, DEXTROAMPHETAMINE SULFATE AND AMPHETAMINE SULFATE 1.25; 1.25; 1.25; 1.25 MG/1; MG/1; MG/1; MG/1
5 TABLET ORAL DAILY
Qty: 30 TABLET | Refills: 0 | Status: SHIPPED | OUTPATIENT
Start: 2024-02-04 | End: 2024-03-05

## 2024-02-20 ENCOUNTER — OFFICE VISIT (OUTPATIENT)
Dept: PSYCHIATRY | Facility: OTHER | Age: 54
End: 2024-02-20
Attending: PSYCHIATRY & NEUROLOGY
Payer: COMMERCIAL

## 2024-02-20 VITALS
BODY MASS INDEX: 24.83 KG/M2 | TEMPERATURE: 97.5 F | SYSTOLIC BLOOD PRESSURE: 122 MMHG | WEIGHT: 178 LBS | RESPIRATION RATE: 18 BRPM | OXYGEN SATURATION: 98 % | HEART RATE: 84 BPM | DIASTOLIC BLOOD PRESSURE: 70 MMHG

## 2024-02-20 DIAGNOSIS — F90.0 ADHD (ATTENTION DEFICIT HYPERACTIVITY DISORDER), INATTENTIVE TYPE: Primary | ICD-10-CM

## 2024-02-20 DIAGNOSIS — F31.9 BIPOLAR I DISORDER (H): ICD-10-CM

## 2024-02-20 PROCEDURE — 99213 OFFICE O/P EST LOW 20 MIN: CPT | Performed by: PSYCHIATRY & NEUROLOGY

## 2024-02-20 PROCEDURE — G0463 HOSPITAL OUTPT CLINIC VISIT: HCPCS

## 2024-02-20 RX ORDER — DEXTROAMPHETAMINE SACCHARATE, AMPHETAMINE ASPARTATE, DEXTROAMPHETAMINE SULFATE AND AMPHETAMINE SULFATE 1.25; 1.25; 1.25; 1.25 MG/1; MG/1; MG/1; MG/1
5 TABLET ORAL DAILY
Qty: 30 TABLET | Refills: 0 | Status: SHIPPED | OUTPATIENT
Start: 2024-03-04 | End: 2024-04-03

## 2024-02-20 RX ORDER — DEXTROAMPHETAMINE SACCHARATE, AMPHETAMINE ASPARTATE, DEXTROAMPHETAMINE SULFATE AND AMPHETAMINE SULFATE 1.25; 1.25; 1.25; 1.25 MG/1; MG/1; MG/1; MG/1
5 TABLET ORAL DAILY
Qty: 30 TABLET | Refills: 0 | Status: SHIPPED | OUTPATIENT
Start: 2024-04-01 | End: 2024-09-20

## 2024-02-20 RX ORDER — CARBAMAZEPINE 200 MG/1
200 TABLET, EXTENDED RELEASE ORAL 2 TIMES DAILY
Qty: 60 TABLET | Refills: 6 | Status: SHIPPED | OUTPATIENT
Start: 2024-02-20 | End: 2024-08-02

## 2024-02-20 ASSESSMENT — COLUMBIA-SUICIDE SEVERITY RATING SCALE - C-SSRS
6. IN YOUR LIFETIME, HAVE YOU EVER DONE ANYTHING, STARTED TO DO ANYTHING, OR PREPARED TO DO ANYTHING TO END YOUR LIFE?: NO
1. IN THE PAST MONTH, HAVE YOU WISHED YOU WERE DEAD OR WISHED YOU COULD GO TO SLEEP AND NOT WAKE UP?: NO
2. HAVE YOU ACTUALLY HAD ANY THOUGHTS OF KILLING YOURSELF?: NO

## 2024-02-20 ASSESSMENT — PATIENT HEALTH QUESTIONNAIRE - PHQ9
10. IF YOU CHECKED OFF ANY PROBLEMS, HOW DIFFICULT HAVE THESE PROBLEMS MADE IT FOR YOU TO DO YOUR WORK, TAKE CARE OF THINGS AT HOME, OR GET ALONG WITH OTHER PEOPLE: VERY DIFFICULT
SUM OF ALL RESPONSES TO PHQ QUESTIONS 1-9: 11
SUM OF ALL RESPONSES TO PHQ QUESTIONS 1-9: 11

## 2024-02-20 ASSESSMENT — ANXIETY QUESTIONNAIRES
5. BEING SO RESTLESS THAT IT IS HARD TO SIT STILL: SEVERAL DAYS
GAD7 TOTAL SCORE: 8
8. IF YOU CHECKED OFF ANY PROBLEMS, HOW DIFFICULT HAVE THESE MADE IT FOR YOU TO DO YOUR WORK, TAKE CARE OF THINGS AT HOME, OR GET ALONG WITH OTHER PEOPLE?: VERY DIFFICULT
6. BECOMING EASILY ANNOYED OR IRRITABLE: SEVERAL DAYS
7. FEELING AFRAID AS IF SOMETHING AWFUL MIGHT HAPPEN: SEVERAL DAYS
3. WORRYING TOO MUCH ABOUT DIFFERENT THINGS: SEVERAL DAYS
2. NOT BEING ABLE TO STOP OR CONTROL WORRYING: SEVERAL DAYS
GAD7 TOTAL SCORE: 8
1. FEELING NERVOUS, ANXIOUS, OR ON EDGE: SEVERAL DAYS
7. FEELING AFRAID AS IF SOMETHING AWFUL MIGHT HAPPEN: SEVERAL DAYS
GAD7 TOTAL SCORE: 8
IF YOU CHECKED OFF ANY PROBLEMS ON THIS QUESTIONNAIRE, HOW DIFFICULT HAVE THESE PROBLEMS MADE IT FOR YOU TO DO YOUR WORK, TAKE CARE OF THINGS AT HOME, OR GET ALONG WITH OTHER PEOPLE: VERY DIFFICULT
4. TROUBLE RELAXING: MORE THAN HALF THE DAYS

## 2024-02-20 ASSESSMENT — PAIN SCALES - GENERAL: PAINLEVEL: MODERATE PAIN (4)

## 2024-02-20 NOTE — PROGRESS NOTES
PSYCHIATRY CLINIC PROGRESS NOTE     SUBJECTIVE / INTERIM HISTORY                                                                          Last visit 11/21/23: Continue Tegretol  mg twice daily. Continue Adderall 5 mg daily filled for today 11/20, 12/9, and 1/16/24  - margarita Darling  - Janneth baby is driving him nuts. He is waking Andrei up earlier and earlier and it's now to 1:30 am.   - Paulette's son got out of correction and he is doing okay thus far  - son is 20% owner of [address]  -[place]. Dad still lives out there and Andrei's aunt lives in house next to dad. For while bought it and he lived there with Brianna and when was with her then in 2010 when  Brianna and moved to [place] with Paulette when they started relationship.   -  Finished HCC with AA and was planning to go for radiology degree. 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  and [facility] and raised Ross who passed away form overdose. And Andrei's nephew Andrei murdered.  - Social/Spiritual Support- sister Caroline, dad Carlos, GF Paulette [place]       MEDICAL / SURGICAL HISTORY                     Patient Active Problem List   Diagnosis    Cervicalgia    Lower back pain    Segmental and somatic dysfunction of lower extremity    GERD (gastroesophageal reflux disease)    Mood disorder (H24)    Abnormal weight gain    Attention deficit hyperactivity disorder (ADHD), predominantly inattentive type    ACP (advance care planning)    Flatulence, eructation, and gas pain    Bipolar disease, chronic (H)    Benign essential hypertension    Russell esophagus    Tobacco dependency    Lithium use    DDD (degenerative disc disease), cervical    Cervical stenosis of spinal canal    Obesity (BMI 35.0-39.9) with comorbidity (H)    Chronic lung disease    Suicidal behavior    Dry mouth    Anxiety state    Insomnia, unspecified    Left hip pain    Perianal abscess    Mass of left parotid gland     ALLERGY    Patient has no known allergies.  MEDICATIONS                                                                                             Current Outpatient Medications   Medication Sig    amphetamine-dextroamphetamine (ADDERALL) 5 MG tablet TAKE 1 TABLET (5 MG) BY MOUTH DAILY FOR 30 DAYS    aspirin 81 MG tablet Take 1 tablet (81 mg) by mouth daily    carBAMazepine (TEGRETOL XR) 200 MG 12 hr tablet TAKE 1 TABLET (200 MG) BY MOUTH 2 TIMES DAILY    chlorhexidine (PERIDEX) 0.12 % solution SWISH AND SPIT 15 MLS IN MOUTH 2 TIMES DAILY    gabapentin (NEURONTIN) 300 MG capsule TAKE 2 CAPSULES (600 MG) BY MOUTH 3 TIMES DAILY    hydrochlorothiazide (MICROZIDE) 12.5 MG capsule TAKE 1 CAPSULE (12.5 MG) BYMOUTH EVERY MORNING    omeprazole (PRILOSEC) 40 MG DR capsule TAKE 1 CAPSULE BY MOUTH DAILY    pilocarpine (SALAGEN) 5 MG tablet TAKE 1 TABLET BY MOUTH FOURTIMES DAILY    verapamil ER (VERELAN) 240 MG 24 hr capsule Take 1 capsule (240 mg) by mouth at bedtime     No current facility-administered medications for this visit.       VITALS   /70   Pulse 84   Temp 97.5  F (36.4  C) (Tympanic)   Resp 18   Wt 80.7 kg (178 lb)   SpO2 98%   BMI 24.83 kg/m       PHQ9                       Depression Screening Follow-up        2/20/2024     9:09 AM   PHQ   PHQ-9 Total Score 11   Q9: Thoughts of better off dead/self-harm past 2 weeks Not at all       Does the patient currently have a mental health provider?  Yes,    Annette Hammond MD         LABS                                                                                                                         Last Comprehensive Metabolic Panel:  Sodium   Date Value Ref Range Status   10/23/2023 137 135 - 145 mmol/L Final     Comment:     Reference intervals for this test were updated on 09/26/2023 to more accurately reflect our healthy population. There may be differences in the flagging of prior results with similar values performed with this method. Interpretation of  those prior results can be made in the context of the updated reference intervals.    04/08/2021 139 133 - 144 mmol/L Final     Potassium   Date Value Ref Range Status   10/23/2023 4.1 3.4 - 5.3 mmol/L Final   08/01/2022 3.1 (L) 3.4 - 5.3 mmol/L Final   04/08/2021 3.1 (L) 3.4 - 5.3 mmol/L Final     Chloride   Date Value Ref Range Status   10/23/2023 101 98 - 107 mmol/L Final   08/01/2022 104 94 - 109 mmol/L Final   04/08/2021 105 94 - 109 mmol/L Final     Carbon Dioxide   Date Value Ref Range Status   04/08/2021 29 20 - 32 mmol/L Final     Carbon Dioxide (CO2)   Date Value Ref Range Status   10/23/2023 27 22 - 29 mmol/L Final   08/01/2022 26 20 - 32 mmol/L Final     Anion Gap   Date Value Ref Range Status   10/23/2023 9 7 - 15 mmol/L Final   08/01/2022 9 3 - 14 mmol/L Final   04/08/2021 5 3 - 14 mmol/L Final     Glucose   Date Value Ref Range Status   10/23/2023 86 70 - 99 mg/dL Final   08/01/2022 129 (H) 70 - 99 mg/dL Final   04/08/2021 96 70 - 99 mg/dL Final     Urea Nitrogen   Date Value Ref Range Status   10/23/2023 8.4 6.0 - 20.0 mg/dL Final   08/01/2022 8 7 - 30 mg/dL Final   04/08/2021 7 7 - 30 mg/dL Final     Creatinine   Date Value Ref Range Status   10/23/2023 0.91 0.67 - 1.17 mg/dL Final   04/08/2021 0.81 0.66 - 1.25 mg/dL Final     GFR Estimate   Date Value Ref Range Status   10/23/2023 >90 >60 mL/min/1.73m2 Final   04/08/2021 >90 >60 mL/min/[1.73_m2] Final     Comment:     Non  GFR Calc  Starting 12/18/2018, serum creatinine based estimated GFR (eGFR) will be   calculated using the Chronic Kidney Disease Epidemiology Collaboration   (CKD-EPI) equation.       Calcium   Date Value Ref Range Status   10/23/2023 9.5 8.6 - 10.0 mg/dL Final   04/08/2021 9.0 8.5 - 10.1 mg/dL Final     CBC RESULTS:   Recent Labs   Lab Test 10/23/23  1128   WBC 7.3   RBC 4.68   HGB 14.3   HCT 43.2   MCV 92   MCH 30.6   MCHC 33.1   RDW 13.1           MENTAL STATUS EXAM                                         "                                               Awake, alert. No problems with speech. Psychomotor: unremarkable.  Mood euthymic and affect congruent to mood and speech content. Thought process, including associations, was unremarkable and thought content was devoid of suicidal and homicidal ideation.  No psychosis today -> no paranoia.  Judgment was adequate for safety. Fund of knowledge was intact. Pt demonstrates no obvious problems with attention, concentration, language, recent or remote memory although these were not formally tested.     ASSESSMENT                                                                                                      HISTORICAL:  Initial psych note 12/28/20          NOTES:  discharge summary from April 8/13/20: \"Lithium was tapered and discontinued in march/begining of April. At that time his lamictal was increased. It appears that lithium was tapered due excessive thirst and dry mouth. Elavil was increased for insomnia in February.\" Seroquel \" weight gain, didn't like how made him feel. Zyprexa ? Liang Whitman is a 52 yo with bipolar disorder and ADHD with psychiatric hospitalization August 2020. Was on amitriptyline, Adderall, Wellbutrin : all activating meds although Andrei notes he feels was the Lamictal that precipitated bibiana. Was noted Depakote for him past weight gain, lithium polyuria and thirst, uncertain whether he had been on neuroleptics (I saw Latuda mentioned in [facility] notes).     Andrei continues to do well. He really enjoys his new apartment building [place]. Paulette's son got out of senior living and thus far is doing okay.       TREATMENT RISK STATEMENT:  The risks, benefits, alternatives and potential adverse effects have been explained and are understood by the pt.  The pt agrees to the treatment plan with the ability to do so.   The pt knows to call the clinic for any problems or access emergency care if needed.        DIAGNOSES                     Bipolar " disorder, most recent episode manic (with psychosis), in partial remission  ADHD    PLAN                                                                                                                    1)  MEDICATIONS:         -- Continue Tegretol  mg twice daily. Continue Adderall 5 mg daily last filled 2/4/24 I set to fill next for 3/4/24 and for 4/1/24    2)  THERAPY:  No Change    3)  LABS:  lipid, CBC CMP 10/2023    4)  PT MONITOR [call for probs]:  Worsening symptoms, SI/HI, SEs from meds    5)  REFERRALS [CD, medical, other]:  None    6)  RTC: 2-3 months          Answers submitted by the patient for this visit:  Patient Health Questionnaire (Submitted on 2/20/2024)  If you checked off any problems, how difficult have these problems made it for you to do your work, take care of things at home, or get along with other people?: Very difficult  PHQ9 TOTAL SCORE: 11  SONDRA-7 (Submitted on 2/20/2024)  SONDRA 7 TOTAL SCORE: 8

## 2024-02-27 DIAGNOSIS — K21.9 GASTROESOPHAGEAL REFLUX DISEASE, UNSPECIFIED WHETHER ESOPHAGITIS PRESENT: ICD-10-CM

## 2024-02-27 RX ORDER — OMEPRAZOLE 40 MG/1
CAPSULE, DELAYED RELEASE ORAL
Qty: 90 CAPSULE | Refills: 0 | Status: SHIPPED | OUTPATIENT
Start: 2024-02-27 | End: 2024-05-21

## 2024-02-27 NOTE — TELEPHONE ENCOUNTER
Omeprazole 40 mg       Last Written Prescription Date:  12/4/23  Last Fill Quantity: 90,   # refills: 0  Last Office Visit: 10/23/23  Future Office visit:    Next 5 appointments (look out 90 days)      May 21, 2024  9:00 AM  (Arrive by 8:45 AM)  Return Visit with Annette Hammond MD  [facility] ([facility] ) [address] [place] [phone]

## 2024-03-01 DIAGNOSIS — K05.10 GINGIVITIS: ICD-10-CM

## 2024-03-04 RX ORDER — CHLORHEXIDINE GLUCONATE ORAL RINSE 1.2 MG/ML
15 SOLUTION DENTAL 2 TIMES DAILY
Qty: 473 ML | Refills: 2 | Status: SHIPPED | OUTPATIENT
Start: 2024-03-04 | End: 2024-08-19

## 2024-03-04 NOTE — TELEPHONE ENCOUNTER
Chlorhexidine 0.12% solution      Last Written Prescription Date:  7-24-23  Last Fill Quantity: 473 ML,   # refills: 2  Last Office Visit: 10-23-23  Future Office visit:    Next 5 appointments (look out 90 days)      May 21, 2024  9:00 AM  (Arrive by 8:45 AM)  Return Visit with Annette Hammond MD  [facility] ([facility] ) [facility] [address] [place] [id number] [phone]

## 2024-04-02 DIAGNOSIS — F90.0 ADHD (ATTENTION DEFICIT HYPERACTIVITY DISORDER), INATTENTIVE TYPE: ICD-10-CM

## 2024-04-02 DIAGNOSIS — K11.7 CLINICAL XEROSTOMIA: ICD-10-CM

## 2024-04-02 DIAGNOSIS — I10 ESSENTIAL HYPERTENSION: ICD-10-CM

## 2024-04-02 RX ORDER — VERAPAMIL HYDROCHLORIDE 240 MG/1
240 CAPSULE, EXTENDED RELEASE ORAL AT BEDTIME
Qty: 90 CAPSULE | Refills: 1 | Status: SHIPPED | OUTPATIENT
Start: 2024-04-02 | End: 2024-09-12

## 2024-04-02 RX ORDER — PILOCARPINE HYDROCHLORIDE 5 MG/1
5 TABLET, FILM COATED ORAL 4 TIMES DAILY
Qty: 360 TABLET | Refills: 0 | Status: SHIPPED | OUTPATIENT
Start: 2024-04-02 | End: 2024-08-19

## 2024-04-02 NOTE — TELEPHONE ENCOUNTER
Verapamil      Last Written Prescription Date:  1/3/24  Last Fill Quantity: 90,   # refills: 0  Last Office Visit: 10/23/23  Future Office visit:    Next 5 appointments (look out 90 days)      Apr 22, 2024 11:15 AM  (Arrive by 11:00 AM)  SHORT with Sayda Paula MD  [facility] [place] ([facility] [place] ) [address]  [place] [address]  [phone]     May 21, 2024  9:00 AM  (Arrive by 8:45 AM)  Return Visit with Annette Hammond MD  [facility] [place] ([facility] [place] ) [address]  [place] [id number] [phone]             Routing refill request to provider for review/approval because:      Salagen      Last Written Prescription Date:  12/19/23  Last Fill Quantity: 360,   # refills: 0  Last Office Visit: 10/23/23  Future Office visit:    Next 5 appointments (look out 90 days)      Apr 22, 2024 11:15 AM  (Arrive by 11:00 AM)  SHORT with Sayda Paula MD  [facility] [place] ([facility] [place] ) [address]  [place] [address]  [phone]     May 21, 2024  9:00 AM  (Arrive by 8:45 AM)  Return Visit with Annette Hammond MD  [facility] [place] ([facility] [place] ) [address]  [place] [address]  [phone]             Routing refill request to provider for review/approval because:

## 2024-04-02 NOTE — TELEPHONE ENCOUNTER
Adderall      Last Written Prescription Date:  4/1/24  Last Fill Quantity: 30,   # refills: 0  Last Office Visit: 2/20/24  Future Office visit:    Next 5 appointments (look out 90 days)      Apr 22, 2024 11:15 AM  (Arrive by 11:00 AM)  SHORT with Sayda Paula MD  [facility] ([facility] [address] [place] [address]  [phone]     May 21, 2024  9:00 AM  (Arrive by 8:45 AM)  Return Visit with Annette Hammond MD  [facility] ([facility] ) [address] [place] [phone]             Routing refill request to provider for review/approval because:

## 2024-04-03 RX ORDER — DEXTROAMPHETAMINE SACCHARATE, AMPHETAMINE ASPARTATE, DEXTROAMPHETAMINE SULFATE AND AMPHETAMINE SULFATE 1.25; 1.25; 1.25; 1.25 MG/1; MG/1; MG/1; MG/1
5 TABLET ORAL DAILY
Qty: 30 TABLET | Refills: 0 | OUTPATIENT
Start: 2024-04-03 | End: 2024-05-03

## 2024-04-10 ENCOUNTER — TRANSFERRED RECORDS (OUTPATIENT)
Dept: HEALTH INFORMATION MANAGEMENT | Facility: CLINIC | Age: 54
End: 2024-04-10

## 2024-04-19 NOTE — PROGRESS NOTES
Assessment & Plan     Essential hypertension  Stable.  Continue hydrochlorothiazide 12.5 mg.    - CBC with platelets and differential; Future  - Comprehensive metabolic panel (BMP + Alb, Alk Phos, ALT, AST, Total. Bili, TP); Future  - Primary Care - Care Coordination Referral    Gastroesophageal reflux disease, unspecified whether esophagitis present  Stable.  Continue Prilosec 40 mg.  Weight up.  - CBC with platelets and differential; Future  - Primary Care - Care Coordination Referral    Tobacco use disorder  Declines cessation attemptl  - Primary Care - Care Coordination Referral    Encounter for tobacco use cessation counseling  Counseling performed  - Primary Care - Care Coordination Referral    Hyperlipidemia, unspecified hyperlipidemia type  Labs today.  Reassess results, risk, pooled cohort.  - Comprehensive metabolic panel (BMP + Alb, Alk Phos, ALT, AST, Total. Bili, TP); Future  - Lipid Profile (Chol, Trig, HDL, LDL calc); Future  - Primary Care - Care Coordination Referral    Screening for prostate cancer  - PSA, screen; Future  - Primary Care - Care Coordination Referral    Need for prophylactic vaccination against hepatitis B virus  2 of 3 given today.  - Primary Care - Care Coordination Referral    Mass of left parotid gland  Did not follow through with advised surgical excision.  Has been over a year.  Pleomorphic adenoma on pathology.  Update CT neck - then refer back to surgeon.  - CT Soft Tissue Neck w Contrast; Future  - Primary Care - Care Coordination Referral    Pleomorphic adenoma of parotid gland  As above  - CT Soft Tissue Neck w Contrast; Future  - Primary Care - Care Coordination Referral    Personal history of tobacco use  As above  - Prof fee: Shared Decision Making for Lung Cancer Screening  - CT Chest Lung Cancer Scrn Low Dose wo; Future  - Primary Care - Care Coordination Referral    Review of prior external note(s) from - CareEverywhere information from [facility]  coordination referral placed to assist with multiple follow up - CT neck, CT lung, colonoscopy, etc.      See Patient Instructions    Return in about 3 months (around 7/22/2024) for CDM.    Henna Forbes is a 53 year old, presenting for the following health issues:  Recheck Medication        4/22/2024    10:43 AM   Additional Questions   Roomed by Costa Garcia   Accompanied by None         4/22/2024    10:43 AM   Patient Reported Additional Medications   Patient reports taking the following new medications None     History of Present Illness       Back Pain:  He presents for follow up of back pain. Patient's back pain is a chronic problem.  Location of back pain:  Right lower back, left upper back, right side of neck, left side of neck, left shoulder, left hip, right side of waist and left side of waist  Description of back pain: burning, dull ache, sharp, shooting and stabbing  Back pain spreads: right buttocks, left buttocks, right thigh, left thigh, left knee, left foot, left shoulder and right side of neck    Since patient first noticed back pain, pain is: gradually worsening  Does back pain interfere with his job:  Not applicable       He eats 0-1 servings of fruits and vegetables daily.He consumes 5 sweetened beverage(s) daily.He exercises with enough effort to increase his heart rate 30 to 60 minutes per day.  He exercises with enough effort to increase his heart rate 5 days per week.   He is taking medications regularly.       Colon screen - referral placed 10/2023 needs to set up - he is aware  Weight up.  Appetite improved.    Vaccines - open to shingles, hep B. Declines covid.    Tobacco - 1 ppd; declines quit   Lung CT -agreeable    PSA - due -     Had some soda this morning.  Not fully fasting.    Mental health - follows with Dr Hammond.    CT neck 11/21/22 - biopsy - advised removal - never did - did not follow up.  Pleomorphic adenoma on biopsy 11/29/22.  Was not ready to address it at that  "time.  Wants to do so now.  No change in symptoms, tenderness size.  Neck is sore to touch at times, but very poor dentition as well.  From -2022 imaging CT neck                                  IMPRESSION:   4.5 x 3.5 x 2.5 cm centrally necrotic, heterogeneously enhancing mass  of the left parotid gland with cervical lymphadenopathy and thickening  of the adjacent portions of the left sternocleidomastoid mastoid  muscle. Differential diagnosis favors parotid gland abscess however  also includes including centrally necrotic neoplasm.    CT C spine - \"I'm good\".  CT 3/21/23.  Managing with DDD/DJD.  Not interested in procedures, etc.                       Hypertension Follow-up  Do you check your blood pressure regularly outside of the clinic? No   Are you following a low salt diet? No  Are your blood pressures ever more than 140 on the top number (systolic) OR more   than 90 on the bottom number (diastolic), for example 140/90? Patient does not check BP outside of the clinic     Hyperlipidemia Follow-Up  Are you regularly taking any medication or supplement to lower your cholesterol?   No  Are you having muscle aches or other side effects that you think could be caused by your cholesterol lowering medication?  No    Gerd/Heartburn  Duration of complaint: Chronic    Description of symptoms:    food getting stuck: no   nausea/vomiting: no   abdominal pain: no   black/tarry or bloody stools: no :  worse with no particular food or drink.  current NSAID/Aspirin use: YES- 81mg Asprin daily   Therapies tried and outcome: Omeprazole (Prilosec)       Social History     Tobacco Use    Smoking status: Every Day     Current packs/day: 1.00     Average packs/day: 1 pack/day for 35.3 years (35.3 ttl pk-yrs)     Types: Cigarettes     Start date: 1/1/1989     Passive exposure: Current    Smokeless tobacco: Never    Tobacco comments:     quitplan referral declined 6/19/19 on chantax   Vaping Use    Vaping status: Never Used "   Substance Use Topics    Alcohol use: No     Alcohol/week: 0.0 standard drinks of alcohol    Drug use: Yes     Types: Marijuana         8/15/2023     9:37 AM 9/20/2023     8:46 AM 2/20/2024     9:09 AM   PHQ   PHQ-9 Total Score 8 10 11   Q9: Thoughts of better off dead/self-harm past 2 weeks Not at all Not at all Not at all         11/21/2023     8:24 AM 2/20/2024     9:09 AM 4/22/2024    10:41 AM   SONDRA-7 SCORE   Total Score 7 (mild anxiety) 8 (mild anxiety) 6 (mild anxiety)   Total Score 7 8 6         2/20/2024     9:09 AM   Last PHQ-9   1.  Little interest or pleasure in doing things 1   2.  Feeling down, depressed, or hopeless 1   3.  Trouble falling or staying asleep, or sleeping too much 2   4.  Feeling tired or having little energy 2   5.  Poor appetite or overeating 2   6.  Feeling bad about yourself 1   7.  Trouble concentrating 1   8.  Moving slowly or restless 1   Q9: Thoughts of better off dead/self-harm past 2 weeks 0   PHQ-9 Total Score 11         4/22/2024    10:41 AM   SONDRA-7    1. Feeling nervous, anxious, or on edge 1   2. Not being able to stop or control worrying 1   3. Worrying too much about different things 1   4. Trouble relaxing 1   5. Being so restless that it is hard to sit still 1   6. Becoming easily annoyed or irritable 1   7. Feeling afraid, as if something awful might happen 0   SONDRA-7 Total Score 6   If you checked any problems, how difficult have they made it for you to do your work, take care of things at home, or get along with other people? Somewhat difficult       Suicide Assessment Five-step Evaluation and Treatment (SAFE-T)        Review of Systems  Constitutional, HEENT, cardiovascular, pulmonary, gi and gu systems are negative, except as otherwise noted.      Objective    /82 (BP Location: Left arm, Patient Position: Sitting, Cuff Size: Adult Regular)   Pulse 72   Temp 97.8  F (36.6  C) (Tympanic)   Resp 16   Wt 86.8 kg (191 lb 6.4 oz)   SpO2 97%   BMI 26.69 kg/m     Body mass index is 26.69 kg/m .  Physical Exam   GENERAL: alert and no distress  EYES: Eyes grossly normal to inspection, PERRL and conjunctivae and sclerae normal  HENT: normal cephalic/atraumatic, oropharynx clear, oral mucous membranes moist, and poor dentition throughout  NECK: no adenopathy, thyroid normal to palpation, and mild tenderness left neck; slightly more full here as well  RESP: lungs clear to auscultation - no rales, rhonchi or wheezes  CV: regular rate and rhythm, normal S1 S2, no S3 or S4, no murmur, click or rub, no peripheral edema  ABDOMEN: soft, nontender, no hepatosplenomegaly, no masses and bowel sounds normal  MS: no gross musculoskeletal defects noted, no edema  PSYCH: mentation appears normal, affect normal/bright    Labs pending        Signed Electronically by: Sayda Solorzano MD    Answers submitted by the patient for this visit:  SONDRA-7 (Submitted on 4/22/2024)  SONDRA 7 TOTAL SCORE: 6    Lung Cancer Screening Shared Decision Making Visit     Andrei Whitman, a 53 year old male, is eligible for lung cancer screening    History   Smoking Status    Every Day    Types: Cigarettes   Smokeless Tobacco    Never       I have discussed with patient the risks and benefits of screening for lung cancer with low-dose CT.     The risks include:    radiation exposure: one low dose chest CT has as much ionizing radiation as about 15 chest x-rays, or 6 months of background radiation living in [place]      false positives: most findings/nodules are NOT cancer, but some might still require additional diagnostic evaluation, including biopsy    over-diagnosis: some slow growing cancers that might never have been clinically significant will be detected and treated unnecessarily     The benefit of early detection of lung cancer is contingent upon adherence to annual screening or more frequent follow up if indicated.     Furthermore, to benefit from screening, Andrei must be willing and able to undergo  diagnostic procedures, if indicated. Although no specific guide is available for determining severity of comorbidities, it is reasonable to withhold screening in patients who have greater mortality risk from other diseases.     We did discuss that the best way to prevent lung cancer is to not smoke.    Some patients may value a numeric estimation of lung cancer risk when evaluating if lung cancer screening is right for them, here is one calculator:    ShouldIScreen

## 2024-04-22 ENCOUNTER — PATIENT OUTREACH (OUTPATIENT)
Dept: CARE COORDINATION | Facility: OTHER | Age: 54
End: 2024-04-22

## 2024-04-22 ENCOUNTER — OFFICE VISIT (OUTPATIENT)
Dept: FAMILY MEDICINE | Facility: OTHER | Age: 54
End: 2024-04-22
Attending: FAMILY MEDICINE
Payer: COMMERCIAL

## 2024-04-22 VITALS
DIASTOLIC BLOOD PRESSURE: 82 MMHG | TEMPERATURE: 97.8 F | RESPIRATION RATE: 16 BRPM | OXYGEN SATURATION: 97 % | SYSTOLIC BLOOD PRESSURE: 130 MMHG | BODY MASS INDEX: 26.69 KG/M2 | WEIGHT: 191.4 LBS | HEART RATE: 72 BPM

## 2024-04-22 DIAGNOSIS — E78.5 HYPERLIPIDEMIA, UNSPECIFIED HYPERLIPIDEMIA TYPE: ICD-10-CM

## 2024-04-22 DIAGNOSIS — I10 ESSENTIAL HYPERTENSION: Primary | ICD-10-CM

## 2024-04-22 DIAGNOSIS — Z12.5 SCREENING FOR PROSTATE CANCER: ICD-10-CM

## 2024-04-22 DIAGNOSIS — Z71.6 ENCOUNTER FOR TOBACCO USE CESSATION COUNSELING: ICD-10-CM

## 2024-04-22 DIAGNOSIS — Z23 NEED FOR PROPHYLACTIC VACCINATION AGAINST HEPATITIS B VIRUS: ICD-10-CM

## 2024-04-22 DIAGNOSIS — F17.200 TOBACCO USE DISORDER: ICD-10-CM

## 2024-04-22 DIAGNOSIS — D11.0 PLEOMORPHIC ADENOMA OF PAROTID GLAND: ICD-10-CM

## 2024-04-22 DIAGNOSIS — K11.8 MASS OF LEFT PAROTID GLAND: ICD-10-CM

## 2024-04-22 DIAGNOSIS — K21.9 GASTROESOPHAGEAL REFLUX DISEASE, UNSPECIFIED WHETHER ESOPHAGITIS PRESENT: ICD-10-CM

## 2024-04-22 DIAGNOSIS — Z87.891 PERSONAL HISTORY OF TOBACCO USE: ICD-10-CM

## 2024-04-22 LAB
ALBUMIN SERPL BCG-MCNC: 4.6 G/DL (ref 3.5–5.2)
ALP SERPL-CCNC: 121 U/L (ref 40–150)
ALT SERPL W P-5'-P-CCNC: 10 U/L (ref 0–70)
ANION GAP SERPL CALCULATED.3IONS-SCNC: 7 MMOL/L (ref 7–15)
AST SERPL W P-5'-P-CCNC: 23 U/L (ref 0–45)
BASOPHILS # BLD AUTO: 0 10E3/UL (ref 0–0.2)
BASOPHILS NFR BLD AUTO: 1 %
BILIRUB SERPL-MCNC: 0.2 MG/DL
BUN SERPL-MCNC: 7.9 MG/DL (ref 6–20)
CALCIUM SERPL-MCNC: 9.4 MG/DL (ref 8.6–10)
CHLORIDE SERPL-SCNC: 101 MMOL/L (ref 98–107)
CHOLEST SERPL-MCNC: 168 MG/DL
CREAT SERPL-MCNC: 0.81 MG/DL (ref 0.67–1.17)
DEPRECATED HCO3 PLAS-SCNC: 29 MMOL/L (ref 22–29)
EGFRCR SERPLBLD CKD-EPI 2021: >90 ML/MIN/1.73M2
EOSINOPHIL # BLD AUTO: 0.2 10E3/UL (ref 0–0.7)
EOSINOPHIL NFR BLD AUTO: 3 %
ERYTHROCYTE [DISTWIDTH] IN BLOOD BY AUTOMATED COUNT: 13.2 % (ref 10–15)
FASTING STATUS PATIENT QL REPORTED: YES
GLUCOSE SERPL-MCNC: 89 MG/DL (ref 70–99)
HCT VFR BLD AUTO: 42.4 % (ref 40–53)
HDLC SERPL-MCNC: 43 MG/DL
HGB BLD-MCNC: 14.3 G/DL (ref 13.3–17.7)
IMM GRANULOCYTES # BLD: 0 10E3/UL
IMM GRANULOCYTES NFR BLD: 0 %
LDLC SERPL CALC-MCNC: 97 MG/DL
LYMPHOCYTES # BLD AUTO: 2 10E3/UL (ref 0.8–5.3)
LYMPHOCYTES NFR BLD AUTO: 32 %
MCH RBC QN AUTO: 30.8 PG (ref 26.5–33)
MCHC RBC AUTO-ENTMCNC: 33.7 G/DL (ref 31.5–36.5)
MCV RBC AUTO: 91 FL (ref 78–100)
MONOCYTES # BLD AUTO: 0.5 10E3/UL (ref 0–1.3)
MONOCYTES NFR BLD AUTO: 8 %
NEUTROPHILS # BLD AUTO: 3.4 10E3/UL (ref 1.6–8.3)
NEUTROPHILS NFR BLD AUTO: 56 %
NONHDLC SERPL-MCNC: 125 MG/DL
NRBC # BLD AUTO: 0 10E3/UL
NRBC BLD AUTO-RTO: 0 /100
PLATELET # BLD AUTO: 188 10E3/UL (ref 150–450)
POTASSIUM SERPL-SCNC: 4.4 MMOL/L (ref 3.4–5.3)
PROT SERPL-MCNC: 7.6 G/DL (ref 6.4–8.3)
PSA SERPL DL<=0.01 NG/ML-MCNC: 0.7 NG/ML (ref 0–3.5)
RBC # BLD AUTO: 4.64 10E6/UL (ref 4.4–5.9)
SODIUM SERPL-SCNC: 137 MMOL/L (ref 135–145)
TRIGL SERPL-MCNC: 140 MG/DL
WBC # BLD AUTO: 6 10E3/UL (ref 4–11)

## 2024-04-22 PROCEDURE — G0296 VISIT TO DETERM LDCT ELIG: HCPCS | Performed by: FAMILY MEDICINE

## 2024-04-22 PROCEDURE — 36415 COLL VENOUS BLD VENIPUNCTURE: CPT | Mod: ZL | Performed by: FAMILY MEDICINE

## 2024-04-22 PROCEDURE — 80053 COMPREHEN METABOLIC PANEL: CPT | Mod: ZL | Performed by: FAMILY MEDICINE

## 2024-04-22 PROCEDURE — G0463 HOSPITAL OUTPT CLINIC VISIT: HCPCS | Mod: 25

## 2024-04-22 PROCEDURE — 85025 COMPLETE CBC W/AUTO DIFF WBC: CPT | Mod: ZL | Performed by: FAMILY MEDICINE

## 2024-04-22 PROCEDURE — 99214 OFFICE O/P EST MOD 30 MIN: CPT | Performed by: FAMILY MEDICINE

## 2024-04-22 PROCEDURE — G0103 PSA SCREENING: HCPCS | Mod: ZL | Performed by: FAMILY MEDICINE

## 2024-04-22 PROCEDURE — 82465 ASSAY BLD/SERUM CHOLESTEROL: CPT | Mod: ZL | Performed by: FAMILY MEDICINE

## 2024-04-22 PROCEDURE — G0010 ADMIN HEPATITIS B VACCINE: HCPCS

## 2024-04-22 ASSESSMENT — PAIN SCALES - GENERAL: PAINLEVEL: MODERATE PAIN (5)

## 2024-04-22 ASSESSMENT — ANXIETY QUESTIONNAIRES
5. BEING SO RESTLESS THAT IT IS HARD TO SIT STILL: SEVERAL DAYS
1. FEELING NERVOUS, ANXIOUS, OR ON EDGE: SEVERAL DAYS
GAD7 TOTAL SCORE: 6
7. FEELING AFRAID AS IF SOMETHING AWFUL MIGHT HAPPEN: NOT AT ALL
4. TROUBLE RELAXING: SEVERAL DAYS
8. IF YOU CHECKED OFF ANY PROBLEMS, HOW DIFFICULT HAVE THESE MADE IT FOR YOU TO DO YOUR WORK, TAKE CARE OF THINGS AT HOME, OR GET ALONG WITH OTHER PEOPLE?: SOMEWHAT DIFFICULT
6. BECOMING EASILY ANNOYED OR IRRITABLE: SEVERAL DAYS
GAD7 TOTAL SCORE: 6
2. NOT BEING ABLE TO STOP OR CONTROL WORRYING: SEVERAL DAYS
7. FEELING AFRAID AS IF SOMETHING AWFUL MIGHT HAPPEN: NOT AT ALL
IF YOU CHECKED OFF ANY PROBLEMS ON THIS QUESTIONNAIRE, HOW DIFFICULT HAVE THESE PROBLEMS MADE IT FOR YOU TO DO YOUR WORK, TAKE CARE OF THINGS AT HOME, OR GET ALONG WITH OTHER PEOPLE: SOMEWHAT DIFFICULT
3. WORRYING TOO MUCH ABOUT DIFFERENT THINGS: SEVERAL DAYS

## 2024-04-22 NOTE — PROGRESS NOTES
Clinic Care Coordination Contact  Care Team Conversations    RN CC attempted to call patient to introduce self and CC services after referral from PCP. No answer and no where to leave VM. Attempt again on a later date.     Ted DUMONT RN, Care Coordinator  [facility]

## 2024-04-22 NOTE — PATIENT INSTRUCTIONS
Hep B vaccine updated 2 of 3.  At pharmacy - advise Shingrix - 2 shot series.    Labs today - will notify of results.    Call to schedule colonoscopy - order in place from 10/2023.     Smoking cessation advised.  Lung CT screen ordered - radiology will contact you to schedule.    CT neck ordered to follow up left parotid mass - pleomorphic adenoma.    Care coordination referral to assist in navigation, scheduling, follow through, etc.        Lung Cancer Screening   Frequently Asked Questions  If you are at high-risk for lung cancer, getting screened with low-dose computed tomography (LDCT) every year can help save your life. This handout offers answers to some of the most common questions about lung cancer screening. If you have other questions, please call [phone] ([phone]).     What is it?  Lung cancer screening uses special X-ray technology to create an image of your lung tissue. The exam is quick and easy and takes less than 10 seconds. We don t give you any medicine or use any needles. You can eat before and after the exam. You don t need to change your clothes as long as the clothing on your chest doesn t contain metal. But, you do need to be able to hold your breath for at least 6 seconds during the exam.    What is the goal of lung cancer screening?  The goal of lung cancer screening is to save lives. Many times, lung cancer is not found until a person starts having physical symptoms. Lung cancer screening can help detect lung cancer in the earliest stages when it may be easier to treat.    Who should be screened for lung cancer?  We suggest lung cancer screening for anyone who is at high-risk for lung cancer. You are in the high-risk group if you:     are between the ages of 55 and 79, and   have smoked at least 1 pack of cigarettes a day for 20 or more years, and   still smoke or have quit within the past 15 years.    However, if you have a new cough or shortness of breath, you should talk  to your doctor before being screened.    Why does it matter if I have symptoms?  Certain symptoms can be a sign that you have a condition in your lungs that should be checked and treated by your doctor. These symptoms include fever, chest pain, a new or changing cough, shortness of breath that you have never felt before, coughing up blood or unexplained weight loss. Having any of these symptoms can greatly affect the results of lung cancer screening.       Should all smokers get an LDCT lung cancer screening exam?  It depends. Lung cancer screening is for a very specific group of men and women who have a history of heavy smoking over a long period of time (see  Who should be screened for lung cancer  above).  I am in the high-risk group, but have been diagnosed with cancer in the past. Is LDCT lung cancer screening right for me?  In some cases, you should not have LDCT lung screening, such as when your doctor is already following your cancer with CT scan studies. Your doctor will help you decide if LDCT lung screening is right for you.  Do I need to have a screening exam every year?  Yes. If you are in the high-risk group described earlier, you should get an LDCT lung cancer screening exam every year until you are 79, or are no longer willing or able to undergo screening and possible procedures to diagnose and treat lung cancer.  How effective is LDCT at preventing death from lung cancer?  Studies have shown that LDCT lung cancer screening can lower the risk of death from lung cancer by 20 percent in people who are at high-risk.  What are the risks?  There are some risks and limitations of LDCT lung cancer screening. We want to make sure you understand the risks and benefits, so please let us know if you have any questions. Your doctor may want to talk with you more about these risks.   Radiation exposure: As with any exam that uses radiation, there is a very small increased risk of cancer. The amount of radiation  in LDCT is small--about the same amount a person would get from a mammogram. Your doctor orders the exam when he or she feels the potential benefits outweigh the risks.   False negatives: No test is perfect, including LDCT. It is possible that you may have a medical condition, including lung cancer, that is not found during your exam. This is called a false negative result.   False positives and more testing: LDCT very often finds something in the lung that could be cancer, but in fact is not. This is called a false positive result. False positive tests often cause anxiety. To make sure these findings are not cancer, you may need to have more tests. These tests will be done only if you give us permission. Sometimes patients need a treatment that can have side effects, such as a biopsy. For more information on false positives, see  What can I expect from the results?    Findings not related to lung cancer: Your LDCT exam also takes pictures of areas of your body next to your lungs. In a very small number of cases, the CT scan will show an abnormal finding in one of these areas, such as your kidneys, adrenal glands, liver or thyroid. This finding may not be serious, but you may need more tests. Your doctor can help you decide what other tests you may need, if any.  What can I expect from the results?  About 1 out of 4 LDCT exams will find something that may need more tests. Most of the time, these findings are lung nodules. Lung nodules are very small collections of tissue in the lung. These nodules are very common, and the vast majority--more than 97 percent--are not cancer (benign). Most are normal lymph nodes or small areas of scarring from past infections.  But, if a small lung nodule is found to be cancer, the cancer can be cured more than 90 percent of the time. To know if the nodule is cancer, we may need to get more images before your next yearly screening exam. If the nodule has suspicious features (for  example, it is large, has an odd shape or grows over time), we will refer you to a specialist for further testing.  Will my doctor also get the results?  Yes. Your doctor will get a copy of your results.  Is it okay to keep smoking now that there s a cancer screening exam?  No. Tobacco is one of the strongest cancer-causing agents. It causes not only lung cancer, but other cancers and cardiovascular (heart) diseases as well. The damage caused by smoking builds over time. This means that the longer you smoke, the higher your risk of disease. While it is never too late to quit, the sooner you quit, the better.  Where can I find help to quit smoking?  The best way to prevent lung cancer is to stop smoking. If you have already quit smoking, congratulations and keep it up! For help on quitting smoking, please call [employer] at 3-891-QUITNOW (1-505.707.2290) or the American Cancer Society at 1-111.756.4189 to find local resources near you.  One-on-one health coaching:  If you d prefer to work individually with a health care provider on tobacco cessation, we offer:     Medication Therapy Management:  Our specially trained pharmacists work closely with you and your doctor to help you quit smoking.  Call [phone] or [phone] (toll free).

## 2024-04-24 ENCOUNTER — TELEPHONE (OUTPATIENT)
Dept: FAMILY MEDICINE | Facility: OTHER | Age: 54
End: 2024-04-24

## 2024-04-24 ENCOUNTER — PATIENT OUTREACH (OUTPATIENT)
Dept: CARE COORDINATION | Facility: OTHER | Age: 54
End: 2024-04-24

## 2024-04-24 ENCOUNTER — HOSPITAL ENCOUNTER (OUTPATIENT)
Dept: CT IMAGING | Facility: HOSPITAL | Age: 54
Discharge: HOME | End: 2024-04-24
Attending: FAMILY MEDICINE
Payer: COMMERCIAL

## 2024-04-24 DIAGNOSIS — Z12.11 ENCOUNTER FOR SCREENING COLONOSCOPY: Primary | ICD-10-CM

## 2024-04-24 DIAGNOSIS — D11.0 PLEOMORPHIC ADENOMA OF PAROTID GLAND: ICD-10-CM

## 2024-04-24 DIAGNOSIS — K11.8 MASS OF LEFT PAROTID GLAND: ICD-10-CM

## 2024-04-24 DIAGNOSIS — Z87.891 PERSONAL HISTORY OF TOBACCO USE: ICD-10-CM

## 2024-04-24 PROCEDURE — 250N000011 HC RX IP 250 OP 636: Performed by: RADIOLOGY

## 2024-04-24 PROCEDURE — 71271 CT THORAX LUNG CANCER SCR C-: CPT

## 2024-04-24 PROCEDURE — 70491 CT SOFT TISSUE NECK W/DYE: CPT

## 2024-04-24 RX ORDER — IOPAMIDOL 755 MG/ML
75 INJECTION, SOLUTION INTRAVASCULAR ONCE
Status: COMPLETED | OUTPATIENT
Start: 2024-04-24 | End: 2024-04-24

## 2024-04-24 RX ADMIN — IOPAMIDOL 75 ML: 755 INJECTION, SOLUTION INTRAVENOUS at 08:14

## 2024-04-24 ASSESSMENT — ACTIVITIES OF DAILY LIVING (ADL): DEPENDENT_IADLS:: INDEPENDENT

## 2024-04-24 NOTE — TELEPHONE ENCOUNTER
Screening Questions for the Scheduling of Screening Colonoscopies     (If Colonoscopy is diagnostic, Provider should review the chart before scheduling.)    Are you younger than 50 or older than 80?  No    Do you take aspirin or fish oil?  Yes:  (if yes, tell patient to stop 1 week prior to Colonoscopy)    Do you take warfarin (Coumadin), clopidogrel (Plavix), apixaban (Eliquis), dabigatram (Pradaxa), rivaroxaban (Xarelto) or any blood thinner? No    Do you use oxygen at home?  No    Do you have kidney disease? No    Are you on dialysis? No    Have you had a stroke or heart attack in the last year? No    Have you had a stent in your heart or any blood vessel in the last year? No    Have you had a transplant of any organ?  No    Have you had a colonoscopy or upper endoscopy (EGD) before?  YES. Date-.         Date of scheduled Colonoscopy:8/1/24     Provider: Dr. NEAL Collazo     Pharmacy [facility]

## 2024-04-24 NOTE — PROGRESS NOTES
Clinic Care Coordination Contact  Clinic Care Coordination Contact  OUTREACH    Referral Information:  Referral Source: PCP         Chief Complaint   Patient presents with    Clinic Care Coordination - Follow-up        [place] Utilization:   Clinic Utilization  Difficulty keeping appointments:: Yes  Compliance Concerns: Yes  No PCP office visit in Past Year: No  Utilization      No Show Count (past year)  0             ED Visits  0             Hospital Admissions  0                    Current as of: 4/24/2024  9:16 AM                 Health Maintenance Reviewed: Not assessed      Functional Status:  Dependent ADLs:: Independent  Dependent IADLs:: Independent  Bed or wheelchair confined:: No  Mobility Status: Independent  Fallen 2 or more times in the past year?: No  Any fall with injury in the past year?: No    Living Situation:  Current living arrangement:: I live alone    Lifestyle & Psychosocial Needs:    Social Determinants of Health     Food Insecurity: High Risk (10/23/2023)    Food Insecurity     Within the past 12 months, did you worry that your food would run out before you got money to buy more?: Yes     Within the past 12 months, did the food you bought just not last and you didn t have money to get more?: Yes   Depression: Not at risk (4/22/2024)    PHQ-2     PHQ-2 Score: 2   Housing Stability: Low Risk  (10/23/2023)    Housing Stability     Do you have housing? : Yes     Are you worried about losing your housing?: No   Tobacco Use: High Risk (4/22/2024)    Patient History     Smoking Tobacco Use: Every Day     Smokeless Tobacco Use: Never     Passive Exposure: Current   Financial Resource Strain: Low Risk  (10/23/2023)    Financial Resource Strain     Within the past 12 months, have you or your family members you live with been unable to get utilities (heat, electricity) when it was really needed?: No   Alcohol Use: Not on file   Transportation Needs: Low Risk  (10/23/2023)    Transportation Needs      Within the past 12 months, has lack of transportation kept you from medical appointments, getting your medicines, non-medical meetings or appointments, work, or from getting things that you need?: No   Physical Activity: Not on file   Interpersonal Safety: Low Risk  (4/22/2024)    Interpersonal Safety     Do you feel physically and emotionally safe where you currently live?: Yes     Within the past 12 months, have you been hit, slapped, kicked or otherwise physically hurt by someone?: No     Within the past 12 months, have you been humiliated or emotionally abused in other ways by your partner or ex-partner?: No   Stress: Not on file   Social Connections: Not on file   Health Literacy: Not on file        Transportation means:: Accessible car     Mental health DX:: Yes  Mental health DX how managed:: Psychiatrist             Resources and Interventions:  Current Resources:      Community Resources: None     Equipment Currently Used at Home: none  Employment Status: unemployed                        Care Plan:  Care Plan: Specialty Referral       Problem: Patient is in need of specialty care       Goal: Establish consistent relationship with specialist(s) as needed                               Outreach Frequency: monthly, more frequently as needed  Future Appointments                In 3 weeks Annette Hammond MD [facility]    In 2 months Sayda Paula MD [facility]            Plan: RN CC reached out to patient and introduced self and CC services. Patient agreeable to services. Took down direct number for this RN CC.   Patient did complete his CT scans today.   Discussed goals with patient. His top two priorities right now are completing his colonoscopy and getting in with a dentist.   Previously was seen in [place] about a year and a half ago, but is really hoping to be seen closer to the [place] area.Called around the [place] area,  found a couple locations that will accept him, but insurance not in network.  Not able to find one in network accepting new patients. Called patient to discuss this. He is willing to go to one in [place] out of network. He knows his bill will be more. Scheduled at [facility] for 5/10/24 at 8:00 am.   Sent a message to surgery RN CC to assist with getting colonoscopy scheduled.    Assisted patient with scheduling 3 month follow up with PCP.   Will continue to follow and support as needed.    Ted DUMONT RN, Care Coordinator  [facility]

## 2024-04-24 NOTE — Clinical Note
FYI, patient is agreeable to CC services. I will plan to follow. Working on getting colonoscopy scheduled and was able to get him in to a dentist here in [place] on 5/10. Also helped him schedule his follow up with you for July. Thank you!

## 2024-04-25 ENCOUNTER — PATIENT OUTREACH (OUTPATIENT)
Dept: CARE COORDINATION | Facility: OTHER | Age: 54
End: 2024-04-25

## 2024-04-25 NOTE — PROGRESS NOTES
Clinic Care Coordination Contact  Care Team Conversations    RN CC called patient to go over result CT scans as yesterday patient was inquiring about them. Went over results and noted that patient has a new L1 compression fracture.   Patient reports that he has been having back pain and left shoulder pain, fell about a month ago and landed on his back and neck. Had a headache for 2 days. Did not seek care.   Reports his back and shoulder are still sore, but he is able to complete his daily activities. No headaches or other symptoms. No numbness.   Encouraged a follow up with PCP. Patient agreeable, but also said he could probably wait until his next scheduled follow up in July.   Encouraged sooner visit. Scheduled next available with PCP for 5/24.   Encouraged patient to be seen with new or worsening symptoms or any further falls.     Ted DUMONT RN, Care Coordinator  [facility]

## 2024-04-26 ENCOUNTER — HOSPITAL ENCOUNTER (OUTPATIENT)
Facility: HOSPITAL | Age: 54
End: 2024-04-26
Attending: SURGERY | Admitting: SURGERY
Payer: COMMERCIAL

## 2024-04-26 RX ORDER — BISACODYL 5 MG/1
10 TABLET, DELAYED RELEASE ORAL ONCE
Qty: 2 TABLET | Refills: 0 | Status: SHIPPED | OUTPATIENT
Start: 2024-04-26 | End: 2024-04-26

## 2024-04-26 NOTE — TELEPHONE ENCOUNTER
Patient scheduled screening colonoscopy 08/01/24 with kailash Sosa bowel prep ordered to [facility], instructions mailed today.

## 2024-05-05 DIAGNOSIS — M54.12 CERVICAL RADICULOPATHY: ICD-10-CM

## 2024-05-06 RX ORDER — GABAPENTIN 300 MG/1
600 CAPSULE ORAL 3 TIMES DAILY
Qty: 180 CAPSULE | Refills: 3 | Status: SHIPPED | OUTPATIENT
Start: 2024-05-06 | End: 2024-09-06

## 2024-05-06 NOTE — TELEPHONE ENCOUNTER
Gabapentin  Last Written Prescription Date: 1/2/24  Last Fill Quantity: 180 # of Refills: 3  Last Office Visit: 2/20/24

## 2024-05-07 ENCOUNTER — PATIENT OUTREACH (OUTPATIENT)
Dept: CARE COORDINATION | Facility: OTHER | Age: 54
End: 2024-05-07

## 2024-05-07 NOTE — PROGRESS NOTES
Clinic Care Coordination Contact  Care Team Conversations    RN CC spoke with patient, patient reports that he remembers his dental appointment coming up on 5/10 and he plans to attend it.   He cancelled his upcoming appointment with PCP as he feels that he can wait for July to address, continues to have back pain, but this is manageable. States he causes a lot of it by overworking himself.  Working with patient financial to get enrolled in [facility]. Was in contact with them today.   No other current needs from this RN CC at this time.   Patient has direct number for RN CC and will use it if needed.   Will close out program and make no further outreach.     Ted DUMONT RN, Care Coordinator  [facility]        normal...

## 2024-05-21 ENCOUNTER — TELEPHONE (OUTPATIENT)
Dept: PSYCHIATRY | Facility: OTHER | Age: 54
End: 2024-05-21

## 2024-05-21 DIAGNOSIS — K21.9 GASTROESOPHAGEAL REFLUX DISEASE, UNSPECIFIED WHETHER ESOPHAGITIS PRESENT: ICD-10-CM

## 2024-05-21 RX ORDER — OMEPRAZOLE 40 MG/1
CAPSULE, DELAYED RELEASE ORAL
Qty: 90 CAPSULE | Refills: 2 | Status: SHIPPED | OUTPATIENT
Start: 2024-05-21

## 2024-05-21 NOTE — TELEPHONE ENCOUNTER
Omeprazole      Last Written Prescription Date:  2/27/24  Last Fill Quantity: 90,   # refills: 0  Last Office Visit: 4/22/24  Future Office visit:    Next 5 appointments (look out 90 days)      Jul 22, 2024  9:45 AM  (Arrive by 9:30 AM)  Provider Visit with Sayda Paula MD  [facility] ([facility] ) [address]  [place] [address]  [phone]             Routing refill request to provider for review/approval because:

## 2024-05-21 NOTE — CONFIDENTIAL NOTE
May 21, 2024    Dr Annette Hammond out of office. Unable to reach patient, no voicemail.    -Minal Berrios on 5/21/2024 at 8:28 AM

## 2024-06-17 DIAGNOSIS — F90.0 ADHD (ATTENTION DEFICIT HYPERACTIVITY DISORDER), INATTENTIVE TYPE: ICD-10-CM

## 2024-06-17 NOTE — TELEPHONE ENCOUNTER
Adderall      Last Written Prescription Date:  4/1/24  Last Fill Quantity: 30,   # refills: 0  Last Office Visit: 4/22/24  Future Office visit:    Next 5 appointments (look out 90 days)      Jul 03, 2024  9:00 AM  (Arrive by 8:45 AM)  Return Visit with Annette Hammond MD  [facility] ([facility] ) [address] [place] [id number] [phone]     Jul 22, 2024  9:45 AM  (Arrive by 9:30 AM)  Provider Visit with Sayda Paula MD  [facility] ([facility] ) [address] [place] [id number]  [phone]             Routing refill request to provider for review/approval because:

## 2024-06-19 RX ORDER — DEXTROAMPHETAMINE SACCHARATE, AMPHETAMINE ASPARTATE, DEXTROAMPHETAMINE SULFATE AND AMPHETAMINE SULFATE 1.25; 1.25; 1.25; 1.25 MG/1; MG/1; MG/1; MG/1
5 TABLET ORAL DAILY
Qty: 30 TABLET | Refills: 0 | Status: SHIPPED | OUTPATIENT
Start: 2024-06-19 | End: 2024-07-25

## 2024-07-03 ENCOUNTER — OFFICE VISIT (OUTPATIENT)
Dept: PSYCHIATRY | Facility: OTHER | Age: 54
End: 2024-07-03
Attending: PSYCHIATRY & NEUROLOGY
Payer: COMMERCIAL

## 2024-07-03 VITALS
HEIGHT: 72 IN | WEIGHT: 183.4 LBS | BODY MASS INDEX: 24.84 KG/M2 | SYSTOLIC BLOOD PRESSURE: 126 MMHG | OXYGEN SATURATION: 97 % | DIASTOLIC BLOOD PRESSURE: 72 MMHG | TEMPERATURE: 97.5 F | HEART RATE: 77 BPM

## 2024-07-03 DIAGNOSIS — F31.9 BIPOLAR I DISORDER (H): Primary | ICD-10-CM

## 2024-07-03 PROCEDURE — 99213 OFFICE O/P EST LOW 20 MIN: CPT | Performed by: PSYCHIATRY & NEUROLOGY

## 2024-07-03 PROCEDURE — G0463 HOSPITAL OUTPT CLINIC VISIT: HCPCS

## 2024-07-03 ASSESSMENT — ANXIETY QUESTIONNAIRES
GAD7 TOTAL SCORE: 8
7. FEELING AFRAID AS IF SOMETHING AWFUL MIGHT HAPPEN: SEVERAL DAYS
8. IF YOU CHECKED OFF ANY PROBLEMS, HOW DIFFICULT HAVE THESE MADE IT FOR YOU TO DO YOUR WORK, TAKE CARE OF THINGS AT HOME, OR GET ALONG WITH OTHER PEOPLE?: SOMEWHAT DIFFICULT
7. FEELING AFRAID AS IF SOMETHING AWFUL MIGHT HAPPEN: SEVERAL DAYS
IF YOU CHECKED OFF ANY PROBLEMS ON THIS QUESTIONNAIRE, HOW DIFFICULT HAVE THESE PROBLEMS MADE IT FOR YOU TO DO YOUR WORK, TAKE CARE OF THINGS AT HOME, OR GET ALONG WITH OTHER PEOPLE: SOMEWHAT DIFFICULT
3. WORRYING TOO MUCH ABOUT DIFFERENT THINGS: MORE THAN HALF THE DAYS
GAD7 TOTAL SCORE: 8
4. TROUBLE RELAXING: SEVERAL DAYS
1. FEELING NERVOUS, ANXIOUS, OR ON EDGE: SEVERAL DAYS
GAD7 TOTAL SCORE: 8
2. NOT BEING ABLE TO STOP OR CONTROL WORRYING: SEVERAL DAYS
5. BEING SO RESTLESS THAT IT IS HARD TO SIT STILL: SEVERAL DAYS
6. BECOMING EASILY ANNOYED OR IRRITABLE: SEVERAL DAYS

## 2024-07-03 ASSESSMENT — PAIN SCALES - GENERAL: PAINLEVEL: MODERATE PAIN (5)

## 2024-07-03 NOTE — PROGRESS NOTES
"      PSYCHIATRY CLINIC PROGRESS NOTE     SUBJECTIVE / INTERIM HISTORY                                                                          Last visit 2/2024: Continue Tegretol  mg twice daily. Continue Adderall 5 mg daily last filled 2/4/24 I set to fill next for 3/4/24 and for 4/1/24  - started having fur all over his face and had in his mouth and told the neighbor. Next day Janneth Baby was dead.  - went to his [facility] dentist. They did x-rays and Andrei started telling them about the fur and then Andrei got a letter   - \"I realized a lot of this stuff was me, not her\"  - doesn't talk to his sister or dad because this all happens when around them.  - \"I don't want to try new meds\"  - Paulette's son got out of [employer] and then ended up back in [employer]  - son is 20% owner of [employer]. Son going to have a baby. Son has Hungting[place]'s chorea? Son already has 12 yo daughter and she has no issues  -[place] of [place]. Dad still lives out there and Andrei's aunt lives in house next to dad. For while bought it and he lived there with Brianna and when was with her then in 2010 when  Brianna and moved to [place] with Paulette when they started relationship.   -  Finished [employer] with [employer] and was planning to go for radiology degree. Then started in [place] for [employer].  and notes dated [facility] [employer] and raised Ross who passed away form overdose. And Andrei's nephew Andrei murdered.  - Social/Spiritual Support- sister Caroline, dad Carlos, GF Paulette Alfonzo       MEDICAL / SURGICAL HISTORY                     Patient Active Problem List   Diagnosis    Cervicalgia    Lower back pain    Segmental and somatic dysfunction of lower extremity    GERD (gastroesophageal reflux disease)    Mood disorder (H24)    Abnormal weight gain    Attention deficit hyperactivity disorder (ADHD), predominantly inattentive type    ACP (advance care planning)    Flatulence, eructation, and gas pain    Bipolar " disease, chronic (H)    Benign essential hypertension    Russell esophagus    Tobacco dependency    Lithium use    DDD (degenerative disc disease), cervical    Cervical stenosis of spinal canal    Obesity (BMI 35.0-39.9) with comorbidity (H)    Chronic lung disease    Suicidal behavior    Dry mouth    Anxiety state    Insomnia, unspecified    Left hip pain    Perianal abscess    Mass of left parotid gland     ALLERGY   Patient has no known allergies.  MEDICATIONS                                                                                             Current Outpatient Medications   Medication Sig Dispense Refill    amphetamine-dextroamphetamine (ADDERALL) 5 MG tablet TAKE 1 TABLET (5 MG) BY MOUTH DAILY FOR 30 DAYS 30 tablet 0    aspirin 81 MG tablet Take 1 tablet (81 mg) by mouth daily 30 tablet     carBAMazepine (TEGRETOL XR) 200 MG 12 hr tablet Take 1 tablet (200 mg) by mouth 2 times daily 60 tablet 6    chlorhexidine (PERIDEX) 0.12 % solution SWISH AND SPIT 15 MLS IN MOUTH 2 TIMES DAILY 473 mL 2    gabapentin (NEURONTIN) 300 MG capsule TAKE 2 CAPSULES (600 MG) BY MOUTH 3 TIMES DAILY 180 capsule 3    hydrochlorothiazide (MICROZIDE) 12.5 MG capsule TAKE 1 CAPSULE (12.5 MG) BY MOUTH EVERY MORNING 90 capsule 0    omeprazole (PRILOSEC) 40 MG DR capsule TAKE 1 CAPSULE BY MOUTH DAILY 90 capsule 2    pilocarpine (SALAGEN) 5 MG tablet TAKE 1 TABLET BY MOUTH FOUR TIMES DAILY 360 tablet 0    verapamil ER (VERELAN) 240 MG 24 hr capsule TAKE 1 CAPSULE (240 MG) BY MOUTH AT BEDTIME 90 capsule 1     No current facility-administered medications for this visit.       VITALS   /72 (Cuff Size: Adult Regular)   Pulse 77   Temp 97.5  F (36.4  C) (Tympanic)   Ht 1.829 m (6')   Wt 83.2 kg (183 lb 6.4 oz)   SpO2 97%   BMI 24.87 kg/m       PHQ9                       Depression Screening Follow-up        7/3/2024     8:57 AM   PHQ   PHQ-9 Total Score 8   Q9: Thoughts of better off dead/self-harm past 2 weeks Not at all        Does the patient currently have a mental health provider?  Yes,    Annette Hammond MD         LABS                                                                                                                         Last Comprehensive Metabolic Panel:  Sodium   Date Value Ref Range Status   04/22/2024 137 135 - 145 mmol/L Final     Comment:     Reference intervals for this test were updated on 09/26/2023 to more accurately reflect our healthy population. There may be differences in the flagging of prior results with similar values performed with this method. Interpretation of those prior results can be made in the context of the updated reference intervals.    04/08/2021 139 133 - 144 mmol/L Final     Potassium   Date Value Ref Range Status   04/22/2024 4.4 3.4 - 5.3 mmol/L Final   08/01/2022 3.1 (L) 3.4 - 5.3 mmol/L Final   04/08/2021 3.1 (L) 3.4 - 5.3 mmol/L Final     Chloride   Date Value Ref Range Status   04/22/2024 101 98 - 107 mmol/L Final   08/01/2022 104 94 - 109 mmol/L Final   04/08/2021 105 94 - 109 mmol/L Final     Carbon Dioxide   Date Value Ref Range Status   04/08/2021 29 20 - 32 mmol/L Final     Carbon Dioxide (CO2)   Date Value Ref Range Status   04/22/2024 29 22 - 29 mmol/L Final   08/01/2022 26 20 - 32 mmol/L Final     Anion Gap   Date Value Ref Range Status   04/22/2024 7 7 - 15 mmol/L Final   08/01/2022 9 3 - 14 mmol/L Final   04/08/2021 5 3 - 14 mmol/L Final     Glucose   Date Value Ref Range Status   04/22/2024 89 70 - 99 mg/dL Final   08/01/2022 129 (H) 70 - 99 mg/dL Final   04/08/2021 96 70 - 99 mg/dL Final     Urea Nitrogen   Date Value Ref Range Status   04/22/2024 7.9 6.0 - 20.0 mg/dL Final   08/01/2022 8 7 - 30 mg/dL Final   04/08/2021 7 7 - 30 mg/dL Final     Creatinine   Date Value Ref Range Status   04/22/2024 0.81 0.67 - 1.17 mg/dL Final   04/08/2021 0.81 0.66 - 1.25 mg/dL Final     GFR Estimate   Date Value Ref Range Status   04/22/2024 >90 >60 mL/min/1.73m2 Final  "  04/08/2021 >90 >60 mL/min/[1.73_m2] Final     Comment:     Non  GFR Calc  Starting 12/18/2018, serum creatinine based estimated GFR (eGFR) will be   calculated using the Chronic Kidney Disease Epidemiology Collaboration   (CKD-EPI) equation.       Calcium   Date Value Ref Range Status   04/22/2024 9.4 8.6 - 10.0 mg/dL Final   04/08/2021 9.0 8.5 - 10.1 mg/dL Final     CBC RESULTS:   Recent Labs   Lab Test 10/23/23  1128   WBC 7.3   RBC 4.68   HGB 14.3   HCT 43.2   MCV 92   MCH 30.6   MCHC 33.1   RDW 13.1           MENTAL STATUS EXAM                                                                                       Awake, alert. No problems with speech. Psychomotor: unremarkable.  Mood sad and affect congruent to mood and speech content: tearful. Thought process, including associations, was unremarkable and thought content was devoid of suicidal and homicidal ideation.  No psychosis today -> no paranoia.  Judgment was adequate for safety. Fund of knowledge was intact. Pt demonstrates no obvious problems with attention, concentration, language, recent or remote memory although these were not formally tested.     ASSESSMENT                                                                                                      HISTORICAL:  Initial psych note 12/28/20          NOTES:  discharge summary from April 8/13/20: \"Lithium was tapered and discontinued in march/begining of April. At that time his lamictal was increased. It appears that lithium was tapered due excessive thirst and dry mouth. Elavil was increased for insomnia in February.\" Seroquel \" weight gain, didn't like how made him feel. Zyprexa ? Geodon.     Andrei Whitman is a 52 yo with bipolar disorder and ADHD with psychiatric hospitalization August 2020. Was on amitriptyline, Adderall, Wellbutrin : all activating meds although Andrei notes he feels was the Lamictal that precipitated bibiana. Was noted Depakote for him past weight " gain, lithium polyuria and thirst, uncertain whether he had been on neuroleptics (I saw [facility] mentioned in [facility] notes).     Today he notes things are happening again including he complained to neighbor lady and then janak Baby  the next day. I asked several times if he would be wiling to try a med such as Seroquel or Abilify. He notes it's not going to change the things that happen to him. I noted I agree however it may help him feel calmer and not get as upset. Andrei notes he doesn't want to change meds...the more I've gotten to know Andrei I question if he has schizoaffective disorder as opposed to bipolar disorder.      TREATMENT RISK STATEMENT:  The risks, benefits, alternatives and potential adverse effects have been explained and are understood by the pt.  The pt agrees to the treatment plan with the ability to do so.   The pt knows to call the clinic for any problems or access emergency care if needed.        DIAGNOSES                     Bipolar I disorder vs. Schizoaffective Disorder  ADHD    PLAN                                                                                                                    1)  MEDICATIONS:         -- Continue Tegretol  mg twice daily. Continue Adderall 5 mg daily   2)  THERAPY:  No Change    3)  LABS:  lipid, CBC CMP 10/2023    4)  PT MONITOR [call for probs]:  Worsening symptoms, SI/HI, SEs from meds    5)  REFERRALS [CD, medical, other]:  None    6)  RTC: 1 month        Answers submitted by the patient for this visit:  Patient Health Questionnaire (Submitted on 7/3/2024)  If you checked off any problems, how difficult have these problems made it for you to do your work, take care of things at home, or get along with other people?: Somewhat difficult  PHQ9 TOTAL SCORE: 8  SONDRA-7 (Submitted on 7/3/2024)  SONDRA 7 TOTAL SCORE: 8

## 2024-07-18 NOTE — PROGRESS NOTES
Assessment & Plan     Benign essential hypertension  At goal.  Continue Verapamil and hydrochlorothiazide.  - Basic metabolic panel; Future    Gastroesophageal reflux disease, unspecified whether esophagitis present  Stable with Prilosec.  Continue 40 mg daily.    Special screening for malignant neoplasms, colon  Scheduled next month.    Tobacco use disorder  Encounter for tobacco use cessation counseling  Declines quit attempt.    DDD (degenerative disc disease), lumbar  Known arthritis.  Compression fracture L1 noted on lung CT.  Radicular symptoms down right leg.  No red flags.  Declines physical therapy.  Update lumbar spine.  Add DEXA to assess for osteoporosis.  Risk factors - L1 compression fracture unknown cause, tobacco use, diet.  Update MRI lumbar spine.  - MR Lumbar Spine w/o Contrast; Future  - XR Lumbar Spine 2/3 Views; Future    Lumbar radiculopathy  As above.  - MR Lumbar Spine w/o Contrast; Future  - XR Lumbar Spine 2/3 Views; Future    Compression fracture of L1 vertebra with routine healing, subsequent encounter  As above.  - MR Lumbar Spine w/o Contrast; Future  - DX Bone Density; Future  - XR Lumbar Spine 2/3 Views; Future  - Vitamin D Deficiency; Future  - TSH with free T4 reflex; Future    Other specified disorders of bone density and structure, other site  - DX Bone Density; Future  - Vitamin D Deficiency; Future  - TSH with free T4 reflex; Future    Abnormal findings on diagnostic imaging of other specified body structures  - TSH with free T4 reflex; Future    Of note - will update care coordination Ted B to assist with dental cares -   Letter from Dr Ming Tineo - [facility] - unable to provide cares.  Needs to look elsewhere.      The longitudinal plan of care for the diagnosis(es)/condition(s) as documented were addressed during this visit. Due to the added complexity in care, I will continue to support Andrei in the subsequent management and with ongoing continuity of  care.  See Patient Instructions    No follow-ups on file.    Henna Forbes is a 53 year old, presenting for the following health issues:  Gastrophageal Reflux and Hypertension        7/22/2024     9:36 AM   Additional Questions   Roomed by Costa Garcia   Accompanied by None         7/22/2024     9:36 AM   Patient Reported Additional Medications   Patient reports taking the following new medications None     History of Present Illness       Back Pain:  He presents for follow up of back pain. Patient's back pain is a chronic problem.  Location of back pain:  Right lower back, left lower back, left middle of back, right side of neck, left shoulder, left buttock, left hip and left side of waist  Description of back pain: burning, cramping, gnawing and stabbing  Back pain spreads: right buttocks, left buttocks, right thigh, left thigh, left knee, right foot, right shoulder and right side of neck    Since patient first noticed back pain, pain is: gradually worsening  Does back pain interfere with his job:  Not applicable       Mental Health Follow-up:  Patient presents to follow-up on Depression & Anxiety.Patient's depression since last visit has been:  Medium  The patient is not having other symptoms associated with depression.  Patient's anxiety since last visit has been:  Medium  The patient is not having other symptoms associated with anxiety.  Any significant life events: financial concerns, grief or loss and health concerns  Patient is feeling anxious or having panic attacks.  Patient has no concerns about alcohol or drug use.    He eats 0-1 servings of fruits and vegetables daily.He consumes 5 sweetened beverage(s) daily.He exercises with enough effort to increase his heart rate 20 to 29 minutes per day.  He exercises with enough effort to increase his heart rate 3 or less days per week.   He is taking medications regularly.       Hep B #3 of 3- agreeable  Shingrix - agreeable    Colon screen - scheduled  "8/1/24    Tobacco - 1 ppd - declines quit assistance     Psych - Dr Hammond - Neurontin, Tegretol XL, Adderall; stable.  No counseling.    Hypertension Follow-up  Do you check your blood pressure regularly outside of the clinic? No   Are you following a low salt diet? Yes  Are your blood pressures ever more than 140 on the top number (systolic) OR more   than 90 on the bottom number (diastolic), for example 140/90? Patient does not check BP outside of the clinic     Gerd/Heartburn  Duration of complaint: Chronic    Description of symptoms:    food getting stuck: no   nausea/vomiting: no   abdominal pain: no   black/tarry or bloody stools: no :  worse with no particular food or drink.  current NSAID/Aspirin use: YES- 81mg asprin daily  Therapies tried and outcome: Omeprazole (Prilosec) and medication helpful; doesn't miss doses often - but if does - will flare    Back and joints - \"they're good\"; sometimes sore on chest/ribs when walking dogs.  Arms and legs get tingly at times.  Compression fracture L1 noted on lung CT.  New since 2021.  Right big toe pain when leg straight and leg flexed, or with leg bent in certain way.  Feels like a burning tendon up right leg.      Review of Systems  Constitutional, HEENT, cardiovascular, pulmonary, gi and gu systems are negative, except as otherwise noted.      Objective    /73 (BP Location: Right arm, Patient Position: Sitting, Cuff Size: Adult Large)   Pulse 69   Temp 98.1  F (36.7  C) (Tympanic)   Resp 16   Wt 82.2 kg (181 lb 4.8 oz)   SpO2 98%   BMI 24.59 kg/m    Body mass index is 24.59 kg/m .  Physical Exam   GENERAL: alert and no distress  HENT: poor dentition  NECK: no adenopathy, no asymmetry, masses, or scars  RESP: lungs clear to auscultation - no rales, rhonchi or wheezes  CV: regular rate and rhythm, normal S1 S2, no S3 or S4, no murmur, click or rub, no peripheral edema  MS: normal muscle tone, no edema, peripheral pulses normal, and tenderness to " palpation lumbar spine, paraspinal, multiple levels; SLR negative today; symmetric strength, sensation, reflexes  SKIN: no suspicious lesions or rashes  NEURO: Normal strength and tone, mentation intact and speech normal  PSYCH: mentation appears normal, affect normal/bright    Labs pending - D, TSH, BMP  Xray l spine pending        Signed Electronically by: Sayda Solorzano MD    Answers submitted by the patient for this visit:  Patient Health Questionnaire (Submitted on 7/22/2024)  If you checked off any problems, how difficult have these problems made it for you to do your work, take care of things at home, or get along with other people?: Somewhat difficult  PHQ9 TOTAL SCORE: 10  SONDRA-7 (Submitted on 7/22/2024)  SONDRA 7 TOTAL SCORE: 7

## 2024-07-22 ENCOUNTER — ANCILLARY PROCEDURE (OUTPATIENT)
Dept: GENERAL RADIOLOGY | Facility: OTHER | Age: 54
End: 2024-07-22
Attending: FAMILY MEDICINE
Payer: COMMERCIAL

## 2024-07-22 ENCOUNTER — OFFICE VISIT (OUTPATIENT)
Dept: FAMILY MEDICINE | Facility: OTHER | Age: 54
End: 2024-07-22
Attending: FAMILY MEDICINE
Payer: COMMERCIAL

## 2024-07-22 VITALS
RESPIRATION RATE: 16 BRPM | SYSTOLIC BLOOD PRESSURE: 118 MMHG | DIASTOLIC BLOOD PRESSURE: 73 MMHG | BODY MASS INDEX: 24.59 KG/M2 | TEMPERATURE: 98.1 F | HEART RATE: 69 BPM | OXYGEN SATURATION: 98 % | WEIGHT: 181.3 LBS

## 2024-07-22 DIAGNOSIS — M51.369 DDD (DEGENERATIVE DISC DISEASE), LUMBAR: ICD-10-CM

## 2024-07-22 DIAGNOSIS — M85.88 OTHER SPECIFIED DISORDERS OF BONE DENSITY AND STRUCTURE, OTHER SITE: ICD-10-CM

## 2024-07-22 DIAGNOSIS — S32.010D COMPRESSION FRACTURE OF L1 VERTEBRA WITH ROUTINE HEALING, SUBSEQUENT ENCOUNTER: ICD-10-CM

## 2024-07-22 DIAGNOSIS — Z12.11 SPECIAL SCREENING FOR MALIGNANT NEOPLASMS, COLON: ICD-10-CM

## 2024-07-22 DIAGNOSIS — F17.200 TOBACCO USE DISORDER: ICD-10-CM

## 2024-07-22 DIAGNOSIS — M54.16 LUMBAR RADICULOPATHY: ICD-10-CM

## 2024-07-22 DIAGNOSIS — I10 BENIGN ESSENTIAL HYPERTENSION: Primary | ICD-10-CM

## 2024-07-22 DIAGNOSIS — Z71.6 ENCOUNTER FOR TOBACCO USE CESSATION COUNSELING: ICD-10-CM

## 2024-07-22 DIAGNOSIS — K21.9 GASTROESOPHAGEAL REFLUX DISEASE, UNSPECIFIED WHETHER ESOPHAGITIS PRESENT: ICD-10-CM

## 2024-07-22 DIAGNOSIS — R93.89 ABNORMAL FINDINGS ON DIAGNOSTIC IMAGING OF OTHER SPECIFIED BODY STRUCTURES: ICD-10-CM

## 2024-07-22 LAB
ANION GAP SERPL CALCULATED.3IONS-SCNC: 11 MMOL/L (ref 7–15)
BUN SERPL-MCNC: 4.9 MG/DL (ref 6–20)
CALCIUM SERPL-MCNC: 9.3 MG/DL (ref 8.8–10.4)
CHLORIDE SERPL-SCNC: 104 MMOL/L (ref 98–107)
CREAT SERPL-MCNC: 0.79 MG/DL (ref 0.67–1.17)
EGFRCR SERPLBLD CKD-EPI 2021: >90 ML/MIN/1.73M2
GLUCOSE SERPL-MCNC: 96 MG/DL (ref 70–99)
HCO3 SERPL-SCNC: 26 MMOL/L (ref 22–29)
POTASSIUM SERPL-SCNC: 4 MMOL/L (ref 3.4–5.3)
SODIUM SERPL-SCNC: 141 MMOL/L (ref 135–145)
TSH SERPL DL<=0.005 MIU/L-ACNC: 0.61 UIU/ML (ref 0.3–4.2)
VIT D+METAB SERPL-MCNC: 20 NG/ML (ref 20–50)

## 2024-07-22 PROCEDURE — 72100 X-RAY EXAM L-S SPINE 2/3 VWS: CPT | Mod: TC

## 2024-07-22 PROCEDURE — G2211 COMPLEX E/M VISIT ADD ON: HCPCS | Performed by: FAMILY MEDICINE

## 2024-07-22 PROCEDURE — 80048 BASIC METABOLIC PNL TOTAL CA: CPT | Mod: ZL | Performed by: FAMILY MEDICINE

## 2024-07-22 PROCEDURE — 36415 COLL VENOUS BLD VENIPUNCTURE: CPT | Mod: ZL | Performed by: FAMILY MEDICINE

## 2024-07-22 PROCEDURE — 84443 ASSAY THYROID STIM HORMONE: CPT | Mod: ZL | Performed by: FAMILY MEDICINE

## 2024-07-22 PROCEDURE — G0463 HOSPITAL OUTPT CLINIC VISIT: HCPCS

## 2024-07-22 PROCEDURE — 82306 VITAMIN D 25 HYDROXY: CPT | Mod: ZL | Performed by: FAMILY MEDICINE

## 2024-07-22 PROCEDURE — 99214 OFFICE O/P EST MOD 30 MIN: CPT | Performed by: FAMILY MEDICINE

## 2024-07-22 ASSESSMENT — ANXIETY QUESTIONNAIRES
4. TROUBLE RELAXING: SEVERAL DAYS
7. FEELING AFRAID AS IF SOMETHING AWFUL MIGHT HAPPEN: SEVERAL DAYS
GAD7 TOTAL SCORE: 7
IF YOU CHECKED OFF ANY PROBLEMS ON THIS QUESTIONNAIRE, HOW DIFFICULT HAVE THESE PROBLEMS MADE IT FOR YOU TO DO YOUR WORK, TAKE CARE OF THINGS AT HOME, OR GET ALONG WITH OTHER PEOPLE: SOMEWHAT DIFFICULT
GAD7 TOTAL SCORE: 7
7. FEELING AFRAID AS IF SOMETHING AWFUL MIGHT HAPPEN: SEVERAL DAYS
GAD7 TOTAL SCORE: 7
8. IF YOU CHECKED OFF ANY PROBLEMS, HOW DIFFICULT HAVE THESE MADE IT FOR YOU TO DO YOUR WORK, TAKE CARE OF THINGS AT HOME, OR GET ALONG WITH OTHER PEOPLE?: SOMEWHAT DIFFICULT
3. WORRYING TOO MUCH ABOUT DIFFERENT THINGS: SEVERAL DAYS
5. BEING SO RESTLESS THAT IT IS HARD TO SIT STILL: SEVERAL DAYS
1. FEELING NERVOUS, ANXIOUS, OR ON EDGE: SEVERAL DAYS
6. BECOMING EASILY ANNOYED OR IRRITABLE: SEVERAL DAYS
2. NOT BEING ABLE TO STOP OR CONTROL WORRYING: SEVERAL DAYS

## 2024-07-22 ASSESSMENT — PAIN SCALES - GENERAL: PAINLEVEL: MODERATE PAIN (5)

## 2024-07-22 NOTE — PATIENT INSTRUCTIONS
Xray lumbar spine and lab today - will notify of results.  MRI lumbar spine ordered.  DEXA bone density scan ordered.  Continue current refills.

## 2024-07-23 ENCOUNTER — PATIENT OUTREACH (OUTPATIENT)
Dept: CARE COORDINATION | Facility: OTHER | Age: 54
End: 2024-07-23

## 2024-07-23 NOTE — PROGRESS NOTES
"Clinic Care Coordination Contact  Care Team Conversations    RN CC spoke with patient after PCP visit yesterday. Was notified that [facility] mailed patient a letter that they are not able to see him due to not being able to manage care. RN CC called [facility] to gather more information and see if patient needs to be referred to a specialty or not.   Per [facility], when they went over the treatment plan with patient he became \"overly excited\" and the dentist was not comfortable with him. State that they felt he was aggressive and did not want him returning to their office. Unable to elaborate more.   RN CC called patient, he is unsure why [facility] will not see him. He feels that the dentist was very pushy and kept asking him what happened to his teeth. Patient reports the whole time he was there the dentist kept informing him that he would work with him. Patient was surprised to see this letter in the mail.    Patient reports he called the number on his insurance card to see if he could find another dentist, they were supposed to be mailing him a list of people in network, but he never received this. States that the state never helps him with anything and he would probably have to call again.   RN CC offered to assist patient with this, patient states that he does not trust [place] either and he is scared of [place], scared to have a colonoscopy and put to sleep by these people. Patient then ended the phone call.   RN CC will reach out to patient at a later time to offer assistance.     Ted DUMONT RN, Care Coordinator  [facility]      "

## 2024-07-24 ENCOUNTER — TELEPHONE (OUTPATIENT)
Dept: FAMILY MEDICINE | Facility: OTHER | Age: 54
End: 2024-07-24

## 2024-07-24 DIAGNOSIS — F90.0 ADHD (ATTENTION DEFICIT HYPERACTIVITY DISORDER), INATTENTIVE TYPE: ICD-10-CM

## 2024-07-24 NOTE — TELEPHONE ENCOUNTER
Disp Refills Start End LORNA   amphetamine-dextroamphetamine (ADDERALL) 5 MG tablet 30 tablet 0 6/19/2024 7/19/2024 No     Last Office Visit: 07/03/2024  Future Office visit:    Next 5 appointments (look out 90 days)      Aug 02, 2024 8:40 AM  (Arrive by 8:25 AM)  Return Visit with Annette Hammond MD  [facility] ([facility] ) [address] [place] [id number] [phone]             Routing refill request to provider for review/approval because:

## 2024-07-24 NOTE — TELEPHONE ENCOUNTER
Clinic Care Coordination Contact  Care Team Conversations    Patient called this RN CC to apologize for hanging up on this RN CC yesterday. Patient would like assistance from this RN CC to find a new dentist.   RN CC will plan to assist patient on a later date.     Ted DUMONT RN, Care Coordinator  [facility]

## 2024-07-25 ENCOUNTER — PATIENT OUTREACH (OUTPATIENT)
Dept: CARE COORDINATION | Facility: OTHER | Age: 54
End: 2024-07-25

## 2024-07-25 RX ORDER — DEXTROAMPHETAMINE SACCHARATE, AMPHETAMINE ASPARTATE, DEXTROAMPHETAMINE SULFATE AND AMPHETAMINE SULFATE 1.25; 1.25; 1.25; 1.25 MG/1; MG/1; MG/1; MG/1
5 TABLET ORAL DAILY
Qty: 30 TABLET | Refills: 0 | Status: SHIPPED | OUTPATIENT
Start: 2024-07-25 | End: 2024-08-02

## 2024-07-25 NOTE — PROGRESS NOTES
Clinic Care Coordination Contact  Care Team Conversations    RN CC was able to get patient scheduled with [facility] in [place], [place] for dental visit. Will start with cleaning and exam. Scheduled for 8/13/24 arrive at 1:45 pm for a 2:00 pm appointment.   Informed patient. He will check and see if he is able to find a ride, if he is not he will let this RN CC know to reschedule.     Ted DUMONT RN, Care Coordinator  [facility]

## 2024-07-25 NOTE — OR NURSING
Due to patients past medical hx of abnl stress test, this PAT nurse spoke with [facility] regarding no follow up with cardiology.  CRNA spoke with [facility] and patient needs to follow up with his PCP. Will update patient.  PCP to be updated.

## 2024-07-25 NOTE — PROGRESS NOTES
Patient returned call and stated to cancel this visit with [facility]. Patient reports that he was able to get a dental visit somewhere closer, but did not want to state where.   RN CC will cancel this visit per patient request.   No other needs at this time.

## 2024-07-25 NOTE — OR NURSING
Phoned patient back at this time and no answer and unable to leave message.  Will attempt to phone him back at a later time today or on 7/26/24.

## 2024-07-26 ENCOUNTER — TELEPHONE (OUTPATIENT)
Dept: SURGERY | Facility: HOSPITAL | Age: 54
End: 2024-07-26

## 2024-07-29 NOTE — TELEPHONE ENCOUNTER
----- Message from Pedro HALL sent at 7/26/2024  8:59 AM CDT -----  Spoke to Andrei and he does not want to reschedule at this time  ----- Message -----  From: Lucita Laird RN  Sent: 7/25/2024   1:52 PM CDT  To: Pedro Vergara; Cecily Bauman;  Surgery    Pt will need to be rescheduled at a later time.  He needs to see PCP and follow up for an abnormal stress test.     Thank you.

## 2024-08-02 ENCOUNTER — OFFICE VISIT (OUTPATIENT)
Dept: PSYCHIATRY | Facility: OTHER | Age: 54
End: 2024-08-02
Attending: PSYCHIATRY & NEUROLOGY
Payer: COMMERCIAL

## 2024-08-02 VITALS
SYSTOLIC BLOOD PRESSURE: 104 MMHG | TEMPERATURE: 98.1 F | DIASTOLIC BLOOD PRESSURE: 72 MMHG | WEIGHT: 181 LBS | OXYGEN SATURATION: 97 % | BODY MASS INDEX: 24.55 KG/M2 | HEART RATE: 87 BPM | RESPIRATION RATE: 16 BRPM

## 2024-08-02 DIAGNOSIS — F31.9 BIPOLAR I DISORDER (H): ICD-10-CM

## 2024-08-02 DIAGNOSIS — F90.0 ADHD (ATTENTION DEFICIT HYPERACTIVITY DISORDER), INATTENTIVE TYPE: ICD-10-CM

## 2024-08-02 PROCEDURE — G0463 HOSPITAL OUTPT CLINIC VISIT: HCPCS

## 2024-08-02 PROCEDURE — 99213 OFFICE O/P EST LOW 20 MIN: CPT | Performed by: PSYCHIATRY & NEUROLOGY

## 2024-08-02 RX ORDER — CARBAMAZEPINE 200 MG/1
200 TABLET, EXTENDED RELEASE ORAL 2 TIMES DAILY
Qty: 60 TABLET | Refills: 6 | Status: SHIPPED | OUTPATIENT
Start: 2024-08-02

## 2024-08-02 RX ORDER — DEXTROAMPHETAMINE SACCHARATE, AMPHETAMINE ASPARTATE, DEXTROAMPHETAMINE SULFATE AND AMPHETAMINE SULFATE 1.25; 1.25; 1.25; 1.25 MG/1; MG/1; MG/1; MG/1
5 TABLET ORAL DAILY
Qty: 30 TABLET | Refills: 0 | Status: SHIPPED | OUTPATIENT
Start: 2024-08-22

## 2024-08-02 ASSESSMENT — PAIN SCALES - GENERAL: PAINLEVEL: MILD PAIN (2)

## 2024-08-02 ASSESSMENT — PATIENT HEALTH QUESTIONNAIRE - PHQ9
SUM OF ALL RESPONSES TO PHQ QUESTIONS 1-9: 10
SUM OF ALL RESPONSES TO PHQ QUESTIONS 1-9: 10
10. IF YOU CHECKED OFF ANY PROBLEMS, HOW DIFFICULT HAVE THESE PROBLEMS MADE IT FOR YOU TO DO YOUR WORK, TAKE CARE OF THINGS AT HOME, OR GET ALONG WITH OTHER PEOPLE: SOMEWHAT DIFFICULT

## 2024-08-02 NOTE — PROGRESS NOTES
PSYCHIATRY CLINIC PROGRESS NOTE     SUBJECTIVE / INTERIM HISTORY                                                                          Last visit 7/3/24: Continue Tegretol  mg twice daily. Continue Adderall 5 mg daily   - doing better today as compared to last visit  - Andrei went out to his dad's house on July 6  - hung out with his son July   - is going to go to [place] for dentist  - still grieving over Janneth Baby dying  - started having fur all over his face and had in his mouth and told the neighbor. Next day Janneth Baby was dead.  - Paulette's son got out of [employer] and then ended up back in [employer]  - son is 20% owner of [email or web address]. Son going to have a baby. Son has Hungcelena's chorea? Son already has 12 yo daughter and she has no issues  -[place]. Dad still lives out there and Andrei's aunt lives in house next to dad. For while bought it and he lived there with Brianna and when was with her then in 2010 when  Brianna and moved to [place] with Paulette when they started relationship.   -  Finished HCC with [employer] and was planning to go for radiology degree. Then started in [place] for [employer].  and notes dated Brianna Michelle of [employer] and raised Ross who passed away form overdose. And Andrei's nephew Andrei murdered.  - Social/Spiritual Support- sister Caroline, dad Carlos, GF Paulette Ankeny       MEDICAL / SURGICAL HISTORY                     Patient Active Problem List   Diagnosis    Cervicalgia    Lower back pain    Segmental and somatic dysfunction of lower extremity    GERD (gastroesophageal reflux disease)    Mood disorder (H24)    Abnormal weight gain    Attention deficit hyperactivity disorder (ADHD), predominantly inattentive type    ACP (advance care planning)    Flatulence, eructation, and gas pain    Bipolar disease, chronic (H)    Benign essential hypertension    Russell esophagus    Tobacco dependency    Lithium use    DDD (degenerative disc disease),  cervical    Cervical stenosis of spinal canal    Obesity (BMI 35.0-39.9) with comorbidity (H)    Chronic lung disease    Suicidal behavior    Dry mouth    Anxiety state    Insomnia, unspecified    Left hip pain    Perianal abscess    Mass of left parotid gland     ALLERGY   Patient has no known allergies.  MEDICATIONS                                                                                             Current Outpatient Medications   Medication Sig Dispense Refill    amphetamine-dextroamphetamine (ADDERALL) 5 MG tablet TAKE 1 TABLET (5 MG) BY MOUTH DAILY FOR 30 DAYS 30 tablet 0    aspirin 81 MG tablet Take 1 tablet (81 mg) by mouth daily 30 tablet     carBAMazepine (TEGRETOL XR) 200 MG 12 hr tablet Take 1 tablet (200 mg) by mouth 2 times daily 60 tablet 6    chlorhexidine (PERIDEX) 0.12 % solution SWISH AND SPIT 15 MLS IN MOUTH 2 TIMES DAILY 473 mL 2    gabapentin (NEURONTIN) 300 MG capsule TAKE 2 CAPSULES (600 MG) BY MOUTH 3 TIMES DAILY 180 capsule 3    hydrochlorothiazide (MICROZIDE) 12.5 MG capsule TAKE 1 CAPSULE (12.5 MG) BY MOUTH EVERY MORNING 90 capsule 0    omeprazole (PRILOSEC) 40 MG DR capsule TAKE 1 CAPSULE BY MOUTH DAILY 90 capsule 2    pilocarpine (SALAGEN) 5 MG tablet TAKE 1 TABLET BY MOUTH FOUR TIMES DAILY 360 tablet 0    verapamil ER (VERELAN) 240 MG 24 hr capsule TAKE 1 CAPSULE (240 MG) BY MOUTH AT BEDTIME 90 capsule 1     No current facility-administered medications for this visit.       VITALS   /72   Pulse 87   Temp 98.1  F (36.7  C) (Tympanic)   Resp 16   Wt 82.1 kg (181 lb)   SpO2 97%   BMI 24.55 kg/m       PHQ9                       Depression Screening Follow-up        8/2/2024     8:42 AM   PHQ   PHQ-9 Total Score 10   Q9: Thoughts of better off dead/self-harm past 2 weeks Not at all       Does the patient currently have a mental health provider?  Yes,    Annette Hammond MD         LABS                                                                                                                          Last Comprehensive Metabolic Panel:  Sodium   Date Value Ref Range Status   07/22/2024 141 135 - 145 mmol/L Final   04/08/2021 139 133 - 144 mmol/L Final     Potassium   Date Value Ref Range Status   07/22/2024 4.0 3.4 - 5.3 mmol/L Final   08/01/2022 3.1 (L) 3.4 - 5.3 mmol/L Final   04/08/2021 3.1 (L) 3.4 - 5.3 mmol/L Final     Chloride   Date Value Ref Range Status   07/22/2024 104 98 - 107 mmol/L Final   08/01/2022 104 94 - 109 mmol/L Final   04/08/2021 105 94 - 109 mmol/L Final     Carbon Dioxide   Date Value Ref Range Status   04/08/2021 29 20 - 32 mmol/L Final     Carbon Dioxide (CO2)   Date Value Ref Range Status   07/22/2024 26 22 - 29 mmol/L Final   08/01/2022 26 20 - 32 mmol/L Final     Anion Gap   Date Value Ref Range Status   07/22/2024 11 7 - 15 mmol/L Final   08/01/2022 9 3 - 14 mmol/L Final   04/08/2021 5 3 - 14 mmol/L Final     Glucose   Date Value Ref Range Status   07/22/2024 96 70 - 99 mg/dL Final   08/01/2022 129 (H) 70 - 99 mg/dL Final   04/08/2021 96 70 - 99 mg/dL Final     Urea Nitrogen   Date Value Ref Range Status   07/22/2024 4.9 (L) 6.0 - 20.0 mg/dL Final   08/01/2022 8 7 - 30 mg/dL Final   04/08/2021 7 7 - 30 mg/dL Final     Creatinine   Date Value Ref Range Status   07/22/2024 0.79 0.67 - 1.17 mg/dL Final   04/08/2021 0.81 0.66 - 1.25 mg/dL Final     GFR Estimate   Date Value Ref Range Status   07/22/2024 >90 >60 mL/min/1.73m2 Final     Comment:     eGFR calculated using 2021 CKD-EPI equation.   04/08/2021 >90 >60 mL/min/[1.73_m2] Final     Comment:     Non  GFR Calc  Starting 12/18/2018, serum creatinine based estimated GFR (eGFR) will be   calculated using the Chronic Kidney Disease Epidemiology Collaboration   (CKD-EPI) equation.       Calcium   Date Value Ref Range Status   07/22/2024 9.3 8.8 - 10.4 mg/dL Final     Comment:     Reference intervals for this test were updated on 7/16/2024 to reflect our healthy population more accurately.  "There may be differences in the flagging of prior results with similar values performed with this method. Those prior results can be interpreted in the context of the updated reference intervals.   04/08/2021 9.0 8.5 - 10.1 mg/dL Final     CBC RESULTS:   Recent Labs   Lab Test 10/23/23  1128   WBC 7.3   RBC 4.68   HGB 14.3   HCT 43.2   MCV 92   MCH 30.6   MCHC 33.1   RDW 13.1           MENTAL STATUS EXAM                                                                                       Awake, alert. No problems with speech. Psychomotor: unremarkable.  Mood today Thought process, including associations, was unremarkable and thought content was devoid of suicidal and homicidal ideation.  No psychosis today -> no paranoia.  Judgment was adequate for safety. Fund of knowledge was intact. Pt demonstrates no obvious problems with attention, concentration, language, recent or remote memory although these were not formally tested.     ASSESSMENT                                                                                                      HISTORICAL:  Initial psych note 12/28/20          NOTES:  discharge summary from April 8/13/20: \"Lithium was tapered and discontinued in march/begining of April. At that time his lamictal was increased. It appears that lithium was tapered due excessive thirst and dry mouth. Elavil was increased for insomnia in February.\" Seroquel \" weight gain, didn't like how made him feel. Zyprexa ? Liang.     Andrei Whitman is a 52 yo with bipolar disorder and ADHD with psychiatric hospitalization August 2020. Was on amitriptyline, Adderall, Wellbutrin : all activating meds although Andrei notes he feels was the Lamictal that precipitated bibiana. Was noted Depakote for him past weight gain, lithium polyuria and thirst, uncertain whether he had been on neuroleptics (I saw Latuda mentioned in [facility] notes).     Andrei doing better today as compared to last visit. He has been hanging out with his " family. Still grieving Janneth baby dying but slowly getting better.       TREATMENT RISK STATEMENT:  The risks, benefits, alternatives and potential adverse effects have been explained and are understood by the pt.  The pt agrees to the treatment plan with the ability to do so.   The pt knows to call the clinic for any problems or access emergency care if needed.        DIAGNOSES                     Bipolar I disorder vs. Schizoaffective Disorder  ADHD    PLAN                                                                                                                    1)  MEDICATIONS:         -- Continue Tegretol  mg twice daily refilled today. Continue Adderall 5 mg daily I set to fill again for August.  2)  THERAPY:  No Change    3)  LABS:  lipid, CBC CMP 10/2023    4)  PT MONITOR [call for probs]:  Worsening symptoms, SI/HI, SEs from meds    5)  REFERRALS [CD, medical, other]:  None    6)  RTC: 2 months          Answers submitted by the patient for this visit:  Patient Health Questionnaire (Submitted on 8/2/2024)  If you checked off any problems, how difficult have these problems made it for you to do your work, take care of things at home, or get along with other people?: Somewhat difficult  PHQ9 TOTAL SCORE: 10

## 2024-08-05 DIAGNOSIS — I10 ESSENTIAL HYPERTENSION: ICD-10-CM

## 2024-08-05 RX ORDER — HYDROCHLOROTHIAZIDE 12.5 MG/1
CAPSULE ORAL
Qty: 90 CAPSULE | Refills: 0 | Status: SHIPPED | OUTPATIENT
Start: 2024-08-05 | End: 2024-09-12

## 2024-08-05 NOTE — TELEPHONE ENCOUNTER
Attempted to call patient to update, no answer and no where to leave VM. Will attempt again at a later time.

## 2024-08-05 NOTE — TELEPHONE ENCOUNTER
Refill signed.  Of note, surgery was deferred.  Had abnormal stress test in 2018 that was not followed up on.  Please schedule follow up office visit next available.

## 2024-08-05 NOTE — TELEPHONE ENCOUNTER
The stress test was ordered by prior PCP Dr Lamb.   Showed possible/questionable area of poor blood flow.

## 2024-08-05 NOTE — TELEPHONE ENCOUNTER
Hydrochlorothiazide 12.5 mg      Last Written Prescription Date:  3/5/24  Last Fill Quantity: 90,   # refills: 0  Last Office Visit: 7/22/24  Future Office visit:    Next 5 appointments (look out 90 days)      Oct 02, 2024 8:40 AM  (Arrive by 8:25 AM)  Return Visit with Annette Hammond MD  [facility] ([facility] ) [address] [place] [address]  [phone]             Routing refill request to provider for review/approval because:  Diuretics (Including Combos) Protocol Failed      Recent (12 mo) or future (90 days) visit within the authorizing provider's specialty    The patient must have completed an in-person or virtual visit within the past 12 months or has a future visit scheduled within the next 90 days with the authorizing provider s specialty.  Urgent care and e-visits do not quality as an office visit for this protocol.

## 2024-08-05 NOTE — TELEPHONE ENCOUNTER
Notified patient and scheduled him for follow up appointment on 9/19/24. Patient would like to be given some information on what was abnormal on his stress test as he believes that everything was normal at that time. Prior to visit, patient would like a little information on what was abnormal as he is concerned about this.

## 2024-08-18 DIAGNOSIS — K05.10 GINGIVITIS: ICD-10-CM

## 2024-08-18 DIAGNOSIS — K11.7 CLINICAL XEROSTOMIA: ICD-10-CM

## 2024-08-19 RX ORDER — PILOCARPINE HYDROCHLORIDE 5 MG/1
5 TABLET, FILM COATED ORAL 4 TIMES DAILY
Qty: 360 TABLET | Refills: 0 | Status: SHIPPED | OUTPATIENT
Start: 2024-08-19

## 2024-08-19 RX ORDER — CHLORHEXIDINE GLUCONATE ORAL RINSE 1.2 MG/ML
15 SOLUTION DENTAL 2 TIMES DAILY
Qty: 473 ML | Refills: 2 | Status: SHIPPED | OUTPATIENT
Start: 2024-08-19

## 2024-08-19 NOTE — TELEPHONE ENCOUNTER
chlorhexidine (PERIDEX) 0.12 % solution         Last Written Prescription Date:  3/4/24  Last Fill Quantity: 473 mL,   # refills: 2  Last Office Visit: 7/22/24  Future Office visit:    Next 5 appointments (look out 90 days)      Sep 19, 2024 2:30 PM  (Arrive by 2:15 PM)  Provider Visit with Sayda Paula MD  [facility] ([facility] [place] ) [address]  [place] [address]  [phone]     Oct 02, 2024 8:40 AM  (Arrive by 8:25 AM)  Return Visit with Annette Hammond MD  [facility] ([facility] [place] ) [address] [place] [address]  [place] [address] [phone]             Routing refill request to provider for review/approval because:  Drug not on the [facility], [facility] or [facility] refill protocol or controlled substance      pilocarpine (SALAGEN) 5 MG tablet       Last Written Prescription Date:  4/2/24  Last Fill Quantity: 360,   # refills: 0  Last Office Visit: 7/22/24  Future Office visit:    Next 5 appointments (look out 90 days)      Sep 19, 2024 2:30 PM  (Arrive by 2:15 PM)  Provider Visit with Sayda Paula MD  [facility] ([facility] [place] ) [address]  [place] [address]  [phone]     Oct 02, 2024 8:40 AM  (Arrive by 8:25 AM)  Return Visit with Annette Hammond MD  [facility] ([facility] [place] ) [address] [place] [address]  [place] [address]  [phone]             Routing refill request to provider for review/approval because:  Drug not on the [facility], [facility] or [facility] refill protocol or controlled substance

## 2024-09-04 DIAGNOSIS — M54.12 CERVICAL RADICULOPATHY: ICD-10-CM

## 2024-09-04 NOTE — TELEPHONE ENCOUNTER
Gabapentin (Neurontin) 300 MG capsule     Last Written Prescription Date:  05/06/2024  Last Fill Quantity: 180,   # refills: 3  Last Office Visit: 07/22/2024

## 2024-09-06 RX ORDER — GABAPENTIN 300 MG/1
600 CAPSULE ORAL 3 TIMES DAILY
Qty: 180 CAPSULE | Refills: 5 | Status: SHIPPED | OUTPATIENT
Start: 2024-09-06

## 2024-09-11 NOTE — PROGRESS NOTES
Assessment & Plan     Essential hypertension  Over controlled?  BP trending down.  Some positional light headedness.  Stop hydrochlorothiazide.  Continue Verapamil.    Monitor BP and symptoms.    Monitor for any new swelling off the diuretic.  - verapamil ER (VERELAN) 240 MG 24 hr capsule; Take 1 capsule (240 mg) by mouth at bedtime.    Abnormal stress test  In 2018.  Was equivocal.  Currently asymptomatic.  Able to perform 4 METS without exertional symptoms.  Walks miles per day.    EKG repeated today.  NSR, left anterior fascicular block.    Will send note to cardiology - per anesthesia request to further evaluate.  Any need for consult or repeat stress testing?  Or consider adequate surgical candidate.  - EKG 12-lead complete w/read - (Clinic Performed)    Tobacco use disorder  Cessation advised.    Encounter for tobacco use cessation counseling  Declines quit attempt.          See Patient Instructions    Return if symptoms worsen or fail to improve.    Henna Forbes is a 53 year old, presenting for the following health issues:  abnormal stress test and Hypertension        9/12/2024     8:11 AM   Additional Questions   Roomed by marco mo   Accompanied by self         9/12/2024     8:11 AM   Patient Reported Additional Medications   Patient reports taking the following new medications none     History of Present Illness       Back Pain:  He presents for follow up of back pain. Patient's back pain is a recurring problem.  Location of back pain:  Right lower back, right side of neck, left side of neck and right shoulder  Description of back pain: burning and dull ache  Back pain spreads: right buttocks, left buttocks, left knee, right foot, right shoulder, right side of neck and left side of neck    Since patient first noticed back pain, pain is: gradually worsening  Does back pain interfere with his job:  No       Mental Health Follow-up:  Patient presents to follow-up on Depression & Anxiety.Patient's  "depression since last visit has been:  Medium  The patient is not having other symptoms associated with depression.  Patient's anxiety since last visit has been:  No change  The patient is not having other symptoms associated with anxiety.  Any significant life events: No  Patient is not feeling anxious or having panic attacks.  Patient has no concerns about alcohol or drug use.    He eats 0-1 servings of fruits and vegetables daily.He consumes 8 sweetened beverage(s) daily.He exercises with enough effort to increase his heart rate 30 to 60 minutes per day.  He exercises with enough effort to increase his heart rate 7 days per week.   He is taking medications regularly.       Tobacco - declined  Colon screen - decline   Vaccines - flu, hep b shingles- decline   Weight stable now.    Abnormal stress test -   Was scheduled for colonoscopy.  Anesthesia noted abnormal stress test in 2018.  Reports states \"questionable small area of reversible ischemia\".  Declined procedure until further evaluation.  \"I walk a lot.\"  Has 2 dogs.  Can walk miles.  No chest pain or dyspnea.  Does get fatigued later in the day.  \"My breathing is good.\"  \"I was being messed with back then.\"  Referring to when the stress test was completed.    No dizziness, fevers or abnormal bleeding.  At times a bit light headed.  BP has been running lower over all.    Hypertension Follow-up - hydrochlorothiazide 12.5 mg, Verapamil  mg.  Do you check your blood pressure regularly outside of the clinic? No   Are you following a low salt diet? No  Are your blood pressures ever more than 140 on the top number (systolic) OR more   than 90 on the bottom number (diastolic), for example 140/90? N/a    Depression and Anxiety - managed by Dr Hammond    Social History     Tobacco Use    Smoking status: Every Day     Current packs/day: 1.00     Average packs/day: 1 pack/day for 35.7 years (35.7 ttl pk-yrs)     Types: Cigarettes     Start date: 1/1/1989     " Passive exposure: Current    Smokeless tobacco: Never    Tobacco comments:     quitplan referral declined 6/19/19 on chantax   Vaping Use    Vaping status: Never Used   Substance Use Topics    Alcohol use: No     Alcohol/week: 0.0 standard drinks of alcohol    Drug use: Yes     Types: Marijuana         7/3/2024     8:57 AM 7/22/2024     9:34 AM 8/2/2024     8:42 AM   PHQ   PHQ-9 Total Score 8 10 10   Q9: Thoughts of better off dead/self-harm past 2 weeks Not at all Not at all Not at all         7/3/2024     8:58 AM 7/22/2024     9:35 AM 9/12/2024     8:15 AM   SONDRA-7 SCORE   Total Score 8 (mild anxiety) 7 (mild anxiety) 7 (mild anxiety)   Total Score 8 7 7         8/2/2024     8:42 AM   Last PHQ-9   1.  Little interest or pleasure in doing things 1   2.  Feeling down, depressed, or hopeless 1   3.  Trouble falling or staying asleep, or sleeping too much 1   4.  Feeling tired or having little energy 1   5.  Poor appetite or overeating 2   6.  Feeling bad about yourself 1   7.  Trouble concentrating 2   8.  Moving slowly or restless 1   Q9: Thoughts of better off dead/self-harm past 2 weeks 0   PHQ-9 Total Score 10         9/12/2024     8:15 AM   SONDRA-7    1. Feeling nervous, anxious, or on edge 1   2. Not being able to stop or control worrying 1   3. Worrying too much about different things 1   4. Trouble relaxing 1   5. Being so restless that it is hard to sit still 1   6. Becoming easily annoyed or irritable 2   7. Feeling afraid, as if something awful might happen 0   SONDRA-7 Total Score 7   If you checked any problems, how difficult have they made it for you to do your work, take care of things at home, or get along with other people? Somewhat difficult       Suicide Assessment Five-step Evaluation and Treatment (SAFE-T)        Review of Systems  Constitutional, HEENT, cardiovascular, pulmonary, gi and gu systems are negative, except as otherwise noted.      Objective    /76 (BP Location: Right arm, Patient  Position: Sitting, Cuff Size: Adult Regular)   Pulse 67   Temp 97  F (36.1  C) (Tympanic)   Ht 1.829 m (6')   Wt 81.3 kg (179 lb 3.2 oz)   SpO2 98%   BMI 24.30 kg/m    Body mass index is 24.3 kg/m .  Physical Exam   GENERAL: alert and no distress  NECK: no adenopathy, no asymmetry, masses, or scars  RESP: lungs clear to auscultation - no rales, rhonchi or wheezes  CV: regular rate and rhythm, normal S1 S2, no S3 or S4, no murmur, click or rub, no peripheral edema  ABDOMEN: soft, nontender, no hepatosplenomegaly, no masses and bowel sounds normal  MS: no gross musculoskeletal defects noted, no edema  NEURO: Normal strength and tone, mentation intact and speech normal  PSYCH: mentation appears normal, affect normal/bright    EKG - NSR, left anterior fascicular block.  Prior EKG 2021 - sinus with 1st degree AV block with PACs, left axis deviation.        The longitudinal plan of care for the diagnosis(es)/condition(s) as documented were addressed during this visit. Due to the added complexity in care, I will continue to support Andrei in the subsequent management and with ongoing continuity of care.    Signed Electronically by: Sayda Solorzano MD     4

## 2024-09-12 ENCOUNTER — OFFICE VISIT (OUTPATIENT)
Dept: FAMILY MEDICINE | Facility: OTHER | Age: 54
End: 2024-09-12
Attending: FAMILY MEDICINE
Payer: COMMERCIAL

## 2024-09-12 VITALS
HEIGHT: 72 IN | DIASTOLIC BLOOD PRESSURE: 76 MMHG | SYSTOLIC BLOOD PRESSURE: 108 MMHG | OXYGEN SATURATION: 98 % | TEMPERATURE: 97 F | BODY MASS INDEX: 24.27 KG/M2 | HEART RATE: 67 BPM | WEIGHT: 179.2 LBS

## 2024-09-12 DIAGNOSIS — Z71.6 ENCOUNTER FOR TOBACCO USE CESSATION COUNSELING: ICD-10-CM

## 2024-09-12 DIAGNOSIS — F17.200 TOBACCO USE DISORDER: ICD-10-CM

## 2024-09-12 DIAGNOSIS — I10 ESSENTIAL HYPERTENSION: ICD-10-CM

## 2024-09-12 DIAGNOSIS — R94.39 ABNORMAL STRESS TEST: ICD-10-CM

## 2024-09-12 DIAGNOSIS — I10 BENIGN ESSENTIAL HYPERTENSION: Primary | ICD-10-CM

## 2024-09-12 LAB
ATRIAL RATE - MUSE: 60 BPM
DIASTOLIC BLOOD PRESSURE - MUSE: NORMAL MMHG
INTERPRETATION ECG - MUSE: NORMAL
P AXIS - MUSE: 49 DEGREES
PR INTERVAL - MUSE: 188 MS
QRS DURATION - MUSE: 106 MS
QT - MUSE: 426 MS
QTC - MUSE: 426 MS
R AXIS - MUSE: -58 DEGREES
SYSTOLIC BLOOD PRESSURE - MUSE: NORMAL MMHG
T AXIS - MUSE: 55 DEGREES
VENTRICULAR RATE- MUSE: 60 BPM

## 2024-09-12 PROCEDURE — 93005 ELECTROCARDIOGRAM TRACING: CPT | Performed by: FAMILY MEDICINE

## 2024-09-12 PROCEDURE — 99214 OFFICE O/P EST MOD 30 MIN: CPT | Performed by: FAMILY MEDICINE

## 2024-09-12 PROCEDURE — G0463 HOSPITAL OUTPT CLINIC VISIT: HCPCS

## 2024-09-12 PROCEDURE — 93010 ELECTROCARDIOGRAM REPORT: CPT | Performed by: INTERNAL MEDICINE

## 2024-09-12 PROCEDURE — G2211 COMPLEX E/M VISIT ADD ON: HCPCS | Performed by: FAMILY MEDICINE

## 2024-09-12 RX ORDER — VERAPAMIL HYDROCHLORIDE 240 MG/1
240 CAPSULE, EXTENDED RELEASE ORAL AT BEDTIME
Qty: 90 CAPSULE | Refills: 1 | Status: SHIPPED | OUTPATIENT
Start: 2024-09-12

## 2024-09-12 ASSESSMENT — PAIN SCALES - GENERAL: PAINLEVEL: NO PAIN (0)

## 2024-09-12 ASSESSMENT — ANXIETY QUESTIONNAIRES
8. IF YOU CHECKED OFF ANY PROBLEMS, HOW DIFFICULT HAVE THESE MADE IT FOR YOU TO DO YOUR WORK, TAKE CARE OF THINGS AT HOME, OR GET ALONG WITH OTHER PEOPLE?: SOMEWHAT DIFFICULT
GAD7 TOTAL SCORE: 7
7. FEELING AFRAID AS IF SOMETHING AWFUL MIGHT HAPPEN: NOT AT ALL

## 2024-09-12 NOTE — PATIENT INSTRUCTIONS
Stop hydochlorothiazide/ microzide.    Monitor BP and swelling and dizziness.    EKG repeated today.  Will send note to cardiology - need to schedule follow up with them or repeat stress test or ok to proceed as is?

## 2024-09-12 NOTE — Clinical Note
"Appreciate your input - odd situation;  was suppose to have colonoscopy - anesthesia said no due to prior \"abnormal stress test\".  It was in 2018.  It was equivocal.  Patient asymptomatic now.  Walks dogs miles per day. Repeat EKG today NSR, left anterior fascicular block; prior was 1st degree AV block. Any need to see you or repeat stress testing, or can say case closed!  Thank you!!"

## 2024-09-18 DIAGNOSIS — F90.0 ADHD (ATTENTION DEFICIT HYPERACTIVITY DISORDER), INATTENTIVE TYPE: ICD-10-CM

## 2024-09-18 NOTE — TELEPHONE ENCOUNTER
Adderall  Last Written Prescription Date: 8/22/24  Last Fill Quantity: 30 # of Refills: 0  Last Office Visit: 8/2/24

## 2024-09-20 RX ORDER — DEXTROAMPHETAMINE SACCHARATE, AMPHETAMINE ASPARTATE, DEXTROAMPHETAMINE SULFATE AND AMPHETAMINE SULFATE 2.5; 2.5; 2.5; 2.5 MG/1; MG/1; MG/1; MG/1
TABLET ORAL
Qty: 15 TABLET | Refills: 0 | Status: SHIPPED | OUTPATIENT
Start: 2024-09-20

## 2024-09-30 ENCOUNTER — TELEPHONE (OUTPATIENT)
Dept: FAMILY MEDICINE | Facility: OTHER | Age: 54
End: 2024-09-30

## 2024-09-30 NOTE — TELEPHONE ENCOUNTER
Did discuss case with Dr Mcintyre, cardiology.  Given ability to exercise, with no exertional symptoms, he would NOT advise repeating a stress test at this time.   (Abnormal 2018 stress test; colonoscopy cancelled because of this.)    Therefore, should be able to reschedule colonoscopy.    Will forward to anesthesia.       diabetes education

## 2024-10-18 DIAGNOSIS — F90.0 ADHD (ATTENTION DEFICIT HYPERACTIVITY DISORDER), INATTENTIVE TYPE: ICD-10-CM

## 2024-10-18 RX ORDER — DEXTROAMPHETAMINE SACCHARATE, AMPHETAMINE ASPARTATE, DEXTROAMPHETAMINE SULFATE AND AMPHETAMINE SULFATE 2.5; 2.5; 2.5; 2.5 MG/1; MG/1; MG/1; MG/1
TABLET ORAL
Qty: 15 TABLET | Refills: 0 | Status: SHIPPED | OUTPATIENT
Start: 2024-10-18

## 2024-10-18 NOTE — TELEPHONE ENCOUNTER
Adderall      Last Written Prescription Date:  9.20.24  Last Fill Quantity: #15,   # refills: 0  Last Office Visit: 8.2.24  Future Office visit:    Next 5 appointments (look out 90 days)      Nov 05, 2024 11:00 AM  (Arrive by 10:45 AM)  Return Visit with Annette Hammond MD  [facility] ([facility] ) [address] [place] [id number]  [phone]             Routing refill request to provider for review/approval because:  Drug not on the [facility], [facility] or [facility] refill protocol or controlled substance

## 2024-10-23 ENCOUNTER — PATIENT OUTREACH (OUTPATIENT)
Dept: GASTROENTEROLOGY | Facility: CLINIC | Age: 54
End: 2024-10-23

## 2024-11-05 ENCOUNTER — OFFICE VISIT (OUTPATIENT)
Dept: PSYCHIATRY | Facility: OTHER | Age: 54
End: 2024-11-05
Attending: PSYCHIATRY & NEUROLOGY
Payer: COMMERCIAL

## 2024-11-05 VITALS
RESPIRATION RATE: 16 BRPM | SYSTOLIC BLOOD PRESSURE: 122 MMHG | HEART RATE: 72 BPM | BODY MASS INDEX: 24.71 KG/M2 | DIASTOLIC BLOOD PRESSURE: 64 MMHG | OXYGEN SATURATION: 97 % | WEIGHT: 182.2 LBS | TEMPERATURE: 97.9 F

## 2024-11-05 DIAGNOSIS — F90.0 ADHD (ATTENTION DEFICIT HYPERACTIVITY DISORDER), INATTENTIVE TYPE: ICD-10-CM

## 2024-11-05 PROCEDURE — G0463 HOSPITAL OUTPT CLINIC VISIT: HCPCS

## 2024-11-05 PROCEDURE — 99214 OFFICE O/P EST MOD 30 MIN: CPT | Performed by: PSYCHIATRY & NEUROLOGY

## 2024-11-05 ASSESSMENT — PAIN SCALES - GENERAL: PAINLEVEL_OUTOF10: NO PAIN (0)

## 2024-11-05 ASSESSMENT — ANXIETY QUESTIONNAIRES
IF YOU CHECKED OFF ANY PROBLEMS ON THIS QUESTIONNAIRE, HOW DIFFICULT HAVE THESE PROBLEMS MADE IT FOR YOU TO DO YOUR WORK, TAKE CARE OF THINGS AT HOME, OR GET ALONG WITH OTHER PEOPLE: SOMEWHAT DIFFICULT
5. BEING SO RESTLESS THAT IT IS HARD TO SIT STILL: SEVERAL DAYS
3. WORRYING TOO MUCH ABOUT DIFFERENT THINGS: SEVERAL DAYS
2. NOT BEING ABLE TO STOP OR CONTROL WORRYING: SEVERAL DAYS
8. IF YOU CHECKED OFF ANY PROBLEMS, HOW DIFFICULT HAVE THESE MADE IT FOR YOU TO DO YOUR WORK, TAKE CARE OF THINGS AT HOME, OR GET ALONG WITH OTHER PEOPLE?: SOMEWHAT DIFFICULT
GAD7 TOTAL SCORE: 8
1. FEELING NERVOUS, ANXIOUS, OR ON EDGE: MORE THAN HALF THE DAYS
GAD7 TOTAL SCORE: 8
7. FEELING AFRAID AS IF SOMETHING AWFUL MIGHT HAPPEN: SEVERAL DAYS
6. BECOMING EASILY ANNOYED OR IRRITABLE: SEVERAL DAYS
4. TROUBLE RELAXING: SEVERAL DAYS
GAD7 TOTAL SCORE: 8
7. FEELING AFRAID AS IF SOMETHING AWFUL MIGHT HAPPEN: SEVERAL DAYS

## 2024-11-05 NOTE — PROGRESS NOTES
PSYCHIATRY CLINIC PROGRESS NOTE     SUBJECTIVE / INTERIM HISTORY                                                                          Last visit 8/2/24: Continue Tegretol  mg twice daily refilled today. Continue Adderall 5 mg daily I set to fill again for August.  - doing well   - finally got his teeth pulled out ([place]) they are removing 4 at a time.   - going with Paulette down to the [facility] this week so she can see Dr. Moreno  - thinking go up on Adderall dose. Paulette has been bugging him for awhile to increase dose too  - son is 20% owner of [employer]. Son going to have a baby. Son has Hungting[place]'s chorea? Son already has 14 yo daughter and she has no issues  -[place]. Dad still lives out there and Andrei's aunt lives in house next to dad. For while bought it and he lived there with Brianna and when was with her then in 2010 when  Brianna and moved to [place] with Paulette when they started relationship.   -  Finished HCC with AA and was planning to go for radiology degree. Then started in [place] for [employer].  and [facility] [employer] and raised Ross who passed away form overdose. And Andrei's nephew Andrei murdered.  - Social/Spiritual Support- sister Caroline, dad ADAM Gonzalez       MEDICAL / SURGICAL HISTORY                     Patient Active Problem List   Diagnosis    Cervicalgia    Lower back pain    Segmental and somatic dysfunction of lower extremity    GERD (gastroesophageal reflux disease)    Mood disorder (H)    Abnormal weight gain    Attention deficit hyperactivity disorder (ADHD), predominantly inattentive type    ACP (advance care planning)    Flatulence, eructation, and gas pain    Bipolar disease, chronic (H)    Benign essential hypertension    Russell esophagus    Tobacco dependency    Lithium use    DDD (degenerative disc disease), cervical    Cervical stenosis of spinal canal    Obesity (BMI 35.0-39.9) with  comorbidity (H)    Chronic lung disease    Suicidal behavior    Dry mouth    Anxiety state    Insomnia, unspecified    Left hip pain    Perianal abscess    Mass of left parotid gland     ALLERGY   Patient has no known allergies.  MEDICATIONS                                                                                             Current Outpatient Medications   Medication Sig Dispense Refill    amphetamine-dextroamphetamine (ADDERALL) 10 MG tablet TAKE 1/2 TABLET (5 MG) BY MOUTH DAILY 15 tablet 0    amphetamine-dextroamphetamine (ADDERALL) 5 MG tablet Take 1 tablet (5 mg) by mouth daily 30 tablet 0    aspirin 81 MG tablet Take 1 tablet (81 mg) by mouth daily 30 tablet     carBAMazepine (TEGRETOL XR) 200 MG 12 hr tablet Take 1 tablet (200 mg) by mouth 2 times daily 60 tablet 6    chlorhexidine (PERIDEX) 0.12 % solution SWISH AND SPIT 15 MLS IN MOUTH 2 TIMES DAILY 473 mL 2    gabapentin (NEURONTIN) 300 MG capsule TAKE 2 CAPSULES (600 MG) BY MOUTH 3 TIMES DAILY 180 capsule 5    omeprazole (PRILOSEC) 40 MG DR capsule TAKE 1 CAPSULE BY MOUTH DAILY 90 capsule 2    pilocarpine (SALAGEN) 5 MG tablet TAKE 1 TABLET BY MOUTH FOUR TIMES DAILY 360 tablet 0    verapamil ER (VERELAN) 240 MG 24 hr capsule Take 1 capsule (240 mg) by mouth at bedtime. 90 capsule 1     No current facility-administered medications for this visit.       VITALS   /64 (BP Location: Right arm, Patient Position: Sitting, Cuff Size: Adult Regular)   Pulse 72   Temp 97.9  F (36.6  C) (Tympanic)   Resp 16   Wt 82.6 kg (182 lb 3.2 oz)   SpO2 97%   BMI 24.71 kg/m       PHQ9                       Depression Screening Follow-up        11/5/2024    11:03 AM   PHQ   PHQ-9 Total Score 9    Q9: Thoughts of better off dead/self-harm past 2 weeks Not at all        Patient-reported       Does the patient currently have a mental health provider?  Yes,    Annette Hammond MD         LABS                                                                                                                          Last Comprehensive Metabolic Panel:  Sodium   Date Value Ref Range Status   07/22/2024 141 135 - 145 mmol/L Final   04/08/2021 139 133 - 144 mmol/L Final     Potassium   Date Value Ref Range Status   07/22/2024 4.0 3.4 - 5.3 mmol/L Final   08/01/2022 3.1 (L) 3.4 - 5.3 mmol/L Final   04/08/2021 3.1 (L) 3.4 - 5.3 mmol/L Final     Chloride   Date Value Ref Range Status   07/22/2024 104 98 - 107 mmol/L Final   08/01/2022 104 94 - 109 mmol/L Final   04/08/2021 105 94 - 109 mmol/L Final     Carbon Dioxide   Date Value Ref Range Status   04/08/2021 29 20 - 32 mmol/L Final     Carbon Dioxide (CO2)   Date Value Ref Range Status   07/22/2024 26 22 - 29 mmol/L Final   08/01/2022 26 20 - 32 mmol/L Final     Anion Gap   Date Value Ref Range Status   07/22/2024 11 7 - 15 mmol/L Final   08/01/2022 9 3 - 14 mmol/L Final   04/08/2021 5 3 - 14 mmol/L Final     Glucose   Date Value Ref Range Status   07/22/2024 96 70 - 99 mg/dL Final   08/01/2022 129 (H) 70 - 99 mg/dL Final   04/08/2021 96 70 - 99 mg/dL Final     Urea Nitrogen   Date Value Ref Range Status   07/22/2024 4.9 (L) 6.0 - 20.0 mg/dL Final   08/01/2022 8 7 - 30 mg/dL Final   04/08/2021 7 7 - 30 mg/dL Final     Creatinine   Date Value Ref Range Status   07/22/2024 0.79 0.67 - 1.17 mg/dL Final   04/08/2021 0.81 0.66 - 1.25 mg/dL Final     GFR Estimate   Date Value Ref Range Status   07/22/2024 >90 >60 mL/min/1.73m2 Final     Comment:     eGFR calculated using 2021 CKD-EPI equation.   04/08/2021 >90 >60 mL/min/[1.73_m2] Final     Comment:     Non  GFR Calc  Starting 12/18/2018, serum creatinine based estimated GFR (eGFR) will be   calculated using the Chronic Kidney Disease Epidemiology Collaboration   (CKD-EPI) equation.       Calcium   Date Value Ref Range Status   07/22/2024 9.3 8.8 - 10.4 mg/dL Final     Comment:     Reference intervals for this test were updated on 7/16/2024 to reflect our healthy population  "more accurately. There may be differences in the flagging of prior results with similar values performed with this method. Those prior results can be interpreted in the context of the updated reference intervals.   04/08/2021 9.0 8.5 - 10.1 mg/dL Final     CBC RESULTS:   Recent Labs   Lab Test 10/23/23  1128   WBC 7.3   RBC 4.68   HGB 14.3   HCT 43.2   MCV 92   MCH 30.6   MCHC 33.1   RDW 13.1           MENTAL STATUS EXAM                                                                                       Awake, alert. No problems with speech. Psychomotor: unremarkable.  Mood euthymic. Thought process, including associations, was unremarkable and thought content was devoid of suicidal and homicidal ideation.  No psychosis today -> no paranoia.  Judgment was adequate for safety. Fund of knowledge was intact. Pt demonstrates no obvious problems with attention, concentration, language, recent or remote memory although these were not formally tested.     ASSESSMENT                                                                                                      HISTORICAL:  Initial psych note 12/28/20          NOTES:  discharge summary from April 8/13/20: \"Lithium was tapered and discontinued in march/begining of April. At that time his lamictal was increased. It appears that lithium was tapered due excessive thirst and dry mouth. Elavil was increased for insomnia in February.\" Seroquel \" weight gain, didn't like how made him feel. Zyprexa ? Liang.     Andrei Whitman is a 54 yo with bipolar disorder and ADHD with psychiatric hospitalization August 2020. Was on amitriptyline, Adderall, Wellbutrin : all activating meds although Andrei notes he feels was the Lamictal that precipitated bibiana. Was noted Depakote for him past weight gain, lithium polyuria and thirst, uncertain whether he had been on neuroleptics (I saw Latuda mentioned in [facility] notes).     Andrei doing well today. Only change is we agreed on increasing " Adderall dose from 5 mg daily to 10 mg daily for next time he fills. He notes Paulette has been feeling ihs ADHD sx not under control for while hence feeling he should increase dose - he's in agreement and fine this as of michelle.      TREATMENT RISK STATEMENT:  The risks, benefits, alternatives and potential adverse effects have been explained and are understood by the pt.  The pt agrees to the treatment plan with the ability to do so.   The pt knows to call the clinic for any problems or access emergency care if needed.        DIAGNOSES                     Bipolar I disorder vs. Schizoaffective Disorder  ADHD    PLAN                                                                                                                    1)  MEDICATIONS:         -- Continue Tegretol  mg twice daily Increase Adderall from 5 mg to 10 mg daily filled next 11/18/24    2)  THERAPY:  No Change    3)  LABS:  lipid, CBC CMP 10/2023    4)  PT MONITOR [call for probs]:  Worsening symptoms, SI/HI, SEs from meds    5)  REFERRALS [CD, medical, other]:  None    6)  RTC: 2-3  months          Answers submitted by the patient for this visit:  Patient Health Questionnaire (Submitted on 11/5/2024)  If you checked off any problems, how difficult have these problems made it for you to do your work, take care of things at home, or get along with other people?: Somewhat difficult  PHQ9 TOTAL SCORE: 9  Patient Health Questionnaire (G7) (Submitted on 11/5/2024)  SONDRA 7 TOTAL SCORE: 8

## 2024-11-18 DIAGNOSIS — F90.0 ADHD (ATTENTION DEFICIT HYPERACTIVITY DISORDER), INATTENTIVE TYPE: ICD-10-CM

## 2024-11-18 RX ORDER — DEXTROAMPHETAMINE SACCHARATE, AMPHETAMINE ASPARTATE, DEXTROAMPHETAMINE SULFATE AND AMPHETAMINE SULFATE 1.25; 1.25; 1.25; 1.25 MG/1; MG/1; MG/1; MG/1
5 TABLET ORAL DAILY
Qty: 30 TABLET | Refills: 0 | Status: SHIPPED | OUTPATIENT
Start: 2024-11-18

## 2024-11-18 NOTE — TELEPHONE ENCOUNTER
Amphetamine-dextroamphetamine  (Adderall) 10 mg tablet  Take 1/2 tablet (5 mg) by mouth daily  Last Written Prescription Date:  10-18-24  Last Fill Quantity: 15 tablet,   # refills: 0  Last Office Visit: 11-5-24  Future Office visit:      PLEASE NOTE:  at last office visit of 11-5-24, the dose of Adderall was increased from 5 mg to 10 mg daily for next fill on 11-18-24    Next 5 appointments (look out 90 days)      Jan 07, 2025 11:20 AM  (Arrive by 11:05 AM)  Return Visit with Annette Hammond MD  [facility] ([facility] )  [address] [place] [id number] [phone]

## 2024-12-04 DIAGNOSIS — K11.7 CLINICAL XEROSTOMIA: ICD-10-CM

## 2024-12-04 RX ORDER — PILOCARPINE HYDROCHLORIDE 5 MG/1
5 TABLET, FILM COATED ORAL 4 TIMES DAILY
Qty: 360 TABLET | Refills: 0 | Status: SHIPPED | OUTPATIENT
Start: 2024-12-04

## 2024-12-04 NOTE — TELEPHONE ENCOUNTER
pilocarpine (SALAGEN) 5 MG tablet       Last Written Prescription Date:  8/19/24  Last Fill Quantity: 360,   # refills: 0  Last Office Visit: 9/12/24  Future Office visit:    Next 5 appointments (look out 90 days)      Jan 07, 2025 11:20 AM  (Arrive by 11:05 AM)  Return Visit with Annette Hammond MD  [facility] ([facility] ) [address] [place] [address]  [phone]             Routing refill request to provider for review/approval because:  Drug not on the [facility], [facility] or [facility] refill protocol or controlled substance

## 2024-12-21 DIAGNOSIS — F90.0 ADHD (ATTENTION DEFICIT HYPERACTIVITY DISORDER), INATTENTIVE TYPE: ICD-10-CM

## 2024-12-23 RX ORDER — DEXTROAMPHETAMINE SACCHARATE, AMPHETAMINE ASPARTATE, DEXTROAMPHETAMINE SULFATE AND AMPHETAMINE SULFATE 1.25; 1.25; 1.25; 1.25 MG/1; MG/1; MG/1; MG/1
5 TABLET ORAL DAILY
Qty: 30 TABLET | Refills: 0 | Status: SHIPPED | OUTPATIENT
Start: 2024-12-23

## 2024-12-23 NOTE — TELEPHONE ENCOUNTER
Adderall      Last Written Prescription Date:  7/18/19  Last Fill Quantity: 60,   # refills: 0  Last Office Visit: 7/31/19  Future Office visit:    Next 5 appointments (look out 90 days)    Sep 11, 2019 10:20 AM CDT  (Arrive by 10:00 AM)  SHORT with Tommy Lamb DO  [facility] ([facility] ) [address]  [place] [address]  [phone]           Routing refill request to provider for review/approval because:  Drug not on the [facility], [facility] or [facility] refill protocol or controlled substance     Baseline creatinine is around 1.4-1.8.  Follows with Dr. Quiñonez of [facility].  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  Continue pre and postcontrast IV fluid as ordered.  Okay to stop IV fluid after 6 hours of cardiac catheterization, order changed to continue IV fluid for another 10 hours

## 2024-12-23 NOTE — TELEPHONE ENCOUNTER
Adderall      Last Written Prescription Date:  11.19.24  Last Fill Quantity: #30,   # refills: 0  Last Office Visit: 11.5.24   Future Office visit:    Next 5 appointments (look out 90 days)      Jan 07, 2025 11:20 AM  (Arrive by 11:05 AM)  Return Visit with Annette Hammond MD  [facility] ([facility] ) [address] [place] [id number]  [phone]             Routing refill request to provider for review/approval because:  Drug not on the [facility], [facility] or [facility] refill protocol or controlled substance     [facility]

## 2025-01-07 ENCOUNTER — OFFICE VISIT (OUTPATIENT)
Dept: PSYCHIATRY | Facility: OTHER | Age: 55
End: 2025-01-07
Attending: PSYCHIATRY & NEUROLOGY
Payer: COMMERCIAL

## 2025-01-07 VITALS
WEIGHT: 183 LBS | OXYGEN SATURATION: 98 % | HEART RATE: 80 BPM | SYSTOLIC BLOOD PRESSURE: 130 MMHG | DIASTOLIC BLOOD PRESSURE: 78 MMHG | RESPIRATION RATE: 18 BRPM | TEMPERATURE: 97.3 F | BODY MASS INDEX: 24.82 KG/M2

## 2025-01-07 DIAGNOSIS — F90.0 ADHD (ATTENTION DEFICIT HYPERACTIVITY DISORDER), INATTENTIVE TYPE: Primary | ICD-10-CM

## 2025-01-07 PROCEDURE — G0463 HOSPITAL OUTPT CLINIC VISIT: HCPCS

## 2025-01-07 RX ORDER — DEXTROAMPHETAMINE SACCHARATE, AMPHETAMINE ASPARTATE, DEXTROAMPHETAMINE SULFATE AND AMPHETAMINE SULFATE 2.5; 2.5; 2.5; 2.5 MG/1; MG/1; MG/1; MG/1
10 TABLET ORAL DAILY
Qty: 30 TABLET | Refills: 0 | Status: SHIPPED | OUTPATIENT
Start: 2025-01-07 | End: 2025-02-06

## 2025-01-07 ASSESSMENT — PAIN SCALES - GENERAL: PAINLEVEL_OUTOF10: SEVERE PAIN (6)

## 2025-01-07 ASSESSMENT — ANXIETY QUESTIONNAIRES
8. IF YOU CHECKED OFF ANY PROBLEMS, HOW DIFFICULT HAVE THESE MADE IT FOR YOU TO DO YOUR WORK, TAKE CARE OF THINGS AT HOME, OR GET ALONG WITH OTHER PEOPLE?: VERY DIFFICULT
1. FEELING NERVOUS, ANXIOUS, OR ON EDGE: MORE THAN HALF THE DAYS
IF YOU CHECKED OFF ANY PROBLEMS ON THIS QUESTIONNAIRE, HOW DIFFICULT HAVE THESE PROBLEMS MADE IT FOR YOU TO DO YOUR WORK, TAKE CARE OF THINGS AT HOME, OR GET ALONG WITH OTHER PEOPLE: VERY DIFFICULT
6. BECOMING EASILY ANNOYED OR IRRITABLE: MORE THAN HALF THE DAYS
4. TROUBLE RELAXING: MORE THAN HALF THE DAYS
GAD7 TOTAL SCORE: 14
2. NOT BEING ABLE TO STOP OR CONTROL WORRYING: MORE THAN HALF THE DAYS
7. FEELING AFRAID AS IF SOMETHING AWFUL MIGHT HAPPEN: MORE THAN HALF THE DAYS
3. WORRYING TOO MUCH ABOUT DIFFERENT THINGS: MORE THAN HALF THE DAYS
GAD7 TOTAL SCORE: 14
7. FEELING AFRAID AS IF SOMETHING AWFUL MIGHT HAPPEN: MORE THAN HALF THE DAYS
5. BEING SO RESTLESS THAT IT IS HARD TO SIT STILL: MORE THAN HALF THE DAYS
GAD7 TOTAL SCORE: 14

## 2025-01-07 ASSESSMENT — COLUMBIA-SUICIDE SEVERITY RATING SCALE - C-SSRS
2. HAVE YOU ACTUALLY HAD ANY THOUGHTS OF KILLING YOURSELF?: NO
6. IN YOUR LIFETIME, HAVE YOU EVER DONE ANYTHING, STARTED TO DO ANYTHING, OR PREPARED TO DO ANYTHING TO END YOUR LIFE?: NO
1. IN THE PAST MONTH, HAVE YOU WISHED YOU WERE DEAD OR WISHED YOU COULD GO TO SLEEP AND NOT WAKE UP?: NO

## 2025-01-07 NOTE — PROGRESS NOTES
"      PSYCHIATRY CLINIC PROGRESS NOTE     SUBJECTIVE / INTERIM HISTORY                                                                          Last visit 11/15/24: Continue Tegretol  mg twice daily Increase Adderall from 5 mg to 10 mg daily filled next 11/18/24  - Christmas Paulette's kids were supposed to come over and they cancelled on Christmas Eve. Then they called [employer] and cancelled for that too. Paulette was pretty bummed out  - took Paulette to Dr. Moreno and that appointment went well  - \"I got up at 6, left at 9 and was a half hour late for a half hour appointment\". Andrei notes this is the kind of thing that happends when he doesn't take ADHD med (pharamcy is out(  - ran out of Adderall end of December  - son is 20% owner of [employer]. Son had baby  - didn't end up getting out to his parent's house for xmas day  -[place]. Dad still lives out there and Andrei's aunt lives in house next to dad. For while bought it and he lived there with Brianna and when was with her then in 2010 when  Brianna and moved to [place] with Paulette when they started relationship.   -  Finished HCC with AA and was planning to go for radiology degree. Then started in [place] for [employer].  and notes dated Brianna [employer] of [employer] and raised Ross who passed away form overdose. And Andrei's nephew Andrei murdered.  - Social/Spiritual Support- sister Caroline, dad Carlos, GF Paulette [place]       MEDICAL / SURGICAL HISTORY                     Patient Active Problem List   Diagnosis    Cervicalgia    Lower back pain    Segmental and somatic dysfunction of lower extremity    GERD (gastroesophageal reflux disease)    Mood disorder (H)    Abnormal weight gain    Attention deficit hyperactivity disorder (ADHD), predominantly inattentive type    ACP (advance care planning)    Flatulence, eructation, and gas pain    Bipolar disease, chronic (H)    Benign essential hypertension    Russell esophagus    " Tobacco dependency    Lithium use    DDD (degenerative disc disease), cervical    Cervical stenosis of spinal canal    Obesity (BMI 35.0-39.9) with comorbidity (H)    Chronic lung disease    Suicidal behavior    Dry mouth    Anxiety state    Insomnia, unspecified    Left hip pain    Perianal abscess    Mass of left parotid gland     ALLERGY   Patient has no known allergies.  MEDICATIONS                                                                                             Current Outpatient Medications   Medication Sig Dispense Refill    carBAMazepine (TEGRETOL XR) 200 MG 12 hr tablet Take 1 tablet (200 mg) by mouth 2 times daily 60 tablet 6    chlorhexidine (PERIDEX) 0.12 % solution SWISH AND SPIT 15 MLS IN MOUTH 2 TIMES DAILY 473 mL 2    amphetamine-dextroamphetamine (ADDERALL) 5 MG tablet TAKE 1 TABLET (5 MG) BY MOUTH DAILY 30 tablet 0    amphetamine-dextroamphetamine (ADDERALL) 5 MG tablet Take 1 tablet (5 mg) by mouth daily 30 tablet 0    aspirin 81 MG tablet Take 1 tablet (81 mg) by mouth daily 30 tablet     gabapentin (NEURONTIN) 300 MG capsule TAKE 2 CAPSULES (600 MG) BY MOUTH 3 TIMES DAILY 180 capsule 5    omeprazole (PRILOSEC) 40 MG DR capsule TAKE 1 CAPSULE BY MOUTH DAILY 90 capsule 2    pilocarpine (SALAGEN) 5 MG tablet TAKE 1 TABLET BY MOUTH FOUR TIMES DAILY 360 tablet 0    verapamil ER (VERELAN) 240 MG 24 hr capsule Take 1 capsule (240 mg) by mouth at bedtime. 90 capsule 1     No current facility-administered medications for this visit.       VITALS   Wt 83 kg (183 lb)   BMI 24.82 kg/m       PHQ9                       Depression Screening Follow-up        1/7/2025    11:39 AM   PHQ   PHQ-9 Total Score 9    Q9: Thoughts of better off dead/self-harm past 2 weeks Not at all       Patient-reported       Does the patient currently have a mental health provider?  Yes,    Annette Hammond MD         LABS                                                                                                                          Last Comprehensive Metabolic Panel:  Sodium   Date Value Ref Range Status   07/22/2024 141 135 - 145 mmol/L Final   04/08/2021 139 133 - 144 mmol/L Final     Potassium   Date Value Ref Range Status   07/22/2024 4.0 3.4 - 5.3 mmol/L Final   08/01/2022 3.1 (L) 3.4 - 5.3 mmol/L Final   04/08/2021 3.1 (L) 3.4 - 5.3 mmol/L Final     Chloride   Date Value Ref Range Status   07/22/2024 104 98 - 107 mmol/L Final   08/01/2022 104 94 - 109 mmol/L Final   04/08/2021 105 94 - 109 mmol/L Final     Carbon Dioxide   Date Value Ref Range Status   04/08/2021 29 20 - 32 mmol/L Final     Carbon Dioxide (CO2)   Date Value Ref Range Status   07/22/2024 26 22 - 29 mmol/L Final   08/01/2022 26 20 - 32 mmol/L Final     Anion Gap   Date Value Ref Range Status   07/22/2024 11 7 - 15 mmol/L Final   08/01/2022 9 3 - 14 mmol/L Final   04/08/2021 5 3 - 14 mmol/L Final     Glucose   Date Value Ref Range Status   07/22/2024 96 70 - 99 mg/dL Final   08/01/2022 129 (H) 70 - 99 mg/dL Final   04/08/2021 96 70 - 99 mg/dL Final     Urea Nitrogen   Date Value Ref Range Status   07/22/2024 4.9 (L) 6.0 - 20.0 mg/dL Final   08/01/2022 8 7 - 30 mg/dL Final   04/08/2021 7 7 - 30 mg/dL Final     Creatinine   Date Value Ref Range Status   07/22/2024 0.79 0.67 - 1.17 mg/dL Final   04/08/2021 0.81 0.66 - 1.25 mg/dL Final     GFR Estimate   Date Value Ref Range Status   07/22/2024 >90 >60 mL/min/1.73m2 Final     Comment:     eGFR calculated using 2021 CKD-EPI equation.   04/08/2021 >90 >60 mL/min/[1.73_m2] Final     Comment:     Non  GFR Calc  Starting 12/18/2018, serum creatinine based estimated GFR (eGFR) will be   calculated using the Chronic Kidney Disease Epidemiology Collaboration   (CKD-EPI) equation.       Calcium   Date Value Ref Range Status   07/22/2024 9.3 8.8 - 10.4 mg/dL Final     Comment:     Reference intervals for this test were updated on 7/16/2024 to reflect our healthy population more accurately. There may be  "differences in the flagging of prior results with similar values performed with this method. Those prior results can be interpreted in the context of the updated reference intervals.   04/08/2021 9.0 8.5 - 10.1 mg/dL Final     CBC RESULTS:   Recent Labs   Lab Test 10/23/23  1128   WBC 7.3   RBC 4.68   HGB 14.3   HCT 43.2   MCV 92   MCH 30.6   MCHC 33.1   RDW 13.1           MENTAL STATUS EXAM                                                                                       Awake, alert. No problems with speech. Psychomotor: unremarkable.  Mood euthymic. Thought process, including associations, was unremarkable and thought content was devoid of suicidal and homicidal ideation.  No psychosis today -> no paranoia.  Judgment was adequate for safety. Fund of knowledge was intact. Pt demonstrates no obvious problems with attention, concentration, language, recent or remote memory although these were not formally tested.     ASSESSMENT                                                                                                      HISTORICAL:  Initial psych note 12/28/20          NOTES:  discharge summary from April 8/13/20: \"Lithium was tapered and discontinued in march/begining of April. At that time his lamictal was increased. It appears that lithium was tapered due excessive thirst and dry mouth. Elavil was increased for insomnia in February.\" Seroquel \" weight gain, didn't like how made him feel. Zyprexa ? Liang.     Andrei Whitman is a 54 yo with bipolar disorder and ADHD with psychiatric hospitalization August 2020. Was on amitriptyline, Adderall, Wellbutrin : all activating meds although Andrei notes he feels was the Lamictal that precipitated bibiana. Was noted Depakote for him past weight gain, lithium polyuria and thirst, uncertain whether he had been on neuroleptics (I saw [facility] mentioned in [facility] notes).     Andrei doing well today. We are going to try for Adderall 10 mg daily as we already had talked " about this and everywhere he has called is out of Adderall 5 mg.       TREATMENT RISK STATEMENT:  The risks, benefits, alternatives and potential adverse effects have been explained and are understood by the pt.  The pt agrees to the treatment plan with the ability to do so.   The pt knows to call the clinic for any problems or access emergency care if needed.        DIAGNOSES                     Bipolar I disorder vs. Schizoaffective Disorder  ADHD    PLAN                                                                                                                    1)  MEDICATIONS:         -- Continue Tegretol  mg twice daily Increase Adderall from 5 mg to 10 mg daily filled today 1/7/25.     2)  THERAPY:  No Change    3)  LABS:  lipid, CBC CMP 10/2023    4)  PT MONITOR [call for probs]:  Worsening symptoms, SI/HI, SEs from meds    5)  REFERRALS [CD, medical, other]:  None    6)  RTC: 2-3  months          Answers submitted by the patient for this visit:  Patient Health Questionnaire (Submitted on 1/7/2025)  If you checked off any problems, how difficult have these problems made it for you to do your work, take care of things at home, or get along with other people?: Somewhat difficult  PHQ9 TOTAL SCORE: 9  Patient Health Questionnaire (G7) (Submitted on 1/7/2025)  SONDRA 7 TOTAL SCORE: 14

## 2025-02-10 DIAGNOSIS — F31.9 BIPOLAR I DISORDER (H): ICD-10-CM

## 2025-02-10 RX ORDER — CARBAMAZEPINE 200 MG/1
200 TABLET, EXTENDED RELEASE ORAL 2 TIMES DAILY
Qty: 60 TABLET | Refills: 6 | Status: SHIPPED | OUTPATIENT
Start: 2025-02-10

## 2025-02-10 NOTE — TELEPHONE ENCOUNTER
Tegretol  MG      Last Written Prescription Date:  08/02/24  Last Fill Quantity: 60,   # refills: 6  Last Office Visit: 01/07/25  Future Office visit:    Next 5 appointments (look out 90 days)      Mar 04, 2025 11:20 AM  (Arrive by 11:05 AM)  Return Visit with Annette Hammond MD  [facility] ([facility] ) [address] [place] [id number]  [phone]             Routing refill request to provider for review/approval because:  Anti-Seizure Meds Protocol  Failed    Rerun Protocol (2/10/2025 1:15 AM)    Review Authorizing provider's last note.    Refer to last progress notes: confirm request is for original authorizing provider (cannot be through other providers).    Carbamazepine level within therapeutic range in last 26 months    No lab results found.     Carbamazepine level must be checked 2-4 weeks after dosage change.       Medication indicated for associated diagnosis    Medication is associated with one or more of the following diagnoses:                Bipolar              Dementia              Depression              Epilepsy              Migraine              Seizure              Trigeminal Neuralgia              Cyclothymia

## 2025-02-12 DIAGNOSIS — K21.9 GASTROESOPHAGEAL REFLUX DISEASE, UNSPECIFIED WHETHER ESOPHAGITIS PRESENT: ICD-10-CM

## 2025-02-12 RX ORDER — OMEPRAZOLE 40 MG/1
CAPSULE, DELAYED RELEASE ORAL
Qty: 90 CAPSULE | Refills: 2 | Status: SHIPPED | OUTPATIENT
Start: 2025-02-12

## 2025-02-24 NOTE — ED NOTES
Addended by: MARCELLE CASTILLO on: 2/24/2025 02:17 PM     Modules accepted: Orders     Report given to Natalia QUINTANA

## 2025-03-05 DIAGNOSIS — M54.12 CERVICAL RADICULOPATHY: ICD-10-CM

## 2025-03-05 DIAGNOSIS — F90.0 ADHD (ATTENTION DEFICIT HYPERACTIVITY DISORDER), INATTENTIVE TYPE: ICD-10-CM

## 2025-03-05 NOTE — TELEPHONE ENCOUNTER
Gabapentin  Last Written Prescription Date: 9/6/24  Last Fill Quantity: 180 # of Refills: 5  Last Office Visit: 1/7/25    Adderall  Last Written Prescription Date: 12/23/24  Last Fill Quantity: 30 # of Refills: 0  Last Office Visit: 1/7/25

## 2025-03-06 RX ORDER — GABAPENTIN 300 MG/1
600 CAPSULE ORAL 3 TIMES DAILY
Qty: 180 CAPSULE | Refills: 5 | Status: SHIPPED | OUTPATIENT
Start: 2025-03-06

## 2025-03-06 RX ORDER — DEXTROAMPHETAMINE SACCHARATE, AMPHETAMINE ASPARTATE, DEXTROAMPHETAMINE SULFATE AND AMPHETAMINE SULFATE 2.5; 2.5; 2.5; 2.5 MG/1; MG/1; MG/1; MG/1
10 TABLET ORAL DAILY
Qty: 30 TABLET | Refills: 0 | Status: SHIPPED | OUTPATIENT
Start: 2025-03-06 | End: 2025-04-05

## 2025-03-08 DIAGNOSIS — K05.10 GINGIVITIS: ICD-10-CM

## 2025-03-10 RX ORDER — CHLORHEXIDINE GLUCONATE ORAL RINSE 1.2 MG/ML
15 SOLUTION DENTAL 2 TIMES DAILY
Qty: 473 ML | Refills: 2 | Status: SHIPPED | OUTPATIENT
Start: 2025-03-10

## 2025-03-10 NOTE — TELEPHONE ENCOUNTER
Peridex 0.12% solution      Last Written Prescription Date:  08/19/24  Last Fill Quantity: 473ml,   # refills: 2  Last Office Visit: 09/12/24  Future Office visit:    Next 5 appointments (look out 90 days)      May 06, 2025 10:00 AM  (Arrive by 9:45 AM)  Return Visit with Annette Hammond MD  [facility] ([facility] ) [address] [place] [phone]  [phone]             Routing refill request to provider for review/approval because:  Drug not on the [facility], [facility] or [facility] refill protocol or controlled substance

## 2025-03-12 DIAGNOSIS — I10 ESSENTIAL HYPERTENSION: ICD-10-CM

## 2025-03-12 RX ORDER — VERAPAMIL HYDROCHLORIDE 240 MG/1
240 CAPSULE, DELAYED RELEASE ORAL AT BEDTIME
Qty: 90 CAPSULE | Refills: 1 | Status: SHIPPED | OUTPATIENT
Start: 2025-03-12

## 2025-04-07 DIAGNOSIS — F90.0 ADHD (ATTENTION DEFICIT HYPERACTIVITY DISORDER), INATTENTIVE TYPE: ICD-10-CM

## 2025-04-07 DIAGNOSIS — K11.7 CLINICAL XEROSTOMIA: ICD-10-CM

## 2025-04-07 RX ORDER — DEXTROAMPHETAMINE SACCHARATE, AMPHETAMINE ASPARTATE, DEXTROAMPHETAMINE SULFATE AND AMPHETAMINE SULFATE 2.5; 2.5; 2.5; 2.5 MG/1; MG/1; MG/1; MG/1
10 TABLET ORAL DAILY
Qty: 30 TABLET | Refills: 0 | Status: SHIPPED | OUTPATIENT
Start: 2025-04-07

## 2025-04-07 RX ORDER — PILOCARPINE HYDROCHLORIDE 5 MG/1
5 TABLET, FILM COATED ORAL 4 TIMES DAILY
Qty: 360 TABLET | Refills: 0 | Status: SHIPPED | OUTPATIENT
Start: 2025-04-07

## 2025-04-07 NOTE — TELEPHONE ENCOUNTER
Adderall 10 mg tablet       Last Written Prescription Date:  1/7/25  Last Fill Quantity: 30,   # refills: 0  Last Office Visit: 1/7/25  Future Office visit:    Next 5 appointments (look out 90 days)      May 06, 2025 10:00 AM  (Arrive by 9:45 AM)  Return Visit with Annette Hammond MD  [facility] ([facility] ) [address] [place] [phone]  [phone]             Routing refill request to provider for review/approval because:  Drug not on the [facility], [facility] or [facility] refill protocol or controlled substance

## 2025-04-07 NOTE — TELEPHONE ENCOUNTER
pilocarpine (SALAGEN) 5 MG tablet       Last Written Prescription Date:  12/4/24  Last Fill Quantity: 360,   # refills: 0  Last Office Visit: 9/12/24  Future Office visit:    Next 5 appointments (look out 90 days)      May 06, 2025 10:00 AM  (Arrive by 9:45 AM)  Return Visit with Annette Hammond MD  [facility] ([facility] ) [address] [place] [phone]  [phone]             Routing refill request to provider for review/approval because:  Drug not on the [facility], [facility] or [facility] refill protocol or controlled substance

## 2025-06-11 DIAGNOSIS — F90.0 ADHD (ATTENTION DEFICIT HYPERACTIVITY DISORDER), INATTENTIVE TYPE: ICD-10-CM

## 2025-06-11 RX ORDER — DEXTROAMPHETAMINE SACCHARATE, AMPHETAMINE ASPARTATE, DEXTROAMPHETAMINE SULFATE AND AMPHETAMINE SULFATE 2.5; 2.5; 2.5; 2.5 MG/1; MG/1; MG/1; MG/1
10 TABLET ORAL DAILY
Qty: 30 TABLET | Refills: 0 | Status: SHIPPED | OUTPATIENT
Start: 2025-06-11

## 2025-06-11 NOTE — TELEPHONE ENCOUNTER
Adderall 10 MG      Last Written Prescription Date:  05/09/25  Last Fill Quantity: 30,   # refills: 0  Last Office Visit: 01/07/25  Future Office visit:    Next 5 appointments (look out 90 days)      Jul 18, 2025 10:00 AM  (Arrive by 9:45 AM)  Return Visit with Annette Hammond MD  [facility] ([facility] ) [address] [place] [phone]  [phone]             Routing refill request to provider for review/approval because:  Drug not on the [facility], [facility] or [facility] refill protocol or controlled substance

## 2025-07-31 DIAGNOSIS — K11.7 CLINICAL XEROSTOMIA: ICD-10-CM

## 2025-07-31 RX ORDER — PILOCARPINE HYDROCHLORIDE 5 MG/1
5 TABLET, FILM COATED ORAL 4 TIMES DAILY
Qty: 360 TABLET | Refills: 0 | Status: SHIPPED | OUTPATIENT
Start: 2025-07-31

## 2025-07-31 NOTE — TELEPHONE ENCOUNTER
pilocarpine (SALAGEN) 5 MG tablet         Last Written Prescription Date:  4/7/25  Last Fill Quantity: 360,   # refills: 0  Last Office Visit: 9/12/24  Future Office visit:    Next 5 appointments (look out 90 days)      Sep 19, 2025 8:40 AM  (Arrive by 8:25 AM)  Return Visit with Annette Hammond MD  [facility] ([facility] ) [address] [place] [phone]  [phone]             Routing refill request to provider for review/approval because:  Drug not on the [facility], [facility] or [facility] refill protocol or controlled substance

## 2025-08-05 DIAGNOSIS — K05.10 GINGIVITIS: ICD-10-CM

## 2025-08-05 RX ORDER — CHLORHEXIDINE GLUCONATE ORAL RINSE 1.2 MG/ML
15 SOLUTION DENTAL 2 TIMES DAILY
Qty: 473 ML | Refills: 11 | Status: SHIPPED | OUTPATIENT
Start: 2025-08-05

## 2025-08-28 DIAGNOSIS — I10 ESSENTIAL HYPERTENSION: ICD-10-CM

## 2025-08-28 RX ORDER — VERAPAMIL HYDROCHLORIDE 240 MG/1
240 CAPSULE, DELAYED RELEASE ORAL AT BEDTIME
Qty: 90 CAPSULE | Refills: 0 | Status: SHIPPED | OUTPATIENT
Start: 2025-08-28

## 2025-08-29 DIAGNOSIS — M54.12 CERVICAL RADICULOPATHY: ICD-10-CM

## 2025-08-30 RX ORDER — GABAPENTIN 300 MG/1
600 CAPSULE ORAL 3 TIMES DAILY
Qty: 180 CAPSULE | Refills: 5 | Status: SHIPPED | OUTPATIENT
Start: 2025-08-30

## (undated) DEVICE — IRRIGATION-H2O 1000ML

## (undated) DEVICE — LABEL-STERILE PREPRINTED FOR OR

## (undated) DEVICE — CANISTER-SUCTION 2000CC

## (undated) DEVICE — SENSOR-OXISENSOR II ADULT

## (undated) DEVICE — SYRINGE-30CC SLIP TIP

## (undated) DEVICE — FORCEP-COLON BIOPSY LARGE W/NEEDLE 240CM

## (undated) DEVICE — LIGHT HANDLE COVER

## (undated) DEVICE — CAUTERY PAD-POLYHESIVE II ADULT

## (undated) DEVICE — FORCEP-COLON BIOPSY STD W/NEEDLE 160CM

## (undated) DEVICE — SCD SLEEVE-KNEE REG.

## (undated) DEVICE — TUBING-SUCTION 20FT

## (undated) DEVICE — MOUTHPIECE W/GUARD FOR ENDOSCOPY

## (undated) DEVICE — CONNECTOR-ERBEFLO 2 PORT

## (undated) DEVICE — LUBRICANT JELLY 2OZ. TUBE

## (undated) DEVICE — PANTIES-MESH L/XL

## (undated) DEVICE — TRAY-SKIN PREP POVIDONE/IODINE

## (undated) DEVICE — DRSG-SPONGE STERILE 4 X 4

## (undated) DEVICE — IRRIGATION-NACL 1000ML

## (undated) DEVICE — GLV-7.5 BIOGEL LATEX

## (undated) DEVICE — PACK-BASIN SET-UP

## (undated) DEVICE — PACK-LAPAROTOMY-CUSTOM

## (undated) DEVICE — FORCEP-COLON C NEEDLE  SWING JAW  FB-220UA

## (undated) RX ORDER — PROPOFOL 10 MG/ML
INJECTION, EMULSION INTRAVENOUS
Status: DISPENSED
Start: 2017-07-12

## (undated) RX ORDER — LIDOCAINE HYDROCHLORIDE 20 MG/ML
INJECTION, SOLUTION EPIDURAL; INFILTRATION; INTRACAUDAL; PERINEURAL
Status: DISPENSED
Start: 2017-07-12

## (undated) RX ORDER — KETAMINE HCL IN NACL, ISO-OSM 100MG/10ML
SYRINGE (ML) INJECTION
Status: DISPENSED
Start: 2021-04-08

## (undated) RX ORDER — LIDOCAINE HYDROCHLORIDE 20 MG/ML
INJECTION, SOLUTION EPIDURAL; INFILTRATION; INTRACAUDAL; PERINEURAL
Status: DISPENSED
Start: 2021-04-08

## (undated) RX ORDER — PROPOFOL 10 MG/ML
INJECTION, EMULSION INTRAVENOUS
Status: DISPENSED
Start: 2018-06-20

## (undated) RX ORDER — PROPOFOL 10 MG/ML
INJECTION, EMULSION INTRAVENOUS
Status: DISPENSED
Start: 2021-04-08

## (undated) RX ORDER — LIDOCAINE HYDROCHLORIDE 20 MG/ML
INJECTION, SOLUTION EPIDURAL; INFILTRATION; INTRACAUDAL; PERINEURAL
Status: DISPENSED
Start: 2018-06-20

## (undated) RX ORDER — ONDANSETRON 2 MG/ML
INJECTION INTRAMUSCULAR; INTRAVENOUS
Status: DISPENSED
Start: 2021-04-08

## (undated) RX ORDER — FENTANYL CITRATE 50 UG/ML
INJECTION, SOLUTION INTRAMUSCULAR; INTRAVENOUS
Status: DISPENSED
Start: 2021-04-08

## (undated) RX ORDER — DEXAMETHASONE SODIUM PHOSPHATE 10 MG/ML
INJECTION, SOLUTION INTRAMUSCULAR; INTRAVENOUS
Status: DISPENSED
Start: 2021-04-08